# Patient Record
Sex: MALE | Race: BLACK OR AFRICAN AMERICAN | Employment: UNEMPLOYED | URBAN - METROPOLITAN AREA
[De-identification: names, ages, dates, MRNs, and addresses within clinical notes are randomized per-mention and may not be internally consistent; named-entity substitution may affect disease eponyms.]

---

## 2018-09-09 ENCOUNTER — HOSPITAL ENCOUNTER (EMERGENCY)
Age: 33
Discharge: HOME | End: 2018-09-09
Attending: EMERGENCY MEDICINE
Payer: SELF-PAY

## 2018-09-09 ENCOUNTER — APPOINTMENT (OUTPATIENT)
Dept: GENERAL RADIOLOGY | Age: 33
End: 2018-09-09
Attending: EMERGENCY MEDICINE
Payer: SELF-PAY

## 2018-09-09 VITALS — HEART RATE: 106 BPM | DIASTOLIC BLOOD PRESSURE: 67 MMHG | SYSTOLIC BLOOD PRESSURE: 149 MMHG | OXYGEN SATURATION: 96 %

## 2018-09-09 DIAGNOSIS — J20.9 ACUTE BRONCHITIS, UNSPECIFIED ORGANISM: ICD-10-CM

## 2018-09-09 DIAGNOSIS — J45.41 MODERATE PERSISTENT ASTHMA WITH ACUTE EXACERBATION: Primary | ICD-10-CM

## 2018-09-09 LAB
FLUAV AG NPH QL IA: NEGATIVE
FLUBV AG NPH QL IA: NEGATIVE
SPECIMEN SOURCE: NORMAL

## 2018-09-09 PROCEDURE — 94640 AIRWAY INHALATION TREATMENT: CPT

## 2018-09-09 PROCEDURE — 74011250637 HC RX REV CODE- 250/637: Performed by: EMERGENCY MEDICINE

## 2018-09-09 PROCEDURE — 74011000250 HC RX REV CODE- 250: Performed by: EMERGENCY MEDICINE

## 2018-09-09 PROCEDURE — 99284 EMERGENCY DEPT VISIT MOD MDM: CPT | Performed by: EMERGENCY MEDICINE

## 2018-09-09 PROCEDURE — 87804 INFLUENZA ASSAY W/OPTIC: CPT

## 2018-09-09 PROCEDURE — 71046 X-RAY EXAM CHEST 2 VIEWS: CPT

## 2018-09-09 PROCEDURE — 96372 THER/PROPH/DIAG INJ SC/IM: CPT | Performed by: EMERGENCY MEDICINE

## 2018-09-09 PROCEDURE — 74011250636 HC RX REV CODE- 250/636: Performed by: EMERGENCY MEDICINE

## 2018-09-09 RX ORDER — AMOXICILLIN 500 MG/1
500 CAPSULE ORAL 3 TIMES DAILY
Qty: 21 CAP | Refills: 0 | Status: ON HOLD | OUTPATIENT
Start: 2018-09-09 | End: 2022-04-04

## 2018-09-09 RX ORDER — BENZONATATE 200 MG/1
200 CAPSULE ORAL
Qty: 21 CAP | Refills: 0 | Status: SHIPPED | OUTPATIENT
Start: 2018-09-09 | End: 2018-09-16

## 2018-09-09 RX ORDER — IPRATROPIUM BROMIDE AND ALBUTEROL SULFATE 2.5; .5 MG/3ML; MG/3ML
3 SOLUTION RESPIRATORY (INHALATION)
Status: COMPLETED | OUTPATIENT
Start: 2018-09-09 | End: 2018-09-09

## 2018-09-09 RX ORDER — ALBUTEROL SULFATE 0.83 MG/ML
10 SOLUTION RESPIRATORY (INHALATION)
Status: COMPLETED | OUTPATIENT
Start: 2018-09-09 | End: 2018-09-09

## 2018-09-09 RX ORDER — PREDNISONE 20 MG/1
60 TABLET ORAL DAILY
Qty: 15 TAB | Refills: 0 | Status: SHIPPED | OUTPATIENT
Start: 2018-09-09 | End: 2018-09-14

## 2018-09-09 RX ORDER — BENZONATATE 100 MG/1
200 CAPSULE ORAL
Status: COMPLETED | OUTPATIENT
Start: 2018-09-09 | End: 2018-09-09

## 2018-09-09 RX ORDER — ALBUTEROL SULFATE 90 UG/1
2 AEROSOL, METERED RESPIRATORY (INHALATION)
Qty: 1 INHALER | Refills: 0 | OUTPATIENT
Start: 2018-09-09 | End: 2021-10-19

## 2018-09-09 RX ADMIN — ALBUTEROL SULFATE 10 MG: 2.5 SOLUTION RESPIRATORY (INHALATION) at 13:53

## 2018-09-09 RX ADMIN — IPRATROPIUM BROMIDE AND ALBUTEROL SULFATE 3 ML: .5; 3 SOLUTION RESPIRATORY (INHALATION) at 13:18

## 2018-09-09 RX ADMIN — METHYLPREDNISOLONE SODIUM SUCCINATE 125 MG: 125 INJECTION, POWDER, FOR SOLUTION INTRAMUSCULAR; INTRAVENOUS at 14:01

## 2018-09-09 RX ADMIN — BENZONATATE 200 MG: 100 CAPSULE ORAL at 14:01

## 2018-09-09 NOTE — DISCHARGE INSTRUCTIONS
Use inhlaer 2 puffs every 6 hours  1,200mg mucinex DM every 12 hours for a week. Try a sustained release sudafed every morning,  Drink plenty of fluids       Asthma: Your Action Plan  Sample Action Plan    Controller medicine action plan  Fill in the blank spaces and boxes that apply for all sections. · Name of your controller medicine:  ¨ ____________________________________________  · How much of this medicine do you take? ¨ ____________________________________________  · How often do you take this medicine? ¨ ____________________________________________  · Other instructions? ¨ ____________________________________________  Quick-relief medicine action plan  · Name of your quick-relief medicine:  ¨ ____________________________________________  · How much of this medicine do you take? ¨ ____________________________________________  · How often do you take this medicine? ¨ ____________________________________________  Asthma Zones  GREEN ZONE: This is where you want to be! Green zone symptoms  · You have no shortness of breath or chest tightness. You are not coughing or wheezing. · You can do all of your usual activities. · You sleep well at night. Green zone peak flow (if you use a peak flow meter)  · ______ or more (80% or more of your personal best)  Green zone actions (Check the boxes and fill in the blank spaces that apply.)  [ ] You take your controller medicine(s) every day. [ ] Malina Rubio are staying away from your asthma triggers. [ ] You take quick-relief medicine (called _____________________) ______ minutes before exercise. YELLOW ZONE: Your asthma is getting worse. Yellow zone symptoms  · You are short of breath or have chest tightness. You are coughing or wheezing. · You have symptoms that keep you up at night. · You can do some, but not all, of your usual activities.   Yellow zone peak flow (if you use a peak flow meter)  · ______ to ______ (50% to 79% of your personal best)  Yellow zone actions (Check the boxes and fill in the blank spaces that apply.)  [ ] Take _____ puff(s) of quick-relief medicine called ______________________. Repeat _____ times. [ ] If your symptoms don't get better or your peak flow has not returned to the green zone in 1 hour, then:  · [ ] Take _____ puff(s) of medicine called ______________________. Take it ____ times a day. · [ ] Begin or increase treatment with corticosteroid pills. Take ______ mg of medicine called ____________________________ every __________. · [ ] Call your doctor at this number: ____________________. RED ZONE: Danger! Red zone symptoms  · You are very short of breath. · You can't do your usual activities. · Quick-relief medicine doesn't help. Or your symptoms don't get better after 24 hours in the yellow zone. Red zone peak flow (if you use a peak flow meter)  · Less than _______ (less than 50% of your personal best)  Red zone actions (Check the boxes and fill in the blank spaces that apply.)  [ ] Take _____ puff(s) of quick-relief medicine called ____________________________. Repeat ______ times. [ ] Begin or increase treatment with corticosteroid pills. Take ________ mg now. [ ] Call your doctor at this number: _________________. If you can't contact your doctor, go to the emergency department. Call 911 or ___________________. [ ] Other numbers you might call are: ___________________________________. When should you call for help? Call 911 anytime you think you may need emergency care. For example, call if:  · You have severe trouble breathing. Call your doctor now or seek immediate medical care if:  · You are in the red zone of your asthma action plan. · You've used your quick-relief medicine but are still having trouble breathing. · You cough up blood. · You have new or worse trouble breathing. · You cough up dark brown or bloody mucus (sputum).   Watch closely for changes in your health, and be sure to contact your doctor if:  · You need to use quick-relief medicine more than 2 days each week (unless it's just for exercise). · Your coughing and wheezing get worse. Follow-up care is a key part of your treatment and safety. Be sure to make and go to all appointments, and call your doctor if you are having problems. It's also a good idea to know your test results and keep a list of the medicines you take. Where can you learn more? Go to http://rama-cara.info/. Enter 08 04 [id number] in the search box to learn more about \"Asthma: Your Action Plan. \"  Current as of: December 6, 2017  Content Version: 11.7  © 9846-4004 [employer], [employer]. Care instructions adapted under license by [employer] (which disclaims liability or warranty for this information). If you have questions about a medical condition or this instruction, always ask your healthcare professional. [address] any warranty or liability for your use of this information.

## 2018-09-09 NOTE — ED NOTES
I have reviewed discharge instructions with the patient. The patient verbalized understanding. Patient left ED via Discharge Method: ambulatory to Home with family Opportunity for questions and clarification provided. Patient given 4 scripts. To continue your aftercare when you leave the hospital, you may receive an automated call from our care team to check in on how you are doing. This is a free service and part of our promise to provide the best care and service to meet your aftercare needs.  If you have questions, or wish to unsubscribe from this service please call [phone]. Thank you for Choosing our [facility] Emergency Department.

## 2018-09-09 NOTE — LETTER
[address] [facility] EMERGENCY DEPT [facility] [address] [id number] [phone] Work/School Note Date: 9/9/2018 To Whom It May concern: 
 
Josefa Kee was seen and treated today in the emergency room by the following provider(s): 
Attending Provider: Estephania Mann MD. Josefa Kee may return to work on 9/11/18, please excuse Monday as needed.  
 
Sincerely, 
 
 
 
 
Estephania Mann MD

## 2018-09-09 NOTE — ED PROVIDER NOTES
Patient is a 34 y.o. male presenting with wheezing. The history is provided by the patient. Wheezing This is a new problem. The current episode started more than 2 days ago. The problem occurs constantly. The problem has been gradually worsening. Associated symptoms include rhinorrhea, sore throat and cough. Pertinent negatives include no chest pain, no fever, no abdominal pain, no vomiting, no diarrhea, no headaches, no sputum production and no rash. Associated symptoms comments: Chills and body aches. He has tried nothing for the symptoms. The treatment provided no relief. His past medical history is significant for asthma. Past Medical History:  
Diagnosis Date  Asthma History reviewed. No pertinent surgical history. History reviewed. No pertinent family history. Social History Social History  Marital status:  Spouse name: N/A  
 Number of children: N/A  
 Years of education: N/A Occupational History  Not on file. Social History Main Topics  Smoking status: Not on file  Smokeless tobacco: Not on file  Alcohol use Not on file  Drug use: Not on file  Sexual activity: Not on file Other Topics Concern  Not on file Social History Narrative  No narrative on file ALLERGIES: Singulair [montelukast] Review of Systems Constitutional: Positive for chills and fatigue. Negative for fever. HENT: Positive for rhinorrhea and sore throat. Eyes: Negative for discharge and redness. Respiratory: Positive for cough, shortness of breath and wheezing. Negative for sputum production. Cardiovascular: Negative for chest pain and palpitations. Gastrointestinal: Negative for abdominal pain, diarrhea, nausea and vomiting. Musculoskeletal: Positive for arthralgias and myalgias. Skin: Negative for rash. Neurological: Negative for dizziness and headaches. All other systems reviewed and are negative. Vitals: 09/09/18 1319 09/09/18 1353 09/09/18 1456 BP:   149/67 Pulse:   (!) 106 SpO2: 94% 95% 96% Physical Exam  
Constitutional: He is oriented to person, place, and time. He appears well-developed and well-nourished. No distress. HENT:  
Head: Normocephalic and atraumatic. Mouth/Throat: Oropharynx is clear and moist. No oropharyngeal exudate. Eyes: Conjunctivae and EOM are normal. Pupils are equal, round, and reactive to light. Right eye exhibits no discharge. Left eye exhibits no discharge. No scleral icterus. Neck: Normal range of motion. Neck supple. Cardiovascular: Normal rate, regular rhythm and normal heart sounds. Exam reveals no gallop. No murmur heard. Pulmonary/Chest: Effort normal. No respiratory distress. He has wheezes. He has no rales. Moderate coarse wheezing despite a breathing treatment in triage Abdominal: Soft. Bowel sounds are normal. There is no tenderness. There is no guarding. Musculoskeletal: Normal range of motion. He exhibits no edema. Neurological: He is alert and oriented to person, place, and time. He exhibits normal muscle tone. cni 2-12 grossly Skin: Skin is warm and dry. He is not diaphoretic. Psychiatric: He has a normal mood and affect. His behavior is normal.  
Nursing note and vitals reviewed. MDM Number of Diagnoses or Management Options Acute bronchitis, unspecified organism: Moderate persistent asthma with acute exacerbation:  
Diagnosis management comments: Medical decision making note: 
Bronchitis with asthma exacerbation. Patient has been out of his beta agonist inhaler 4 days. Chest x-ray negative for pneumonia, symptoms markedly improved after IM Solu-Medrol and 10 mg continuous albuterol. Home with cough pearls, antibiotics, Ventolin inhaler refill This concludes the \"medical decision making note\" part of this emergency department visit note. ED Course Procedures

## 2020-10-26 ENCOUNTER — HOSPITAL ENCOUNTER (EMERGENCY)
Age: 35
Discharge: HOME | End: 2020-10-26
Attending: EMERGENCY MEDICINE

## 2020-10-26 VITALS
RESPIRATION RATE: 30 BRPM | DIASTOLIC BLOOD PRESSURE: 94 MMHG | OXYGEN SATURATION: 97 % | SYSTOLIC BLOOD PRESSURE: 175 MMHG | HEART RATE: 111 BPM | TEMPERATURE: 98.3 F | WEIGHT: 302 LBS

## 2020-10-26 DIAGNOSIS — J45.41 MODERATE PERSISTENT ASTHMA WITH ACUTE EXACERBATION: Primary | ICD-10-CM

## 2020-10-26 DIAGNOSIS — I10 HYPERTENSION, UNSPECIFIED TYPE: ICD-10-CM

## 2020-10-26 PROCEDURE — 99283 EMERGENCY DEPT VISIT LOW MDM: CPT

## 2020-10-26 PROCEDURE — 94640 AIRWAY INHALATION TREATMENT: CPT

## 2020-10-26 PROCEDURE — 74011000250 HC RX REV CODE- 250

## 2020-10-26 PROCEDURE — 77030013140 HC MSK NEB VYRM -A

## 2020-10-26 PROCEDURE — 74011000250 HC RX REV CODE- 250: Performed by: EMERGENCY MEDICINE

## 2020-10-26 PROCEDURE — 74011636637 HC RX REV CODE- 636/637: Performed by: EMERGENCY MEDICINE

## 2020-10-26 RX ORDER — BENZONATATE 200 MG/1
200 CAPSULE ORAL
Qty: 21 CAP | Refills: 0 | Status: SHIPPED | OUTPATIENT
Start: 2020-10-26 | End: 2020-11-02

## 2020-10-26 RX ORDER — IPRATROPIUM BROMIDE AND ALBUTEROL SULFATE 2.5; .5 MG/3ML; MG/3ML
SOLUTION RESPIRATORY (INHALATION)
Status: DISCONTINUED
Start: 2020-10-26 | End: 2020-10-26 | Stop reason: HOSPADM

## 2020-10-26 RX ORDER — MAGNESIUM SULFATE HEPTAHYDRATE 40 MG/ML
2 INJECTION, SOLUTION INTRAVENOUS
Status: DISCONTINUED | OUTPATIENT
Start: 2020-10-26 | End: 2020-10-26 | Stop reason: HOSPADM

## 2020-10-26 RX ORDER — IPRATROPIUM BROMIDE AND ALBUTEROL SULFATE 2.5; .5 MG/3ML; MG/3ML
3 SOLUTION RESPIRATORY (INHALATION)
Status: COMPLETED | OUTPATIENT
Start: 2020-10-26 | End: 2020-10-26

## 2020-10-26 RX ORDER — ALBUTEROL SULFATE 90 UG/1
2 AEROSOL, METERED RESPIRATORY (INHALATION)
Qty: 1 INHALER | Refills: 0 | OUTPATIENT
Start: 2020-10-26 | End: 2021-10-19

## 2020-10-26 RX ORDER — IPRATROPIUM BROMIDE AND ALBUTEROL SULFATE 2.5; .5 MG/3ML; MG/3ML
SOLUTION RESPIRATORY (INHALATION)
Status: COMPLETED
Start: 2020-10-26 | End: 2020-10-26

## 2020-10-26 RX ORDER — LISINOPRIL 20 MG/1
20 TABLET ORAL ONCE
Status: DISCONTINUED | OUTPATIENT
Start: 2020-10-26 | End: 2020-10-26 | Stop reason: HOSPADM

## 2020-10-26 RX ORDER — PREDNISONE 20 MG/1
60 TABLET ORAL DAILY
Qty: 15 TAB | Refills: 0 | Status: SHIPPED | OUTPATIENT
Start: 2020-10-26 | End: 2020-10-31

## 2020-10-26 RX ADMIN — IPRATROPIUM BROMIDE AND ALBUTEROL SULFATE 3 ML: .5; 3 SOLUTION RESPIRATORY (INHALATION) at 18:51

## 2020-10-26 RX ADMIN — IPRATROPIUM BROMIDE AND ALBUTEROL SULFATE 3 ML: .5; 3 SOLUTION RESPIRATORY (INHALATION) at 19:55

## 2020-10-26 RX ADMIN — IPRATROPIUM BROMIDE AND ALBUTEROL SULFATE 3 ML: 2.5; .5 SOLUTION RESPIRATORY (INHALATION) at 18:51

## 2020-10-26 RX ADMIN — PREDNISONE 60 MG: 10 TABLET ORAL at 19:55

## 2020-10-26 NOTE — ED NOTES
Pt still waiting on room and states that he is not able to breath well. MD Consulted for another breathing treatment.  Noted wheezing

## 2020-10-26 NOTE — ED TRIAGE NOTES
Patient ambulatory to triage without difficulty; mask in place. Patient reports hx of asthma and reports having issues with asthma for the past two weeks. Patient reports using albuterol treatments at home without relief. Expiratory wheezing noted upon auscultation.

## 2020-10-27 NOTE — DISCHARGE INSTRUCTIONS
Follow-up with the [facility], or [facility] [place], or Dr. Nicol Moralez    Use inhlaer 2 puffs every 6 hours, no more than 5 sessions in a day    1,200mg mucinex DM every 12 hours for a week. Try a sustained release sudafed every morning,  Drink plenty of fluids    Return to the ER if worse in any way      Patient Education        Asthma Attack: Care Instructions  Your Care Instructions     During an asthma attack, the airways swell and narrow. This makes it hard to breathe. Severe asthma attacks can be life-threatening, but you can help prevent them by keeping your asthma under control and treating symptoms before they get bad. Symptoms include being short of breath, having chest tightness, coughing, and wheezing. Noting and treating these symptoms can also help you avoid future trips to the emergency room. The doctor has checked you carefully, but problems can develop later. If you notice any problems or new symptoms, get medical treatment right away. Follow-up care is a key part of your treatment and safety. Be sure to make and go to all appointments, and call your doctor if you are having problems. It's also a good idea to know your test results and keep a list of the medicines you take. How can you care for yourself at home? · Follow your asthma action plan to prevent and treat attacks. If you don't have an asthma action plan, work with your doctor to create one. · Take your asthma medicines exactly as prescribed. Talk to your doctor right away if you have any questions about how to take them. ? Use your quick-relief medicine when you have symptoms of an attack. Quick-relief medicine is usually an albuterol inhaler. Some people need to use quick-relief medicine before they exercise. ? Take your controller medicine every day, not just when you have symptoms. Controller medicine is usually an inhaled corticosteroid. The goal is to prevent problems before they occur.  Don't use your controller medicine to treat an attack that has already started. It doesn't work fast enough to help. ? If your doctor prescribed corticosteroid pills to use during an attack, take them exactly as prescribed. It may take hours for the pills to work, but they may make the episode shorter and help you breathe better. ? Keep your quick-relief medicine with you at all times. · Talk to your doctor before using other medicines. Some medicines, such as aspirin, can cause asthma attacks in some people. · If you have a peak flow meter, use it to check how well you are breathing. This can help you predict when an asthma attack is going to occur. Then you can take medicine to prevent the asthma attack or make it less severe. · Do not smoke or allow others to smoke around you. Avoid smoky places. Smoking makes asthma worse. If you need help quitting, talk to your doctor about stop-smoking programs and medicines. These can increase your chances of quitting for good. · Learn what triggers an asthma attack for you, and avoid the triggers when you can. Common triggers include colds, smoke, air pollution, dust, pollen, mold, pets, cockroaches, stress, and cold air. · Avoid colds and the flu. Get a pneumococcal vaccine shot. If you have had one before, ask your doctor if you need a second dose. Get a flu vaccine every fall. If you must be around people with colds or the flu, wash your hands often. When should you call for help? Call 911 anytime you think you may need emergency care. For example, call if:    · You have severe trouble breathing. Call your doctor now or seek immediate medical care if:    · Your symptoms do not get better after you have followed your asthma action plan.     · You have new or worse trouble breathing.     · Your coughing and wheezing get worse.     · You cough up dark brown or bloody mucus (sputum).     · You have a new or higher fever.    Watch closely for changes in your health, and be sure to contact your doctor if:    · You need to use quick-relief medicine on more than 2 days a week (unless it is just for exercise).     · You cough more deeply or more often, especially if you notice more mucus or a change in the color of your mucus.     · You are not getting better as expected. Where can you learn more? Go to http://rama-cara.info/  Enter L002 in the search box to learn more about \"Asthma Attack: Care Instructions. \"  Current as of: February 24, 2020               Content Version: 12.6  © 2006-2020 [employer]. Care instructions adapted under license by [employer] (which disclaims liability or warranty for this information). If you have questions about a medical condition or this instruction, always ask your healthcare professional. [address] any warranty or liability for your use of this information.

## 2020-10-27 NOTE — ED NOTES
Upon RN speaking to patient, patient states \"I have to go, my son is driving my car. \" Patient was requesting prescription from MD. Dr. Susanna Nichols notified, but states that patient needs labs done prior to Rx being written. Patient verbalized understanding but states that he had to get back home. AMA paperwork filled out by MD and discussed with patient. Patient offered no questions. Verbalized understanding of all risks.

## 2021-10-19 ENCOUNTER — HOSPITAL ENCOUNTER (EMERGENCY)
Age: 36
Discharge: HOME | End: 2021-10-19
Attending: EMERGENCY MEDICINE

## 2021-10-19 ENCOUNTER — APPOINTMENT (OUTPATIENT)
Dept: GENERAL RADIOLOGY | Age: 36
End: 2021-10-19
Attending: EMERGENCY MEDICINE

## 2021-10-19 VITALS
BODY MASS INDEX: 46.65 KG/M2 | RESPIRATION RATE: 17 BRPM | OXYGEN SATURATION: 100 % | WEIGHT: 315 LBS | DIASTOLIC BLOOD PRESSURE: 82 MMHG | HEIGHT: 69 IN | TEMPERATURE: 97.9 F | HEART RATE: 89 BPM | SYSTOLIC BLOOD PRESSURE: 124 MMHG

## 2021-10-19 DIAGNOSIS — J20.9 ACUTE BRONCHITIS, UNSPECIFIED ORGANISM: ICD-10-CM

## 2021-10-19 DIAGNOSIS — J45.41 MODERATE PERSISTENT ASTHMA WITH ACUTE EXACERBATION: Primary | ICD-10-CM

## 2021-10-19 LAB
COVID-19 RAPID TEST, COVR: NOT DETECTED
SOURCE, COVRS: NORMAL

## 2021-10-19 PROCEDURE — 74011636637 HC RX REV CODE- 636/637: Performed by: EMERGENCY MEDICINE

## 2021-10-19 PROCEDURE — 87635 SARS-COV-2 COVID-19 AMP PRB: CPT

## 2021-10-19 PROCEDURE — 99282 EMERGENCY DEPT VISIT SF MDM: CPT

## 2021-10-19 PROCEDURE — 71045 X-RAY EXAM CHEST 1 VIEW: CPT

## 2021-10-19 PROCEDURE — 74011000250 HC RX REV CODE- 250: Performed by: EMERGENCY MEDICINE

## 2021-10-19 RX ORDER — ALBUTEROL SULFATE 0.83 MG/ML
2.5 SOLUTION RESPIRATORY (INHALATION)
Qty: 30 NEBULE | Refills: 0 | Status: ON HOLD | OUTPATIENT
Start: 2021-10-19 | End: 2022-04-04

## 2021-10-19 RX ORDER — AZITHROMYCIN 250 MG/1
TABLET, FILM COATED ORAL
Qty: 6 TABLET | Refills: 0 | Status: ON HOLD | OUTPATIENT
Start: 2021-10-19 | End: 2022-04-04

## 2021-10-19 RX ORDER — IPRATROPIUM BROMIDE AND ALBUTEROL SULFATE 2.5; .5 MG/3ML; MG/3ML
3 SOLUTION RESPIRATORY (INHALATION)
Status: COMPLETED | OUTPATIENT
Start: 2021-10-19 | End: 2021-10-19

## 2021-10-19 RX ORDER — PREDNISONE 20 MG/1
60 TABLET ORAL DAILY
Qty: 12 TABLET | Refills: 0 | Status: SHIPPED | OUTPATIENT
Start: 2021-10-19 | End: 2021-10-23

## 2021-10-19 RX ORDER — ALBUTEROL SULFATE 90 UG/1
2 AEROSOL, METERED RESPIRATORY (INHALATION)
Qty: 1 EACH | Refills: 0 | Status: SHIPPED | OUTPATIENT
Start: 2021-10-19 | End: 2022-04-13

## 2021-10-19 RX ADMIN — IPRATROPIUM BROMIDE AND ALBUTEROL SULFATE 3 ML: .5; 3 SOLUTION RESPIRATORY (INHALATION) at 22:30

## 2021-10-19 RX ADMIN — PREDNISONE 60 MG: 10 TABLET ORAL at 21:23

## 2021-10-19 RX ADMIN — IPRATROPIUM BROMIDE AND ALBUTEROL SULFATE 3 ML: .5; 3 SOLUTION RESPIRATORY (INHALATION) at 20:32

## 2021-10-19 NOTE — ED TRIAGE NOTES
Arrives with face mask in place. Reports shortness of breath r/t asthma. Onset of symptoms including shortness of breath and productive cough with white sputum Friday. Denies fever/chills. Denies smoker. Out of inhalers x2 weeks.

## 2021-10-20 NOTE — ED PROVIDER NOTES
Patient with history of asthma. Denies any other medical problems. For the past 5 or 6 days has been having cough congestion and sputum production. Has increased wheezing and shortness of breath. Has been trying albuterol nebulizers without relief. Denies any chest pain. With symptoms getting worse came in for evaluation. Patient had one [employer] vaccination shot. Has not had any others. The history is provided by the patient. No  was used. Wheezing   This is a new problem. The current episode started more than 2 days ago. The problem occurs daily. The problem has been gradually worsening. Associated symptoms include cough and sputum production. Pertinent negatives include no chest pain, no fever, no abdominal pain, no vomiting, no diarrhea, no dysuria, no headaches, no rhinorrhea, no sore throat, no neck pain and no rash. He has tried beta-agonist inhalers for the symptoms. The treatment provided mild relief. His past medical history is significant for asthma. Past Medical History:   Diagnosis Date    Asthma        No past surgical history on file. No family history on file. Social History     Socioeconomic History    Marital status:      Spouse name: Not on file    Number of children: Not on file    Years of education: Not on file    Highest education level: Not on file   Occupational History    Not on file   Tobacco Use    Smoking status: Not on file   Substance and Sexual Activity    Alcohol use: Not on file    Drug use: Not on file    Sexual activity: Not on file   Other Topics Concern    Not on file   Social History Narrative    Not on file     Social Determinants of Health     Financial Resource Strain:     Difficulty of Paying Living Expenses:    Food Insecurity:     Worried About Running Out of Food in the Last Year:     [address] in the Last Year:    Transportation Needs:     Lack of Transportation (Medical):      Lack of Transportation (Non-Medical):    Physical Activity:     Days of Exercise per Week:     Minutes of Exercise per Session:    Stress:     Feeling of Stress :    Social Connections:     Frequency of Communication with Friends and Family:     Frequency of Social Gatherings with Friends and Family:     Attends Anabaptism Services:     Active Member of Clubs or Organizations:     Attends Club or Organization Meetings:     Marital Status:    Intimate Partner Violence:     Fear of Current or Ex-Partner:     Emotionally Abused:     Physically Abused:     Sexually Abused: ALLERGIES: Singulair [montelukast]    Review of Systems   Constitutional: Negative for chills and fever. HENT: Positive for congestion. Negative for rhinorrhea and sore throat. Eyes: Negative for pain and redness. Respiratory: Positive for cough, sputum production, shortness of breath and wheezing. Negative for chest tightness. Cardiovascular: Negative for chest pain and leg swelling. Gastrointestinal: Negative for abdominal pain, diarrhea, nausea and vomiting. Genitourinary: Negative for dysuria and hematuria. Musculoskeletal: Negative for back pain, gait problem, neck pain and neck stiffness. Skin: Negative for color change and rash. Neurological: Negative for weakness, numbness and headaches. Vitals:    10/19/21 1955   BP: (!) 173/99   Pulse: (!) 110   Resp: 24   Temp: 98.3 °F (36.8 °C)   SpO2: 94%   Weight: 147.4 kg (325 lb)   Height: 5' 9\" (1.753 m)            Physical Exam  Constitutional:       Appearance: Normal appearance. He is well-developed. HENT:      Head: Normocephalic and atraumatic. Cardiovascular:      Rate and Rhythm: Regular rhythm. Tachycardia present. Pulmonary:      Effort: Respiratory distress present. Breath sounds: Wheezing present. Abdominal:      General: Bowel sounds are normal.      Palpations: Abdomen is soft. Tenderness: There is no abdominal tenderness. Musculoskeletal:         General: No swelling. Normal range of motion. Cervical back: Normal range of motion and neck supple. Skin:     General: Skin is warm and dry. Neurological:      Mental Status: He is alert and oriented to person, place, and time. MDM  Number of Diagnoses or Management Options  Diagnosis management comments: Patient much improved here after DuoNeb and steroids. Will discharge with meds at home and follow-up. Amount and/or Complexity of Data Reviewed  Clinical lab tests: ordered and reviewed  Tests in the radiology section of CPT®: ordered and reviewed  Tests in the medicine section of CPT®: ordered and reviewed    Patient Progress  Patient progress: stable         Procedures        Results Include:    Recent Results (from the past 24 hour(s))   COVID-19 RAPID TEST    Collection Time: 10/19/21  9:24 PM   Result Value Ref Range    Specimen source NASAL      COVID-19 rapid test Not detected NOTD       XR CHEST PORT (Final result)  Result time 10/19/21 21:25:07  Final result by Viki Dang MD (10/19/21 21:25:07)                Impression:    Normal chest x-ray. Narrative:    EXAM: Chest x-ray. INDICATION: Cough. COMPARISON: Prior chest x-ray on September 9, 2018. TECHNIQUE: Single frontal view. FINDINGS: The lungs are clear. The cardiac size, mediastinal contour and   pulmonary vasculature are normal. No pneumothorax or pleural effusion is seen.    The bony structures are intact.

## 2021-10-20 NOTE — ED NOTES
I have reviewed discharge instructions with the patient. The patient verbalized understanding. Patient left ED via Discharge Method: ambulatory to Home with self . Opportunity for questions and clarification provided. Patient given 4 scripts. No e-sign. To continue your aftercare when you leave the hospital, you may receive an automated call from our care team to check in on how you are doing. This is a free service and part of our promise to provide the best care and service to meet your aftercare needs.  If you have questions, or wish to unsubscribe from this service please call [phone]. Thank you for Choosing our [facility] Emergency Department.

## 2022-04-02 ENCOUNTER — HOSPITAL ENCOUNTER (EMERGENCY)
Age: 37
Discharge: HOME | End: 2022-04-02
Attending: STUDENT IN AN ORGANIZED HEALTH CARE EDUCATION/TRAINING PROGRAM

## 2022-04-02 ENCOUNTER — APPOINTMENT (OUTPATIENT)
Dept: GENERAL RADIOLOGY | Age: 37
End: 2022-04-02
Attending: STUDENT IN AN ORGANIZED HEALTH CARE EDUCATION/TRAINING PROGRAM

## 2022-04-02 VITALS
RESPIRATION RATE: 29 BRPM | HEART RATE: 112 BPM | OXYGEN SATURATION: 90 % | TEMPERATURE: 97.1 F | SYSTOLIC BLOOD PRESSURE: 170 MMHG | DIASTOLIC BLOOD PRESSURE: 108 MMHG

## 2022-04-02 DIAGNOSIS — J45.901 ASTHMA WITH ACUTE EXACERBATION, UNSPECIFIED ASTHMA SEVERITY, UNSPECIFIED WHETHER PERSISTENT: Primary | ICD-10-CM

## 2022-04-02 LAB
ALBUMIN SERPL-MCNC: 3.7 G/DL (ref 3.5–5)
ALBUMIN/GLOB SERPL: 1 {RATIO} (ref 1.2–3.5)
ALP SERPL-CCNC: 68 U/L (ref 50–136)
ALT SERPL-CCNC: 34 U/L (ref 12–65)
ANION GAP SERPL CALC-SCNC: 10 MMOL/L (ref 7–16)
AST SERPL-CCNC: 24 U/L (ref 15–37)
BASOPHILS # BLD: 0.1 K/UL (ref 0–0.2)
BASOPHILS NFR BLD: 1 % (ref 0–2)
BILIRUB SERPL-MCNC: 0.6 MG/DL (ref 0.2–1.1)
BUN SERPL-MCNC: 9 MG/DL (ref 6–23)
CALCIUM SERPL-MCNC: 9.1 MG/DL (ref 8.3–10.4)
CHLORIDE SERPL-SCNC: 107 MMOL/L (ref 98–107)
CO2 SERPL-SCNC: 27 MMOL/L (ref 21–32)
COVID-19 RAPID TEST, COVR: NOT DETECTED
CREAT SERPL-MCNC: 0.9 MG/DL (ref 0.8–1.5)
DIFFERENTIAL METHOD BLD: ABNORMAL
EOSINOPHIL # BLD: 0.3 K/UL (ref 0–0.8)
EOSINOPHIL NFR BLD: 4 % (ref 0.5–7.8)
ERYTHROCYTE [DISTWIDTH] IN BLOOD BY AUTOMATED COUNT: 12.4 % (ref 11.9–14.6)
GLOBULIN SER CALC-MCNC: 3.8 G/DL (ref 2.3–3.5)
GLUCOSE SERPL-MCNC: 96 MG/DL (ref 65–100)
HCT VFR BLD AUTO: 44.2 % (ref 41.1–50.3)
HGB BLD-MCNC: 15.2 G/DL (ref 13.6–17.2)
IMM GRANULOCYTES # BLD AUTO: 0 K/UL (ref 0–0.5)
IMM GRANULOCYTES NFR BLD AUTO: 0 % (ref 0–5)
LYMPHOCYTES # BLD: 2.3 K/UL (ref 0.5–4.6)
LYMPHOCYTES NFR BLD: 28 % (ref 13–44)
MAGNESIUM SERPL-MCNC: 2.1 MG/DL (ref 1.8–2.4)
MCH RBC QN AUTO: 33.9 PG (ref 26.1–32.9)
MCHC RBC AUTO-ENTMCNC: 34.4 G/DL (ref 31.4–35)
MCV RBC AUTO: 98.4 FL (ref 79.6–97.8)
MONOCYTES # BLD: 1 K/UL (ref 0.1–1.3)
MONOCYTES NFR BLD: 12 % (ref 4–12)
NEUTS SEG # BLD: 4.4 K/UL (ref 1.7–8.2)
NEUTS SEG NFR BLD: 54 % (ref 43–78)
NRBC # BLD: 0 K/UL (ref 0–0.2)
PLATELET # BLD AUTO: 397 K/UL (ref 150–450)
PMV BLD AUTO: 9.2 FL (ref 9.4–12.3)
POTASSIUM SERPL-SCNC: 3.6 MMOL/L (ref 3.5–5.1)
PROT SERPL-MCNC: 7.5 G/DL (ref 6.3–8.2)
RBC # BLD AUTO: 4.49 M/UL (ref 4.23–5.6)
SODIUM SERPL-SCNC: 144 MMOL/L (ref 138–145)
SOURCE, COVRS: NORMAL
WBC # BLD AUTO: 8.1 K/UL (ref 4.3–11.1)

## 2022-04-02 PROCEDURE — 85025 COMPLETE CBC W/AUTO DIFF WBC: CPT

## 2022-04-02 PROCEDURE — 74011000250 HC RX REV CODE- 250: Performed by: STUDENT IN AN ORGANIZED HEALTH CARE EDUCATION/TRAINING PROGRAM

## 2022-04-02 PROCEDURE — 71045 X-RAY EXAM CHEST 1 VIEW: CPT

## 2022-04-02 PROCEDURE — 94640 AIRWAY INHALATION TREATMENT: CPT

## 2022-04-02 PROCEDURE — 74011000250 HC RX REV CODE- 250: Performed by: PHYSICIAN ASSISTANT

## 2022-04-02 PROCEDURE — 80053 COMPREHEN METABOLIC PANEL: CPT

## 2022-04-02 PROCEDURE — 87635 SARS-COV-2 COVID-19 AMP PRB: CPT

## 2022-04-02 PROCEDURE — 96365 THER/PROPH/DIAG IV INF INIT: CPT

## 2022-04-02 PROCEDURE — 74011250636 HC RX REV CODE- 250/636: Performed by: STUDENT IN AN ORGANIZED HEALTH CARE EDUCATION/TRAINING PROGRAM

## 2022-04-02 PROCEDURE — 99285 EMERGENCY DEPT VISIT HI MDM: CPT

## 2022-04-02 PROCEDURE — 74011250637 HC RX REV CODE- 250/637: Performed by: STUDENT IN AN ORGANIZED HEALTH CARE EDUCATION/TRAINING PROGRAM

## 2022-04-02 PROCEDURE — 96375 TX/PRO/DX INJ NEW DRUG ADDON: CPT

## 2022-04-02 PROCEDURE — 83735 ASSAY OF MAGNESIUM: CPT

## 2022-04-02 PROCEDURE — 84484 ASSAY OF TROPONIN QUANT: CPT

## 2022-04-02 PROCEDURE — 94664 DEMO&/EVAL PT USE INHALER: CPT

## 2022-04-02 PROCEDURE — 74011250636 HC RX REV CODE- 250/636: Performed by: PHYSICIAN ASSISTANT

## 2022-04-02 RX ORDER — IPRATROPIUM BROMIDE AND ALBUTEROL SULFATE 2.5; .5 MG/3ML; MG/3ML
3 SOLUTION RESPIRATORY (INHALATION)
Status: COMPLETED | OUTPATIENT
Start: 2022-04-02 | End: 2022-04-02

## 2022-04-02 RX ORDER — IBUPROFEN 800 MG/1
800 TABLET ORAL
Qty: 20 TABLET | Refills: 0 | Status: ON HOLD | OUTPATIENT
Start: 2022-04-02 | End: 2022-04-04

## 2022-04-02 RX ORDER — ALBUTEROL SULFATE 0.83 MG/ML
10 SOLUTION RESPIRATORY (INHALATION) CONTINUOUS
Status: DISCONTINUED | OUTPATIENT
Start: 2022-04-02 | End: 2022-04-03 | Stop reason: SDUPTHER

## 2022-04-02 RX ORDER — SODIUM CHLORIDE 0.9 % (FLUSH) 0.9 %
5-10 SYRINGE (ML) INJECTION EVERY 8 HOURS
Status: DISCONTINUED | OUTPATIENT
Start: 2022-04-02 | End: 2022-04-02 | Stop reason: HOSPADM

## 2022-04-02 RX ORDER — ONDANSETRON 2 MG/ML
4 INJECTION INTRAMUSCULAR; INTRAVENOUS
Status: COMPLETED | OUTPATIENT
Start: 2022-04-02 | End: 2022-04-02

## 2022-04-02 RX ORDER — PREDNISONE 20 MG/1
40 TABLET ORAL DAILY
Qty: 8 TABLET | Refills: 0 | Status: ON HOLD | OUTPATIENT
Start: 2022-04-02 | End: 2022-04-04

## 2022-04-02 RX ORDER — ACETAMINOPHEN 500 MG
1000 TABLET ORAL
Status: COMPLETED | OUTPATIENT
Start: 2022-04-02 | End: 2022-04-02

## 2022-04-02 RX ORDER — SODIUM CHLORIDE 0.9 % (FLUSH) 0.9 %
5-10 SYRINGE (ML) INJECTION AS NEEDED
Status: DISCONTINUED | OUTPATIENT
Start: 2022-04-02 | End: 2022-04-02 | Stop reason: HOSPADM

## 2022-04-02 RX ORDER — MAGNESIUM SULFATE HEPTAHYDRATE 40 MG/ML
2 INJECTION, SOLUTION INTRAVENOUS
Status: COMPLETED | OUTPATIENT
Start: 2022-04-02 | End: 2022-04-02

## 2022-04-02 RX ADMIN — ALBUTEROL SULFATE 10 MG: 2.5 SOLUTION RESPIRATORY (INHALATION) at 23:52

## 2022-04-02 RX ADMIN — IPRATROPIUM BROMIDE AND ALBUTEROL SULFATE 3 ML: .5; 3 SOLUTION RESPIRATORY (INHALATION) at 23:23

## 2022-04-02 RX ADMIN — ACETAMINOPHEN 1000 MG: 500 TABLET, FILM COATED ORAL at 17:29

## 2022-04-02 RX ADMIN — METHYLPREDNISOLONE SODIUM SUCCINATE 125 MG: 125 INJECTION, POWDER, FOR SOLUTION INTRAMUSCULAR; INTRAVENOUS at 16:40

## 2022-04-02 RX ADMIN — IPRATROPIUM BROMIDE AND ALBUTEROL SULFATE 3 ML: .5; 3 SOLUTION RESPIRATORY (INHALATION) at 17:04

## 2022-04-02 RX ADMIN — ONDANSETRON 4 MG: 2 INJECTION INTRAMUSCULAR; INTRAVENOUS at 16:25

## 2022-04-02 RX ADMIN — MAGNESIUM SULFATE HEPTAHYDRATE 2 G: 40 INJECTION, SOLUTION INTRAVENOUS at 16:41

## 2022-04-02 NOTE — ED NOTES
I have reviewed discharge instructions with the patient. The patient verbalized understanding. Patient left ED via Discharge Method: ambulatory to Home with . Opportunity for questions and clarification provided. Patient given 2 scripts. To continue your aftercare when you leave the hospital, you may receive an automated call from our care team to check in on how you are doing. This is a free service and part of our promise to provide the best care and service to meet your aftercare needs.  If you have questions, or wish to unsubscribe from this service please call [phone]. Thank you for Choosing our [facility] Emergency Department.

## 2022-04-02 NOTE — ED TRIAGE NOTES
Patient arrives ambulatory to triage with mask in place. Reports wheezing that began this morning. Reports taking inhaler and breathing treatments without relief.

## 2022-04-02 NOTE — DISCHARGE INSTRUCTIONS
Take the prednisone prescribed as directed. Use the ibuprofen as needed for body aches, continue breathing treatments as discussed. If you would require any treatments more than every 4 hours return to this department. Arrange follow-up with your primary care provider this week.

## 2022-04-02 NOTE — ED PROVIDER NOTES
19-year-old male patient with history of asthma presents to the ER with reports of worsening wheezing that started last evening. Is been able to improve this at home with breathing treatments. Shortly after arriving to this department, patient became nauseous and vomited once. He denies history of similar symptoms with previous asthma attacks. He states his wheezing is similar to previous asthma exacerbations however. He reports no fever, chills, chest pain pressure or significant tightness. He denies known sick contacts or recent travel. He did receive 1 Covid shock           Past Medical History:   Diagnosis Date    Asthma        No past surgical history on file. No family history on file. Social History     Socioeconomic History    Marital status:      Spouse name: Not on file    Number of children: Not on file    Years of education: Not on file    Highest education level: Not on file   Occupational History    Not on file   Tobacco Use    Smoking status: Not on file    Smokeless tobacco: Not on file   Substance and Sexual Activity    Alcohol use: Not on file    Drug use: Not on file    Sexual activity: Not on file   Other Topics Concern    Not on file   Social History Narrative    Not on file     Social Determinants of Health     Financial Resource Strain:     Difficulty of Paying Living Expenses: Not on file   Food Insecurity:     Worried About Running Out of Food in the Last Year: Not on file    Sue of Food in the Last Year: Not on file   Transportation Needs:     Lack of Transportation (Medical): Not on file    Lack of Transportation (Non-Medical):  Not on file   Physical Activity:     Days of Exercise per Week: Not on file    Minutes of Exercise per Session: Not on file   Stress:     Feeling of Stress : Not on file   Social Connections:     Frequency of Communication with Friends and Family: Not on file    Frequency of Social Gatherings with Friends and Family: Not on file    Attends Holiness Services: Not on file    Active Member of Clubs or Organizations: Not on file    Attends Club or Organization Meetings: Not on file    Marital Status: Not on file   Intimate Partner Violence:     Fear of Current or Ex-Partner: Not on file    Emotionally Abused: Not on file    Physically Abused: Not on file    Sexually Abused: Not on file   Housing Stability:     Unable to Pay for Housing in the Last Year: Not on file    Number of [place] in the Last Year: Not on file    Unstable Housing in the Last Year: Not on file         ALLERGIES: Singulair [montelukast]    Review of Systems   Constitutional: Negative for chills, diaphoresis and fever. HENT: Negative for congestion, sneezing and sore throat. Eyes: Negative for visual disturbance. Respiratory: Positive for cough, shortness of breath and wheezing. Negative for chest tightness. Cardiovascular: Negative for chest pain and leg swelling. Gastrointestinal: Positive for nausea and vomiting. Negative for abdominal pain, blood in stool and diarrhea. Endocrine: Negative for polyuria. Genitourinary: Negative for difficulty urinating, dysuria, flank pain, hematuria and urgency. Musculoskeletal: Negative for back pain, myalgias, neck pain and neck stiffness. Skin: Negative for color change and rash. Neurological: Negative for dizziness, syncope, speech difficulty, weakness, light-headedness, numbness and headaches. Psychiatric/Behavioral: Negative for behavioral problems. All other systems reviewed and are negative. Vitals:    04/02/22 1624   BP: (!) 138/103   Pulse: (!) 115   Resp: 30   Temp: 97.1 °F (36.2 °C)   SpO2: 95%            Physical Exam  Vitals and nursing note reviewed. Constitutional:       General: He is not in acute distress. Appearance: He is well-developed. He is not diaphoretic. Comments: Alert and oriented to person place and time.   No acute distress, speaks in clear, fluid sentences. HENT:      Head: Normocephalic and atraumatic. Right Ear: External ear normal.      Left Ear: External ear normal.      Nose: Nose normal.   Eyes:      Pupils: Pupils are equal, round, and reactive to light. Cardiovascular:      Rate and Rhythm: Normal rate and regular rhythm. Heart sounds: Normal heart sounds. No murmur heard. No friction rub. No gallop. Pulmonary:      Effort: Pulmonary effort is normal. Tachypnea present. No respiratory distress. Breath sounds: No stridor. Examination of the right-upper field reveals wheezing. Examination of the left-upper field reveals wheezing. Examination of the right-middle field reveals wheezing. Examination of the left-middle field reveals wheezing. Examination of the right-lower field reveals wheezing. Examination of the left-lower field reveals wheezing. Decreased breath sounds and wheezing present. No rhonchi or rales. Comments: Audible wheezing at bedside without the aid of stethoscope. Both inspiratory expiratory wheezing throughout with auscultation. Mild tachypnea  Chest:      Chest wall: No tenderness. Abdominal:      General: There is no distension. Palpations: Abdomen is soft. There is no mass. Tenderness: There is no abdominal tenderness. There is no guarding or rebound. Hernia: No hernia is present. Musculoskeletal:         General: No tenderness or deformity. Normal range of motion. Cervical back: Normal range of motion. Skin:     General: Skin is warm and dry. Neurological:      Mental Status: He is alert and oriented to person, place, and time. Cranial Nerves: No cranial nerve deficit.           MDM       Procedures

## 2022-04-03 ENCOUNTER — HOSPITAL ENCOUNTER (INPATIENT)
Age: 37
LOS: 10 days | Discharge: NURSING, REHAB OR LONG-TERM CARE | DRG: 207 | End: 2022-04-13
Attending: EMERGENCY MEDICINE | Admitting: HOSPITALIST
Payer: COMMERCIAL

## 2022-04-03 ENCOUNTER — HOSPITAL ENCOUNTER (EMERGENCY)
Dept: ULTRASOUND IMAGING | Age: 37
Discharge: HOME | End: 2022-04-03
Attending: EMERGENCY MEDICINE

## 2022-04-03 ENCOUNTER — HOSPITAL ENCOUNTER (EMERGENCY)
Dept: GENERAL RADIOLOGY | Age: 37
Discharge: HOME | End: 2022-04-03
Attending: INTERNAL MEDICINE

## 2022-04-03 ENCOUNTER — HOSPITAL ENCOUNTER (EMERGENCY)
Dept: GENERAL RADIOLOGY | Age: 37
Discharge: HOME | End: 2022-04-03
Attending: PHYSICIAN ASSISTANT

## 2022-04-03 DIAGNOSIS — J45.52 SEVERE PERSISTENT ASTHMA WITH STATUS ASTHMATICUS: ICD-10-CM

## 2022-04-03 DIAGNOSIS — J96.01 ACUTE RESPIRATORY FAILURE WITH HYPOXIA (HCC): ICD-10-CM

## 2022-04-03 DIAGNOSIS — I10 PRIMARY HYPERTENSION: ICD-10-CM

## 2022-04-03 DIAGNOSIS — J10.1 INFLUENZA A: ICD-10-CM

## 2022-04-03 DIAGNOSIS — E66.01 CLASS 3 SEVERE OBESITY WITH SERIOUS COMORBIDITY AND BODY MASS INDEX (BMI) OF 45.0 TO 49.9 IN ADULT, UNSPECIFIED OBESITY TYPE (HCC): Chronic | ICD-10-CM

## 2022-04-03 DIAGNOSIS — J45.51 SEVERE PERSISTENT ASTHMA WITH ACUTE EXACERBATION: Primary | ICD-10-CM

## 2022-04-03 DIAGNOSIS — J15.211 STAPHYLOCOCCUS AUREUS PNEUMONIA (HCC): ICD-10-CM

## 2022-04-03 DIAGNOSIS — J45.51 SEVERE PERSISTENT ASTHMA WITH EXACERBATION: ICD-10-CM

## 2022-04-03 DIAGNOSIS — J96.02 ACUTE RESPIRATORY FAILURE WITH HYPOXIA AND HYPERCAPNIA (HCC): ICD-10-CM

## 2022-04-03 DIAGNOSIS — J96.01 ACUTE RESPIRATORY FAILURE WITH HYPOXIA AND HYPERCAPNIA (HCC): ICD-10-CM

## 2022-04-03 PROBLEM — J45.901 ASTHMA EXACERBATION: Status: ACTIVE | Noted: 2022-04-03

## 2022-04-03 PROBLEM — E66.9 OBESITY: Chronic | Status: ACTIVE | Noted: 2022-04-03

## 2022-04-03 PROBLEM — E66.9 OBESITY: Status: ACTIVE | Noted: 2022-04-03

## 2022-04-03 LAB
ALBUMIN SERPL-MCNC: 3.7 G/DL (ref 3.5–5)
ALBUMIN/GLOB SERPL: 0.9 {RATIO} (ref 1.2–3.5)
ALP SERPL-CCNC: 70 U/L (ref 50–136)
ALT SERPL-CCNC: 39 U/L (ref 12–65)
AMPHET UR QL SCN: NEGATIVE
ANION GAP SERPL CALC-SCNC: 11 MMOL/L (ref 7–16)
ANION GAP SERPL CALC-SCNC: 6 MMOL/L (ref 7–16)
ARTERIAL PATENCY WRIST A: ABNORMAL
ARTERIAL PATENCY WRIST A: ABNORMAL
ARTERIAL PATENCY WRIST A: POSITIVE
AST SERPL-CCNC: 40 U/L (ref 15–37)
ATRIAL RATE: 137 BPM
BARBITURATES UR QL SCN: NEGATIVE
BASE DEFICIT BLD-SCNC: 1.2 MMOL/L
BASE DEFICIT BLD-SCNC: 2.5 MMOL/L
BASE DEFICIT BLD-SCNC: 2.7 MMOL/L
BASE DEFICIT BLD-SCNC: 3 MMOL/L
BASE DEFICIT BLD-SCNC: 3.4 MMOL/L
BASE DEFICIT BLDV-SCNC: 2.4 MMOL/L
BASE EXCESS BLD CALC-SCNC: 0.5 MMOL/L
BASE EXCESS BLD CALC-SCNC: 2.4 MMOL/L
BASOPHILS # BLD: 0 K/UL (ref 0–0.2)
BASOPHILS NFR BLD: 1 % (ref 0–2)
BDY SITE: ABNORMAL
BENZODIAZ UR QL: NEGATIVE
BILIRUB SERPL-MCNC: 0.9 MG/DL (ref 0.2–1.1)
BUN SERPL-MCNC: 8 MG/DL (ref 6–23)
BUN SERPL-MCNC: 9 MG/DL (ref 6–23)
CALCIUM SERPL-MCNC: 8.9 MG/DL (ref 8.3–10.4)
CALCIUM SERPL-MCNC: 9 MG/DL (ref 8.3–10.4)
CALCULATED P AXIS, ECG09: 75 DEGREES
CALCULATED R AXIS, ECG10: 76 DEGREES
CALCULATED T AXIS, ECG11: 20 DEGREES
CANNABINOIDS UR QL SCN: POSITIVE
CHLORIDE SERPL-SCNC: 103 MMOL/L (ref 98–107)
CHLORIDE SERPL-SCNC: 106 MMOL/L (ref 98–107)
CO2 BLD-SCNC: 32 MMOL/L (ref 13–23)
CO2 SERPL-SCNC: 26 MMOL/L (ref 21–32)
CO2 SERPL-SCNC: 28 MMOL/L (ref 21–32)
COCAINE UR QL SCN: NEGATIVE
CREAT SERPL-MCNC: 0.9 MG/DL (ref 0.8–1.5)
CREAT SERPL-MCNC: 1.1 MG/DL (ref 0.8–1.5)
D DIMER PPP FEU-MCNC: 0.55 UG/ML(FEU)
DIAGNOSIS, 93000: NORMAL
DIFFERENTIAL METHOD BLD: ABNORMAL
EOSINOPHIL # BLD: 0 K/UL (ref 0–0.8)
EOSINOPHIL NFR BLD: 0 % (ref 0.5–7.8)
ERYTHROCYTE [DISTWIDTH] IN BLOOD BY AUTOMATED COUNT: 12.8 % (ref 11.9–14.6)
ERYTHROCYTE [DISTWIDTH] IN BLOOD BY AUTOMATED COUNT: 13 % (ref 11.9–14.6)
FIO2, L/MIN - FIO2P: 4
GAS FLOW.O2 O2 DELIVERY SYS: ABNORMAL L/MIN
GLOBULIN SER CALC-MCNC: 3.9 G/DL (ref 2.3–3.5)
GLUCOSE SERPL-MCNC: 171 MG/DL (ref 65–100)
GLUCOSE SERPL-MCNC: 196 MG/DL (ref 65–100)
HCO3 BLD-SCNC: 23.4 MMOL/L (ref 22–26)
HCO3 BLD-SCNC: 26.3 MMOL/L (ref 22–26)
HCO3 BLD-SCNC: 29.4 MMOL/L (ref 22–26)
HCO3 BLD-SCNC: 30.7 MMOL/L (ref 22–26)
HCO3 BLD-SCNC: 31 MMOL/L (ref 22–26)
HCO3 BLD-SCNC: 31.8 MMOL/L (ref 22–26)
HCO3 BLD-SCNC: 32.1 MMOL/L (ref 22–26)
HCO3 BLDV-SCNC: 27.8 MMOL/L (ref 23–28)
HCT VFR BLD AUTO: 44 % (ref 41.1–50.3)
HCT VFR BLD AUTO: 45.1 % (ref 41.1–50.3)
HGB BLD-MCNC: 14.6 G/DL (ref 13.6–17.2)
HGB BLD-MCNC: 15 G/DL (ref 13.6–17.2)
IMM GRANULOCYTES # BLD AUTO: 0 K/UL (ref 0–0.5)
IMM GRANULOCYTES NFR BLD AUTO: 0 % (ref 0–5)
INSPIRATION.DURATION SETTING TIME VENT: 0.75 SEC
INSPIRATION.DURATION SETTING TIME VENT: 0.75 SEC
LYMPHOCYTES # BLD: 0.4 K/UL (ref 0.5–4.6)
LYMPHOCYTES NFR BLD: 5 % (ref 13–44)
MAGNESIUM SERPL-MCNC: 2.1 MG/DL (ref 1.8–2.4)
MCH RBC QN AUTO: 33.5 PG (ref 26.1–32.9)
MCH RBC QN AUTO: 34.3 PG (ref 26.1–32.9)
MCHC RBC AUTO-ENTMCNC: 33.2 G/DL (ref 31.4–35)
MCHC RBC AUTO-ENTMCNC: 33.3 G/DL (ref 31.4–35)
MCV RBC AUTO: 100.9 FL (ref 79.6–97.8)
MCV RBC AUTO: 103.2 FL (ref 79.6–97.8)
METHADONE UR QL: NEGATIVE
MONOCYTES # BLD: 0.2 K/UL (ref 0.1–1.3)
MONOCYTES NFR BLD: 2 % (ref 4–12)
NEUTS SEG # BLD: 7.6 K/UL (ref 1.7–8.2)
NEUTS SEG NFR BLD: 92 % (ref 43–78)
NRBC # BLD: 0 K/UL (ref 0–0.2)
NRBC # BLD: 0 K/UL (ref 0–0.2)
O2/TOTAL GAS SETTING VFR VENT: 100 %
O2/TOTAL GAS SETTING VFR VENT: 100 %
O2/TOTAL GAS SETTING VFR VENT: 60 %
OPIATES UR QL: POSITIVE
P-R INTERVAL, ECG05: 122 MS
PAW @ MEAN EXP FLOW ON VENT: 19 CMH2O
PAW @ MEAN EXP FLOW ON VENT: 20 CMH2O
PCO2 BLD: 106.1 MMHG (ref 35–45)
PCO2 BLD: 112.8 MMHG (ref 35–45)
PCO2 BLD: 47.5 MMHG (ref 35–45)
PCO2 BLD: 62 MMHG (ref 35–45)
PCO2 BLD: 74.2 MMHG (ref 35–45)
PCO2 BLD: 76.4 MMHG (ref 35–45)
PCO2 BLD: 78.8 MMHG (ref 35–45)
PCO2 BLDV: 73.3 MMHG (ref 41–51)
PCP UR QL: NEGATIVE
PEEP RESPIRATORY: 10 CMH2O
PEEP RESPIRATORY: 8 CMH2O
PH BLD: 7.06 [PH] (ref 7.35–7.45)
PH BLD: 7.07 [PH] (ref 7.35–7.45)
PH BLD: 7.19 [PH] (ref 7.35–7.45)
PH BLD: 7.2 [PH] (ref 7.35–7.45)
PH BLD: 7.24 [PH] (ref 7.35–7.45)
PH BLD: 7.25 [PH] (ref 7.35–7.45)
PH BLD: 7.3 [PH] (ref 7.35–7.45)
PH BLDV: 7.19 [PH] (ref 7.32–7.42)
PIP ISTAT,IPIP: 42
PIP ISTAT,IPIP: 47
PLATELET # BLD AUTO: 387 K/UL (ref 150–450)
PLATELET # BLD AUTO: 392 K/UL (ref 150–450)
PMV BLD AUTO: 9.3 FL (ref 9.4–12.3)
PMV BLD AUTO: 9.8 FL (ref 9.4–12.3)
PO2 BLD: 115 MMHG (ref 75–100)
PO2 BLD: 452 MMHG (ref 75–100)
PO2 BLD: 57 MMHG (ref 75–100)
PO2 BLD: 61 MMHG (ref 75–100)
PO2 BLD: 73 MMHG (ref 75–100)
PO2 BLD: 75 MMHG (ref 75–100)
PO2 BLD: 78 MMHG (ref 75–100)
PO2 BLDV: 84 MMHG
POTASSIUM SERPL-SCNC: 3.5 MMOL/L (ref 3.5–5.1)
POTASSIUM SERPL-SCNC: 4.4 MMOL/L (ref 3.5–5.1)
PROT SERPL-MCNC: 7.6 G/DL (ref 6.3–8.2)
Q-T INTERVAL, ECG07: 304 MS
QRS DURATION, ECG06: 86 MS
QTC CALCULATION (BEZET), ECG08: 459 MS
RBC # BLD AUTO: 4.36 M/UL (ref 4.23–5.6)
RBC # BLD AUTO: 4.37 M/UL (ref 4.23–5.6)
SAO2 % BLD: 73.7 % (ref 95–98)
SAO2 % BLD: 88.1 % (ref 95–98)
SAO2 % BLD: 89.6 % (ref 95–98)
SAO2 % BLD: 91.1 % (ref 95–98)
SAO2 % BLD: 91.3 % (ref 95–98)
SAO2 % BLD: 97.4 % (ref 95–98)
SAO2 % BLD: 99.9 % (ref 95–98)
SAO2 % BLDV: 92.6 % (ref 65–88)
SERVICE CMNT-IMP: ABNORMAL
SODIUM SERPL-SCNC: 140 MMOL/L (ref 138–145)
SODIUM SERPL-SCNC: 140 MMOL/L (ref 138–145)
SPECIMEN TYPE: ABNORMAL
TOTAL RESP. RATE, ITRR: 24
TROPONIN-HIGH SENSITIVITY: 5.9 PG/ML (ref 0–14)
VENTILATION MODE VENT: ABNORMAL
VENTRICULAR RATE, ECG03: 137 BPM
VT SETTING VENT: 450 ML
VT SETTING VENT: 450 ML
VT SETTING VENT: 500 ML
WBC # BLD AUTO: 10.5 K/UL (ref 4.3–11.1)
WBC # BLD AUTO: 8.3 K/UL (ref 4.3–11.1)

## 2022-04-03 PROCEDURE — 5A1955Z RESPIRATORY VENTILATION, GREATER THAN 96 CONSECUTIVE HOURS: ICD-10-PCS | Performed by: INTERNAL MEDICINE

## 2022-04-03 PROCEDURE — C1751 CATH, INF, PER/CENT/MIDLINE: HCPCS

## 2022-04-03 PROCEDURE — 94002 VENT MGMT INPAT INIT DAY: CPT

## 2022-04-03 PROCEDURE — 74011250636 HC RX REV CODE- 250/636: Performed by: INTERNAL MEDICINE

## 2022-04-03 PROCEDURE — 74011000250 HC RX REV CODE- 250

## 2022-04-03 PROCEDURE — 74011000258 HC RX REV CODE- 258: Performed by: INTERNAL MEDICINE

## 2022-04-03 PROCEDURE — 82803 BLOOD GASES ANY COMBINATION: CPT

## 2022-04-03 PROCEDURE — 99223 1ST HOSP IP/OBS HIGH 75: CPT | Performed by: INTERNAL MEDICINE

## 2022-04-03 PROCEDURE — B548ZZA ULTRASONOGRAPHY OF SUPERIOR VENA CAVA, GUIDANCE: ICD-10-PCS | Performed by: INTERNAL MEDICINE

## 2022-04-03 PROCEDURE — 71045 X-RAY EXAM CHEST 1 VIEW: CPT

## 2022-04-03 PROCEDURE — 74011000250 HC RX REV CODE- 250: Performed by: INTERNAL MEDICINE

## 2022-04-03 PROCEDURE — 77010033710 HC HELIOX THERAPY DAILY

## 2022-04-03 PROCEDURE — 96374 THER/PROPH/DIAG INJ IV PUSH: CPT

## 2022-04-03 PROCEDURE — 36600 WITHDRAWAL OF ARTERIAL BLOOD: CPT

## 2022-04-03 PROCEDURE — 65620000000 HC RM CCU GENERAL

## 2022-04-03 PROCEDURE — 94640 AIRWAY INHALATION TREATMENT: CPT

## 2022-04-03 PROCEDURE — 85379 FIBRIN DEGRADATION QUANT: CPT

## 2022-04-03 PROCEDURE — 36573 INSJ PICC RS&I 5 YR+: CPT | Performed by: INTERNAL MEDICINE

## 2022-04-03 PROCEDURE — 74011250636 HC RX REV CODE- 250/636

## 2022-04-03 PROCEDURE — 93971 EXTREMITY STUDY: CPT

## 2022-04-03 PROCEDURE — 74011000250 HC RX REV CODE- 250: Performed by: EMERGENCY MEDICINE

## 2022-04-03 PROCEDURE — 36415 COLL VENOUS BLD VENIPUNCTURE: CPT

## 2022-04-03 PROCEDURE — 74011000250 HC RX REV CODE- 250: Performed by: HOSPITALIST

## 2022-04-03 PROCEDURE — 0T9B70Z DRAINAGE OF BLADDER WITH DRAINAGE DEVICE, VIA NATURAL OR ARTIFICIAL OPENING: ICD-10-PCS | Performed by: INTERNAL MEDICINE

## 2022-04-03 PROCEDURE — 96375 TX/PRO/DX INJ NEW DRUG ADDON: CPT

## 2022-04-03 PROCEDURE — 80307 DRUG TEST PRSMV CHEM ANLYZR: CPT

## 2022-04-03 PROCEDURE — 31500 INSERT EMERGENCY AIRWAY: CPT | Performed by: INTERNAL MEDICINE

## 2022-04-03 PROCEDURE — 74018 RADEX ABDOMEN 1 VIEW: CPT

## 2022-04-03 PROCEDURE — 2709999900 HC NON-CHARGEABLE SUPPLY

## 2022-04-03 PROCEDURE — C9113 INJ PANTOPRAZOLE SODIUM, VIA: HCPCS | Performed by: INTERNAL MEDICINE

## 2022-04-03 PROCEDURE — 74011250636 HC RX REV CODE- 250/636: Performed by: HOSPITALIST

## 2022-04-03 PROCEDURE — 0BH17EZ INSERTION OF ENDOTRACHEAL AIRWAY INTO TRACHEA, VIA NATURAL OR ARTIFICIAL OPENING: ICD-10-PCS | Performed by: INTERNAL MEDICINE

## 2022-04-03 PROCEDURE — 74011250637 HC RX REV CODE- 250/637: Performed by: EMERGENCY MEDICINE

## 2022-04-03 PROCEDURE — 36620 INSERTION CATHETER ARTERY: CPT | Performed by: INTERNAL MEDICINE

## 2022-04-03 PROCEDURE — 99291 CRITICAL CARE FIRST HOUR: CPT | Performed by: INTERNAL MEDICINE

## 2022-04-03 PROCEDURE — 85027 COMPLETE CBC AUTOMATED: CPT

## 2022-04-03 PROCEDURE — 94660 CPAP INITIATION&MGMT: CPT

## 2022-04-03 PROCEDURE — 02HV33Z INSERTION OF INFUSION DEVICE INTO SUPERIOR VENA CAVA, PERCUTANEOUS APPROACH: ICD-10-PCS | Performed by: INTERNAL MEDICINE

## 2022-04-03 PROCEDURE — 77030041247 HC PROTECTOR HEEL HEELMEDIX MDII -B

## 2022-04-03 PROCEDURE — 74011000250 HC RX REV CODE- 250: Performed by: PHYSICIAN ASSISTANT

## 2022-04-03 PROCEDURE — 03HY32Z INSERTION OF MONITORING DEVICE INTO UPPER ARTERY, PERCUTANEOUS APPROACH: ICD-10-PCS | Performed by: INTERNAL MEDICINE

## 2022-04-03 PROCEDURE — 74011250636 HC RX REV CODE- 250/636: Performed by: EMERGENCY MEDICINE

## 2022-04-03 RX ORDER — ONDANSETRON 2 MG/ML
4 INJECTION INTRAMUSCULAR; INTRAVENOUS
Status: DISCONTINUED | OUTPATIENT
Start: 2022-04-03 | End: 2022-04-13 | Stop reason: HOSPADM

## 2022-04-03 RX ORDER — SODIUM CHLORIDE 0.9 % (FLUSH) 0.9 %
30 SYRINGE (ML) INJECTION AS NEEDED
Status: DISCONTINUED | OUTPATIENT
Start: 2022-04-03 | End: 2022-04-13 | Stop reason: HOSPADM

## 2022-04-03 RX ORDER — FENTANYL CITRATE-0.9 % NACL/PF 25 MCG/ML
0-200 PLASTIC BAG, INJECTION (ML) INJECTION
Status: DISCONTINUED | OUTPATIENT
Start: 2022-04-03 | End: 2022-04-08

## 2022-04-03 RX ORDER — PROPOFOL 10 MG/ML
0-50 VIAL (ML) INTRAVENOUS
Status: DISCONTINUED | OUTPATIENT
Start: 2022-04-03 | End: 2022-04-03

## 2022-04-03 RX ORDER — ENOXAPARIN SODIUM 100 MG/ML
40 INJECTION SUBCUTANEOUS EVERY 12 HOURS
Status: DISCONTINUED | OUTPATIENT
Start: 2022-04-03 | End: 2022-04-13 | Stop reason: HOSPADM

## 2022-04-03 RX ORDER — NOREPINEPHRINE BITARTRATE/D5W 4MG/250ML
.5-3 PLASTIC BAG, INJECTION (ML) INTRAVENOUS
Status: DISCONTINUED | OUTPATIENT
Start: 2022-04-03 | End: 2022-04-09

## 2022-04-03 RX ORDER — IPRATROPIUM BROMIDE AND ALBUTEROL SULFATE 2.5; .5 MG/3ML; MG/3ML
3 SOLUTION RESPIRATORY (INHALATION) CONTINUOUS
Status: DISCONTINUED | OUTPATIENT
Start: 2022-04-03 | End: 2022-04-03

## 2022-04-03 RX ORDER — MORPHINE SULFATE 2 MG/ML
2 INJECTION, SOLUTION INTRAMUSCULAR; INTRAVENOUS
Status: DISCONTINUED | OUTPATIENT
Start: 2022-04-03 | End: 2022-04-03

## 2022-04-03 RX ORDER — LEVALBUTEROL INHALATION SOLUTION 1.25 MG/3ML
1.25 SOLUTION RESPIRATORY (INHALATION)
Status: DISCONTINUED | OUTPATIENT
Start: 2022-04-03 | End: 2022-04-03

## 2022-04-03 RX ORDER — FENTANYL CITRATE 50 UG/ML
INJECTION, SOLUTION INTRAMUSCULAR; INTRAVENOUS
Status: COMPLETED
Start: 2022-04-03 | End: 2022-04-03

## 2022-04-03 RX ORDER — DEXMEDETOMIDINE HYDROCHLORIDE 4 UG/ML
.1-1.5 INJECTION, SOLUTION INTRAVENOUS
Status: DISCONTINUED | OUTPATIENT
Start: 2022-04-03 | End: 2022-04-03

## 2022-04-03 RX ORDER — LEVALBUTEROL INHALATION SOLUTION 1.25 MG/3ML
1.25 SOLUTION RESPIRATORY (INHALATION)
Status: COMPLETED | OUTPATIENT
Start: 2022-04-03 | End: 2022-04-03

## 2022-04-03 RX ORDER — LEVALBUTEROL INHALATION SOLUTION 1.25 MG/3ML
5 SOLUTION RESPIRATORY (INHALATION)
Status: DISCONTINUED | OUTPATIENT
Start: 2022-04-03 | End: 2022-04-11

## 2022-04-03 RX ORDER — LEVALBUTEROL INHALATION SOLUTION 1.25 MG/3ML
5 SOLUTION RESPIRATORY (INHALATION)
Status: DISCONTINUED | OUTPATIENT
Start: 2022-04-03 | End: 2022-04-03

## 2022-04-03 RX ORDER — IPRATROPIUM BROMIDE AND ALBUTEROL SULFATE 2.5; .5 MG/3ML; MG/3ML
3 SOLUTION RESPIRATORY (INHALATION)
Status: COMPLETED | OUTPATIENT
Start: 2022-04-03 | End: 2022-04-03

## 2022-04-03 RX ORDER — ETOMIDATE 2 MG/ML
INJECTION INTRAVENOUS
Status: COMPLETED
Start: 2022-04-03 | End: 2022-04-03

## 2022-04-03 RX ORDER — ENOXAPARIN SODIUM 100 MG/ML
40 INJECTION SUBCUTANEOUS DAILY
Status: DISCONTINUED | OUTPATIENT
Start: 2022-04-03 | End: 2022-04-03 | Stop reason: DRUGHIGH

## 2022-04-03 RX ORDER — CISATRACURIUM BESYLATE 2 MG/ML
0.2 INJECTION, SOLUTION INTRAVENOUS ONCE
Status: COMPLETED | OUTPATIENT
Start: 2022-04-03 | End: 2022-04-03

## 2022-04-03 RX ORDER — IPRATROPIUM BROMIDE 0.5 MG/2.5ML
0.5 SOLUTION RESPIRATORY (INHALATION)
Status: COMPLETED | OUTPATIENT
Start: 2022-04-03 | End: 2022-04-03

## 2022-04-03 RX ORDER — PROPOFOL 10 MG/ML
INJECTION, EMULSION INTRAVENOUS
Status: ACTIVE
Start: 2022-04-03 | End: 2022-04-03

## 2022-04-03 RX ORDER — IPRATROPIUM BROMIDE AND ALBUTEROL SULFATE 2.5; .5 MG/3ML; MG/3ML
12 SOLUTION RESPIRATORY (INHALATION) CONTINUOUS
Status: DISCONTINUED | OUTPATIENT
Start: 2022-04-03 | End: 2022-04-03 | Stop reason: SDUPTHER

## 2022-04-03 RX ORDER — MIDAZOLAM HYDROCHLORIDE 1 MG/ML
INJECTION, SOLUTION INTRAMUSCULAR; INTRAVENOUS
Status: COMPLETED
Start: 2022-04-03 | End: 2022-04-03

## 2022-04-03 RX ORDER — SODIUM CHLORIDE 0.9 % (FLUSH) 0.9 %
30 SYRINGE (ML) INJECTION DAILY
Status: DISCONTINUED | OUTPATIENT
Start: 2022-04-04 | End: 2022-04-13 | Stop reason: HOSPADM

## 2022-04-03 RX ORDER — MORPHINE SULFATE 2 MG/ML
2 INJECTION, SOLUTION INTRAMUSCULAR; INTRAVENOUS
Status: DISCONTINUED | OUTPATIENT
Start: 2022-04-03 | End: 2022-04-11

## 2022-04-03 RX ORDER — ACETAMINOPHEN 325 MG/1
650 TABLET ORAL
Status: DISCONTINUED | OUTPATIENT
Start: 2022-04-03 | End: 2022-04-04

## 2022-04-03 RX ORDER — POLYETHYLENE GLYCOL 3350 17 G/17G
17 POWDER, FOR SOLUTION ORAL DAILY PRN
Status: DISCONTINUED | OUTPATIENT
Start: 2022-04-03 | End: 2022-04-07

## 2022-04-03 RX ORDER — MIDAZOLAM HYDROCHLORIDE 1 MG/ML
5 INJECTION, SOLUTION INTRAMUSCULAR; INTRAVENOUS ONCE
Status: COMPLETED | OUTPATIENT
Start: 2022-04-03 | End: 2022-04-03

## 2022-04-03 RX ORDER — LEVALBUTEROL INHALATION SOLUTION 0.63 MG/3ML
0.63 SOLUTION RESPIRATORY (INHALATION)
Status: DISCONTINUED | OUTPATIENT
Start: 2022-04-03 | End: 2022-04-03

## 2022-04-03 RX ORDER — ALPRAZOLAM 0.5 MG/1
1 TABLET ORAL ONCE
Status: DISCONTINUED | OUTPATIENT
Start: 2022-04-03 | End: 2022-04-03

## 2022-04-03 RX ORDER — LABETALOL HYDROCHLORIDE 5 MG/ML
10 INJECTION, SOLUTION INTRAVENOUS ONCE
Status: COMPLETED | OUTPATIENT
Start: 2022-04-03 | End: 2022-04-03

## 2022-04-03 RX ORDER — IPRATROPIUM BROMIDE 0.5 MG/2.5ML
0.5 SOLUTION RESPIRATORY (INHALATION)
Status: DISCONTINUED | OUTPATIENT
Start: 2022-04-03 | End: 2022-04-11

## 2022-04-03 RX ORDER — FENTANYL CITRATE 50 UG/ML
100 INJECTION, SOLUTION INTRAMUSCULAR; INTRAVENOUS ONCE
Status: COMPLETED | OUTPATIENT
Start: 2022-04-03 | End: 2022-04-03

## 2022-04-03 RX ORDER — LEVALBUTEROL INHALATION SOLUTION 1.25 MG/3ML
1.25 SOLUTION RESPIRATORY (INHALATION)
Status: DISCONTINUED | OUTPATIENT
Start: 2022-04-03 | End: 2022-04-03 | Stop reason: SDUPTHER

## 2022-04-03 RX ORDER — ETOMIDATE 2 MG/ML
40 INJECTION INTRAVENOUS ONCE
Status: COMPLETED | OUTPATIENT
Start: 2022-04-03 | End: 2022-04-03

## 2022-04-03 RX ORDER — FENTANYL CITRATE-0.9 % NACL/PF 25 MCG/ML
0-200 PLASTIC BAG, INJECTION (ML) INJECTION
Status: DISCONTINUED | OUTPATIENT
Start: 2022-04-03 | End: 2022-04-03

## 2022-04-03 RX ORDER — MIDAZOLAM IN NACL,ISO-OSMOT/PF 100MG/0.1L
0-10 VIAL (ML) INTRAVENOUS
Status: DISCONTINUED | OUTPATIENT
Start: 2022-04-03 | End: 2022-04-09

## 2022-04-03 RX ORDER — MAGNESIUM SULFATE HEPTAHYDRATE 40 MG/ML
2 INJECTION, SOLUTION INTRAVENOUS ONCE
Status: COMPLETED | OUTPATIENT
Start: 2022-04-03 | End: 2022-04-04

## 2022-04-03 RX ORDER — LORAZEPAM 2 MG/ML
1 INJECTION INTRAMUSCULAR
Status: DISCONTINUED | OUTPATIENT
Start: 2022-04-03 | End: 2022-04-11

## 2022-04-03 RX ORDER — ONDANSETRON 4 MG/1
4 TABLET, ORALLY DISINTEGRATING ORAL
Status: DISCONTINUED | OUTPATIENT
Start: 2022-04-03 | End: 2022-04-13 | Stop reason: HOSPADM

## 2022-04-03 RX ORDER — SODIUM CHLORIDE 0.9 % (FLUSH) 0.9 %
5-40 SYRINGE (ML) INJECTION AS NEEDED
Status: DISCONTINUED | OUTPATIENT
Start: 2022-04-03 | End: 2022-04-13 | Stop reason: HOSPADM

## 2022-04-03 RX ORDER — ALBUTEROL SULFATE 0.83 MG/ML
2.5 SOLUTION RESPIRATORY (INHALATION)
Status: DISCONTINUED | OUTPATIENT
Start: 2022-04-03 | End: 2022-04-13 | Stop reason: HOSPADM

## 2022-04-03 RX ORDER — LEVALBUTEROL INHALATION SOLUTION 1.25 MG/3ML
8 SOLUTION RESPIRATORY (INHALATION) CONTINUOUS
Status: DISCONTINUED | OUTPATIENT
Start: 2022-04-03 | End: 2022-04-03 | Stop reason: SDUPTHER

## 2022-04-03 RX ORDER — LEVALBUTEROL INHALATION SOLUTION 1.25 MG/3ML
1.25 SOLUTION RESPIRATORY (INHALATION)
Status: DISCONTINUED | OUTPATIENT
Start: 2022-04-03 | End: 2022-04-13 | Stop reason: HOSPADM

## 2022-04-03 RX ORDER — LEVALBUTEROL INHALATION SOLUTION 0.63 MG/3ML
5 SOLUTION RESPIRATORY (INHALATION)
Status: DISCONTINUED | OUTPATIENT
Start: 2022-04-03 | End: 2022-04-03

## 2022-04-03 RX ORDER — IPRATROPIUM BROMIDE AND ALBUTEROL SULFATE 2.5; .5 MG/3ML; MG/3ML
3 SOLUTION RESPIRATORY (INHALATION)
Status: DISCONTINUED | OUTPATIENT
Start: 2022-04-03 | End: 2022-04-03

## 2022-04-03 RX ORDER — GUAIFENESIN 100 MG/5ML
324 LIQUID (ML) ORAL
Status: COMPLETED | OUTPATIENT
Start: 2022-04-03 | End: 2022-04-03

## 2022-04-03 RX ORDER — SODIUM BICARBONATE 84 MG/ML
100 INJECTION, SOLUTION INTRAVENOUS ONCE
Status: COMPLETED | OUTPATIENT
Start: 2022-04-03 | End: 2022-04-03

## 2022-04-03 RX ORDER — LEVALBUTEROL INHALATION SOLUTION 0.63 MG/3ML
0.63 SOLUTION RESPIRATORY (INHALATION)
Status: DISCONTINUED | OUTPATIENT
Start: 2022-04-03 | End: 2022-04-03 | Stop reason: SDUPTHER

## 2022-04-03 RX ORDER — SODIUM CHLORIDE 0.9 % (FLUSH) 0.9 %
5-40 SYRINGE (ML) INJECTION EVERY 8 HOURS
Status: DISCONTINUED | OUTPATIENT
Start: 2022-04-03 | End: 2022-04-13 | Stop reason: HOSPADM

## 2022-04-03 RX ORDER — ACETAMINOPHEN 650 MG/1
650 SUPPOSITORY RECTAL
Status: DISCONTINUED | OUTPATIENT
Start: 2022-04-03 | End: 2022-04-04

## 2022-04-03 RX ORDER — ENALAPRILAT 1.25 MG/ML
1.25 INJECTION INTRAVENOUS
Status: DISCONTINUED | OUTPATIENT
Start: 2022-04-03 | End: 2022-04-09

## 2022-04-03 RX ORDER — LORAZEPAM 2 MG/ML
1 INJECTION INTRAMUSCULAR ONCE
Status: COMPLETED | OUTPATIENT
Start: 2022-04-03 | End: 2022-04-03

## 2022-04-03 RX ORDER — LORAZEPAM 2 MG/ML
1 INJECTION INTRAMUSCULAR
Status: COMPLETED | OUTPATIENT
Start: 2022-04-03 | End: 2022-04-03

## 2022-04-03 RX ORDER — MORPHINE SULFATE 2 MG/ML
2 INJECTION, SOLUTION INTRAMUSCULAR; INTRAVENOUS ONCE
Status: COMPLETED | OUTPATIENT
Start: 2022-04-03 | End: 2022-04-03

## 2022-04-03 RX ORDER — BUDESONIDE 0.5 MG/2ML
500 INHALANT ORAL
Status: DISCONTINUED | OUTPATIENT
Start: 2022-04-03 | End: 2022-04-13 | Stop reason: HOSPADM

## 2022-04-03 RX ADMIN — SODIUM CHLORIDE 40 MG: 9 INJECTION INTRAMUSCULAR; INTRAVENOUS; SUBCUTANEOUS at 13:51

## 2022-04-03 RX ADMIN — CISATRACURIUM BESYLATE 5 MCG/KG/MIN: 2 INJECTION, SOLUTION INTRAVENOUS at 21:34

## 2022-04-03 RX ADMIN — PROPOFOL 45 MCG/KG/MIN: 10 INJECTION, EMULSION INTRAVENOUS at 11:22

## 2022-04-03 RX ADMIN — ETOMIDATE 40 MG: 2 INJECTION INTRAVENOUS at 11:00

## 2022-04-03 RX ADMIN — METHYLPREDNISOLONE SODIUM SUCCINATE 60 MG: 40 INJECTION, POWDER, FOR SOLUTION INTRAMUSCULAR; INTRAVENOUS at 21:56

## 2022-04-03 RX ADMIN — AZITHROMYCIN MONOHYDRATE 500 MG: 500 INJECTION, POWDER, LYOPHILIZED, FOR SOLUTION INTRAVENOUS at 06:27

## 2022-04-03 RX ADMIN — LEVALBUTEROL HYDROCHLORIDE 8 MG: 1.25 SOLUTION RESPIRATORY (INHALATION) at 00:20

## 2022-04-03 RX ADMIN — LORAZEPAM 1 MG: 2 INJECTION INTRAMUSCULAR at 06:05

## 2022-04-03 RX ADMIN — LABETALOL HYDROCHLORIDE 10 MG: 5 INJECTION INTRAVENOUS at 04:53

## 2022-04-03 RX ADMIN — LEVALBUTEROL HYDROCHLORIDE 1.25 MG: 1.25 SOLUTION RESPIRATORY (INHALATION) at 10:30

## 2022-04-03 RX ADMIN — ASPIRIN 324 MG: 81 TABLET, CHEWABLE ORAL at 01:38

## 2022-04-03 RX ADMIN — METHYLPREDNISOLONE SODIUM SUCCINATE 40 MG: 40 INJECTION, POWDER, FOR SOLUTION INTRAMUSCULAR; INTRAVENOUS at 08:17

## 2022-04-03 RX ADMIN — DEXMEDETOMIDINE HYDROCHLORIDE 0.4 MCG/KG/HR: 100 INJECTION, SOLUTION INTRAVENOUS at 08:19

## 2022-04-03 RX ADMIN — SODIUM CHLORIDE, PRESERVATIVE FREE 10 ML: 5 INJECTION INTRAVENOUS at 07:34

## 2022-04-03 RX ADMIN — IPRATROPIUM BROMIDE 0.5 MG: 0.5 SOLUTION RESPIRATORY (INHALATION) at 16:02

## 2022-04-03 RX ADMIN — LEVALBUTEROL HYDROCHLORIDE 0.63 MG: 0.63 SOLUTION RESPIRATORY (INHALATION) at 07:40

## 2022-04-03 RX ADMIN — MORPHINE SULFATE 2 MG: 2 INJECTION, SOLUTION INTRAMUSCULAR; INTRAVENOUS at 05:20

## 2022-04-03 RX ADMIN — SODIUM CHLORIDE 40 MG: 9 INJECTION INTRAMUSCULAR; INTRAVENOUS; SUBCUTANEOUS at 21:56

## 2022-04-03 RX ADMIN — SODIUM CHLORIDE 1000 ML: 900 INJECTION, SOLUTION INTRAVENOUS at 12:06

## 2022-04-03 RX ADMIN — Medication 2 MG/HR: at 12:35

## 2022-04-03 RX ADMIN — ENOXAPARIN SODIUM 40 MG: 100 INJECTION SUBCUTANEOUS at 08:18

## 2022-04-03 RX ADMIN — SODIUM CHLORIDE, PRESERVATIVE FREE 10 ML: 5 INJECTION INTRAVENOUS at 22:08

## 2022-04-03 RX ADMIN — IPRATROPIUM BROMIDE AND ALBUTEROL SULFATE 3 ML: .5; 3 SOLUTION RESPIRATORY (INHALATION) at 02:34

## 2022-04-03 RX ADMIN — LEVALBUTEROL HYDROCHLORIDE 5 MG: 1.25 SOLUTION RESPIRATORY (INHALATION) at 12:28

## 2022-04-03 RX ADMIN — IPRATROPIUM BROMIDE AND ALBUTEROL SULFATE 3 ML: .5; 3 SOLUTION RESPIRATORY (INHALATION) at 02:39

## 2022-04-03 RX ADMIN — CISATRACURIUM BESYLATE 3 MCG/KG/MIN: 2 INJECTION, SOLUTION INTRAVENOUS at 11:52

## 2022-04-03 RX ADMIN — METHYLPREDNISOLONE SODIUM SUCCINATE 40 MG: 40 INJECTION, POWDER, FOR SOLUTION INTRAMUSCULAR; INTRAVENOUS at 15:23

## 2022-04-03 RX ADMIN — FENTANYL CITRATE 100 MCG: 50 INJECTION, SOLUTION INTRAMUSCULAR; INTRAVENOUS at 11:00

## 2022-04-03 RX ADMIN — LEVALBUTEROL HYDROCHLORIDE 5 MG: 1.25 SOLUTION RESPIRATORY (INHALATION) at 19:25

## 2022-04-03 RX ADMIN — NOREPINEPHRINE BITARTRATE 4 MCG/MIN: 1 SOLUTION INTRAVENOUS at 12:51

## 2022-04-03 RX ADMIN — SODIUM CHLORIDE, PRESERVATIVE FREE 10 ML: 5 INJECTION INTRAVENOUS at 13:14

## 2022-04-03 RX ADMIN — LEVALBUTEROL HYDROCHLORIDE 5 MG: 1.25 SOLUTION RESPIRATORY (INHALATION) at 16:02

## 2022-04-03 RX ADMIN — BUDESONIDE 500 MCG: 0.5 INHALANT RESPIRATORY (INHALATION) at 07:40

## 2022-04-03 RX ADMIN — METHYLPREDNISOLONE SODIUM SUCCINATE 60 MG: 40 INJECTION, POWDER, FOR SOLUTION INTRAMUSCULAR; INTRAVENOUS at 06:27

## 2022-04-03 RX ADMIN — FENTANYL CITRATE 50 MCG/HR: 50 INJECTION, SOLUTION INTRAMUSCULAR; INTRAVENOUS at 11:56

## 2022-04-03 RX ADMIN — PROPOFOL 50 MCG/KG/MIN: 10 INJECTION, EMULSION INTRAVENOUS at 11:01

## 2022-04-03 RX ADMIN — IPRATROPIUM BROMIDE 0.5 MG: 0.5 SOLUTION RESPIRATORY (INHALATION) at 01:34

## 2022-04-03 RX ADMIN — LEVALBUTEROL HYDROCHLORIDE 5 MG: 1.25 SOLUTION RESPIRATORY (INHALATION) at 23:20

## 2022-04-03 RX ADMIN — MIDAZOLAM HYDROCHLORIDE 5 MG: 1 INJECTION, SOLUTION INTRAMUSCULAR; INTRAVENOUS at 11:00

## 2022-04-03 RX ADMIN — CISATRACURIUM BESYLATE 5 MCG/KG/MIN: 2 INJECTION, SOLUTION INTRAVENOUS at 16:43

## 2022-04-03 RX ADMIN — ENOXAPARIN SODIUM 40 MG: 100 INJECTION SUBCUTANEOUS at 21:55

## 2022-04-03 RX ADMIN — ETOMIDATE 40 MG: 2 INJECTION, SOLUTION INTRAVENOUS at 11:00

## 2022-04-03 RX ADMIN — LORAZEPAM 1 MG: 2 INJECTION INTRAMUSCULAR at 07:34

## 2022-04-03 RX ADMIN — DEXMEDETOMIDINE HYDROCHLORIDE 1.5 MCG/KG/HR: 100 INJECTION, SOLUTION INTRAVENOUS at 11:47

## 2022-04-03 RX ADMIN — IPRATROPIUM BROMIDE AND ALBUTEROL SULFATE 12 ML: .5; 3 SOLUTION RESPIRATORY (INHALATION) at 03:46

## 2022-04-03 RX ADMIN — SODIUM BICARBONATE 100 MEQ: 84 INJECTION, SOLUTION INTRAVENOUS at 16:57

## 2022-04-03 RX ADMIN — ENALAPRILAT 1.25 MG: 1.25 INJECTION INTRAVENOUS at 09:50

## 2022-04-03 RX ADMIN — CISATRACURIUM BESYLATE 28.04 MG: 2 INJECTION, SOLUTION INTRAVENOUS at 11:51

## 2022-04-03 RX ADMIN — MIDAZOLAM 5 MG: 1 INJECTION INTRAMUSCULAR; INTRAVENOUS at 11:00

## 2022-04-03 RX ADMIN — MAGNESIUM SULFATE HEPTAHYDRATE 2 G: 40 INJECTION, SOLUTION INTRAVENOUS at 13:52

## 2022-04-03 RX ADMIN — METHYLPREDNISOLONE SODIUM SUCCINATE 125 MG: 125 INJECTION, POWDER, FOR SOLUTION INTRAMUSCULAR; INTRAVENOUS at 02:56

## 2022-04-03 RX ADMIN — MORPHINE SULFATE 2 MG: 2 INJECTION, SOLUTION INTRAMUSCULAR; INTRAVENOUS at 08:17

## 2022-04-03 RX ADMIN — IPRATROPIUM BROMIDE 0.5 MG: 0.5 SOLUTION RESPIRATORY (INHALATION) at 23:20

## 2022-04-03 RX ADMIN — SODIUM CHLORIDE 500 ML: 9 INJECTION, SOLUTION INTRAVENOUS at 01:39

## 2022-04-03 RX ADMIN — PHENYLEPHRINE HYDROCHLORIDE 30 MCG/MIN: 10 INJECTION INTRAVENOUS at 22:08

## 2022-04-03 RX ADMIN — BUDESONIDE 500 MCG: 0.5 INHALANT RESPIRATORY (INHALATION) at 19:25

## 2022-04-03 RX ADMIN — LEVALBUTEROL HYDROCHLORIDE 1.25 MG: 1.25 SOLUTION RESPIRATORY (INHALATION) at 05:57

## 2022-04-03 NOTE — PROGRESS NOTES
Patient was intubated with a number 8.0 ET Tube. Tube placement verified by auscultation, by CXR and ETCO2 monitor. ET Tube is secured at the 23 cm willis at the teeth and on the right side. Patient was intubated by Dr. Greer Quiñonez on the 1 attempt. Breath sounds are diminished, expiratory wheezes and inspiratory wheezes. Patient is Negative for subcutaneous air and chest excursion is symmetric. Trachea is midline. Patient is also Negative for cyanosis and is Negative for pitting edema. Patient placed on ventilator on documented settings. All alarms are set and audible. Resuscitation bag is at the head of the bed. Ventilator Settings  Mode FIO2 Rate Tidal Volume Pressure PEEP I:E Ratio   PRVC  100 %   25 450ml     10  1:2      Peak airway pressure: 44.9 cm H2O   Minute ventilation: 21.4 l/min     ABG: No results for input(s): PH, PCO2, PO2, HCO3 in the last 72 hours.

## 2022-04-03 NOTE — ED NOTES
Pt with significant respiratory distress. Remains tachycardic. ICU charge nurse at bedside speaking with admitting MD regarding pt condition.

## 2022-04-03 NOTE — PROGRESS NOTES
PICC Placement Note    PRE-PROCEDURE VERIFICATION  Correct Procedure: yes. Time out completed with assistant Brianda Lambert RN and all persons present in agreement with time out. Correct Site:  yes  Temperature: Temp: 98.5 °F (36.9 °C), Temperature Source: Temp Source: Oral  Recent Labs     04/03/22  0951   BUN 8   CREA 0.90      WBC 10.5     Allergies: Singulair [montelukast]  Education materials for [facility] Care given to patient or family. PROCEDURE DETAIL  A triple lumen PICC line was started for vascular access. The following documentation is in addition to the PICC properties in the lines/airways flowsheet :  Lot #:[id number]  xylocaine used: yes  Mid-Arm Circumference: 46 (cm)  Internal Catheter Length: 43 (cm)  Internal Catheter Total Length: 43 (cm)  Vein Selection for PICC:right basilic  Central Line Bundle followed yes  Complication Related to Insertion: none  Both the insertion guidewire and ECG guidewire were removed intact all ports have positive blood return and were flush well with normal saline. The location of the tip of the PICC is verified using ECG technology. The tip is in the SVC per ECG reading. See image below.      Line is okay to use: yes

## 2022-04-03 NOTE — PROGRESS NOTES
Reviewed notes for new spiritual concerns      Per notes:        Unknown alma delia    Father -  Ezio Alvarado for decline

## 2022-04-03 NOTE — H&P
[facility] Hospitalist History and Physical       Name:  Dora Bull  Age:36 y.o. Sex:male   :  1985    MRN:  [id number]   PCP:  Nieves Salgado, Not On File, [place]    ER Arrival date and time:  4/3/2022 12:32 AM   Chief Complaint: Wheezing     Reason for Admission:   Asthma exacerbation [J45.901] -- 30 yo AAM, h/o asthma and obesity, 2nd ER visit today, continues with tachycardia and tachypnea, hypoxia on presentation, clear CXR, still wheezing despite treatment in ER    Assessment & Plan:     Principal Problem:    Severe persistent asthma with exacerbation (4/3/2022)        Active Problems:    Obesity (4/3/2022)        Asthma exacerbation (4/3/2022)            PLAN:  1) Admit remote telemetry as he remains tachycardic with pulse 140s  2) Will continue nebs, steroids, oxygen support  3) resume home meds as appropriate  4) Full code      Disposition/Expected LOS: 2d  Diet: DIET ADULT  VTE ppx: lovenox  Code status: full code  Surrogate decision-maker:       History of Presenting Illness:     Dora Bull is a 31 y.o. male with medical history of asthma and obesity presents with complaint of worsening shortness of breath, wheezing, diffuse chest tightness after he was discharged from the ER several hours ago. Patient reports that he has been compliant with his nebulizer inhalers. Denies tobacco use. Denies dizziness, fever, chills, hemoptysis. Patient reports productive cough with yellow sputum over the past several days. Patient with complaint of left calf pain. Denies history of DVT, PE.        This is a recurrent problem. The current episode started more than 2 days ago. The problem occurs constantly. The problem has been gradually worsening. Associated symptoms include cough. Pertinent negatives include no chest pain, no fever, no abdominal pain, no vomiting, no dysuria, no headaches, no rhinorrhea, no sore throat and no rash. He has tried beta-agonist inhalers for the symptoms.  He has had prior hospitalizations. He has had prior ED visits. He was Seen several hours ago and given Solumedrol 125 mg IV, Duoneb, Mag. Patient returns with worsening wheezing, shortness of breath. Patient is  tachycardic, hypoxic. Sats noted to range from 88 to 93% on room air. Patient remains tachypneic with diffuse audible expiratory wheezes. Patient given DuoNeb and transition to Xopenex due to his tachycardia. Chest x-ray clear. Labs pending at this time including D-dimer. Left lower extremity duplex ultrasound in process. Ultrasound negative for DVT. D-dimer 0.55. Will consult hospitalist for admission. Patient requiring 4 L O2 via nasal cannula at this time. Review of Systems:  A 14 point review of systems was taken and pertinent positive as per HPI. Past Medical History:   Diagnosis Date    Asthma        Past Surgical History:   Procedure Laterality Date    HX TONSILLECTOMY         Family History : reviewed  Family History   Problem Relation Age of Onset    Sleep Apnea Mother     Leukemia Father     Asthma Brother         Social History     Tobacco Use    Smoking status: Never Smoker    Smokeless tobacco: Never Used   Substance Use Topics    Alcohol use: Not Currently       Allergies   Allergen Reactions    Singulair [Montelukast] Hives         There is no immunization history on file for this patient.       PTA Medications:  Current Outpatient Medications   Medication Instructions    albuterol (PROVENTIL HFA, VENTOLIN HFA, PROAIR HFA) 90 mcg/actuation inhaler 2 Puffs, Inhalation, EVERY 4 HOURS AS NEEDED    albuterol (PROVENTIL VENTOLIN) 2.5 mg, Nebulization, EVERY 4 HOURS AS NEEDED    amoxicillin (AMOXIL) 500 mg, Oral, 3 TIMES DAILY    azithromycin (Zithromax Z-Kristopher) 250 mg tablet Take 2 tabs day one, and one tab daily thereafter for total of 5 days    ibuprofen (MOTRIN) 800 mg, Oral, EVERY 6 HOURS AS NEEDED    predniSONE (DELTASONE) 40 mg, Oral, DAILY       Objective: Patient Vitals for the past 24 hrs:   Temp Pulse Resp BP SpO2   04/03/22 0130  (!) 132 15 123/60 (!) 89 %   04/03/22 0123  (!) 136 27  (!) 89 %   04/03/22 0115  (!) 132 20 131/66 93 %   04/03/22 0100  (!) 136 14 (!) 144/49 97 %   04/03/22 0045  (!) 134 21 113/83 96 %   04/03/22 0036  (!) 131 25  94 %   04/03/22 0035  (!) 132 23 110/84 96 %   04/02/22 2338     92 %   04/02/22 2226 98.3 °F (36.8 °C) (!) 122 26 (!) 180/88 90 %       Oxygen Therapy  O2 Sat (%): (!) 89 % (04/03/22 0130)  Pulse via Oximetry: 126 beats per minute (04/02/22 2338)  O2 Device: Nasal cannula (04/02/22 2338)  O2 Flow Rate (L/min): 4 l/min (04/02/22 2338)    Body mass index is 44.3 kg/m². Physical Exam:    General:  Alert and oriented x 3, moderate resp distress, Not able to speak in complete sentences, morbidly obese  HEENT:  Pupils equal and reactive to light and accommodation, oropharynx is clear   Neck:   Supple, no lymphadenopathy, no JVD   Lungs:  Harsh scattered wheezing and rhonchi  bilaterally   CV:   tachycardic, regular rhythm, with normal S1 and S2   Abdomen:  Soft, nontender, nondistended, normoactive bowel sounds , obese   Extremities:  No cyanosis clubbing or edema   Neuro:  Nonfocal, A&O x3   Psych:  Normal mood and affect       Data Reviewed: I have reviewed all labs, meds, and studies.       Recent Results (from the past 24 hour(s))   CBC WITH AUTOMATED DIFF    Collection Time: 04/02/22  4:27 PM   Result Value Ref Range    WBC 8.1 4.3 - 11.1 K/uL    RBC 4.49 4.23 - 5.6 M/uL    HGB 15.2 13.6 - 17.2 g/dL    HCT 44.2 41.1 - 50.3 %    MCV 98.4 (H) 79.6 - 97.8 FL    MCH 33.9 (H) 26.1 - 32.9 PG    MCHC 34.4 31.4 - 35.0 g/dL    RDW 12.4 11.9 - 14.6 %    PLATELET 733 342 - 653 K/uL    MPV 9.2 (L) 9.4 - 12.3 FL    ABSOLUTE NRBC 0.00 0.0 - 0.2 K/uL    DF AUTOMATED      NEUTROPHILS 54 43 - 78 %    LYMPHOCYTES 28 13 - 44 %    MONOCYTES 12 4.0 - 12.0 %    EOSINOPHILS 4 0.5 - 7.8 %    BASOPHILS 1 0.0 - 2.0 %    IMMATURE GRANULOCYTES 0 0.0 - 5.0 %    ABS. NEUTROPHILS 4.4 1.7 - 8.2 K/UL    ABS. LYMPHOCYTES 2.3 0.5 - 4.6 K/UL    ABS. MONOCYTES 1.0 0.1 - 1.3 K/UL    ABS. EOSINOPHILS 0.3 0.0 - 0.8 K/UL    ABS. BASOPHILS 0.1 0.0 - 0.2 K/UL    ABS. IMM. GRANS. 0.0 0.0 - 0.5 K/UL   METABOLIC PANEL, COMPREHENSIVE    Collection Time: 04/02/22  4:27 PM   Result Value Ref Range    Sodium 144 138 - 145 mmol/L    Potassium 3.6 3.5 - 5.1 mmol/L    Chloride 107 98 - 107 mmol/L    CO2 27 21 - 32 mmol/L    Anion gap 10 7 - 16 mmol/L    Glucose 96 65 - 100 mg/dL    BUN 9 6 - 23 MG/DL    Creatinine 0.90 0.8 - 1.5 MG/DL    GFR est AA >60 >60 ml/min/1.73m2    GFR est non-AA >60 >60 ml/min/1.73m2    Calcium 9.1 8.3 - 10.4 MG/DL    Bilirubin, total 0.6 0.2 - 1.1 MG/DL    ALT (SGPT) 34 12 - 65 U/L    AST (SGOT) 24 15 - 37 U/L    Alk. phosphatase 68 50 - 136 U/L    Protein, total 7.5 6.3 - 8.2 g/dL    Albumin 3.7 3.5 - 5.0 g/dL    Globulin 3.8 (H) 2.3 - 3.5 g/dL    A-G Ratio 1.0 (L) 1.2 - 3.5     MAGNESIUM    Collection Time: 04/02/22  4:27 PM   Result Value Ref Range    Magnesium 2.1 1.8 - 2.4 mg/dL   COVID-19 RAPID TEST    Collection Time: 04/02/22  4:42 PM   Result Value Ref Range    Specimen source Nasopharyngeal      COVID-19 rapid test Not detected NOTD     CBC WITH AUTOMATED DIFF    Collection Time: 04/02/22 10:42 PM   Result Value Ref Range    WBC 8.3 4.3 - 11.1 K/uL    RBC 4.37 4.23 - 5.6 M/uL    HGB 15.0 13.6 - 17.2 g/dL    HCT 45.1 41.1 - 50.3 %    .2 (H) 79.6 - 97.8 FL    MCH 34.3 (H) 26.1 - 32.9 PG    MCHC 33.3 31.4 - 35.0 g/dL    RDW 12.8 11.9 - 14.6 %    PLATELET 177 264 - 489 K/uL    MPV 9.8 9.4 - 12.3 FL    ABSOLUTE NRBC 0.00 0.0 - 0.2 K/uL    DF AUTOMATED      NEUTROPHILS 92 (H) 43 - 78 %    LYMPHOCYTES 5 (L) 13 - 44 %    MONOCYTES 2 (L) 4.0 - 12.0 %    EOSINOPHILS 0 (L) 0.5 - 7.8 %    BASOPHILS 1 0.0 - 2.0 %    IMMATURE GRANULOCYTES 0 0.0 - 5.0 %    ABS. NEUTROPHILS 7.6 1.7 - 8.2 K/UL    ABS.  LYMPHOCYTES 0.4 (L) 0.5 - 4.6 K/UL ABS. MONOCYTES 0.2 0.1 - 1.3 K/UL    ABS. EOSINOPHILS 0.0 0.0 - 0.8 K/UL    ABS. BASOPHILS 0.0 0.0 - 0.2 K/UL    ABS. IMM. GRANS. 0.0 0.0 - 0.5 K/UL   METABOLIC PANEL, COMPREHENSIVE    Collection Time: 04/02/22 10:42 PM   Result Value Ref Range    Sodium 140 138 - 145 mmol/L    Potassium 3.5 3.5 - 5.1 mmol/L    Chloride 103 98 - 107 mmol/L    CO2 26 21 - 32 mmol/L    Anion gap 11 7 - 16 mmol/L    Glucose 196 (H) 65 - 100 mg/dL    BUN 9 6 - 23 MG/DL    Creatinine 1.10 0.8 - 1.5 MG/DL    GFR est AA >60 >60 ml/min/1.73m2    GFR est non-AA >60 >60 ml/min/1.73m2    Calcium 9.0 8.3 - 10.4 MG/DL    Bilirubin, total 0.9 0.2 - 1.1 MG/DL    ALT (SGPT) 39 12 - 65 U/L    AST (SGOT) 40 (H) 15 - 37 U/L    Alk.  phosphatase 70 50 - 136 U/L    Protein, total 7.6 6.3 - 8.2 g/dL    Albumin 3.7 3.5 - 5.0 g/dL    Globulin 3.9 (H) 2.3 - 3.5 g/dL    A-G Ratio 0.9 (L) 1.2 - 3.5     TROPONIN-HIGH SENSITIVITY    Collection Time: 04/02/22 10:42 PM   Result Value Ref Range    Troponin-High Sensitivity 5.9 0 - 14 pg/mL   MAGNESIUM    Collection Time: 04/02/22 10:42 PM   Result Value Ref Range    Magnesium 2.1 1.8 - 2.4 mg/dL   EKG, 12 LEAD, INITIAL    Collection Time: 04/03/22 12:16 AM   Result Value Ref Range    Ventricular Rate 137 BPM    Atrial Rate 137 BPM    P-R Interval 122 ms    QRS Duration 86 ms    Q-T Interval 304 ms    QTC Calculation (Bezet) 459 ms    Calculated P Axis 75 degrees    Calculated R Axis 76 degrees    Calculated T Axis 20 degrees    Diagnosis       Sinus tachycardia  ST & T wave abnormality, consider inferior ischemia  Abnormal ECG  No previous ECGs available     D DIMER    Collection Time: 04/03/22  1:10 AM   Result Value Ref Range    D DIMER 0.55 <0.56 ug/ml(FEU)   BLOOD GAS, ARTERIAL POC    Collection Time: 04/03/22  3:28 AM   Result Value Ref Range    Device: NASAL CANNULA      pH (POC) 7.30 (L) 7.35 - 7.45      pCO2 (POC) 47.5 (H) 35 - 45 MMHG    pO2 (POC) 61 (L) 75 - 100 MMHG    HCO3 (POC) 23.4 22 - 26 MMOL/L sO2 (POC) 88.1 (L) 95 - 98 %    Base deficit (POC) 3.4 mmol/L    Allens test (POC) Positive      Site RIGHT RADIAL      Specimen type (POC) ARTERIAL      Performed by Shannon     FIO2, L/min 4         EKG Results     Procedure 720 Value Units Date/Time    EKG 12 LEAD INITIAL [[id number]]     Order Status: Sent           All Micro Results     None          Other Studies:  XR CHEST PORT    Result Date: 4/3/2022  EXAM: Chest x-ray. INDICATION: Chest pain and dyspnea. COMPARISON: Yesterday's chest x-ray. TECHNIQUE: Single frontal view. FINDINGS: The lungs are clear. The cardiac size, mediastinal contour and pulmonary vasculature are normal. No pneumothorax or pleural effusion is seen. The bony structures are within normal limits. Normal chest x-ray. XR CHEST PORT    Result Date: 4/2/2022  Portable chest x-ray CLINICAL INDICATION: Shortness of breath and wheezing FINDINGS: Single AP view of the chest compared to a similar exam dated 10/19/2021 show the lungs to be expanded and clear. No pleural effusion or pneumothorax. The cardiac silhouette and mediastinum are unremarkable. The bones are normal.     Normal portable chest x-ray. DUPLEX LOWER EXT VENOUS LEFT    Result Date: 4/3/2022  EXAM: Left leg venous ultrasound. INDICATION: Left leg pain. COMPARISON: None. TECHNIQUE: Evaluation of the left leg veins was performed utilizing grey-scale, color Doppler and duplex ultrasound. FINDINGS: The left common femoral, superficial femoral, deep femoral, popliteal, posterior tibialis, peroneal and greater saphenous veins are patent and compressible, with normal response to augmentation. No evidence of left leg DVT.         Medications:  Medications Administered      Medications Administered     albuterol (PROVENTIL VENTOLIN) nebulizer solution 10 mg     Admin Date  04/02/2022 Action  New Bag Dose  10 mg Route  Nebulization Administered By  [facility] C, RT          albuterol-ipratropium (DUO-NEB) 2.5 MG-0.5 MG/3 ML     Admin Date  04/02/2022 Action  Given Dose  3 mL Route  Nebulization Administered By  Dmitriell Band, RT           Admin Date  04/03/2022 Action  Given Dose  3 mL Route  Nebulization Administered By  Dmitriell Band, RT           Admin Date  04/03/2022 Action  Given Dose  3 mL Route  Nebulization Administered By  [address], RT          aspirin chewable tablet 324 mg     Admin Date  04/03/2022 Action  Given Dose  324 mg Route  Oral Administered By  Kim Arceo RN          ipratropium (ATROVENT) 0.02 % nebulizer solution 0.5 mg     Admin Date  04/03/2022 Action  Given Dose  0.5 mg Route  Nebulization Administered By  Dmitriell Band, RT          levalbuterol [place]il) nebulizer soln 1.25 mg/3 mL     Admin Date  04/03/2022 Action  New Bag Dose  8 mg Route  Nebulization Administered By  Lupe Zhou, RT          methylPREDNISolone (PF) (Solu-MEDROL) injection 125 mg     Admin Date  04/03/2022 Action  Given Dose  125 mg Route  IntraVENous Administered By  Kim Arceo RN          sodium chloride 0.9 % bolus infusion 500 mL     Admin Date  04/03/2022 Action  New Bag Dose  500 mL Route  IntraVENous Administered By  Kim Arceo RN                    Signed By: José Manuel Trimble MD   [facility] Hospitalist Service    April 3, 2022   3:45 AM

## 2022-04-03 NOTE — PROGRESS NOTES
TRANSFER - IN REPORT:    Verbal report received from Rebeka Aly (name) on Dickson Herr  being received from ED (unit) for routine progression of care      Report consisted of patients Situation, Background, Assessment and   Recommendations(SBAR). Information from the following report(s) SBAR, Kardex, ED Summary, Intake/Output, MAR, Recent Results, Cardiac Rhythm ST and Alarm Parameters  was reviewed with the receiving nurse. Opportunity for questions and clarification was provided. Assessment completed upon patients arrival to unit and care assumed.

## 2022-04-03 NOTE — PROGRESS NOTES
Heliox adapters/ventilator  not availble to entrain Heliox into servo for patient at this time. MD Vanegas . Aware. MD Ramona Garcia consulted [facility] for Heliox adapters/vent. RT consulted RT Ar Longoria at [address] [place] [address] for Heliox adapters/vent as well. RT Ar Longoria spoke with MD Ramona Garcia in reference to Heliox administration with ventilator. RT Ar Longoria advised RT and MD Ramona Garcia that patient should be transferred to [address] [place], [place] [id number]  for Heliox treatment.

## 2022-04-03 NOTE — PROCEDURES
Endotracheal Intubation Procedure Note  Indication for endotracheal intubation: respiratory failure  Sedation: midazolam, fentanyl and etomidate  Equipment: Shante 3 laryngoscope blade. Cricoid Pressure: yes  Number of attempts: 1  ETT location confirmed by by auscultation, by CXR and ETCO2 monitor.         Flavio Locke MD

## 2022-04-03 NOTE — PROGRESS NOTES
[facility] Critical Care Note[de-identified] 4/3/2022  Delia Booker  Admission Date: 4/3/2022     Length of Stay: 0 days    Background: 31 y.o. y/o male  seen and evaluated at the request of Dr. Earline William. He has a history of obesity and asthma. He was initially seen in the ER yesterday with wheezing from asthma exacerbation. He has had similar exacerbations before, requiring hospitalization in the past. He is only on prn albuterol at home. No fever, chills, or obvious triggers, though some productive cough. He was initially treated and discharged home. However he returned around midnight with worsening symptoms. L calf pain but d dimer was normal as was LE doppler. CXR was normal. He was given continuous nebs and IV steroids but continued to have increased WOB so has now been started on bipap 18/8 with 60% FiO2. He is satting well and reportedly less WOB than before and less tachycardia (was up to 150 sinus tach) now at 120 but still with ongoing wheeze. ABG already with some mild hypercarbia at 47. Notable PMH:  has a past medical history of Asthma. 24 Hour events: he decompensated this morning and had to be intubated     ROS: unable to obtain/negative except as listed elsewhere. Lines: (insertion date)   ETT: (4/3/22)  Hunter: (4/3/22)  OGT: (4/3/22)  PICC line- 4/3/22  Arterial Line (4/3/22)    Drips: current dose (range)  Versed Dose (mg/hr): 5 mg/hr (bis 64)  Fentanyl gtt  Nimbex gtt    Pertinent Exam:         Blood pressure 118/63, pulse (!) 107, temperature 98.2 °F (36.8 °C), resp. rate 24, height 5' 9\" (1.753 m), weight 309 lb 1.4 oz (140.2 kg), SpO2 98 %. Intake/Output Summary (Last 24 hours) at 4/3/2022 1818  Last data filed at 4/3/2022 1534  Gross per 24 hour   Intake 1445.18 ml   Output 670 ml   Net 775.18 ml     Constitutional:  intubated and mechanically ventilated.   EENMT:  Sclera clear, pupils equal, oral mucosa moist  Respiratory: wheezing  Cardiovascular:  RRR  Gastrointestinal: soft with no tenderness; positive bowel sounds present  Musculoskeletal:  warm with no cyanosis, no lower extremity edema  Skin:  no jaundice or ecchymosis  Neurologic:sedated paralyzed   Psychiatric: sedated and paralyzed    CXR: reviewed     Recent Labs     04/03/22 0951 04/02/22 2242 04/02/22  1627   WBC 10.5 8.3 8.1   HGB 14.6 15.0 15.2   HCT 44.0 45.1 44.2    387 397     Recent Labs     04/03/22 0951 04/02/22 2242 04/02/22  1627    140 144   K 4.4 3.5 3.6    103 107   CO2 28 26 27   * 196* 96   BUN 8 9 9   CREA 0.90 1.10 0.90   MG  --  2.1 2.1   CA 8.9 9.0 9.1   ALB  --  3.7 3.7   AST  --  40* 24   ALT  --  39 34   AP  --  70 68     Recent Labs     04/02/22 2242   TROPHS 5.9     No results for input(s): GLUCPOC in the last 72 hours. No lab exists for component: A1C  ECHO: No results found for this or any previous visit. Results     Procedure Component Value Units Date/Time    COVID-19 RAPID TEST [[id number]] Collected: 04/02/22 1642    Order Status: Completed Specimen: Nasopharyngeal Updated: 04/02/22 1733     Specimen source Nasopharyngeal        COVID-19 rapid test Not detected        Comment:      The specimen is NEGATIVE for SARS-CoV-2, the novel coronavirus associated with COVID-19. A negative result does not rule out COVID-19. This test has been authorized by the FDA under an Emergency Use Authorization (EUA) for use by authorized laboratories.         Fact sheet for Healthcare Providers: ConventionUpdate.co.nz  Fact sheet for Patients: ConventionUpdate.co.nz       Methodology: Isothermal Nucleic Acid Amplification             Inpat Anti-Infectives (From admission, onward)     Start     Ordered Stop    04/03/22 0700  azithromycin (ZITHROMAX) 500 mg in 0.9% sodium chloride 250 mL ([id number])  500 mg,   IntraVENous,   EVERY 24 HOURS         04/03/22 0607 04/08/22 0659              Ventilator Settings:  Ideal body weight: 70.7 kg (155 lb 13.8 oz)   Mode FIO2 Rate Tidal Volume Pressure PEEP   PRVC  60 %    500 ml     10 cm H20    Peak airway pressure: 47 cm H2O       Minute ventilation: 14 l/min  ABG:  Recent Labs     04/03/22  1540 04/03/22  1532 04/03/22  1147   PHI 7.06* 7.07* 7.24*   PCO2I 112.8* 106.1* 62.0*   PO2I 452* 57* 75   HCO3I 31.8* 30.7* 26.3*     Assessment and Plan:  (Medical Decision Making)   Impression: 31 y.o. male with acute respiratory failure due to asthma exacerbation. NEURO:   Sedation: Versed gtt  Analgesia: Fetanyl gtt  Paralytics: nimbex gtt  CV:   - no issues   PULM:   Acute hypoxemic/hypercapneic respiratory failure:  Due to asthma exacerbation ,hard to ventilate ,he had to be paralyzed, severe respiratory acidosis , unable to give helliox, discussed with [facility] for possible transfer     RENAL:  -no issues   GI:   Nutrition: NPO  HEME:   -no issues   ID:    Skin: no decub, turns, preventive care  Prophy: Lovenox, protonix     Family updated at the bedsite     Full Code    The patient is critically ill with respiratory failure, circulatory failure and requires high complexity decision making for assessment and support including frequent ventilator adjustment , frequent evaluation and titration of therapies , application of advanced monitoring technologies and extensive interpretation of multiple databases    Cumulative time devoted to patient care services by me for day of service is  60  mins.     Leonidas Mahmood MD

## 2022-04-03 NOTE — ED PROVIDER NOTES
30-year-old male with history of asthma presents with complaint of worsening shortness of breath, wheezing, diffuse chest tightness after he was discharged in the ER several hours ago. Patient reports that he has been compliant with his nebulizer inhalers. Denies tobacco use. Denies dizziness, fever, chills, hemoptysis. Patient reports productive cough with yellow sputum over the past several days. Patient with complaint of left calf pain. Denies history of DVT, PE. The history is provided by the patient. No  was used. Wheezing   This is a recurrent problem. The current episode started more than 2 days ago. The problem occurs constantly. The problem has been gradually worsening. Associated symptoms include cough. Pertinent negatives include no chest pain, no fever, no abdominal pain, no vomiting, no dysuria, no headaches, no rhinorrhea, no sore throat and no rash. He has tried beta-agonist inhalers for the symptoms. He has had prior hospitalizations. He has had prior ED visits. His past medical history is significant for asthma. Past Medical History:   Diagnosis Date    Asthma        History reviewed. No pertinent surgical history. History reviewed. No pertinent family history.     Social History     Socioeconomic History    Marital status:      Spouse name: Not on file    Number of children: Not on file    Years of education: Not on file    Highest education level: Not on file   Occupational History    Not on file   Tobacco Use    Smoking status: Not on file    Smokeless tobacco: Not on file   Substance and Sexual Activity    Alcohol use: Not on file    Drug use: Not on file    Sexual activity: Not on file   Other Topics Concern    Not on file   Social History Narrative    Not on file     Social Determinants of Health     Financial Resource Strain:     Difficulty of Paying Living Expenses: Not on file   Food Insecurity:     Worried About Running Out of Food in the Last Year: Not on file    Ran Out of Food in the Last Year: Not on file   Transportation Needs:     Lack of Transportation (Medical): Not on file    Lack of Transportation (Non-Medical): Not on file   Physical Activity:     Days of Exercise per Week: Not on file    Minutes of Exercise per Session: Not on file   Stress:     Feeling of Stress : Not on file   Social Connections:     Frequency of Communication with Friends and Family: Not on file    Frequency of Social Gatherings with Friends and Family: Not on file    Attends [employer] Services: Not on file    Active Member of [address] [place] [address] or Organizations: Not on file    Attends Club or Organization Meetings: Not on file    Marital Status: Not on file   Intimate Partner Violence:     Fear of Current or Ex-Partner: Not on file    Emotionally Abused: Not on file    Physically Abused: Not on file    Sexually Abused: Not on file   Housing Stability:     Unable to Pay for Housing in the Last Year: Not on file    Number of [place] in the Last Year: Not on file    Unstable Housing in the Last Year: Not on file         ALLERGIES: Singulair [montelukast]    Review of Systems   Constitutional: Positive for fatigue. Negative for chills, diaphoresis and fever. HENT: Negative for congestion, rhinorrhea, sore throat, trouble swallowing and voice change. Eyes: Negative for redness and visual disturbance. Respiratory: Positive for cough, shortness of breath and wheezing. Cardiovascular: Negative for chest pain and palpitations. Gastrointestinal: Negative for abdominal distention, abdominal pain, nausea and vomiting. Genitourinary: Negative for dysuria and flank pain. Musculoskeletal: Negative for arthralgias, back pain and myalgias. Skin: Negative for rash and wound. Neurological: Negative for dizziness, syncope, weakness, light-headedness and headaches. Hematological: Does not bruise/bleed easily.    Psychiatric/Behavioral: Negative for confusion. Vitals:    04/02/22 2226 04/02/22 2338 04/03/22 0035 04/03/22 0036   BP: (!) 180/88  110/84    Pulse: (!) 122  (!) 132 (!) 131   Resp: 26  23 25   Temp: 98.3 °F (36.8 °C)      SpO2: 90% 92% 96% 94%   Weight: 136.1 kg (300 lb)      Height: 5' 9\" (1.753 m)               Physical Exam  Vitals and nursing note reviewed. Constitutional:       Appearance: He is ill-appearing. HENT:      Head: Normocephalic. Nose: Nose normal.      Mouth/Throat:      Mouth: Mucous membranes are moist.   Eyes:      Extraocular Movements: Extraocular movements intact. Pupils: Pupils are equal, round, and reactive to light. Cardiovascular:      Rate and Rhythm: Tachycardia present. Pulses: Normal pulses. Heart sounds: Normal heart sounds. Pulmonary:      Breath sounds: Wheezing present. Comments: Tachypneic. Diffuse expiratory wheezes noted pain. Abdominal:      General: Bowel sounds are normal.      Tenderness: There is no abdominal tenderness. There is no guarding or rebound. Musculoskeletal:         General: No deformity or signs of injury. Normal range of motion. Comments: Mild left calf tenderness to palpation. No palpable cords. No lower extreme edema noted. DP pulses 2+ and equal bilaterally. Skin:     Findings: No erythema or rash. Neurological:      General: No focal deficit present. Mental Status: He is oriented to person, place, and time. Cranial Nerves: No cranial nerve deficit. Sensory: No sensory deficit. Motor: No weakness. MDM  Number of Diagnoses or Management Options  Severe persistent asthma with acute exacerbation: new and requires workup  Diagnosis management comments: Seen several hours hours ago and given Solumedrol 125 mg IV, Duoneb, Mag. Patient returns with worsening wheezing, shortness of breath. Patient tachycardic, hypoxic. Sats noted to range from 88 to 93% on room air.   Patient remains tachypneic with diffuse audible expiratory wheezes. Patient given DuoNeb and transition to Xopenex due to his tachycardia. Chest x-ray clear. Labs pending at this time including D-dimer. Left lower extremity duplex ultrasound in process.  =====================================================================  Ultrasound negative for DVT. D-dimer 0.55. Will consult hospitalist for admission. Patient requiring 4 L O2 via nasal cannula at this time.        Amount and/or Complexity of Data Reviewed  Clinical lab tests: ordered and reviewed  Tests in the radiology section of CPT®: ordered and reviewed  Tests in the medicine section of CPT®: ordered and reviewed  Review and summarize past medical records: yes  Discuss the patient with other providers: yes  Independent visualization of images, tracings, or specimens: yes    Risk of Complications, Morbidity, and/or Mortality  Presenting problems: high  Diagnostic procedures: high  Management options: high  General comments: Results Include:    Recent Results (from the past 24 hour(s))  -CBC WITH AUTOMATED DIFF:   Collection Time: 04/02/22  4:27 PM       Result                      Value             Ref Range           WBC                         8.1               4.3 - 11.1 K*       RBC                         4.49              4.23 - 5.6 M*       HGB                         15.2              13.6 - 17.2 *       HCT                         44.2              41.1 - 50.3 %       MCV                         98.4 (H)          79.6 - 97.8 *       MCH                         33.9 (H)          26.1 - 32.9 *       MCHC                        34.4              31.4 - 35.0 *       RDW                         12.4              11.9 - 14.6 %       PLATELET                    397               150 - 450 K/*       MPV                         9.2 (L)           9.4 - 12.3 FL       ABSOLUTE NRBC               0.00              0.0 - 0.2 K/*       DF                          AUTOMATED NEUTROPHILS                 54                43 - 78 %           LYMPHOCYTES                 28                13 - 44 %           MONOCYTES                   12                4.0 - 12.0 %        EOSINOPHILS                 4                 0.5 - 7.8 %         BASOPHILS                   1                 0.0 - 2.0 %         IMMATURE GRANULOCYTES       0                 0.0 - 5.0 %         ABS. NEUTROPHILS            4.4               1.7 - 8.2 K/*       ABS. LYMPHOCYTES            2.3               0.5 - 4.6 K/*       ABS. MONOCYTES              1.0               0.1 - 1.3 K/*       ABS. EOSINOPHILS            0.3               0.0 - 0.8 K/*       ABS. BASOPHILS              0.1               0.0 - 0.2 K/*       ABS. IMM. GRANS.            0.0               0.0 - 0.5 K/*  -METABOLIC PANEL, COMPREHENSIVE:   Collection Time: 04/02/22  4:27 PM       Result                      Value             Ref Range           Sodium                      144               138 - 145 mm*       Potassium                   3.6               3.5 - 5.1 mm*       Chloride                    107               98 - 107 mmo*       CO2                         27                21 - 32 mmol*       Anion gap                   10                7 - 16 mmol/L       Glucose                     96                65 - 100 mg/*       BUN                         9                 6 - 23 MG/DL        Creatinine                  0.90              0.8 - 1.5 MG*       GFR est AA                  >60               >60 ml/min/1*       GFR est non-AA              >60               >60 ml/min/1*       Calcium                     9.1               8.3 - 10.4 M*       Bilirubin, total            0.6               0.2 - 1.1 MG*       ALT (SGPT)                  34                12 - 65 U/L         AST (SGOT)                  24                15 - 37 U/L         Alk.  phosphatase            68                50 - 136 U/L        Protein, total 7.5               6.3 - 8.2 g/*       Albumin                     3.7               3.5 - 5.0 g/*       Globulin                    3.8 (H)           2.3 - 3.5 g/*       A-G Ratio                   1.0 (L)           1.2 - 3.5      -MAGNESIUM:   Collection Time: 04/02/22  4:27 PM       Result                      Value             Ref Range           Magnesium                   2.1               1.8 - 2.4 mg*  -COVID-19 RAPID TEST:   Collection Time: 04/02/22  4:42 PM       Result                      Value             Ref Range           Specimen source                                               Nasopharyngeal       COVID-19 rapid test         Not detected      NOTD           -CBC WITH AUTOMATED DIFF:   Collection Time: 04/02/22 10:42 PM       Result                      Value             Ref Range           WBC                         8.3               4.3 - 11.1 K*       RBC                         4.37              4.23 - 5.6 M*       HGB                         15.0              13.6 - 17.2 *       HCT                         45.1              41.1 - 50.3 %       MCV                         103.2 (H)         79.6 - 97.8 *       MCH                         34.3 (H)          26.1 - 32.9 *       MCHC                        33.3              31.4 - 35.0 *       RDW                         12.8              11.9 - 14.6 %       PLATELET                    387               150 - 450 K/*       MPV                         9.8               9.4 - 12.3 FL       ABSOLUTE NRBC               0.00              0.0 - 0.2 K/*       DF                          AUTOMATED                             NEUTROPHILS                 92 (H)            43 - 78 %           LYMPHOCYTES                 5 (L)             13 - 44 %           MONOCYTES                   2 (L)             4.0 - 12.0 %        EOSINOPHILS                 0 (L)             0.5 - 7.8 %         BASOPHILS                   1                 0.0 - 2.0 % IMMATURE GRANULOCYTES       0                 0.0 - 5.0 %         ABS. NEUTROPHILS            7.6               1.7 - 8.2 K/*       ABS. LYMPHOCYTES            0.4 (L)           0.5 - 4.6 K/*       ABS. MONOCYTES              0.2               0.1 - 1.3 K/*       ABS. EOSINOPHILS            0.0               0.0 - 0.8 K/*       ABS. BASOPHILS              0.0               0.0 - 0.2 K/*       ABS. IMM. GRANS.            0.0               0.0 - 0.5 K/*  -METABOLIC PANEL, COMPREHENSIVE:   Collection Time: 04/02/22 10:42 PM       Result                      Value             Ref Range           Sodium                      140               138 - 145 mm*       Potassium                   3.5               3.5 - 5.1 mm*       Chloride                    103               98 - 107 mmo*       CO2                         26                21 - 32 mmol*       Anion gap                   11                7 - 16 mmol/L       Glucose                     196 (H)           65 - 100 mg/*       BUN                         9                 6 - 23 MG/DL        Creatinine                  1. 10              0.8 - 1.5 MG*       GFR est AA                  >60               >60 ml/min/1*       GFR est non-AA              >60               >60 ml/min/1*       Calcium                     9.0               8.3 - 10.4 M*       Bilirubin, total            0.9               0.2 - 1.1 MG*       ALT (SGPT)                  39                12 - 65 U/L         AST (SGOT)                  40 (H)            15 - 37 U/L         Alk.  phosphatase            70                50 - 136 U/L        Protein, total              7.6               6.3 - 8.2 g/*       Albumin                     3.7               3.5 - 5.0 g/*       Globulin                    3.9 (H)           2.3 - 3.5 g/*       A-G Ratio                   0.9 (L)           1.2 - 3.5      -TROPONIN-HIGH SENSITIVITY:   Collection Time: 04/02/22 10:42 PM       Result Value             Ref Range           Troponin-High Sensitiv*     5.9               0 - 14 pg/mL   -MAGNESIUM:   Collection Time: 04/02/22 10:42 PM       Result                      Value             Ref Range           Magnesium                   2.1               1.8 - 2.4 mg*  -EKG, 12 LEAD, INITIAL:   Collection Time: 04/03/22 12:16 AM       Result                      Value             Ref Range           Ventricular Rate            137               BPM                 Atrial Rate                 137               BPM                 P-R Interval                122               ms                  QRS Duration                86                ms                  Q-T Interval                304               ms                  QTC Calculation (Bezet)     459               ms                  Calculated P Axis           75                degrees             Calculated R Axis           76                degrees             Calculated T Axis           20                degrees             Diagnosis                                                     Sinus tachycardia ST & T wave abnormality, consider inferior ischemia Abnormal ECG No previous ECGs available   -D DIMER:   Collection Time: 04/03/22  1:10 AM       Result                      Value             Ref Range           D DIMER                     0.55              <0.56 ug/ml(*      Patient Progress  Patient progress: other (comment) (Guarded )    ED Course as of 04/03/22 0228   Sun Apr 03, 2022   0048 CXR   FINDINGS: The lungs are clear. The cardiac size, mediastinal contour and pulmonary vasculature are normal. No pneumothorax or pleural effusion is seen. The bony structures are within normal limits.     IMPRESSION  Normal chest x-ray. [DF]   0103 Rapid Covid negative earlier today.  [DF]   5989 D DIMER: 0.55 [DF]   0224 Duplex LLE  FINDINGS: The left common femoral, superficial femoral, deep femoral, popliteal, posterior tibialis, peroneal and greater saphenous veins are patent and  compressible, with normal response to augmentation.     IMPRESSION  No evidence of left leg DVT. [DF]   0228 D DIMER: 0.55 [DF]      ED Course User Index  [DF] Isabella Irvin MD       EKG    Date/Time: 4/3/2022 12:49 AM  Performed by: Isabella Irvin MD  Authorized by: Isabella Irvin MD     ECG reviewed by ED Physician in the absence of a cardiologist: yes    Rate:     ECG rate:  137    ECG rate assessment: tachycardic    Rhythm:     Rhythm: sinus tachycardia    Ectopy:     Ectopy: none    QRS:     QRS axis:  Normal    QRS intervals:  Normal  Conduction:     Conduction: normal    T waves:     T waves: inverted      Inverted:  II, III and aVF  Comments:      No ST elevation. ST depression noted in leads II, III, aVF. Critical Care  Performed by: Isabella Irvin MD  Authorized by: Isabella Irvin MD     Critical care provider statement:     Critical care time (minutes):  35    Critical care was necessary to treat or prevent imminent or life-threatening deterioration of the following conditions:  Respiratory failure    Critical care was time spent personally by me on the following activities:  Blood draw for specimens, development of treatment plan with patient or surrogate, discussions with consultants, evaluation of patient's response to treatment, examination of patient, obtaining history from patient or surrogate, ordering and performing treatments and interventions, ordering and review of laboratory studies, ordering and review of radiographic studies, pulse oximetry, re-evaluation of patient's condition and review of old charts    I assumed direction of critical care for this patient from another provider in my specialty: yes    Comments:      Pt w/ acute asthma exacerbation requiring numerous nebs            Damon Steger Martie Sicard., MD; 4/3/2022 @12:49 AM Voice dictation software was used during the making of this note. This software is not perfect and grammatical and other typographical errors may be present.   This note has not been proofread for errors.  ===================================================================

## 2022-04-03 NOTE — PROGRESS NOTES
Dr. Marah Valentin notified of the following ABG results. Will repeat ABG in 1 hour. Results for [facility] (MRN [id number]) as of 4/3/2022 10:19   Ref.  Range 4/3/2022 09:04   pH (POC) Latest Ref Range: 7.35 - 7.45   7.19 (LL)   pCO2 (POC) Latest Ref Range: 35 - 45 MMHG 76.4 (HH)   pO2 (POC) Latest Ref Range: 75 - 100 MMHG 78   HCO3 (POC) Latest Ref Range: 22 - 26 MMOL/L 29.4 (H)   sO2 (POC) Latest Ref Range: 95 - 98 % 91.1 (L)   Base deficit (POC) Latest Units: mmol/L 1.2   FIO2 (POC) Latest Units: % 60

## 2022-04-03 NOTE — ED NOTES
Patient has already been here and been in triage. The respiratory therapist just advised me that patient is complaining of chest pain that is so severe that he has had pain medication. At this point I will initiate a cardiac work-up on patient.   He is not my patient however I am the provider in triage at this time

## 2022-04-03 NOTE — PROGRESS NOTES
Bedside and verbal shift change report received from  Dede Jacobo RN (offgoing nurse). Report included the following information SBAR, Kardex, Intake/Output, MAR, Recent Results, Cardiac Rhythm ST and Alarm Parameters .      Dual skin assessment completed at bedside: n/a (list pertinent skin assessment findings)    Dual verification of gtts completed (name of gtts verified): Sascha, versed, nimbex

## 2022-04-03 NOTE — CONSULTS
History and Physical Initial Visit NOTE           4/3/2022    Helio Ahn                        Date of Admission:  4/3/2022    The patient's chart is reviewed and the patient is discussed with the staff. Subjective:     Patient is a 31 y.o.  male seen and evaluated at the request of Dr. Victor Hugo Martinez. He has a history of obesity and asthma. He was initially seen in the ER yesterday with wheezing from asthma exacerbation. He has had similar exacerbations before, requiring hospitalization in the past. He is only on prn albuterol at home. No fever, chills, or obvious triggers, though some productive cough. He was initially treated and discharged home. However he returned around midnight with worsening symptoms. L calf pain but d dimer was normal as was LE doppler. CXR was normal. He was given continuous nebs and IV steroids but continued to have increased WOB so has now been started on bipap 18/8 with 60% FiO2. He is satting well and reportedly less WOB than before and less tachycardia (was up to 150 sinus tach) now at 120 but still with ongoing wheeze. ABG already with some mild hypercarbia at 47. Review of Systems  A comprehensive review of systems was negative except for that written in the HPI. Prior to Admission Medications   Prescriptions Last Dose Informant Patient Reported? Taking? albuterol (PROVENTIL HFA, VENTOLIN HFA, PROAIR HFA) 90 mcg/actuation inhaler   No No   Sig: Take 2 Puffs by inhalation every four (4) hours as needed for Wheezing. albuterol (PROVENTIL VENTOLIN) 2.5 mg /3 mL (0.083 %) nebu   No No   Sig: 3 mL by Nebulization route every four (4) hours as needed for Wheezing. amoxicillin (AMOXIL) 500 mg capsule   No No   Sig: Take 1 Cap by mouth three (3) times daily.    azithromycin (Zithromax Z-Kristopher) 250 mg tablet   No No   Sig: Take 2 tabs day one, and one tab daily thereafter for total of 5 days   ibuprofen (MOTRIN) 800 mg tablet   No No   Sig: Take 1 Tablet by mouth every six (6) hours as needed for Pain for up to 7 days. predniSONE (DELTASONE) 20 mg tablet   No No   Sig: Take 40 mg by mouth daily for 4 days. Facility-Administered Medications: None     Past Medical History:   Diagnosis Date    Asthma      Past Surgical History:   Procedure Laterality Date    HX TONSILLECTOMY       Social History     Socioeconomic History    Marital status:      Spouse name: Not on file    Number of children: Not on file    Years of education: Not on file    Highest education level: Not on file   Occupational History    Not on file   Tobacco Use    Smoking status: Never Smoker    Smokeless tobacco: Never Used   Substance and Sexual Activity    Alcohol use: Not Currently    Drug use: Not on file    Sexual activity: Not on file   Other Topics Concern    Not on file   Social History Narrative    Not on file     Social Determinants of Health     Financial Resource Strain:     Difficulty of Paying Living Expenses: Not on file   Food Insecurity:     Worried About Running Out of Food in the Last Year: Not on file    Sue of Food in the Last Year: Not on file   Transportation Needs:     Lack of Transportation (Medical): Not on file    Lack of Transportation (Non-Medical):  Not on file   Physical Activity:     Days of Exercise per Week: Not on file    Minutes of Exercise per Session: Not on file   Stress:     Feeling of Stress : Not on file   Social Connections:     Frequency of Communication with Friends and Family: Not on file    Frequency of Social Gatherings with Friends and Family: Not on file    Attends [employer] Services: Not on file    Active Member of Clubs or Organizations: Not on file    Attends Club or Organization Meetings: Not on file    Marital Status: Not on file   Intimate Partner Violence:     Fear of Current or Ex-Partner: Not on file    Emotionally Abused: Not on file    Physically Abused: Not on file    Sexually Abused: Not on file   Housing Stability:     Unable to Pay for Housing in the Last Year: Not on file    Number of Places Lived in the Last Year: Not on file    Unstable Housing in the Last Year: Not on file     Family History   Problem Relation Age of Onset    Sleep Apnea Mother     Leukemia Father     Asthma Brother      Allergies   Allergen Reactions    Singulair [Montelukast] Hives     Current Facility-Administered Medications   Medication Dose Route Frequency    levalbuterol (XOPENEX) nebulizer soln 1.25 mg/3 mL  1.25 mg Nebulization Q4H PRN    albuterol-ipratropium (DUO-NEB) 2.5 MG-0.5 MG/3 ML  12 mL Nebulization CONTINUOUS    levalbuterol (XOPENEX) nebulizer soln 1.25 mg/3 mL  1.25 mg Nebulization NOW     Current Outpatient Medications   Medication Sig    ibuprofen (MOTRIN) 800 mg tablet Take 1 Tablet by mouth every six (6) hours as needed for Pain for up to 7 days.  predniSONE (DELTASONE) 20 mg tablet Take 40 mg by mouth daily for 4 days.  azithromycin (Zithromax Z-Kristopher) 250 mg tablet Take 2 tabs day one, and one tab daily thereafter for total of 5 days    albuterol (PROVENTIL VENTOLIN) 2.5 mg /3 mL (0.083 %) nebu 3 mL by Nebulization route every four (4) hours as needed for Wheezing.  albuterol (PROVENTIL HFA, VENTOLIN HFA, PROAIR HFA) 90 mcg/actuation inhaler Take 2 Puffs by inhalation every four (4) hours as needed for Wheezing.  amoxicillin (AMOXIL) 500 mg capsule Take 1 Cap by mouth three (3) times daily. Objective:   Blood pressure (!) 176/77, pulse (!) 146, temperature 98.3 °F (36.8 °C), resp. rate 15, height 5' 9\" (1.753 m), weight 300 lb (136.1 kg), SpO2 100 %.  No intake or output data in the 24 hours ending 04/03/22 0553  PHYSICAL EXAM   Constitutional:  the patient is well developed and in no acute distress  EENMT:  Sclera clear, pupils equal, oral mucosa moist  Respiratory: Mild B exp wheeze  Cardiovascular:  RRR without M,G,R  Gastrointestinal: soft and non-tender; with positive bowel sounds. Musculoskeletal: warm without cyanosis. There is no lower extremity edema. Skin:  no jaundice or rashes, no wounds   Neurologic: no gross neuro deficits     Psychiatric:  alert and oriented x ppt    CXR:    4/3/22      CT Chest:     Recent Labs     04/02/22  2242 04/02/22  1627   WBC 8.3 8.1   HGB 15.0 15.2   HCT 45.1 44.2    397    144   K 3.5 3.6    107   CO2 26 27   * 96   BUN 9 9   CREA 1.10 0.90   MG 2.1 2.1   CA 9.0 9.1   ALB 3.7 3.7   AST 40* 24   ALT 39 34   AP 70 68   TROPHS 5.9  --      ECHO: No results found for this or any previous visit. Results     Procedure Component Value Units Date/Time    COVID-19 RAPID TEST [[id number]] Collected: 04/02/22 1642    Order Status: Completed Specimen: Nasopharyngeal Updated: 04/02/22 5254     Specimen source Nasopharyngeal        COVID-19 rapid test Not detected        Comment:      The specimen is NEGATIVE for SARS-CoV-2, the novel coronavirus associated with COVID-19. A negative result does not rule out COVID-19. This test has been authorized by the FDA under an Emergency Use Authorization (EUA) for use by authorized laboratories. Fact sheet for Healthcare Providers: ConventionUpdate.co.nz  Fact sheet for Patients: ConventionUpdate.co.nz       Methodology: Isothermal Nucleic Acid Amplification             Inpat Anti-Infectives (From admission, onward)    None        Assessment and Plan:  (Medical Decision Making)   Principal Problem:    Severe persistent asthma with exacerbation (4/3/2022)        Active Problems:    Obesity (4/3/2022)    Contributes to the complexity of his care. Asthma exacerbation (4/3/2022)    Severe and persistent wheeze despite continuous neb treatments and steroids. On bipap but still with some wheezing. Will start heliox, rely on xopenex for his neb treatments given his tachycardia.  Will start azithromycin for possible URI trigger for his symptoms. Will check UDS to look for other possible triggers. Scheduled steroids ordered. Acute respiratory failure with hypoxia ([address] (4/3/2022)    On 60% satting 100%. Will repeat abg in about 1 hour to ensure hypercarbia is not worsening to the point that he would need intubation. No Order    More than 50% of the time documented was spent in face-to-face contact with the patient and in the care of the patient on the floor/unit where the patient is located. Thank you very much for this referral.  We appreciate the opportunity to participate in this patient's care. Will follow along with above stated plan.     Dylan Tang MD

## 2022-04-03 NOTE — PROGRESS NOTES
Leonard Escalante arrived to unit via stretcher, escorted by RNx2 and RTx2. Cardiac and respiratory monitors placed. Patient currently on NRB and placed on bipap 60% fiO2. Dual skin assessment completed with Carolynn Martin RN. No skin breakdown or pressure ulcers noted.

## 2022-04-03 NOTE — PROGRESS NOTES
Dr. Arjun Hunt updated on patient condition. Orders received to transition patient off propofol and to start versed, start levo if bolus is ineffective and for PICC insertion per Dr. Arjun Hunt.

## 2022-04-03 NOTE — H&P
Date of Surgery Update:  Sunday Kelsie was seen and examined. History and physical has been reviewed. The patient has been examined.  There have been no significant clinical changes since the completion of the originally dated History and Physical.    Signed By: Carolin Corral MD     April 3, 2022 6:26 PM

## 2022-04-03 NOTE — Clinical Note
Status[de-identified] INPATIENT [101]   Type of Bed: Remote Telemetry [29]   Cardiac Monitoring Required?: No   Inpatient Hospitalization Certified Necessary for the Following Reasons: 3.  Patient receiving treatment that can only be provided in an inpatient setting (further clarification in H&P documentation)   Admitting Diagnosis: Asthma exacerbation [[id number]]   Admitting Physician: Alison Flores [[id number]]   Attending Physician: Alison Flores [[id number]]   Estimated Length of Stay: 2 Midnights   Discharge Plan[de-identified] Home with Office Follow-up

## 2022-04-03 NOTE — PROGRESS NOTES
Patient's  MV 2.1 at  This time. RT bagged 1.25mg xopenex down ET tube, RT placed patient back  on a Servo ventilator with out complications, patient's MV 11.2. MD and RN aware.

## 2022-04-03 NOTE — RT PROTOCOL NOTE
Patient's PIP 47-50,RT called Dr. Yulissa Hunter to bedside, Heliox ordered, RT researching parts to adapt heliox to vent.

## 2022-04-03 NOTE — PROGRESS NOTES
Patient admitted by Dr. Yousif Polanco in the morning. Seen and examined at bedside. On BiPAP, IV Solu-Medrol and Xopenex. Worsening respiratory distress. Hypoxic, hypercarbic respiratory failure secondary to asthma exacerbation. Respiratory acidosis. Patient was intubated. Will sign off.

## 2022-04-03 NOTE — PROCEDURES
Procedure Note - Arterial Access:   Performed by Herb King MD .          Procedure Location:  CCU. Condition: Elective. Consent:  YES. Method: Seldinger technique. Site Prep: Betadine. Procedure: Arterial Catheter Insertion in Right, Radial Artery   Catheter inserted into a new site. Number of Attempts:  1   Indication: Monitoring and Blood Drawing. Complication None. The procedure was tolerated well.     Herb King MD

## 2022-04-03 NOTE — ED TRIAGE NOTES
Pt presents to the ED from home c/o diffuse chest tightness and SOB with a h/o asthma. States he was seen here for same complaints two hours ago. Denies dizziness, syncope, leg pain/swelling, and any other complaints.

## 2022-04-04 ENCOUNTER — APPOINTMENT (OUTPATIENT)
Dept: GENERAL RADIOLOGY | Age: 37
DRG: 207 | End: 2022-04-04
Attending: INTERNAL MEDICINE

## 2022-04-04 PROBLEM — J45.902 STATUS ASTHMATICUS: Status: ACTIVE | Noted: 2022-04-03

## 2022-04-04 LAB
ANION GAP SERPL CALC-SCNC: 6 MMOL/L (ref 7–16)
ARTERIAL PATENCY WRIST A: ABNORMAL
ARTERIAL PATENCY WRIST A: POSITIVE
ATRIAL RATE: 128 BPM
BASE DEFICIT BLD-SCNC: 0.1 MMOL/L
BASE DEFICIT BLD-SCNC: 0.5 MMOL/L
BASE EXCESS BLD CALC-SCNC: 1.1 MMOL/L
BASE EXCESS BLD CALC-SCNC: 4 MMOL/L
BDY SITE: ABNORMAL
BUN SERPL-MCNC: 17 MG/DL (ref 6–23)
CALCIUM SERPL-MCNC: 8.5 MG/DL (ref 8.3–10.4)
CALCULATED P AXIS, ECG09: 81 DEGREES
CALCULATED R AXIS, ECG10: 74 DEGREES
CALCULATED T AXIS, ECG11: -43 DEGREES
CHLORIDE SERPL-SCNC: 107 MMOL/L (ref 98–107)
CO2 SERPL-SCNC: 29 MMOL/L (ref 21–32)
CREAT SERPL-MCNC: 1.6 MG/DL (ref 0.8–1.5)
DIAGNOSIS, 93000: NORMAL
ERYTHROCYTE [DISTWIDTH] IN BLOOD BY AUTOMATED COUNT: 13.5 % (ref 11.9–14.6)
GAS FLOW.O2 O2 DELIVERY SYS: ABNORMAL L/MIN
GLUCOSE SERPL-MCNC: 160 MG/DL (ref 65–100)
HCO3 BLD-SCNC: 27.6 MMOL/L (ref 22–26)
HCO3 BLD-SCNC: 29.1 MMOL/L (ref 22–26)
HCO3 BLD-SCNC: 30.4 MMOL/L (ref 22–26)
HCO3 BLD-SCNC: 33.7 MMOL/L (ref 22–26)
HCT VFR BLD AUTO: 38.5 % (ref 41.1–50.3)
HGB BLD-MCNC: 12.2 G/DL (ref 13.6–17.2)
INSPIRATION.DURATION SETTING TIME VENT: 0.75 SEC
MCH RBC QN AUTO: 34.1 PG (ref 26.1–32.9)
MCHC RBC AUTO-ENTMCNC: 31.7 G/DL (ref 31.4–35)
MCV RBC AUTO: 107.5 FL (ref 79.6–97.8)
NRBC # BLD: 0 K/UL (ref 0–0.2)
O2/TOTAL GAS SETTING VFR VENT: 60 %
P-R INTERVAL, ECG05: 118 MS
PAW @ MEAN EXP FLOW ON VENT: 21 CMH2O
PAW @ MEAN EXP FLOW ON VENT: 23 CMH2O
PCO2 BLD: 59.5 MMHG (ref 35–45)
PCO2 BLD: 69.1 MMHG (ref 35–45)
PCO2 BLD: 70.7 MMHG (ref 35–45)
PCO2 BLD: 77.6 MMHG (ref 35–45)
PEEP RESPIRATORY: 10 CMH2O
PEEP RESPIRATORY: 10 CMH2O
PEEP RESPIRATORY: 8 CMH2O
PEEP RESPIRATORY: 8 CMH2O
PH BLD: 7.23 [PH] (ref 7.35–7.45)
PH BLD: 7.24 [PH] (ref 7.35–7.45)
PH BLD: 7.25 [PH] (ref 7.35–7.45)
PH BLD: 7.27 [PH] (ref 7.35–7.45)
PIP ISTAT,IPIP: 37
PIP ISTAT,IPIP: 42
PLATELET # BLD AUTO: 316 K/UL (ref 150–450)
PMV BLD AUTO: 9.7 FL (ref 9.4–12.3)
PO2 BLD: 83 MMHG (ref 75–100)
PO2 BLD: 84 MMHG (ref 75–100)
PO2 BLD: 93 MMHG (ref 75–100)
PO2 BLD: 97 MMHG (ref 75–100)
POTASSIUM SERPL-SCNC: 3.8 MMOL/L (ref 3.5–5.1)
Q-T INTERVAL, ECG07: 336 MS
QRS DURATION, ECG06: 98 MS
QTC CALCULATION (BEZET), ECG08: 490 MS
RBC # BLD AUTO: 3.58 M/UL (ref 4.23–5.6)
SAO2 % BLD: 93.2 % (ref 95–98)
SAO2 % BLD: 93.5 % (ref 95–98)
SAO2 % BLD: 95.2 % (ref 95–98)
SAO2 % BLD: 96.2 % (ref 95–98)
SERVICE CMNT-IMP: ABNORMAL
SODIUM SERPL-SCNC: 142 MMOL/L (ref 138–145)
SPECIMEN TYPE: ABNORMAL
VENTILATION MODE VENT: ABNORMAL
VENTRICULAR RATE, ECG03: 128 BPM
VT SETTING VENT: 400 ML
VT SETTING VENT: 450 ML
VT SETTING VENT: 500 ML
VT SETTING VENT: 500 ML
WBC # BLD AUTO: 12.2 K/UL (ref 4.3–11.1)

## 2022-04-04 PROCEDURE — 94640 AIRWAY INHALATION TREATMENT: CPT

## 2022-04-04 PROCEDURE — 74011250636 HC RX REV CODE- 250/636: Performed by: INTERNAL MEDICINE

## 2022-04-04 PROCEDURE — C9113 INJ PANTOPRAZOLE SODIUM, VIA: HCPCS | Performed by: INTERNAL MEDICINE

## 2022-04-04 PROCEDURE — 87070 CULTURE OTHR SPECIMN AEROBIC: CPT

## 2022-04-04 PROCEDURE — 74011000258 HC RX REV CODE- 258: Performed by: INTERNAL MEDICINE

## 2022-04-04 PROCEDURE — 2709999900 HC NON-CHARGEABLE SUPPLY

## 2022-04-04 PROCEDURE — 87077 CULTURE AEROBIC IDENTIFY: CPT

## 2022-04-04 PROCEDURE — 74011000250 HC RX REV CODE- 250: Performed by: INTERNAL MEDICINE

## 2022-04-04 PROCEDURE — 71045 X-RAY EXAM CHEST 1 VIEW: CPT

## 2022-04-04 PROCEDURE — 85027 COMPLETE CBC AUTOMATED: CPT

## 2022-04-04 PROCEDURE — 82803 BLOOD GASES ANY COMBINATION: CPT

## 2022-04-04 PROCEDURE — 87186 SC STD MICRODIL/AGAR DIL: CPT

## 2022-04-04 PROCEDURE — 65620000000 HC RM CCU GENERAL

## 2022-04-04 PROCEDURE — 36600 WITHDRAWAL OF ARTERIAL BLOOD: CPT

## 2022-04-04 PROCEDURE — 99291 CRITICAL CARE FIRST HOUR: CPT | Performed by: INTERNAL MEDICINE

## 2022-04-04 PROCEDURE — 80048 BASIC METABOLIC PNL TOTAL CA: CPT

## 2022-04-04 PROCEDURE — 74011250637 HC RX REV CODE- 250/637: Performed by: INTERNAL MEDICINE

## 2022-04-04 PROCEDURE — 74011000250 HC RX REV CODE- 250: Performed by: HOSPITALIST

## 2022-04-04 PROCEDURE — 74011250636 HC RX REV CODE- 250/636: Performed by: HOSPITALIST

## 2022-04-04 PROCEDURE — 94003 VENT MGMT INPAT SUBQ DAY: CPT

## 2022-04-04 RX ORDER — FLUTICASONE FUROATE AND VILANTEROL TRIFENATATE 100; 25 UG/1; UG/1
1 POWDER RESPIRATORY (INHALATION) DAILY
COMMUNITY
End: 2022-04-13

## 2022-04-04 RX ORDER — MAGNESIUM SULFATE HEPTAHYDRATE 40 MG/ML
2 INJECTION, SOLUTION INTRAVENOUS ONCE
Status: COMPLETED | OUTPATIENT
Start: 2022-04-04 | End: 2022-04-04

## 2022-04-04 RX ORDER — KETAMINE HCL IN 0.9 % NACL 2 MG/ML
.05-3 PLASTIC BAG, INJECTION (ML) INTRAVENOUS
Status: DISCONTINUED | OUTPATIENT
Start: 2022-04-04 | End: 2022-04-06

## 2022-04-04 RX ORDER — KETAMINE HCL IN 0.9 % NACL 2 MG/ML
.05-3 PLASTIC BAG, INJECTION (ML) INTRAVENOUS
Status: DISCONTINUED | OUTPATIENT
Start: 2022-04-04 | End: 2022-04-04

## 2022-04-04 RX ORDER — ACETAMINOPHEN 650 MG/1
650 SUPPOSITORY RECTAL
Status: DISCONTINUED | OUTPATIENT
Start: 2022-04-04 | End: 2022-04-13

## 2022-04-04 RX ORDER — ACETAMINOPHEN 325 MG/1
650 TABLET ORAL
Status: DISCONTINUED | OUTPATIENT
Start: 2022-04-04 | End: 2022-04-13

## 2022-04-04 RX ADMIN — MINERAL OIL AND PETROLATUM: 150; 830 OINTMENT OPHTHALMIC at 20:59

## 2022-04-04 RX ADMIN — SODIUM CHLORIDE 40 MG: 9 INJECTION INTRAMUSCULAR; INTRAVENOUS; SUBCUTANEOUS at 09:00

## 2022-04-04 RX ADMIN — SODIUM CHLORIDE, PRESERVATIVE FREE 30 ML: 5 INJECTION INTRAVENOUS at 09:05

## 2022-04-04 RX ADMIN — ENOXAPARIN SODIUM 40 MG: 100 INJECTION SUBCUTANEOUS at 09:02

## 2022-04-04 RX ADMIN — CISATRACURIUM BESYLATE 5 MCG/KG/MIN: 2 INJECTION, SOLUTION INTRAVENOUS at 16:58

## 2022-04-04 RX ADMIN — IPRATROPIUM BROMIDE 0.5 MG: 0.5 SOLUTION RESPIRATORY (INHALATION) at 07:32

## 2022-04-04 RX ADMIN — Medication 10 MG/HR: at 20:56

## 2022-04-04 RX ADMIN — SODIUM CHLORIDE 40 MG: 9 INJECTION INTRAMUSCULAR; INTRAVENOUS; SUBCUTANEOUS at 20:19

## 2022-04-04 RX ADMIN — LEVALBUTEROL HYDROCHLORIDE 5 MG: 1.25 SOLUTION RESPIRATORY (INHALATION) at 03:41

## 2022-04-04 RX ADMIN — CISATRACURIUM BESYLATE 5 MCG/KG/MIN: 2 INJECTION, SOLUTION INTRAVENOUS at 02:32

## 2022-04-04 RX ADMIN — Medication 10 MG/HR: at 12:02

## 2022-04-04 RX ADMIN — SODIUM CHLORIDE, PRESERVATIVE FREE 10 ML: 5 INJECTION INTRAVENOUS at 14:19

## 2022-04-04 RX ADMIN — LEVALBUTEROL HYDROCHLORIDE 5 MG: 1.25 SOLUTION RESPIRATORY (INHALATION) at 19:37

## 2022-04-04 RX ADMIN — SODIUM CHLORIDE, PRESERVATIVE FREE 30 ML: 5 INJECTION INTRAVENOUS at 20:21

## 2022-04-04 RX ADMIN — ENOXAPARIN SODIUM 40 MG: 100 INJECTION SUBCUTANEOUS at 20:20

## 2022-04-04 RX ADMIN — PHENYLEPHRINE HYDROCHLORIDE 90 MCG/MIN: 10 INJECTION INTRAVENOUS at 19:25

## 2022-04-04 RX ADMIN — IPRATROPIUM BROMIDE 0.5 MG: 0.5 SOLUTION RESPIRATORY (INHALATION) at 23:46

## 2022-04-04 RX ADMIN — FENTANYL CITRATE 200 MCG/HR: 50 INJECTION, SOLUTION INTRAMUSCULAR; INTRAVENOUS at 15:55

## 2022-04-04 RX ADMIN — METHYLPREDNISOLONE SODIUM SUCCINATE 60 MG: 40 INJECTION, POWDER, FOR SOLUTION INTRAMUSCULAR; INTRAVENOUS at 14:18

## 2022-04-04 RX ADMIN — BUDESONIDE 500 MCG: 0.5 INHALANT RESPIRATORY (INHALATION) at 19:37

## 2022-04-04 RX ADMIN — LEVALBUTEROL HYDROCHLORIDE 5 MG: 1.25 SOLUTION RESPIRATORY (INHALATION) at 07:32

## 2022-04-04 RX ADMIN — KETAMINE HYDROCHLORIDE 0.1 MG/KG/HR: 50 INJECTION, SOLUTION INTRAMUSCULAR; INTRAVENOUS at 09:58

## 2022-04-04 RX ADMIN — MAGNESIUM SULFATE HEPTAHYDRATE 2 G: 40 INJECTION, SOLUTION INTRAVENOUS at 09:03

## 2022-04-04 RX ADMIN — Medication 10 MG/HR: at 01:44

## 2022-04-04 RX ADMIN — CEFTRIAXONE 2 G: 2 INJECTION, POWDER, FOR SOLUTION INTRAMUSCULAR; INTRAVENOUS at 09:05

## 2022-04-04 RX ADMIN — LEVALBUTEROL HYDROCHLORIDE 5 MG: 1.25 SOLUTION RESPIRATORY (INHALATION) at 17:12

## 2022-04-04 RX ADMIN — SODIUM CHLORIDE, PRESERVATIVE FREE 10 ML: 5 INJECTION INTRAVENOUS at 05:35

## 2022-04-04 RX ADMIN — FENTANYL CITRATE 200 MCG/HR: 50 INJECTION, SOLUTION INTRAMUSCULAR; INTRAVENOUS at 02:15

## 2022-04-04 RX ADMIN — ACETAMINOPHEN 650 MG: 325 TABLET ORAL at 19:28

## 2022-04-04 RX ADMIN — METHYLPREDNISOLONE SODIUM SUCCINATE 60 MG: 40 INJECTION, POWDER, FOR SOLUTION INTRAMUSCULAR; INTRAVENOUS at 09:00

## 2022-04-04 RX ADMIN — LEVALBUTEROL HYDROCHLORIDE 5 MG: 1.25 SOLUTION RESPIRATORY (INHALATION) at 23:45

## 2022-04-04 RX ADMIN — CISATRACURIUM BESYLATE 4.5 MCG/KG/MIN: 2 INJECTION, SOLUTION INTRAVENOUS at 20:56

## 2022-04-04 RX ADMIN — LEVALBUTEROL HYDROCHLORIDE 5 MG: 1.25 SOLUTION RESPIRATORY (INHALATION) at 12:03

## 2022-04-04 RX ADMIN — CISATRACURIUM BESYLATE 5 MCG/KG/MIN: 2 INJECTION, SOLUTION INTRAVENOUS at 07:09

## 2022-04-04 RX ADMIN — PHENYLEPHRINE HYDROCHLORIDE 90 MCG/MIN: 10 INJECTION INTRAVENOUS at 14:18

## 2022-04-04 RX ADMIN — AZITHROMYCIN MONOHYDRATE 500 MG: 500 INJECTION, POWDER, LYOPHILIZED, FOR SOLUTION INTRAVENOUS at 07:05

## 2022-04-04 RX ADMIN — BUDESONIDE 500 MCG: 0.5 INHALANT RESPIRATORY (INHALATION) at 07:32

## 2022-04-04 RX ADMIN — PHENYLEPHRINE HYDROCHLORIDE 55 MCG/MIN: 10 INJECTION INTRAVENOUS at 05:35

## 2022-04-04 RX ADMIN — METHYLPREDNISOLONE SODIUM SUCCINATE 60 MG: 40 INJECTION, POWDER, FOR SOLUTION INTRAMUSCULAR; INTRAVENOUS at 03:39

## 2022-04-04 RX ADMIN — IPRATROPIUM BROMIDE 0.5 MG: 0.5 SOLUTION RESPIRATORY (INHALATION) at 17:13

## 2022-04-04 RX ADMIN — CISATRACURIUM BESYLATE 5 MCG/KG/MIN: 2 INJECTION, SOLUTION INTRAVENOUS at 12:01

## 2022-04-04 RX ADMIN — METHYLPREDNISOLONE SODIUM SUCCINATE 60 MG: 40 INJECTION, POWDER, FOR SOLUTION INTRAMUSCULAR; INTRAVENOUS at 20:19

## 2022-04-04 RX ADMIN — KETAMINE HYDROCHLORIDE 0.9 MG/KG/HR: 50 INJECTION, SOLUTION INTRAMUSCULAR; INTRAVENOUS at 20:55

## 2022-04-04 NOTE — PROGRESS NOTES
[facility] Critical Care Note[de-identified] 4/4/2022  Hailey Salinas  Admission Date: 4/3/2022     Length of Stay: 1 days    Background: 31 y.o. y/o male  seen and evaluated at the request of Dr. Chucky Marquez. He has a history of obesity and asthma. He was initially seen in the ER yesterday with wheezing from asthma exacerbation. He has had similar exacerbations before, requiring hospitalization in the past. He is only on prn albuterol at home. No fever, chills, or obvious triggers, though some productive cough. He was initially treated and discharged home. However he returned around midnight with worsening symptoms. L calf pain but d dimer was normal as was LE doppler. CXR was normal. He was given continuous nebs and IV steroids but continued to have increased WOB so has now been started on bipap 18/8 with 60% FiO2. He is satting well and reportedly less WOB than before and less tachycardia (was up to 150 sinus tach) now at 120 but still with ongoing wheeze. ABG already with some mild hypercarbia at 47. Notable PMH:  has a past medical history of Asthma. 24 Hour events: decompensated yesterday and required intubation. Unable to continue heliox through ventilator. Having some air trapping and autopeep 15 from 10 set. He has not had any fevers. He has been on 50 rocky for hypotension and some sinus tachycardia. ROS: unable to obtain/negative except as listed elsewhere. Lines: (insertion date)   ETT: (4/3/22)  Hunter: (4/3/22)  OGT: (4/3/22)  PICC line- 4/3/22  Arterial Line (4/3/22)    Drips: current dose (range)  INFUSIONS:  Nimbex (mcg/kg/min): 5  Levophed (mcg/min): off  Pheynylephrine (mcg/kg/min): 50  Midazolam (mg/hr): 10  Fentanyl (mcg/hr): 200    Pertinent Exam:         Blood pressure (!) 105/50, pulse (!) 122, temperature 99.6 °F (37.6 °C), resp. rate 30, height 5' 9\" (1.753 m), weight 309 lb 1.4 oz (140.2 kg), SpO2 98 %.      Intake/Output Summary (Last 24 hours) at 4/4/2022 5416  Last data filed at 4/4/2022 0732  Gross per 24 hour   Intake 2397.91 ml   Output 980 ml   Net 1417.91 ml     Constitutional:  intubated and mechanically ventilated. EENMT:  Sclera clear, pupils equal, oral mucosa moist  Respiratory: very diminished, no overt wheezing, clearly obstructed on vent with severe obstruction on loops  Cardiovascular:  RRR  Gastrointestinal:  soft with no tenderness; positive bowel sounds present  Musculoskeletal:  warm with no cyanosis, no lower extremity edema  Skin:  no jaundice or ecchymosis  Neurologic:sedated paralyzed   Psychiatric: sedated and paralyzed    CXR: 4/4: increasesd R midlulng infiltrate/atelectasis. Recent Labs     04/04/22  0348 04/03/22  0951 04/02/22 2242   WBC 12.2* 10.5 8.3   HGB 12.2* 14.6 15.0   HCT 38.5* 44.0 45.1    392 387     Recent Labs     04/04/22  0348 04/03/22  0951 04/02/22  2242 04/02/22  1627 04/02/22  1627    140 140   < > 144   K 3.8 4.4 3.5   < > 3.6    106 103   < > 107   CO2 29 28 26   < > 27   * 171* 196*   < > 96   BUN 17 8 9   < > 9   CREA 1.60* 0.90 1.10   < > 0.90   MG  --   --  2.1  --  2.1   CA 8.5 8.9 9.0   < > 9.1   ALB  --   --  3.7  --  3.7   AST  --   --  40*  --  24   ALT  --   --  39  --  34   AP  --   --  70  --  68    < > = values in this interval not displayed. Recent Labs     04/02/22 2242   TROPHS 5.9     No results for input(s): GLUCPOC in the last 72 hours. No lab exists for component: A1C    ECHO: No results found for this or any previous visit. Results     Procedure Component Value Units Date/Time    COVID-19 RAPID TEST [[id number]] Collected: 04/02/22 1642    Order Status: Completed Specimen: Nasopharyngeal Updated: 04/02/22 1733     Specimen source Nasopharyngeal        COVID-19 rapid test Not detected        Comment:      The specimen is NEGATIVE for SARS-CoV-2, the novel coronavirus associated with COVID-19. A negative result does not rule out COVID-19.        This test has been authorized by the FDA under an Emergency Use Authorization (EUA) for use by authorized laboratories. Fact sheet for Healthcare Providers: ConventionUpdate.co.nz  Fact sheet for Patients: ConventionUpdate.co.nz       Methodology: Isothermal Nucleic Acid Amplification             Inpat Anti-Infectives (From admission, onward)     Start     Ordered Stop    04/03/22 0700  azithromycin (ZITHROMAX) 500 mg in 0.9% sodium chloride 250 mL ([id number])  500 mg,   IntraVENous,   EVERY 24 HOURS         04/03/22 0607 04/08/22 0659              Ventilator Settings:  Ideal body weight: 70.7 kg (155 lb 13.8 oz)   Mode FIO2 Rate Tidal Volume Pressure PEEP   PRVC  60 %    500 ml     10 cm H20    Peak airway pressure: 38 cm H2O       Minute ventilation: 14.9 l/min  ABG:  Recent Labs     04/04/22  0605 04/04/22  0350 04/03/22 2027   PHI 7.27* 7.24* 7.25*   PCO2I 59.5* 70.7* 74.2*   PO2I 97 93 115*   HCO3I 27.6* 30.4* 32.1*     Assessment and Plan:  (Medical Decision Making)   Impression: 31 y.o. male with acute respiratory failure due to asthma exacerbation. NEURO:   Sedation: Versed gtt, add ketamine for sedation/airway dilation effects  Analgesia: Fetanyl gtt  Paralytics: nimbex gtt  CV:   Shock: on rocky 50. PULM:   Acute hypoxemic/hypercapneic respiratory failure: On ventilator, could not continue heliox with ventilator, but fairly unclear benefits to heliox once on ventilator and discussed with family that this was likely not worth risking transportation. He has never been intubated before this admission. Had dynamic air trapping and reduced Vt 500->400, RR 30->28, Ti 0.75 to 0.65. Will repeat blood gas. Allow permissive hypercapnea  Severe persistent asthma with exacerbation: ongoing air trapping on ventilator. On steroids, mag redose. Continue nebs. Will add ketamine for sedation and airway dilation effects.  Sounds like he was not on any controller therapy at home recently though has been in the past. Had 2 prior ICU stays though over a decade ago and no intubations. RENAL:  TREVOR: mild, new, appears euvolemic, will monitor  GI:   Nutrition: NPO, start TF tomorrow pending stability  HEME:   no issues   ID:    R midlung infiltrate: on azithro, afebrile. Could be atelectasis. Leukocytosis, but this could be steroid response as well. Will add Rocephin and try to get a sputum culture for follow up  Skin: no decub, turns, preventive care  Prophy: Lovenox, protonix     Wife and mother updated at bedside. Full Code    The patient is critically ill with respiratory failure, circulatory failure and requires high complexity decision making for assessment and support including frequent ventilator adjustment , frequent evaluation and titration of therapies , application of advanced monitoring technologies and extensive interpretation of multiple databases    Cumulative time devoted to patient care services by me for day of service is 37 mins.     Kayli Moody MD

## 2022-04-04 NOTE — PROGRESS NOTES
[facility]. Fentanyl, Versed, Nimbex, and Ketamine gtts dual verified.

## 2022-04-04 NOTE — PROGRESS NOTES
Chart reviewed and pt discussed in am IDR s/p admission to CCU for asthma exacerbation. Currently intubated/vent -60% fio2. Óscar gtt. Spoke with wife, César Estrella. Confirms demographics, but states pt will d/c to [address], [place]. Pt was independent of ADL's prior to admission. Drives self. No current DME's for ambulation, HH or rehab. Does have nebulizer per spouse. No payor source, DECO to follow. PCP with Talia and wife states was to see pulmonary prior to admission. Aware CM to follow for d/c needs/POC when stable per MD.     Care Management Interventions  PCP Verified by CM: Yes (Albertina at [facility])  Mode of Transport at Discharge:  Other (see comment)  Transition of Care Consult (CM Consult): Discharge Planning  Discharge Durable Medical Equipment:  (Nebulizer)  Support Systems: Spouse/Significant Other,Parent(s),Child(manjinder) (Pt lives with his spouse and 1 y/o)  Confirm Follow Up Transport: Family  Freedom of Choice List was Provided with Basic Dialogue that Supports the Patient's Individualized Plan of Care/Goals, Treatment Preferences and Shares the Quality Data Associated with the Providers?: Yes  [employer] Information Provided?:  (no payor source, [employer] to follow and spouse aware)  Discharge Location  Patient Expects to be Discharged to[de-identified] Unable to determine at this time

## 2022-04-04 NOTE — PROGRESS NOTES
Ventilator check complete; patient has a #8. 0 ET tube secured at the 23 at the teeth. Patient is sedated. Patient is not able to follow commands. Breath sounds are diminished. Trachea is midline, Negative for subcutaneous air, and chest excursion is symmetric. Patient is also Negative for cyanosis and is Negative for pitting edema. All alarms are set and audible. Resuscitation bag is at the head of the bed. Ventilator Settings  Mode FIO2 Rate Tidal Volume Pressure PEEP I:E Ratio   PRVC  60 %    500 ml     10 cm H20  1:2.3      Peak airway pressure: 47 cm H2O   Minute ventilation: 11.9 l/min     ABG: No results for input(s): PH, PCO2, PO2, HCO3 in the last 72 hours.       Danielle Dixon, RT

## 2022-04-04 NOTE — PROGRESS NOTES
Problem: Patient Education: Go to Patient Education Activity  Goal: Patient/Family Education  Outcome: Progressing Towards Goal     Problem: Pressure Injury - Risk of  Goal: *Prevention of pressure injury  Description: Document Amrtir Scale and appropriate interventions in the flowsheet. Outcome: Progressing Towards Goal  Note: Pressure Injury Interventions:  Sensory Interventions: Avoid rigorous massage over bony prominences,Maintain/enhance activity level,Keep linens dry and wrinkle-free,Float heels,Pad between skin to skin,Minimize linen layers,Monitor skin under medical devices,Turn and reposition approx. every two hours (pillows and wedges if needed)         Activity Interventions: Pressure redistribution bed/mattress(bed type)    Mobility Interventions: Pressure redistribution bed/mattress (bed type),Turn and reposition approx. every two hours(pillow and wedges)    Nutrition Interventions: Document food/fluid/supplement intake    Friction and Shear Interventions: Foam dressings/transparent film/skin sealants,Transferring/repositioning devices                Problem: Patient Education: Go to Patient Education Activity  Goal: Patient/Family Education  Outcome: Progressing Towards Goal     Problem: Falls - Risk of  Goal: *Absence of Falls  Description: Document Rolando Fall Risk and appropriate interventions in the flowsheet. Outcome: Progressing Towards Goal  Note: Fall Risk Interventions:  Mobility Interventions: Strengthening exercises (ROM-active/passive)         Medication Interventions: Evaluate medications/consider consulting pharmacy    Elimination Interventions:  Toileting schedule/hourly rounds              Problem: Patient Education: Go to Patient Education Activity  Goal: Patient/Family Education  Outcome: Progressing Towards Goal     Problem: Ventilator Management  Goal: *Adequate oxygenation and ventilation  Outcome: Progressing Towards Goal  Goal: *Patient maintains clear airway/free of aspiration  Outcome: Progressing Towards Goal  Goal: *Absence of infection signs and symptoms  Outcome: Progressing Towards Goal     Problem: Patient Education: Go to Patient Education Activity  Goal: Patient/Family Education  Outcome: Progressing Towards Goal     Problem: Patient Education:  Go to Education Activity  Goal: Patient/Family Education  Outcome: Progressing Towards Goal     Problem: Asthma: Day 1  Goal: Activity/Safety  Outcome: Progressing Towards Goal  Goal: Consults, if ordered  Outcome: Progressing Towards Goal  Goal: Diagnostic Test/Procedures  Outcome: Progressing Towards Goal  Goal: Discharge Planning  Outcome: Progressing Towards Goal  Goal: Medications  Outcome: Progressing Towards Goal  Goal: Respiratory  Outcome: Progressing Towards Goal  Goal: Treatments/Interventions/Procedures  Outcome: Progressing Towards Goal  Goal: Psychosocial  Outcome: Progressing Towards Goal

## 2022-04-05 ENCOUNTER — APPOINTMENT (OUTPATIENT)
Dept: GENERAL RADIOLOGY | Age: 37
DRG: 207 | End: 2022-04-05
Attending: INTERNAL MEDICINE

## 2022-04-05 LAB
ANION GAP SERPL CALC-SCNC: 2 MMOL/L (ref 7–16)
APPEARANCE UR: CLEAR
ARTERIAL PATENCY WRIST A: ABNORMAL
B PERT DNA SPEC QL NAA+PROBE: NOT DETECTED
BASE EXCESS BLD CALC-SCNC: 5 MMOL/L
BDY SITE: ABNORMAL
BILIRUB UR QL: ABNORMAL
BORDETELLA PARAPERTUSSIS PCR, BORPAR: NOT DETECTED
BUN SERPL-MCNC: 15 MG/DL (ref 6–23)
C PNEUM DNA SPEC QL NAA+PROBE: NOT DETECTED
CALCIUM SERPL-MCNC: 8.8 MG/DL (ref 8.3–10.4)
CHLORIDE SERPL-SCNC: 110 MMOL/L (ref 98–107)
CO2 SERPL-SCNC: 32 MMOL/L (ref 21–32)
COLOR UR: YELLOW
CREAT SERPL-MCNC: 1 MG/DL (ref 0.8–1.5)
ERYTHROCYTE [DISTWIDTH] IN BLOOD BY AUTOMATED COUNT: 13.9 % (ref 11.9–14.6)
FLUAV H3 RNA SPEC QL NAA+PROBE: DETECTED
FLUBV RNA SPEC QL NAA+PROBE: NOT DETECTED
GAS FLOW.O2 O2 DELIVERY SYS: ABNORMAL L/MIN
GLUCOSE SERPL-MCNC: 144 MG/DL (ref 65–100)
GLUCOSE UR STRIP.AUTO-MCNC: NEGATIVE MG/DL
HADV DNA SPEC QL NAA+PROBE: NOT DETECTED
HCO3 BLD-SCNC: 32.3 MMOL/L (ref 22–26)
HCOV 229E RNA SPEC QL NAA+PROBE: NOT DETECTED
HCOV HKU1 RNA SPEC QL NAA+PROBE: NOT DETECTED
HCOV NL63 RNA SPEC QL NAA+PROBE: NOT DETECTED
HCOV OC43 RNA SPEC QL NAA+PROBE: NOT DETECTED
HCT VFR BLD AUTO: 37.1 % (ref 41.1–50.3)
HGB BLD-MCNC: 11.9 G/DL (ref 13.6–17.2)
HGB UR QL STRIP: NEGATIVE
HMPV RNA SPEC QL NAA+PROBE: NOT DETECTED
HPIV1 RNA SPEC QL NAA+PROBE: NOT DETECTED
HPIV2 RNA SPEC QL NAA+PROBE: NOT DETECTED
HPIV3 RNA SPEC QL NAA+PROBE: NOT DETECTED
HPIV4 RNA SPEC QL NAA+PROBE: NOT DETECTED
KETONES UR QL STRIP.AUTO: ABNORMAL MG/DL
LEUKOCYTE ESTERASE UR QL STRIP.AUTO: NEGATIVE
M PNEUMO DNA SPEC QL NAA+PROBE: NOT DETECTED
MAGNESIUM SERPL-MCNC: 2.9 MG/DL (ref 1.8–2.4)
MCH RBC QN AUTO: 33.9 PG (ref 26.1–32.9)
MCHC RBC AUTO-ENTMCNC: 32.1 G/DL (ref 31.4–35)
MCV RBC AUTO: 105.7 FL (ref 79.6–97.8)
NITRITE UR QL STRIP.AUTO: NEGATIVE
NRBC # BLD: 0 K/UL (ref 0–0.2)
O2/TOTAL GAS SETTING VFR VENT: 60 %
PCO2 BLD: 58.6 MMHG (ref 35–45)
PEEP RESPIRATORY: 8 CMH2O
PH BLD: 7.35 [PH] (ref 7.35–7.45)
PH UR STRIP: 6 [PH] (ref 5–9)
PLATELET # BLD AUTO: 296 K/UL (ref 150–450)
PMV BLD AUTO: 9.3 FL (ref 9.4–12.3)
PO2 BLD: 98 MMHG (ref 75–100)
POTASSIUM SERPL-SCNC: 3.6 MMOL/L (ref 3.5–5.1)
PROT UR STRIP-MCNC: NEGATIVE MG/DL
RBC # BLD AUTO: 3.51 M/UL (ref 4.23–5.6)
RSV RNA SPEC QL NAA+PROBE: NOT DETECTED
RV+EV RNA SPEC QL NAA+PROBE: NOT DETECTED
SAO2 % BLD: 97 % (ref 95–98)
SARS-COV-2 PCR, COVPCR: NOT DETECTED
SERVICE CMNT-IMP: ABNORMAL
SODIUM SERPL-SCNC: 144 MMOL/L (ref 138–145)
SP GR UR REFRACTOMETRY: >1.03 (ref 1–1.02)
SPECIMEN TYPE: ABNORMAL
UROBILINOGEN UR QL STRIP.AUTO: 0.2 EU/DL (ref 0.2–1)
VENTILATION MODE VENT: ABNORMAL
VT SETTING VENT: 450 ML
WBC # BLD AUTO: 14.7 K/UL (ref 4.3–11.1)

## 2022-04-05 PROCEDURE — 87040 BLOOD CULTURE FOR BACTERIA: CPT

## 2022-04-05 PROCEDURE — 36600 WITHDRAWAL OF ARTERIAL BLOOD: CPT

## 2022-04-05 PROCEDURE — 71045 X-RAY EXAM CHEST 1 VIEW: CPT

## 2022-04-05 PROCEDURE — 74011250636 HC RX REV CODE- 250/636: Performed by: INTERNAL MEDICINE

## 2022-04-05 PROCEDURE — 81003 URINALYSIS AUTO W/O SCOPE: CPT

## 2022-04-05 PROCEDURE — 74011000258 HC RX REV CODE- 258: Performed by: INTERNAL MEDICINE

## 2022-04-05 PROCEDURE — 82803 BLOOD GASES ANY COMBINATION: CPT

## 2022-04-05 PROCEDURE — 36415 COLL VENOUS BLD VENIPUNCTURE: CPT

## 2022-04-05 PROCEDURE — 83735 ASSAY OF MAGNESIUM: CPT

## 2022-04-05 PROCEDURE — 74011000250 HC RX REV CODE- 250: Performed by: INTERNAL MEDICINE

## 2022-04-05 PROCEDURE — 0DH67UZ INSERTION OF FEEDING DEVICE INTO STOMACH, VIA NATURAL OR ARTIFICIAL OPENING: ICD-10-PCS | Performed by: INTERNAL MEDICINE

## 2022-04-05 PROCEDURE — 80048 BASIC METABOLIC PNL TOTAL CA: CPT

## 2022-04-05 PROCEDURE — 94640 AIRWAY INHALATION TREATMENT: CPT

## 2022-04-05 PROCEDURE — C9113 INJ PANTOPRAZOLE SODIUM, VIA: HCPCS | Performed by: INTERNAL MEDICINE

## 2022-04-05 PROCEDURE — 99291 CRITICAL CARE FIRST HOUR: CPT | Performed by: INTERNAL MEDICINE

## 2022-04-05 PROCEDURE — 94003 VENT MGMT INPAT SUBQ DAY: CPT

## 2022-04-05 PROCEDURE — 85027 COMPLETE CBC AUTOMATED: CPT

## 2022-04-05 PROCEDURE — 65620000000 HC RM CCU GENERAL

## 2022-04-05 PROCEDURE — 3E0G76Z INTRODUCTION OF NUTRITIONAL SUBSTANCE INTO UPPER GI, VIA NATURAL OR ARTIFICIAL OPENING: ICD-10-PCS | Performed by: INTERNAL MEDICINE

## 2022-04-05 PROCEDURE — 77030018798 HC PMP KT ENTRL FED COVD -A

## 2022-04-05 PROCEDURE — 2709999900 HC NON-CHARGEABLE SUPPLY

## 2022-04-05 PROCEDURE — 74011250636 HC RX REV CODE- 250/636: Performed by: HOSPITALIST

## 2022-04-05 PROCEDURE — 74011250637 HC RX REV CODE- 250/637: Performed by: INTERNAL MEDICINE

## 2022-04-05 PROCEDURE — 74011000250 HC RX REV CODE- 250: Performed by: HOSPITALIST

## 2022-04-05 PROCEDURE — 0202U NFCT DS 22 TRGT SARS-COV-2: CPT

## 2022-04-05 RX ORDER — OSELTAMIVIR PHOSPHATE 75 MG/1
75 CAPSULE ORAL 2 TIMES DAILY
Status: COMPLETED | OUTPATIENT
Start: 2022-04-05 | End: 2022-04-09

## 2022-04-05 RX ORDER — VANCOMYCIN HYDROCHLORIDE
1250 EVERY 12 HOURS
Status: DISCONTINUED | OUTPATIENT
Start: 2022-04-05 | End: 2022-04-06

## 2022-04-05 RX ORDER — AMLODIPINE BESYLATE 10 MG/1
10 TABLET ORAL DAILY
Status: DISCONTINUED | OUTPATIENT
Start: 2022-04-05 | End: 2022-04-10

## 2022-04-05 RX ORDER — PROPOFOL 10 MG/ML
0-50 VIAL (ML) INTRAVENOUS
Status: DISCONTINUED | OUTPATIENT
Start: 2022-04-05 | End: 2022-04-08

## 2022-04-05 RX ORDER — OSELTAMIVIR PHOSPHATE 75 MG/1
75 CAPSULE ORAL 2 TIMES DAILY
Status: DISCONTINUED | OUTPATIENT
Start: 2022-04-05 | End: 2022-04-05

## 2022-04-05 RX ADMIN — SODIUM CHLORIDE 40 MG: 9 INJECTION INTRAMUSCULAR; INTRAVENOUS; SUBCUTANEOUS at 21:09

## 2022-04-05 RX ADMIN — FENTANYL CITRATE 200 MCG/HR: 50 INJECTION, SOLUTION INTRAMUSCULAR; INTRAVENOUS at 03:57

## 2022-04-05 RX ADMIN — CISATRACURIUM BESYLATE 3 MCG/KG/MIN: 2 INJECTION, SOLUTION INTRAVENOUS at 12:13

## 2022-04-05 RX ADMIN — OSELTAMIVIR PHOSPHATE 75 MG: 75 CAPSULE ORAL at 17:53

## 2022-04-05 RX ADMIN — LEVALBUTEROL HYDROCHLORIDE 5 MG: 1.25 SOLUTION RESPIRATORY (INHALATION) at 15:53

## 2022-04-05 RX ADMIN — IPRATROPIUM BROMIDE 0.5 MG: 0.5 SOLUTION RESPIRATORY (INHALATION) at 15:55

## 2022-04-05 RX ADMIN — SODIUM CHLORIDE, PRESERVATIVE FREE 10 ML: 5 INJECTION INTRAVENOUS at 21:20

## 2022-04-05 RX ADMIN — Medication 10 MG/HR: at 18:20

## 2022-04-05 RX ADMIN — PROPOFOL 45 MCG/KG/MIN: 10 INJECTION, EMULSION INTRAVENOUS at 20:07

## 2022-04-05 RX ADMIN — VANCOMYCIN HYDROCHLORIDE 1250 MG: 10 INJECTION, POWDER, LYOPHILIZED, FOR SOLUTION INTRAVENOUS at 21:06

## 2022-04-05 RX ADMIN — LEVALBUTEROL HYDROCHLORIDE 5 MG: 1.25 SOLUTION RESPIRATORY (INHALATION) at 19:39

## 2022-04-05 RX ADMIN — IPRATROPIUM BROMIDE 0.5 MG: 0.5 SOLUTION RESPIRATORY (INHALATION) at 07:26

## 2022-04-05 RX ADMIN — SODIUM CHLORIDE, PRESERVATIVE FREE 10 ML: 5 INJECTION INTRAVENOUS at 13:19

## 2022-04-05 RX ADMIN — KETAMINE HYDROCHLORIDE 1 MG/KG/HR: 50 INJECTION, SOLUTION INTRAMUSCULAR; INTRAVENOUS at 03:57

## 2022-04-05 RX ADMIN — BUDESONIDE 500 MCG: 0.5 INHALANT RESPIRATORY (INHALATION) at 07:25

## 2022-04-05 RX ADMIN — LEVALBUTEROL HYDROCHLORIDE 5 MG: 1.25 SOLUTION RESPIRATORY (INHALATION) at 11:14

## 2022-04-05 RX ADMIN — KETAMINE HYDROCHLORIDE 1 MG/KG/HR: 50 INJECTION, SOLUTION INTRAMUSCULAR; INTRAVENOUS at 00:39

## 2022-04-05 RX ADMIN — PROPOFOL 25 MCG/KG/MIN: 10 INJECTION, EMULSION INTRAVENOUS at 12:15

## 2022-04-05 RX ADMIN — KETAMINE HYDROCHLORIDE 0.8 MG/KG/HR: 50 INJECTION, SOLUTION INTRAMUSCULAR; INTRAVENOUS at 09:12

## 2022-04-05 RX ADMIN — OSELTAMIVIR PHOSPHATE 75 MG: 75 CAPSULE ORAL at 11:16

## 2022-04-05 RX ADMIN — METHYLPREDNISOLONE SODIUM SUCCINATE 60 MG: 40 INJECTION, POWDER, FOR SOLUTION INTRAMUSCULAR; INTRAVENOUS at 21:13

## 2022-04-05 RX ADMIN — METHYLPREDNISOLONE SODIUM SUCCINATE 60 MG: 40 INJECTION, POWDER, FOR SOLUTION INTRAMUSCULAR; INTRAVENOUS at 08:17

## 2022-04-05 RX ADMIN — SODIUM CHLORIDE, PRESERVATIVE FREE 30 ML: 5 INJECTION INTRAVENOUS at 08:55

## 2022-04-05 RX ADMIN — AMLODIPINE BESYLATE 10 MG: 10 TABLET ORAL at 08:25

## 2022-04-05 RX ADMIN — VANCOMYCIN HYDROCHLORIDE 2500 MG: 500 INJECTION, POWDER, LYOPHILIZED, FOR SOLUTION INTRAVENOUS at 10:08

## 2022-04-05 RX ADMIN — METHYLPREDNISOLONE SODIUM SUCCINATE 60 MG: 40 INJECTION, POWDER, FOR SOLUTION INTRAMUSCULAR; INTRAVENOUS at 02:00

## 2022-04-05 RX ADMIN — SODIUM CHLORIDE 40 MG: 9 INJECTION INTRAMUSCULAR; INTRAVENOUS; SUBCUTANEOUS at 08:17

## 2022-04-05 RX ADMIN — CISATRACURIUM BESYLATE 3 MCG/KG/MIN: 2 INJECTION, SOLUTION INTRAVENOUS at 20:01

## 2022-04-05 RX ADMIN — FENTANYL CITRATE 200 MCG/HR: 50 INJECTION, SOLUTION INTRAMUSCULAR; INTRAVENOUS at 17:52

## 2022-04-05 RX ADMIN — MINERAL OIL AND PETROLATUM: 150; 830 OINTMENT OPHTHALMIC at 08:34

## 2022-04-05 RX ADMIN — PROPOFOL 45 MCG/KG/MIN: 10 INJECTION, EMULSION INTRAVENOUS at 22:11

## 2022-04-05 RX ADMIN — ENOXAPARIN SODIUM 40 MG: 100 INJECTION SUBCUTANEOUS at 08:22

## 2022-04-05 RX ADMIN — AZITHROMYCIN MONOHYDRATE 500 MG: 500 INJECTION, POWDER, LYOPHILIZED, FOR SOLUTION INTRAVENOUS at 06:07

## 2022-04-05 RX ADMIN — PROPOFOL 35 MCG/KG/MIN: 10 INJECTION, EMULSION INTRAVENOUS at 16:44

## 2022-04-05 RX ADMIN — CEFTRIAXONE 2 G: 2 INJECTION, POWDER, FOR SOLUTION INTRAMUSCULAR; INTRAVENOUS at 08:20

## 2022-04-05 RX ADMIN — BUDESONIDE 500 MCG: 0.5 INHALANT RESPIRATORY (INHALATION) at 19:39

## 2022-04-05 RX ADMIN — IPRATROPIUM BROMIDE 0.5 MG: 0.5 SOLUTION RESPIRATORY (INHALATION) at 23:45

## 2022-04-05 RX ADMIN — LEVALBUTEROL HYDROCHLORIDE 5 MG: 1.25 SOLUTION RESPIRATORY (INHALATION) at 03:46

## 2022-04-05 RX ADMIN — MINERAL OIL AND PETROLATUM: 150; 830 OINTMENT OPHTHALMIC at 21:19

## 2022-04-05 RX ADMIN — LEVALBUTEROL HYDROCHLORIDE 5 MG: 1.25 SOLUTION RESPIRATORY (INHALATION) at 23:45

## 2022-04-05 RX ADMIN — ENOXAPARIN SODIUM 40 MG: 100 INJECTION SUBCUTANEOUS at 21:17

## 2022-04-05 RX ADMIN — CISATRACURIUM BESYLATE 3 MCG/KG/MIN: 2 INJECTION, SOLUTION INTRAVENOUS at 03:56

## 2022-04-05 RX ADMIN — Medication 10 MG/HR: at 08:23

## 2022-04-05 RX ADMIN — METHYLPREDNISOLONE SODIUM SUCCINATE 60 MG: 40 INJECTION, POWDER, FOR SOLUTION INTRAMUSCULAR; INTRAVENOUS at 15:25

## 2022-04-05 RX ADMIN — PROPOFOL 25 MCG/KG/MIN: 10 INJECTION, EMULSION INTRAVENOUS at 08:45

## 2022-04-05 RX ADMIN — LEVALBUTEROL HYDROCHLORIDE 5 MG: 1.25 SOLUTION RESPIRATORY (INHALATION) at 07:26

## 2022-04-05 NOTE — PROGRESS NOTES
VANCO DAILY FOLLOW UP NOTE  [facility] Pharmacokinetic Monitoring Service - Vancomycin    Consulting Provider: Jose Alberto   Indication: HAP  Target Concentration: Goal AUC/-600 mg*hr/L  Day of Therapy: 1    Pertinent Laboratory Values: Wt Readings from Last 1 Encounters:   04/05/22 137.1 kg (302 lb 4 oz)     Temp Readings from Last 1 Encounters:   04/05/22 99.4 °F (37.4 °C)     No components found for: PROCAL  Recent Labs     04/05/22  0335 04/04/22  0348 04/03/22  0951   BUN 15 17 8   CREA 1.00 1.60* 0.90   WBC 14.7* 12.2* 10.5     Estimated Creatinine Clearance: 143.2 mL/min (based on SCr of 1 mg/dL). No results found for: Jean Carlos Rankin    MRSA Nasal Swab: not ordered. Order placed by pharmacy. .      Assessment:  Date/Time Dose Concentration AUC         Note: Serum concentrations collected for AUC dosing may appear elevated if collected in close proximity to the dose administered, this is not necessarily an indication of toxicity    Plan:  Dosing recommendations based on Bayesian software  Start vancomycin 1250 mg q 12 hours  Anticipated AUC of 472 and trough concentration of 12.3 at steady state  Renal labs as indicated   Vancomycin concentrations will be ordered as clinically appropriate   Pharmacy will continue to monitor patient and adjust therapy as indicated    Thank you for the consult,  AFUA SaavedraD

## 2022-04-05 NOTE — PROGRESS NOTES
RT received patient on documented settings, PRVC 500/rr24/+10/60%. Patient has a 8.0 Et tube secure at the [id number] at the Teeth. Patient alarms are on and audible. Pt negative for pitting edema and negative for cyanosis. Patient is also negative for subcutaneous air. Pt ambu bag is located at the [facility]. No complication noted.     Peak airway pressure:30   Minute ventilation: 12.6

## 2022-04-05 NOTE — CONSULTS
Comprehensive Nutrition Assessment    Type and Reason for Visit: Initial,Consult  Tube Feeding Management (pulmonary)    Nutrition Recommendations/Plan:   Enteral Nutrition:   Enteral Access: Nasogastric  Formula: Peptide Based High Protein (Vital High Protein)  Delivery: Continuous feeding: Initiate  20ml/hour, progress by 10ml/8 hours to goal rate of 70ml/hour. Water flush 40 ml every 4 hours  Modulars: None deferred until tolerance of base tube feeding regimen achieved   Enteral regimen at goal to provide:  1540 calories (100% estimated calorie needs), 134 grams protein (100% estimated protein needs) and 1526ml free fluid (1ml/kcal) calculations based on 22 hour infusion. Above regimen and current propofol rate will provide ~2070 calories per day (108% upper end calorie goal)  Nutritional Supplement Therapy:   Implement electrolyte replacement per nutrition support protocols  Replacement indicated:  None  Labs:   EN labs: BMP daily, Mg daily and Phos daily. Daily labs ordered through 4/8. POC Glucoses/SSI Not indicated  Meals and Snacks:  Continue current diet. NPO. Malnutrition Assessment:  Malnutrition Status: At risk for malnutrition (specify) (intubated, NPO.  )    Nutrition Assessment:   Nutrition History: Intubated, unable to provide hx. Nutrition Background:  PMH remarkable for asthma. Admitted with acute respiratory failure, TREVOR, R midlung infiltrate, influenza a. Nutrition Interval:  Discussed with Justus Noriega RN. Goal for today wean ketamine, maintaining paralytic. Abdominal Status (last documented): Intact,Obese,Semi-soft abdomen with Active  bowel sounds. Last BM  (unknown).   Pertinent Medications: zithromax, rocephin, solu-medrol, tamiflu, protonix, vancomycin  Continuous: nimbex, fentanyl, ketamine, versed, rocky, propofol (current rate 20.6 ml/hr potential for 544 calories per day)   PRN: miralax (no administration thus far)  Pertinent Labs:   Lab Results   Component Value Date/Time    Sodium 144 04/05/2022 03:35 AM    Potassium 3.6 04/05/2022 03:35 AM    Chloride 110 (H) 04/05/2022 03:35 AM    CO2 32 04/05/2022 03:35 AM    Anion gap 2 (L) 04/05/2022 03:35 AM    Glucose 144 (H) 04/05/2022 03:35 AM    BUN 15 04/05/2022 03:35 AM    Creatinine 1.00 04/05/2022 03:35 AM    Calcium 8.8 04/05/2022 03:35 AM    Albumin 3.7 04/02/2022 10:42 PM    Magnesium 2.9 (H) 04/05/2022 03:35 AM     Nutrition Related Findings:   No muscle or fat wasting appreciated from window. Intubated 4/3, +NGT. Current Nutrition Therapies:  DIET NPO    Current Intake:   Average Meal Intake: NPO        Anthropometric Measures:  Height: 5' 9\" (175.3 cm)  Current Body Wt: 137.1 kg (302 lb 4 oz) (4/5), Weight source: Bed scale  BMI: 44.6, Obese class 3 (BMI 40.0 or greater)  Admission Body Weight: 300 lb 0.7 oz (pt stated)  Ideal Body Weight (lbs) (Calculated): 160 lbs (73 kg), 188.9 %  Usual Body Wt: 140 kg (308 lb 10.3 oz) (per  office records 2/9/22), Percent weight change: -2.1        Edema: No data recorded  Estimated Daily Nutrient Needs:  Energy (kcal/day): 8722-8448 (Kcal/kg (11-14), Weight Used: Current)  Protein (g/day): 110-146 Weight Used: (Ideal)  Fluid (ml/day):   (1 ml/kcal)    Nutrition Diagnosis:   · Inadequate oral intake related to impaired respiratory function as evidenced by intubation,NPO or clear liquid status due to medical condition    Nutrition Interventions:   Food and/or Nutrient Delivery: Continue NPO,Start tube feeding     Coordination of Nutrition Care: Continue to monitor while inpatient    Goals: Active Goal: Tolerate goal rate TF within 3 days    Nutrition Monitoring and Evaluation:      Food/Nutrient Intake Outcomes: Enteral nutrition intake/tolerance  Physical Signs/Symptoms Outcomes: Biochemical data,GI status,Fluid status or edema,Weight    Discharge Planning:     Too soon to determine    Liang Petty, RD, LDN   Contact: [phone]

## 2022-04-05 NOTE — PROGRESS NOTES
[facility] Critical Care Note[de-identified] 4/5/2022  Aman Cooley  Admission Date: 4/3/2022     Length of Stay: 2 days    Background: 31 y.o. y/o male  seen and evaluated at the request of Dr. Alexis Castillo. He has a history of obesity and asthma. He was initially seen in the ER yesterday with wheezing from asthma exacerbation. He has had similar exacerbations before, requiring hospitalization in the past. He is only on prn albuterol at home. No fever, chills, or obvious triggers, though some productive cough. He was initially treated and discharged home. However he returned around midnight with worsening symptoms. L calf pain but d dimer was normal as was LE doppler. CXR was normal. He was given continuous nebs and IV steroids but continued to have increased WOB so has now been started on bipap 18/8 with 60% FiO2. He is satting well and reportedly less WOB than before and less tachycardia (was up to 150 sinus tach) now at 120 but still with ongoing wheeze. ABG already with some mild hypercarbia at 47. Notable PMH:  has a past medical history of Asthma. 24 Hour events: improved gas this morning. Óscar off. Ketamine 1mcg. Fever 101.4 overnight. Hypertensive. Just now staph returned in sputum. ROS: unable to obtain/negative except as listed elsewhere. Lines: (insertion date)   ETT: (4/3/22)  Hunter: (4/3/22)  OGT: (4/3/22)  PICC line- 4/3/22  Arterial Line (4/3/22)    Drips: current dose (range)  Nimbex (mcg/kg/min): 5  Pheynylephrine (mcg/kg/min): off  Midazolam (mg/hr): 10  Fentanyl (mcg/hr): 200    Pertinent Exam:         Blood pressure (!) 172/69, pulse (!) 117, temperature 99.4 °F (37.4 °C), resp. rate 30, height 5' 9\" (1.753 m), weight 302 lb 4 oz (137.1 kg), SpO2 94 %. Intake/Output Summary (Last 24 hours) at 4/5/2022 0752  Last data filed at 4/5/2022 0717  Gross per 24 hour   Intake 2806.09 ml   Output 1810 ml   Net 996.09 ml     Constitutional:  intubated and mechanically ventilated.   EENMT: Sclera clear, pupils equal, oral mucosa moist  Respiratory: very diminished, no overt wheezing, clearly obstructed on vent with severe obstruction on loops  Cardiovascular:  RRR  Gastrointestinal:  soft with no tenderness; positive bowel sounds present  Musculoskeletal:  warm with no cyanosis, no lower extremity edema  Skin:  no jaundice or ecchymosis  Neurologic:sedated paralyzed   Psychiatric: sedated and paralyzed    CXR: 4/5: increasesd basilar atx/infiltrates      Recent Labs     04/05/22  0335 04/04/22  0348 04/03/22  0951   WBC 14.7* 12.2* 10.5   HGB 11.9* 12.2* 14.6   HCT 37.1* 38.5* 44.0    316 392     Recent Labs     04/05/22 0335 04/04/22 0348 04/03/22  0951 04/02/22  2242 04/02/22 2242 04/02/22  1627 04/02/22  1627    142 140   < > 140   < > 144   K 3.6 3.8 4.4   < > 3.5   < > 3.6   * 107 106   < > 103   < > 107   CO2 32 29 28   < > 26   < > 27   * 160* 171*   < > 196*   < > 96   BUN 15 17 8   < > 9   < > 9   CREA 1.00 1.60* 0.90   < > 1.10   < > 0.90   MG 2.9*  --   --   --  2.1  --  2.1   CA 8.8 8.5 8.9   < > 9.0   < > 9.1   ALB  --   --   --   --  3.7  --  3.7   AST  --   --   --   --  40*  --  24   ALT  --   --   --   --  39  --  34   AP  --   --   --   --  70  --  68    < > = values in this interval not displayed. Recent Labs     04/02/22 2242   TROPHS 5.9     No results for input(s): GLUCPOC in the last 72 hours. No lab exists for component: A1C    ECHO: No results found for this or any previous visit.      Results     Procedure Component Value Units Date/Time    CULTURE, BLOOD [[id number]]     Order Status: Sent Specimen: Blood     RESPIRATORY VIRUS PANEL W/COVID-19, PCR [[id number]]     Order Status: Sent Specimen: NASOPHARYNGEAL SWAB     CULTURE, BLOOD [[id number]]     Order Status: Sent Specimen: Blood     CULTURE, RESPIRATORY/SPUTUM/BRONCH [facility] [[id number]] Collected: 04/04/22 1207    Order Status: Completed Specimen: Sputum,ET Suction Updated: 04/04/22 1 [facility]    COVID-19 RAPID TEST [[id number]] Collected: 04/02/22 1642    Order Status: Completed Specimen: Nasopharyngeal Updated: 04/02/22 1733     Specimen source Nasopharyngeal        COVID-19 rapid test Not detected        Comment:      The specimen is NEGATIVE for SARS-CoV-2, the novel coronavirus associated with COVID-19. A negative result does not rule out COVID-19. This test has been authorized by the FDA under an Emergency Use Authorization (EUA) for use by authorized laboratories. Fact sheet for Healthcare Providers: ConventionUpdate.co.nz  Fact sheet for Patients: ConventionUpdate.co.nz       Methodology: Isothermal Nucleic Acid Amplification             Inpat Anti-Infectives (From admission, onward)     Start     Ordered Stop    04/03/22 0700  azithromycin (ZITHROMAX) 500 mg in 0.9% sodium chloride 250 mL ([id number])  500 mg,   IntraVENous,   EVERY 24 HOURS         04/03/22 0607 04/08/22 0659              Ventilator Settings:  Ideal body weight: 70.7 kg (155 lb 13.8 oz)   Mode FIO2 Rate Tidal Volume Pressure PEEP   PRVC  50 %    450 ml     8 cm H20    Peak airway pressure: 37 cm H2O       Minute ventilation: 13.4 l/min  ABG:  Recent Labs     04/05/22  0348 04/04/22  1520 04/04/22  0941   PHI 7.35 7.25* 7.23*   PCO2I 58.6* 77.6* 69.1*   PO2I 98 84 83   HCO3I 32.3* 33.7* 29.1*     Assessment and Plan:  (Medical Decision Making)   Impression: 31 y.o. male with acute respiratory failure due to asthma exacerbation. NEURO:   Sedation: versed, add propofol, wean ketamine (not sure it helped for airway dilation) and then wean versed  Analgesia: Fetanyl gtt  Paralytics: nimbex gtt, still today  CV:   Shock: on rocky 50. PULM:   Acute hypoxemic/hypercapneic respiratory failure:  On ventilator, could not continue heliox with ventilator, but fairly unclear benefits to heliox once on ventilator and discussed with family that this was likely not worth risking transportation. He has never been intubated before this admission. Improved PCO2 with normalized pH now on 450cc VT, RR 28. Peak pressure 34. Still fairly obstructed on FVL. Will continue paralytics for today at least.   Severe persistent asthma with exacerbation: ongoing air trapping on ventilator. On steroids, mag redose. Continue nebs. Sounds like he was not on any controller therapy at home recently though has been in the past. Had 2 prior ICU stays though over a decade ago and no intubations. RENAL:  TREVOR: improved, UOP is good. Monitor  GI:   Nutrition: NPO, start TF today  HEME:   no issues   ID:    R midlung infiltrate: on azithro/ceft. Sputum now with staph, will add Vanc, await further cultures. Influenza A: add Tamiflu, droplet precautions. Skin: no decub, turns, preventive care  Prophy: Lovenox, protonix     Updated wife this morning in room. Full Code    The patient is critically ill with respiratory failure, circulatory failure and requires high complexity decision making for assessment and support including frequent ventilator adjustment , frequent evaluation and titration of therapies , application of advanced monitoring technologies and extensive interpretation of multiple databases    Cumulative time devoted to patient care services by me for day of service is 32 mins.     Judith Caldwell MD

## 2022-04-05 NOTE — PROGRESS NOTES
Problem: Delirium Treatment  Goal: *Level of consciousness restored to baseline  Outcome: Progressing Towards Goal  Goal: *Level of environmental perceptions restored to baseline  Outcome: Progressing Towards Goal  Goal: *Sensory perception restored to baseline  Outcome: Progressing Towards Goal  Goal: *Emotional stability restored to baseline  Outcome: Progressing Towards Goal  Goal: *Functional assessment restored to baseline  Outcome: Progressing Towards Goal  Goal: *Will remain free of delirium, CAM Score negative  Outcome: Progressing Towards Goal  Goal: *Cognitive status will be restored to baseline  Outcome: Progressing Towards Goal  Goal: Interventions  Outcome: Progressing Towards Goal     Problem: Pressure Injury - Risk of  Goal: *Prevention of pressure injury  Description: Document Martir Scale and appropriate interventions in the flowsheet. Outcome: Progressing Towards Goal  Note: Pressure Injury Interventions:  Sensory Interventions: Assess changes in LOC,Assess need for specialty bed,Avoid rigorous massage over bony prominences,Check visual cues for pain,Discuss PT/OT consult with provider,Keep linens dry and wrinkle-free,Maintain/enhance activity level,Minimize linen layers,Monitor skin under medical devices,Pressure redistribution bed/mattress (bed type),Turn and reposition approx. every two hours (pillows and wedges if needed)         Activity Interventions: Pressure redistribution bed/mattress(bed type),Assess need for specialty bed    Mobility Interventions: Pressure redistribution bed/mattress (bed type),Turn and reposition approx.  every two hours(pillow and wedges)    Nutrition Interventions: Document food/fluid/supplement intake,Discuss nutritional consult with provider    Friction and Shear Interventions: Apply protective barrier, creams and emollients,Feet elevated on foot rest,Foam dressings/transparent film/skin sealants,Lift team/patient mobility team,Transfer aides:transfer board/Leo lift/ceiling lift,Transferring/repositioning devices                Problem: Falls - Risk of  Goal: *Absence of Falls  Description: Document Natalia Batista Fall Risk and appropriate interventions in the flowsheet. Outcome: Progressing Towards Goal  Note: Fall Risk Interventions:  Mobility Interventions: Communicate number of staff needed for ambulation/transfer         Medication Interventions: Evaluate medications/consider consulting pharmacy    Elimination Interventions:  Toileting schedule/hourly rounds,Bed/chair exit alarm              Problem: Ventilator Management  Goal: *Adequate oxygenation and ventilation  Outcome: Progressing Towards Goal  Goal: *Patient maintains clear airway/free of aspiration  Outcome: Progressing Towards Goal  Goal: *Absence of infection signs and symptoms  Outcome: Progressing Towards Goal  Goal: *Normal spontaneous ventilation  Outcome: Progressing Towards Goal

## 2022-04-05 NOTE — PROGRESS NOTES
Bedside and verbal shift change report received from  Rc Preciado (offgoing nurse). Report included the following information SBAR, ED Summary, Procedure Summary, Intake/Output, MAR, Recent Results, Med Rec Status and Cardiac Rhythm NSR.      Dual skin assessment completed at bedside: NA (list pertinent skin assessment findings)    Dual verification of gtts completed (name of gtts verified): Fentanyl, versed and nimbex

## 2022-04-05 NOTE — PROGRESS NOTES
Bedside and verbal shift change report received from  [address] (offgoing nurse). Report included the following information SBAR, Kardex, Intake/Output, MAR, Recent Results, Cardiac Rhythm NSR/ST and Alarm Parameters .      Dual skin assessment completed at bedside: allevyn and heel boots in place for breakdown prevention (list pertinent skin assessment findings)    Dual verification of gtts completed (name of gtts verified): versed, fentanyl, ketamine, nimbex dual verified in [facility]

## 2022-04-06 ENCOUNTER — APPOINTMENT (OUTPATIENT)
Dept: GENERAL RADIOLOGY | Age: 37
DRG: 207 | End: 2022-04-06
Attending: INTERNAL MEDICINE

## 2022-04-06 PROBLEM — I10 PRIMARY HYPERTENSION: Status: ACTIVE | Noted: 2022-04-06

## 2022-04-06 PROBLEM — J15.211 STAPHYLOCOCCUS AUREUS PNEUMONIA (HCC): Status: ACTIVE | Noted: 2022-04-06

## 2022-04-06 PROBLEM — J10.1 INFLUENZA A: Status: ACTIVE | Noted: 2022-04-06

## 2022-04-06 LAB
ANION GAP SERPL CALC-SCNC: 5 MMOL/L (ref 7–16)
ARTERIAL PATENCY WRIST A: ABNORMAL
BASE EXCESS BLD CALC-SCNC: 4.9 MMOL/L
BDY SITE: ABNORMAL
BUN SERPL-MCNC: 18 MG/DL (ref 6–23)
CALCIUM SERPL-MCNC: 9 MG/DL (ref 8.3–10.4)
CHLORIDE SERPL-SCNC: 110 MMOL/L (ref 98–107)
CO2 SERPL-SCNC: 32 MMOL/L (ref 21–32)
CREAT SERPL-MCNC: 0.8 MG/DL (ref 0.8–1.5)
ERYTHROCYTE [DISTWIDTH] IN BLOOD BY AUTOMATED COUNT: 13.6 % (ref 11.9–14.6)
GAS FLOW.O2 O2 DELIVERY SYS: ABNORMAL L/MIN
GLUCOSE SERPL-MCNC: 155 MG/DL (ref 65–100)
HCO3 BLD-SCNC: 31.5 MMOL/L (ref 22–26)
HCT VFR BLD AUTO: 37.2 % (ref 41.1–50.3)
HGB BLD-MCNC: 12.1 G/DL (ref 13.6–17.2)
MAGNESIUM SERPL-MCNC: 2.8 MG/DL (ref 1.8–2.4)
MCH RBC QN AUTO: 34.1 PG (ref 26.1–32.9)
MCHC RBC AUTO-ENTMCNC: 32.5 G/DL (ref 31.4–35)
MCV RBC AUTO: 104.8 FL (ref 79.6–97.8)
NRBC # BLD: 0.02 K/UL (ref 0–0.2)
O2/TOTAL GAS SETTING VFR VENT: 50 %
PCO2 BLD: 54.1 MMHG (ref 35–45)
PEEP RESPIRATORY: 8 CMH2O
PH BLD: 7.37 [PH] (ref 7.35–7.45)
PHOSPHATE SERPL-MCNC: 2.5 MG/DL (ref 2.5–4.5)
PLATELET # BLD AUTO: 309 K/UL (ref 150–450)
PMV BLD AUTO: 9.1 FL (ref 9.4–12.3)
PO2 BLD: 67 MMHG (ref 75–100)
POTASSIUM SERPL-SCNC: 3 MMOL/L (ref 3.5–5.1)
POTASSIUM SERPL-SCNC: 3.3 MMOL/L (ref 3.5–5.1)
RBC # BLD AUTO: 3.55 M/UL (ref 4.23–5.6)
SAO2 % BLD: 91.8 % (ref 95–98)
SERVICE CMNT-IMP: ABNORMAL
SODIUM SERPL-SCNC: 147 MMOL/L (ref 136–145)
SPECIMEN TYPE: ABNORMAL
VANCOMYCIN SERPL-MCNC: 13.3 UG/ML
VENTILATION MODE VENT: ABNORMAL
VT SETTING VENT: 450 ML
WBC # BLD AUTO: 13 K/UL (ref 4.3–11.1)

## 2022-04-06 PROCEDURE — 74011250637 HC RX REV CODE- 250/637: Performed by: INTERNAL MEDICINE

## 2022-04-06 PROCEDURE — 84100 ASSAY OF PHOSPHORUS: CPT

## 2022-04-06 PROCEDURE — 71045 X-RAY EXAM CHEST 1 VIEW: CPT

## 2022-04-06 PROCEDURE — 2709999900 HC NON-CHARGEABLE SUPPLY

## 2022-04-06 PROCEDURE — 94640 AIRWAY INHALATION TREATMENT: CPT

## 2022-04-06 PROCEDURE — 74011250636 HC RX REV CODE- 250/636: Performed by: INTERNAL MEDICINE

## 2022-04-06 PROCEDURE — 83735 ASSAY OF MAGNESIUM: CPT

## 2022-04-06 PROCEDURE — 94003 VENT MGMT INPAT SUBQ DAY: CPT

## 2022-04-06 PROCEDURE — 82803 BLOOD GASES ANY COMBINATION: CPT

## 2022-04-06 PROCEDURE — 74011000250 HC RX REV CODE- 250: Performed by: INTERNAL MEDICINE

## 2022-04-06 PROCEDURE — 99291 CRITICAL CARE FIRST HOUR: CPT | Performed by: INTERNAL MEDICINE

## 2022-04-06 PROCEDURE — C9113 INJ PANTOPRAZOLE SODIUM, VIA: HCPCS | Performed by: INTERNAL MEDICINE

## 2022-04-06 PROCEDURE — 74011250636 HC RX REV CODE- 250/636: Performed by: HOSPITALIST

## 2022-04-06 PROCEDURE — 74011000250 HC RX REV CODE- 250: Performed by: HOSPITALIST

## 2022-04-06 PROCEDURE — 36600 WITHDRAWAL OF ARTERIAL BLOOD: CPT

## 2022-04-06 PROCEDURE — 80048 BASIC METABOLIC PNL TOTAL CA: CPT

## 2022-04-06 PROCEDURE — 65620000000 HC RM CCU GENERAL

## 2022-04-06 PROCEDURE — 84132 ASSAY OF SERUM POTASSIUM: CPT

## 2022-04-06 PROCEDURE — 85027 COMPLETE CBC AUTOMATED: CPT

## 2022-04-06 PROCEDURE — 80202 ASSAY OF VANCOMYCIN: CPT

## 2022-04-06 PROCEDURE — 74011000258 HC RX REV CODE- 258: Performed by: INTERNAL MEDICINE

## 2022-04-06 RX ORDER — FUROSEMIDE 10 MG/ML
20 INJECTION INTRAMUSCULAR; INTRAVENOUS 2 TIMES DAILY
Status: DISCONTINUED | OUTPATIENT
Start: 2022-04-06 | End: 2022-04-07

## 2022-04-06 RX ORDER — CISATRACURIUM BESYLATE 2 MG/ML
0.2 INJECTION, SOLUTION INTRAVENOUS ONCE
Status: COMPLETED | OUTPATIENT
Start: 2022-04-06 | End: 2022-04-06

## 2022-04-06 RX ORDER — VANCOMYCIN HYDROCHLORIDE
1250 EVERY 8 HOURS
Status: DISCONTINUED | OUTPATIENT
Start: 2022-04-06 | End: 2022-04-07

## 2022-04-06 RX ORDER — MIDAZOLAM HYDROCHLORIDE 1 MG/ML
4 INJECTION, SOLUTION INTRAMUSCULAR; INTRAVENOUS ONCE
Status: COMPLETED | OUTPATIENT
Start: 2022-04-06 | End: 2022-04-06

## 2022-04-06 RX ADMIN — SODIUM CHLORIDE 40 MG: 9 INJECTION INTRAMUSCULAR; INTRAVENOUS; SUBCUTANEOUS at 20:42

## 2022-04-06 RX ADMIN — VANCOMYCIN HYDROCHLORIDE 1250 MG: 10 INJECTION, POWDER, LYOPHILIZED, FOR SOLUTION INTRAVENOUS at 09:27

## 2022-04-06 RX ADMIN — SODIUM CHLORIDE, PRESERVATIVE FREE 30 ML: 5 INJECTION INTRAVENOUS at 09:28

## 2022-04-06 RX ADMIN — PROPOFOL 45 MCG/KG/MIN: 10 INJECTION, EMULSION INTRAVENOUS at 00:50

## 2022-04-06 RX ADMIN — METHYLPREDNISOLONE SODIUM SUCCINATE 60 MG: 40 INJECTION, POWDER, FOR SOLUTION INTRAMUSCULAR; INTRAVENOUS at 08:28

## 2022-04-06 RX ADMIN — SODIUM CHLORIDE 40 MG: 9 INJECTION INTRAMUSCULAR; INTRAVENOUS; SUBCUTANEOUS at 08:28

## 2022-04-06 RX ADMIN — CISATRACURIUM BESYLATE 3 MCG/KG/MIN: 2 INJECTION, SOLUTION INTRAVENOUS at 03:30

## 2022-04-06 RX ADMIN — ENOXAPARIN SODIUM 40 MG: 100 INJECTION SUBCUTANEOUS at 08:29

## 2022-04-06 RX ADMIN — BUDESONIDE 500 MCG: 0.5 INHALANT RESPIRATORY (INHALATION) at 07:13

## 2022-04-06 RX ADMIN — CISATRACURIUM BESYLATE 28.64 MG: 2 INJECTION, SOLUTION INTRAVENOUS at 13:34

## 2022-04-06 RX ADMIN — METHYLPREDNISOLONE SODIUM SUCCINATE 60 MG: 40 INJECTION, POWDER, FOR SOLUTION INTRAMUSCULAR; INTRAVENOUS at 14:00

## 2022-04-06 RX ADMIN — FENTANYL CITRATE 200 MCG/HR: 50 INJECTION, SOLUTION INTRAMUSCULAR; INTRAVENOUS at 05:54

## 2022-04-06 RX ADMIN — IPRATROPIUM BROMIDE 0.5 MG: 0.5 SOLUTION RESPIRATORY (INHALATION) at 15:10

## 2022-04-06 RX ADMIN — METHYLPREDNISOLONE SODIUM SUCCINATE 60 MG: 40 INJECTION, POWDER, FOR SOLUTION INTRAMUSCULAR; INTRAVENOUS at 02:52

## 2022-04-06 RX ADMIN — OSELTAMIVIR PHOSPHATE 75 MG: 75 CAPSULE ORAL at 08:29

## 2022-04-06 RX ADMIN — SODIUM CHLORIDE, PRESERVATIVE FREE 10 ML: 5 INJECTION INTRAVENOUS at 13:04

## 2022-04-06 RX ADMIN — PROPOFOL 45 MCG/KG/MIN: 10 INJECTION, EMULSION INTRAVENOUS at 13:55

## 2022-04-06 RX ADMIN — MIDAZOLAM 4 MG: 1 INJECTION INTRAMUSCULAR; INTRAVENOUS at 12:47

## 2022-04-06 RX ADMIN — OSELTAMIVIR PHOSPHATE 75 MG: 75 CAPSULE ORAL at 17:05

## 2022-04-06 RX ADMIN — LEVALBUTEROL HYDROCHLORIDE 5 MG: 1.25 SOLUTION RESPIRATORY (INHALATION) at 07:13

## 2022-04-06 RX ADMIN — PROPOFOL 45 MCG/KG/MIN: 10 INJECTION, EMULSION INTRAVENOUS at 16:46

## 2022-04-06 RX ADMIN — PROPOFOL 45 MCG/KG/MIN: 10 INJECTION, EMULSION INTRAVENOUS at 20:17

## 2022-04-06 RX ADMIN — BUDESONIDE 500 MCG: 0.5 INHALANT RESPIRATORY (INHALATION) at 20:36

## 2022-04-06 RX ADMIN — IPRATROPIUM BROMIDE 0.5 MG: 0.5 SOLUTION RESPIRATORY (INHALATION) at 07:13

## 2022-04-06 RX ADMIN — LEVALBUTEROL HYDROCHLORIDE 5 MG: 1.25 SOLUTION RESPIRATORY (INHALATION) at 11:40

## 2022-04-06 RX ADMIN — AZITHROMYCIN MONOHYDRATE 500 MG: 500 INJECTION, POWDER, LYOPHILIZED, FOR SOLUTION INTRAVENOUS at 06:35

## 2022-04-06 RX ADMIN — AMLODIPINE BESYLATE 10 MG: 10 TABLET ORAL at 08:29

## 2022-04-06 RX ADMIN — MINERAL OIL AND PETROLATUM: 150; 830 OINTMENT OPHTHALMIC at 09:28

## 2022-04-06 RX ADMIN — CISATRACURIUM BESYLATE 3 MCG/KG/MIN: 2 INJECTION, SOLUTION INTRAVENOUS at 16:08

## 2022-04-06 RX ADMIN — LEVALBUTEROL HYDROCHLORIDE 5 MG: 1.25 SOLUTION RESPIRATORY (INHALATION) at 04:45

## 2022-04-06 RX ADMIN — VANCOMYCIN HYDROCHLORIDE 1250 MG: 10 INJECTION, POWDER, LYOPHILIZED, FOR SOLUTION INTRAVENOUS at 17:06

## 2022-04-06 RX ADMIN — FUROSEMIDE 20 MG: 10 INJECTION, SOLUTION INTRAMUSCULAR; INTRAVENOUS at 17:06

## 2022-04-06 RX ADMIN — PROPOFOL 40 MCG/KG/MIN: 10 INJECTION, EMULSION INTRAVENOUS at 11:36

## 2022-04-06 RX ADMIN — Medication 10 MG/HR: at 17:32

## 2022-04-06 RX ADMIN — PROPOFOL 45 MCG/KG/MIN: 10 INJECTION, EMULSION INTRAVENOUS at 09:06

## 2022-04-06 RX ADMIN — ENOXAPARIN SODIUM 40 MG: 100 INJECTION SUBCUTANEOUS at 20:42

## 2022-04-06 RX ADMIN — LEVALBUTEROL HYDROCHLORIDE 5 MG: 1.25 SOLUTION RESPIRATORY (INHALATION) at 20:35

## 2022-04-06 RX ADMIN — PROPOFOL 45 MCG/KG/MIN: 10 INJECTION, EMULSION INTRAVENOUS at 03:50

## 2022-04-06 RX ADMIN — Medication 9 MG/HR: at 05:54

## 2022-04-06 RX ADMIN — FENTANYL CITRATE 200 MCG/HR: 50 INJECTION, SOLUTION INTRAMUSCULAR; INTRAVENOUS at 20:26

## 2022-04-06 RX ADMIN — POTASSIUM BICARBONATE 40 MEQ: 782 TABLET, EFFERVESCENT ORAL at 17:05

## 2022-04-06 RX ADMIN — METHYLPREDNISOLONE SODIUM SUCCINATE 60 MG: 40 INJECTION, POWDER, FOR SOLUTION INTRAMUSCULAR; INTRAVENOUS at 20:42

## 2022-04-06 RX ADMIN — PROPOFOL 45 MCG/KG/MIN: 10 INJECTION, EMULSION INTRAVENOUS at 06:34

## 2022-04-06 RX ADMIN — POTASSIUM BICARBONATE 40 MEQ: 782 TABLET, EFFERVESCENT ORAL at 04:05

## 2022-04-06 RX ADMIN — FUROSEMIDE 20 MG: 10 INJECTION, SOLUTION INTRAMUSCULAR; INTRAVENOUS at 09:06

## 2022-04-06 RX ADMIN — POTASSIUM BICARBONATE 40 MEQ: 782 TABLET, EFFERVESCENT ORAL at 08:29

## 2022-04-06 RX ADMIN — PROPOFOL 45 MCG/KG/MIN: 10 INJECTION, EMULSION INTRAVENOUS at 22:18

## 2022-04-06 RX ADMIN — MINERAL OIL AND PETROLATUM: 150; 830 OINTMENT OPHTHALMIC at 20:43

## 2022-04-06 RX ADMIN — LEVALBUTEROL HYDROCHLORIDE 5 MG: 1.25 SOLUTION RESPIRATORY (INHALATION) at 15:10

## 2022-04-06 NOTE — PROGRESS NOTES
Bedside report received from Virginia Pinto, [place]. Gtt rates verified. Pt remains heavily sedated and paralyzed on nimbex, propofol, fentanyl, and versed. Wife at bedside.

## 2022-04-06 NOTE — PROGRESS NOTES
Received patient on documented settings /RR30/+8/50%. Patient alarms are on and audible. Patient negative for cyanosis and negative for pitting edema. Patient also negative for subcutaneous air. PAtient has a size 8.0 ET tube secured at the [id number] at the teeth.     Peak airway pressure: 35  Minute ventilation: 13.3

## 2022-04-06 NOTE — PROGRESS NOTES
Chart reviewed as pt remains ICU. Intubated/vent -50% fio2. Fentanyl, versed, and propofol gtt. Type A flu positive isolation. CM will continue to follow for d/c needs/POC when stable per MD. LOS 3 days.

## 2022-04-06 NOTE — PROGRESS NOTES
Pt remains paralyzed and sedated on propofol at 45 mcg/kg/min, nimbex at 3 mcg/kg/min, versed at 10 mg/hr, and fentanyl at 200 mcg/hr.

## 2022-04-06 NOTE — PROGRESS NOTES
Problem: Delirium Treatment  Goal: *Absence of falls  Outcome: Progressing Towards Goal  Goal: Interventions  Outcome: Progressing Towards Goal     Problem: Patient Education: Go to Patient Education Activity  Goal: Patient/Family Education  Outcome: Progressing Towards Goal     Problem: Pressure Injury - Risk of  Goal: *Prevention of pressure injury  Description: Document Martir Scale and appropriate interventions in the flowsheet. Outcome: Progressing Towards Goal  Note: Pressure Injury Interventions:  Sensory Interventions: Assess need for specialty bed,Avoid rigorous massage over bony prominences,Discuss PT/OT consult with provider,Float heels,Keep linens dry and wrinkle-free,Minimize linen layers,Monitor skin under medical devices,Pad between skin to skin,Turn and reposition approx. every two hours (pillows and wedges if needed)         Activity Interventions: Assess need for specialty bed,Pressure redistribution bed/mattress(bed type)    Mobility Interventions: Assess need for specialty bed,HOB 30 degrees or less,Suspension boots,Turn and reposition approx. every two hours(pillow and wedges)    Nutrition Interventions: Document food/fluid/supplement intake,Discuss nutritional consult with provider    Friction and Shear Interventions: Apply protective barrier, creams and emollients,Foam dressings/transparent film/skin sealants,HOB 30 degrees or less,Lift sheet,Lift team/patient mobility team,Minimize layers,Transfer aides:transfer board/Leo lift/ceiling lift,Transferring/repositioning devices                Problem: Patient Education: Go to Patient Education Activity  Goal: Patient/Family Education  Outcome: Progressing Towards Goal     Problem: Falls - Risk of  Goal: *Absence of Falls  Description: Document Rolando Fall Risk and appropriate interventions in the flowsheet.   Outcome: Progressing Towards Goal  Note: Fall Risk Interventions:  Mobility Interventions: Communicate number of staff needed for ambulation/transfer,Bed/chair exit alarm         Medication Interventions: Bed/chair exit alarm,Evaluate medications/consider consulting pharmacy    Elimination Interventions:  Toileting schedule/hourly rounds              Problem: Patient Education: Go to Patient Education Activity  Goal: Patient/Family Education  Outcome: Progressing Towards Goal     Problem: Ventilator Management  Goal: *Adequate oxygenation and ventilation  Outcome: Progressing Towards Goal  Goal: *Patient maintains clear airway/free of aspiration  Outcome: Progressing Towards Goal  Goal: *Absence of infection signs and symptoms  Outcome: Progressing Towards Goal     Problem: Patient Education: Go to Patient Education Activity  Goal: Patient/Family Education  Outcome: Progressing Towards Goal     Problem: Patient Education:  Go to Education Activity  Goal: Patient/Family Education  Outcome: Progressing Towards Goal

## 2022-04-06 NOTE — PROGRESS NOTES
Formerly [facility] Critical Care Note[de-identified] 4/6/2022  Daron Lee  Admission Date: 4/3/2022     Length of Stay: 3 days    Background: 31 y.o. y/o male  seen and evaluated at the request of Dr. Jairo Herr. He has a history of obesity and asthma. He was initially seen in the ER yesterday with wheezing from asthma exacerbation. He has had similar exacerbations before, requiring hospitalization in the past. He is only on prn albuterol at home. No fever, chills, or obvious triggers, though some productive cough. He was initially treated and discharged home. However he returned around midnight with worsening symptoms. L calf pain but d dimer was normal as was LE doppler. CXR was normal. He was given continuous nebs and IV steroids but continued to have increased WOB so has now been started on bipap 18/8 with 60% FiO2. He is satting well and reportedly less WOB than before and less tachycardia (was up to 150 sinus tach) now at 120 but still with ongoing wheeze. ABG already with some mild hypercarbia at 47. Notable PMH:  has a past medical history of Asthma. 24 Hour events: improved gas this morning. Off pressors, hypertensive. Afebrile overnight, WBC slightly down. Peak pressures down to 30, FVL on vent improved. ROS: unable to obtain/negative except as listed elsewhere. Lines: (insertion date)   ETT: (4/3/22)  Hunter: (4/3/22)  OGT: (4/3/22)  PICC line- 4/3/22  Arterial Line (4/3/22)    Drips: current dose (range)  Nimbex (mcg/kg/min): 2  Propofol (mcg/kg/min): 45  Midazolam (mg/hr): 8  Fentanyl (mcg/hr): 200    Pertinent Exam:         Blood pressure (!) 154/62, pulse (!) 104, temperature 97.7 °F (36.5 °C), resp. rate 30, height 5' 9\" (1.753 m), weight 315 lb 11.2 oz (143.2 kg), SpO2 93 %.      Intake/Output Summary (Last 24 hours) at 4/6/2022 0820  Last data filed at 4/6/2022 0304  Gross per 24 hour   Intake 2740.26 ml   Output 1225 ml   Net 1515.26 ml     Constitutional:  intubated and mechanically ventilated. EENMT:  Sclera clear, pupils equal, oral mucosa moist  Respiratory: very diminished, no overt wheezing, clearly obstructed on vent with severe obstruction on loops  Cardiovascular:  RRR  Gastrointestinal:  soft with no tenderness; positive bowel sounds present  Musculoskeletal:  warm with no cyanosis, no lower extremity edema  Skin:  no jaundice or ecchymosis  Neurologic:sedated paralyzed   Psychiatric: sedated and paralyzed    CXR: 4/6: improved basilar atx/infiltrates, ETT is at clavicles      Recent Labs     04/06/22 0256 04/05/22 0335 04/04/22 0348   WBC 13.0* 14.7* 12.2*   HGB 12.1* 11.9* 12.2*   HCT 37.2* 37.1* 38.5*    296 316     Recent Labs     04/06/22 0256 04/05/22 0335 04/04/22 0348   * 144 142   K 3.0* 3.6 3.8   * 110* 107   CO2 32 32 29   * 144* 160*   BUN 18 15 17   CREA 0.80 1.00 1.60*   MG 2.8* 2.9*  --    CA 9.0 8.8 8.5   PHOS 2.5  --   --      No results for input(s): LAC, TROPHS, BNPNT, CRP in the last 72 hours. No lab exists for component: ESR  No results for input(s): GLUCPOC in the last 72 hours. No lab exists for component: A1C    ECHO: No results found for this or any previous visit.      Results     Procedure Component Value Units Date/Time    CULTURE, BLOOD [[id number]] Collected: 04/05/22 1103    Order Status: Completed Specimen: Blood Updated: 04/05/22 1254    CULTURE, BLOOD [[id number]] Collected: 04/05/22 0947    Order Status: Completed Specimen: Blood Updated: 04/05/22 1022    MSSA/MRSA SC BY PCR, NASAL SWAB [[id number]]     Order Status: Canceled Specimen: Nasal swab     RESPIRATORY VIRUS PANEL W/COVID-19, PCR [[id number]]  (Abnormal) Collected: 04/05/22 0837    Order Status: Completed Specimen: Nasopharyngeal Updated: 04/05/22 1021     Adenovirus NOT DETECTED        Coronavirus 229E NOT DETECTED        Coronavirus HKU1 NOT DETECTED        Coronavirus CVNL63 NOT DETECTED        Coronavirus OC43 NOT DETECTED        SARS-CoV-2, PCR NOT DETECTED        Metapneumovirus NOT DETECTED        Rhinovirus and Enterovirus NOT DETECTED        Influenza A, subtype H3 Detected        Influenza B NOT DETECTED        Parainfluenza 1 NOT DETECTED        Parainfluenza 2 NOT DETECTED        Parainfluenza 3 NOT DETECTED        Parainfluenza virus 4 NOT DETECTED        RSV by PCR NOT DETECTED        B. parapertussis, PCR NOT DETECTED        Bordetella pertussis - PCR NOT DETECTED        Chlamydophila pneumoniae DNA, QL, PCR NOT DETECTED        Mycoplasma pneumoniae DNA, QL, PCR NOT DETECTED       CULTURE, RESPIRATORY/SPUTUM/BRONCH Clarnce Fluke STAIN [[id number]]  (Abnormal) Collected: 04/04/22 1207    Order Status: Completed Specimen: Sputum,ET Suction Updated: 04/06/22 0716     Special Requests: NO SPECIAL REQUESTS        GRAM STAIN 2 TO 7 WBCS SEEN PER OIF      NO EPITHELIAL CELLS SEEN         FEW GRAM POSITIVE COCCI         NO MUCUS PRESENT        Culture result:       HEAVY STAPHYLOCOCCUS AUREUS SENSITIVITY TO FOLLOW                  MODERATE NORMAL RESPIRATORY ALYCIA                  CULTURE IN PROGRESS,FURTHER UPDATES TO FOLLOW          COVID-19 RAPID TEST [[id number]] Collected: 04/02/22 1642    Order Status: Completed Specimen: Nasopharyngeal Updated: 04/02/22 1733     Specimen source Nasopharyngeal        COVID-19 rapid test Not detected        Comment:      The specimen is NEGATIVE for SARS-CoV-2, the novel coronavirus associated with COVID-19. A negative result does not rule out COVID-19. This test has been authorized by the FDA under an Emergency Use Authorization (EUA) for use by authorized laboratories.         Fact sheet for Healthcare Providers: ConventionUpdate.co.nz  Fact sheet for Patients: ConventionUpdate.co.nz       Methodology: Isothermal Nucleic Acid Amplification             Inpat Anti-Infectives (From admission, onward)     Start     Ordered Stop    04/03/22 0700  azithromycin (ZITHROMAX) 500 mg in 0.9% sodium chloride 250 mL ([id number])  500 mg,   IntraVENous,   EVERY 24 HOURS         04/03/22 0607 04/08/22 0659              Ventilator Settings:  Ideal body weight: 70.7 kg (155 lb 13.8 oz)   Mode FIO2 Rate Tidal Volume Pressure PEEP   PRVC  50 %    450 ml     8 cm H20    Peak airway pressure: 31 cm H2O       Minute ventilation: 13.5 l/min  ABG:  Recent Labs     04/06/22  0450 04/05/22  0348 04/04/22  1520   PHI 7.37 7.35 7.25*   PCO2I 54.1* 58.6* 77.6*   PO2I 67* 98 84   HCO3I 31.5* 32.3* 33.7*     Assessment and Plan:  (Medical Decision Making)   Impression: 31 y.o. male with acute respiratory failure due to asthma exacerbation, influenza A and staph pneumonia    NEURO:   Sedation: propofol, try to wean versed slowly after stable off nimbex  Analgesia: Fentanyl gtt  Paralytics: hold nimbex and see if can tolerate off without vent desynchrony, breath stacking  CV:   Shock: resolved, add lasix 20mg BID for + balance past few days. PULM:   Acute hypoxemic/hypercapneic respiratory failure: On ventilator, could not continue heliox with ventilator, but fairly unclear benefits to heliox once on ventilator and discussed with family that this was likely not worth risking transportation. He has never been intubated before this admission. Improved PCO2 with normalized pH now on 450cc VT, RR 28. Peak pressure 30. Obstruction better. Wean off nimbex today. Will slowly wean versed after this if tolerating. Advance ETT 2cm. Remove A-line, check VBGs daily. Severe persistent asthma with exacerbation: improved air trapping on ventilator. On steroids, mag redose. Continue nebs. Sounds like he was not on any controller therapy at home recently though has been in the past. Had 2 prior ICU stays though over a decade ago and no intubations. RENAL:  TREVOR: improved, UOP is good.  Monitor, add lasix for + balance  GI:   Nutrition: Tolerating TF  HEME:   no issues   ID:    R midlung infiltrate: finishes azithro, continue Vancomycin while pending staph culture. Stop Rocephin for now. Influenza A: Tamiflu BID x 2/5 days, droplet precautions. Skin: no decub, turns, preventive care  Prophy: Lovenox, protonix     Updated wife this morning by phone. Full Code    The patient is critically ill with respiratory failure, circulatory failure and requires high complexity decision making for assessment and support including frequent ventilator adjustment , frequent evaluation and titration of therapies , application of advanced monitoring technologies and extensive interpretation of multiple databases    Cumulative time devoted to patient care services by me for day of service is 32 mins.     Lila Atkins MD

## 2022-04-06 NOTE — INTERDISCIPLINARY ROUNDS
Interdisciplinary team rounds were held 4/6/2022 with the following team members:Infection Control, Nursing, Nurse Practitioner, Nutrition, Occupational Therapy, Palliative Care, Pastoral Care, Pharmacy, Physical Therapy, Physician and Respiratory Therapy and the patient and spouse. Plan of care discussed. See clinical pathway and/or care plan for interventions and desired outcomes.

## 2022-04-06 NOTE — PROGRESS NOTES
followed up with family when that arrived. Family confirmed that patient would want prayer in his current circumstance, while he is unable to communicate his own needs.  provided pastoral presence, prayer and empathetic listening.   Signed by  Lucia Jones M.Div.

## 2022-04-06 NOTE — PROGRESS NOTES
Vent alarming. Pt restless, coughing. ETT suctioned. HR elevated in 130's, sat dropped to 80's. RT called to bedside. Spoke with Dr Stephanie Mcfarlane about change in pt condition. New order for now versed push of 4 mg IV and to increase sedation back up. Do not re paralyze pt at this time. Family at bedside. Updated.

## 2022-04-06 NOTE — PROGRESS NOTES
Physician Progress Note      Kimberley Kelsey  [facility] #:                  [id number]  :                       1985  ADMIT DATE:       4/3/2022 12:32 AM  DISCH DATE:  RESPONDING  PROVIDER #:        LAZ PORTILLO MD        QUERY TEXT:    Type of Shock: Please provide further specificity, if known. Clinical indicators include: blood, hgb, hct, shock, blood  culture  Options provided:  -- Cardiogenic shock  -- Septic shock  -- Hypovolemic shock  -- Hemorrhagic shock due to trauma  -- Hemorrhagic shock related to surgery  -- Anaphylactic shock  -- Other - I will add my own diagnosis  -- Disagree - Not applicable / Not valid  -- Disagree - Clinically Unable to determine / Unknown        PROVIDER RESPONSE TEXT:    The patient has septic shock.       Electronically signed by:  Candis Fraser MD 2022 10:01 AM

## 2022-04-06 NOTE — PROGRESS NOTES
provided follow up visit. Patient did not respond at time, but appeared to be comfortable.  provided prayer and pastoral presence.   Signed by  Fern Ray M.Div.

## 2022-04-06 NOTE — PROGRESS NOTES
Attempted to cut off paralytics. Still deeply sedated and did okay for a while, but this afternoon had more difficulty and even with a push of versed still having difficulty ventilating. RT was only able to keep volume, MV stable by placing on SIMV PC 30/8 with prolonged I time and rate of 10. This generated a VT of 1200 and rate of 10. This is obviously not acceptable for concerns of VILI. I attempted PS (apnea), PRVC (VT 100s), PC (VT 100s) and ultimately appears he needs to be reparalyzed. Will bolus and restart gtt. Will re-evaluate tomorrow pending his obstruction. May be more possible to wean with PSV with lower sedation as well.      Arsenio Marie MD

## 2022-04-06 NOTE — PROGRESS NOTES
VANCO DAILY FOLLOW UP NOTE  [facility] Pharmacokinetic Monitoring Service - Vancomycin    Consulting Provider: Jose Alberto   Indication: HAP  Target Concentration: Goal AUC/-600 mg*hr/L  Day of Therapy: 2    Pertinent Laboratory Values: Wt Readings from Last 1 Encounters:   04/06/22 143.2 kg (315 lb 11.2 oz)     Temp Readings from Last 1 Encounters:   04/06/22 97.7 °F (36.5 °C)     No components found for: PROCAL  Recent Labs     04/06/22  0256 04/05/22  0335 04/04/22  0348   BUN 18 15 17   CREA 0.80 1.00 1.60*   WBC 13.0* 14.7* 12.2*     Estimated Creatinine Clearance: 183.5 mL/min (based on SCr of 0.8 mg/dL). Lab Results   Component Value Date/Time    Vancomycin, random 13.3 04/06/2022 02:56 AM       MRSA Nasal Swab: not ordered. Order placed by pharmacy. .      Assessment:  Date/Time Dose Concentration AUC   4/6 0256 1250 mg q 12 hours 13.3 289   Note: Serum concentrations collected for AUC dosing may appear elevated if collected in close proximity to the dose administered, this is not necessarily an indication of toxicity    Plan:  Current dosing regimen is sub-therapeutic  Increase dose to 1250 mg q 8 hours for predicted AUC/Tr of 434/9.8  Repeat vancomycin concentrations will be ordered as clinically appropriate   Pharmacy will continue to monitor patient and adjust therapy as indicated    Thank you for the consult,  Satinder Whitmore, AFUAD

## 2022-04-06 NOTE — PROGRESS NOTES
Ventilator check complete; patient has a #8. 0 ET tube secured at the 23 at the teeth. Patient is sedated. Patient is not able to follow commands. Breath sounds are diminished. Trachea is midline, Negative for subcutaneous air, and chest excursion is symmetric. Patient is also Negative for cyanosis and is Negative for pitting edema. All alarms are set and audible. Resuscitation bag is at the head of the bed.       Ventilator Settings  Mode FIO2 Rate Tidal Volume Pressure PEEP I:E Ratio   PRVC  50 %    450 ml     8 cm H20  1:2      Peak airway pressure: 31 cm H2O   Minute ventilation: 13.5 l/min       Cassandra Michel, RT

## 2022-04-06 NOTE — PROGRESS NOTES
Pt paralyzed with TOF 0/4. Pt just received bolus of nimbex. With titrate gtt to TOF of 1-2 twitches. Family at bedside, Dr Osmar Moore spoke with wife.  Will try to decrease sedation and paralytic in am.

## 2022-04-07 ENCOUNTER — APPOINTMENT (OUTPATIENT)
Dept: GENERAL RADIOLOGY | Age: 37
DRG: 207 | End: 2022-04-07
Attending: INTERNAL MEDICINE

## 2022-04-07 LAB
ANION GAP SERPL CALC-SCNC: 2 MMOL/L (ref 7–16)
ARTERIAL PATENCY WRIST A: POSITIVE
BACTERIA SPEC CULT: ABNORMAL
BACTERIA SPEC CULT: ABNORMAL
BASE EXCESS BLD CALC-SCNC: 10 MMOL/L
BDY SITE: ABNORMAL
BUN SERPL-MCNC: 24 MG/DL (ref 6–23)
CALCIUM SERPL-MCNC: 9.2 MG/DL (ref 8.3–10.4)
CHLORIDE SERPL-SCNC: 107 MMOL/L (ref 98–107)
CO2 SERPL-SCNC: 36 MMOL/L (ref 21–32)
CREAT SERPL-MCNC: 0.9 MG/DL (ref 0.8–1.5)
ERYTHROCYTE [DISTWIDTH] IN BLOOD BY AUTOMATED COUNT: 13.8 % (ref 11.9–14.6)
GAS FLOW.O2 O2 DELIVERY SYS: ABNORMAL L/MIN
GLUCOSE BLD STRIP.AUTO-MCNC: 177 MG/DL (ref 65–100)
GLUCOSE BLD STRIP.AUTO-MCNC: 220 MG/DL (ref 65–100)
GLUCOSE BLD STRIP.AUTO-MCNC: 228 MG/DL (ref 65–100)
GLUCOSE SERPL-MCNC: 207 MG/DL (ref 65–100)
GRAM STN SPEC: ABNORMAL
HCO3 BLD-SCNC: 36.1 MMOL/L (ref 22–26)
HCT VFR BLD AUTO: 37.3 % (ref 41.1–50.3)
HGB BLD-MCNC: 12 G/DL (ref 13.6–17.2)
MAGNESIUM SERPL-MCNC: 2.7 MG/DL (ref 1.8–2.4)
MCH RBC QN AUTO: 33.7 PG (ref 26.1–32.9)
MCHC RBC AUTO-ENTMCNC: 32.2 G/DL (ref 31.4–35)
MCV RBC AUTO: 104.8 FL (ref 79.6–97.8)
NRBC # BLD: 0.04 K/UL (ref 0–0.2)
O2/TOTAL GAS SETTING VFR VENT: 50 %
PCO2 BLD: 53.8 MMHG (ref 35–45)
PEEP RESPIRATORY: 8 CMH2O
PH BLD: 7.44 [PH] (ref 7.35–7.45)
PHOSPHATE SERPL-MCNC: 3.7 MG/DL (ref 2.5–4.5)
PLATELET # BLD AUTO: 302 K/UL (ref 150–450)
PMV BLD AUTO: 9.1 FL (ref 9.4–12.3)
PO2 BLD: 65 MMHG (ref 75–100)
POTASSIUM SERPL-SCNC: 4 MMOL/L (ref 3.5–5.1)
RBC # BLD AUTO: 3.56 M/UL (ref 4.23–5.6)
SAO2 % BLD: 92.5 % (ref 95–98)
SERVICE CMNT-IMP: ABNORMAL
SODIUM SERPL-SCNC: 145 MMOL/L (ref 138–145)
SPECIMEN TYPE: ABNORMAL
VENTILATION MODE VENT: ABNORMAL
VT SETTING VENT: 450 ML
WBC # BLD AUTO: 12.1 K/UL (ref 4.3–11.1)

## 2022-04-07 PROCEDURE — 71045 X-RAY EXAM CHEST 1 VIEW: CPT

## 2022-04-07 PROCEDURE — 74011636637 HC RX REV CODE- 636/637: Performed by: INTERNAL MEDICINE

## 2022-04-07 PROCEDURE — 74011250637 HC RX REV CODE- 250/637: Performed by: HOSPITALIST

## 2022-04-07 PROCEDURE — 82803 BLOOD GASES ANY COMBINATION: CPT

## 2022-04-07 PROCEDURE — 94003 VENT MGMT INPAT SUBQ DAY: CPT

## 2022-04-07 PROCEDURE — 65620000000 HC RM CCU GENERAL

## 2022-04-07 PROCEDURE — 77030018798 HC PMP KT ENTRL FED COVD -A

## 2022-04-07 PROCEDURE — 74011000258 HC RX REV CODE- 258: Performed by: INTERNAL MEDICINE

## 2022-04-07 PROCEDURE — 74011250636 HC RX REV CODE- 250/636: Performed by: INTERNAL MEDICINE

## 2022-04-07 PROCEDURE — 36600 WITHDRAWAL OF ARTERIAL BLOOD: CPT

## 2022-04-07 PROCEDURE — 94640 AIRWAY INHALATION TREATMENT: CPT

## 2022-04-07 PROCEDURE — 80048 BASIC METABOLIC PNL TOTAL CA: CPT

## 2022-04-07 PROCEDURE — 74011000250 HC RX REV CODE- 250: Performed by: INTERNAL MEDICINE

## 2022-04-07 PROCEDURE — 2709999900 HC NON-CHARGEABLE SUPPLY

## 2022-04-07 PROCEDURE — 82962 GLUCOSE BLOOD TEST: CPT

## 2022-04-07 PROCEDURE — 85027 COMPLETE CBC AUTOMATED: CPT

## 2022-04-07 PROCEDURE — 83735 ASSAY OF MAGNESIUM: CPT

## 2022-04-07 PROCEDURE — 99291 CRITICAL CARE FIRST HOUR: CPT | Performed by: INTERNAL MEDICINE

## 2022-04-07 PROCEDURE — 74011250636 HC RX REV CODE- 250/636: Performed by: HOSPITALIST

## 2022-04-07 PROCEDURE — 74011000250 HC RX REV CODE- 250: Performed by: HOSPITALIST

## 2022-04-07 PROCEDURE — 74011250637 HC RX REV CODE- 250/637: Performed by: INTERNAL MEDICINE

## 2022-04-07 PROCEDURE — C9113 INJ PANTOPRAZOLE SODIUM, VIA: HCPCS | Performed by: INTERNAL MEDICINE

## 2022-04-07 PROCEDURE — 84100 ASSAY OF PHOSPHORUS: CPT

## 2022-04-07 RX ORDER — FUROSEMIDE 10 MG/ML
40 INJECTION INTRAMUSCULAR; INTRAVENOUS 2 TIMES DAILY
Status: DISCONTINUED | OUTPATIENT
Start: 2022-04-07 | End: 2022-04-09

## 2022-04-07 RX ORDER — POLYETHYLENE GLYCOL 3350 17 G/17G
17 POWDER, FOR SOLUTION ORAL DAILY
Status: DISCONTINUED | OUTPATIENT
Start: 2022-04-07 | End: 2022-04-10

## 2022-04-07 RX ORDER — INSULIN LISPRO 100 [IU]/ML
INJECTION, SOLUTION INTRAVENOUS; SUBCUTANEOUS EVERY 6 HOURS
Status: DISCONTINUED | OUTPATIENT
Start: 2022-04-07 | End: 2022-04-10

## 2022-04-07 RX ORDER — AMOXICILLIN 250 MG
2 CAPSULE ORAL
Status: DISCONTINUED | OUTPATIENT
Start: 2022-04-07 | End: 2022-04-10

## 2022-04-07 RX ORDER — CEFAZOLIN SODIUM/WATER 2 G/20 ML
2 SYRINGE (ML) INTRAVENOUS EVERY 8 HOURS
Status: COMPLETED | OUTPATIENT
Start: 2022-04-07 | End: 2022-04-12

## 2022-04-07 RX ADMIN — LEVALBUTEROL HYDROCHLORIDE 5 MG: 1.25 SOLUTION RESPIRATORY (INHALATION) at 00:00

## 2022-04-07 RX ADMIN — MINERAL OIL AND PETROLATUM: 150; 830 OINTMENT OPHTHALMIC at 08:35

## 2022-04-07 RX ADMIN — CEFAZOLIN SODIUM 2 G: 100 INJECTION, POWDER, LYOPHILIZED, FOR SOLUTION INTRAVENOUS at 09:10

## 2022-04-07 RX ADMIN — ENOXAPARIN SODIUM 40 MG: 100 INJECTION SUBCUTANEOUS at 08:35

## 2022-04-07 RX ADMIN — FUROSEMIDE 40 MG: 10 INJECTION, SOLUTION INTRAMUSCULAR; INTRAVENOUS at 08:35

## 2022-04-07 RX ADMIN — BUDESONIDE 500 MCG: 0.5 INHALANT RESPIRATORY (INHALATION) at 20:17

## 2022-04-07 RX ADMIN — LEVALBUTEROL HYDROCHLORIDE 5 MG: 1.25 SOLUTION RESPIRATORY (INHALATION) at 11:03

## 2022-04-07 RX ADMIN — PROPOFOL 45 MCG/KG/MIN: 10 INJECTION, EMULSION INTRAVENOUS at 15:09

## 2022-04-07 RX ADMIN — SODIUM CHLORIDE, PRESERVATIVE FREE 10 ML: 5 INJECTION INTRAVENOUS at 13:46

## 2022-04-07 RX ADMIN — POLYETHYLENE GLYCOL 3350 17 G: 17 POWDER, FOR SOLUTION ORAL at 13:46

## 2022-04-07 RX ADMIN — SODIUM CHLORIDE, PRESERVATIVE FREE 10 ML: 5 INJECTION INTRAVENOUS at 06:05

## 2022-04-07 RX ADMIN — METHYLPREDNISOLONE SODIUM SUCCINATE 60 MG: 40 INJECTION, POWDER, FOR SOLUTION INTRAMUSCULAR; INTRAVENOUS at 03:22

## 2022-04-07 RX ADMIN — FENTANYL CITRATE 200 MCG/HR: 50 INJECTION, SOLUTION INTRAMUSCULAR; INTRAVENOUS at 07:03

## 2022-04-07 RX ADMIN — CISATRACURIUM BESYLATE 3 MCG/KG/MIN: 2 INJECTION, SOLUTION INTRAVENOUS at 23:08

## 2022-04-07 RX ADMIN — FENTANYL CITRATE 200 MCG/HR: 50 INJECTION, SOLUTION INTRAMUSCULAR; INTRAVENOUS at 19:49

## 2022-04-07 RX ADMIN — PROPOFOL 45 MCG/KG/MIN: 10 INJECTION, EMULSION INTRAVENOUS at 15:25

## 2022-04-07 RX ADMIN — OSELTAMIVIR PHOSPHATE 75 MG: 75 CAPSULE ORAL at 08:35

## 2022-04-07 RX ADMIN — METHYLPREDNISOLONE SODIUM SUCCINATE 60 MG: 40 INJECTION, POWDER, FOR SOLUTION INTRAMUSCULAR; INTRAVENOUS at 15:09

## 2022-04-07 RX ADMIN — POLYETHYLENE GLYCOL 3350 17 G: 17 POWDER, FOR SOLUTION ORAL at 03:26

## 2022-04-07 RX ADMIN — PROPOFOL 45 MCG/KG/MIN: 10 INJECTION, EMULSION INTRAVENOUS at 12:07

## 2022-04-07 RX ADMIN — BUDESONIDE 500 MCG: 0.5 INHALANT RESPIRATORY (INHALATION) at 07:20

## 2022-04-07 RX ADMIN — SENNOSIDES AND DOCUSATE SODIUM 2 TABLET: 8.6; 5 TABLET ORAL at 21:09

## 2022-04-07 RX ADMIN — POTASSIUM BICARBONATE 40 MEQ: 782 TABLET, EFFERVESCENT ORAL at 08:35

## 2022-04-07 RX ADMIN — PROPOFOL 45 MCG/KG/MIN: 10 INJECTION, EMULSION INTRAVENOUS at 07:03

## 2022-04-07 RX ADMIN — PROPOFOL 45 MCG/KG/MIN: 10 INJECTION, EMULSION INTRAVENOUS at 03:49

## 2022-04-07 RX ADMIN — INSULIN LISPRO 2 UNITS: 100 INJECTION, SOLUTION INTRAVENOUS; SUBCUTANEOUS at 12:02

## 2022-04-07 RX ADMIN — CISATRACURIUM BESYLATE 3 MCG/KG/MIN: 2 INJECTION, SOLUTION INTRAVENOUS at 07:21

## 2022-04-07 RX ADMIN — PROPOFOL 45 MCG/KG/MIN: 10 INJECTION, EMULSION INTRAVENOUS at 09:52

## 2022-04-07 RX ADMIN — FUROSEMIDE 40 MG: 10 INJECTION, SOLUTION INTRAMUSCULAR; INTRAVENOUS at 17:29

## 2022-04-07 RX ADMIN — IPRATROPIUM BROMIDE 0.5 MG: 0.5 SOLUTION RESPIRATORY (INHALATION) at 23:54

## 2022-04-07 RX ADMIN — LEVALBUTEROL HYDROCHLORIDE 5 MG: 1.25 SOLUTION RESPIRATORY (INHALATION) at 04:48

## 2022-04-07 RX ADMIN — LEVALBUTEROL HYDROCHLORIDE 5 MG: 1.25 SOLUTION RESPIRATORY (INHALATION) at 23:54

## 2022-04-07 RX ADMIN — SODIUM CHLORIDE, PRESERVATIVE FREE 30 ML: 5 INJECTION INTRAVENOUS at 09:10

## 2022-04-07 RX ADMIN — OSELTAMIVIR PHOSPHATE 75 MG: 75 CAPSULE ORAL at 17:29

## 2022-04-07 RX ADMIN — INSULIN LISPRO 4 UNITS: 100 INJECTION, SOLUTION INTRAVENOUS; SUBCUTANEOUS at 17:29

## 2022-04-07 RX ADMIN — METHYLPREDNISOLONE SODIUM SUCCINATE 60 MG: 40 INJECTION, POWDER, FOR SOLUTION INTRAMUSCULAR; INTRAVENOUS at 08:35

## 2022-04-07 RX ADMIN — AMLODIPINE BESYLATE 10 MG: 10 TABLET ORAL at 08:35

## 2022-04-07 RX ADMIN — VANCOMYCIN HYDROCHLORIDE 1250 MG: 10 INJECTION, POWDER, LYOPHILIZED, FOR SOLUTION INTRAVENOUS at 01:05

## 2022-04-07 RX ADMIN — PROPOFOL 40 MCG/KG/MIN: 10 INJECTION, EMULSION INTRAVENOUS at 21:09

## 2022-04-07 RX ADMIN — LEVALBUTEROL HYDROCHLORIDE 5 MG: 1.25 SOLUTION RESPIRATORY (INHALATION) at 16:10

## 2022-04-07 RX ADMIN — IPRATROPIUM BROMIDE 0.5 MG: 0.5 SOLUTION RESPIRATORY (INHALATION) at 07:20

## 2022-04-07 RX ADMIN — SODIUM CHLORIDE 40 MG: 9 INJECTION INTRAMUSCULAR; INTRAVENOUS; SUBCUTANEOUS at 08:35

## 2022-04-07 RX ADMIN — Medication 10 MG/HR: at 22:10

## 2022-04-07 RX ADMIN — AZITHROMYCIN MONOHYDRATE 500 MG: 500 INJECTION, POWDER, LYOPHILIZED, FOR SOLUTION INTRAVENOUS at 06:04

## 2022-04-07 RX ADMIN — Medication 10 MG/HR: at 03:49

## 2022-04-07 RX ADMIN — MINERAL OIL AND PETROLATUM: 150; 830 OINTMENT OPHTHALMIC at 21:10

## 2022-04-07 RX ADMIN — IPRATROPIUM BROMIDE 0.5 MG: 0.5 SOLUTION RESPIRATORY (INHALATION) at 16:10

## 2022-04-07 RX ADMIN — PROPOFOL 45 MCG/KG/MIN: 10 INJECTION, EMULSION INTRAVENOUS at 01:05

## 2022-04-07 RX ADMIN — PROPOFOL 40 MCG/KG/MIN: 10 INJECTION, EMULSION INTRAVENOUS at 18:56

## 2022-04-07 RX ADMIN — CEFAZOLIN SODIUM 2 G: 100 INJECTION, POWDER, LYOPHILIZED, FOR SOLUTION INTRAVENOUS at 17:29

## 2022-04-07 RX ADMIN — CISATRACURIUM BESYLATE 3 MCG/KG/MIN: 2 INJECTION, SOLUTION INTRAVENOUS at 15:09

## 2022-04-07 RX ADMIN — LEVALBUTEROL HYDROCHLORIDE 5 MG: 1.25 SOLUTION RESPIRATORY (INHALATION) at 07:19

## 2022-04-07 RX ADMIN — SODIUM CHLORIDE, PRESERVATIVE FREE 10 ML: 5 INJECTION INTRAVENOUS at 21:11

## 2022-04-07 RX ADMIN — CISATRACURIUM BESYLATE 3 MCG/KG/MIN: 2 INJECTION, SOLUTION INTRAVENOUS at 01:09

## 2022-04-07 RX ADMIN — IPRATROPIUM BROMIDE 0.5 MG: 0.5 SOLUTION RESPIRATORY (INHALATION) at 00:00

## 2022-04-07 RX ADMIN — SODIUM CHLORIDE 40 MG: 9 INJECTION INTRAMUSCULAR; INTRAVENOUS; SUBCUTANEOUS at 21:10

## 2022-04-07 RX ADMIN — METHYLPREDNISOLONE SODIUM SUCCINATE 60 MG: 40 INJECTION, POWDER, FOR SOLUTION INTRAMUSCULAR; INTRAVENOUS at 21:10

## 2022-04-07 RX ADMIN — ENOXAPARIN SODIUM 40 MG: 100 INJECTION SUBCUTANEOUS at 21:09

## 2022-04-07 RX ADMIN — INSULIN LISPRO 4 UNITS: 100 INJECTION, SOLUTION INTRAVENOUS; SUBCUTANEOUS at 23:38

## 2022-04-07 RX ADMIN — Medication 10 MG/HR: at 12:06

## 2022-04-07 RX ADMIN — LEVALBUTEROL HYDROCHLORIDE 5 MG: 1.25 SOLUTION RESPIRATORY (INHALATION) at 20:17

## 2022-04-07 NOTE — PROGRESS NOTES
Bedside and verbal shift change report [facility]  [facility] (offgoing nurse). Report included the following information SBAR, Intake/Output, MAR, Recent Results, Med Rec Status and Cardiac Rhythm NSR.      Dual skin assessment completed at bedside: NA (list pertinent skin assessment findings)    Dual verification of gtts completed (name of gtts verified): Fentanyl, Versed, Nimbex

## 2022-04-07 NOTE — PROGRESS NOTES
[facility] Critical Care Note[de-identified] 4/7/2022  Tamiko Erwin  Admission Date: 4/3/2022     Length of Stay: 4 days    Background: 31 y.o. y/o male  seen and evaluated at the request of Dr. Yousif Polanco. He has a history of obesity and asthma. He was initially seen in the ER yesterday with wheezing from asthma exacerbation. He has had similar exacerbations before, requiring hospitalization in the past. He is only on prn albuterol at home. No fever, chills, or obvious triggers, though some productive cough. He was initially treated and discharged home. However he returned around midnight with worsening symptoms. L calf pain but d dimer was normal as was LE doppler. CXR was normal. He was given continuous nebs and IV steroids but continued to have increased WOB so has now been started on bipap 18/8 with 60% FiO2. He is satting well and reportedly less WOB than before and less tachycardia (was up to 150 sinus tach) now at 120 but still with ongoing wheeze. ABG already with some mild hypercarbia at 47. Notable PMH:  has a past medical history of Asthma. 24 Hour events: improved gas this morning. Off pressors. Attempted to stop paralytics yesterday, but he did not do well, breath stacked on multiple controlled modes and apneic on PSV. Today still obstructed on flow loops. FiO2 is down to 45%. Placed on lasix yesterday 20mg BID, but still 700cc+. ROS: unable to obtain/negative except as listed elsewhere. Lines: (insertion date)   ETT: (4/3/22)  Hunter: (4/3/22)  OGT: (4/3/22)  PICC line- 4/3/22  Arterial Line (4/3/22)    Drips: current dose (range)  Nimbex (mcg/kg/min): 3  Propofol (mcg/kg/min): 45  Midazolam (mg/hr): 10  Fentanyl (mcg/hr): 200    Pertinent Exam:         Blood pressure (!) 145/65, pulse 83, temperature 98.9 °F (37.2 °C), resp. rate 30, height 5' 9\" (1.753 m), weight 307 lb 8.7 oz (139.5 kg), SpO2 100 %.      Intake/Output Summary (Last 24 hours) at 4/7/2022 0800  Last data filed at 4/7/2022 0329  Gross per 24 hour   Intake 4405.44 ml   Output 3765 ml   Net 640.44 ml     Constitutional:  intubated and mechanically ventilated. EENMT:  Sclera clear, pupils equal, oral mucosa moist  Respiratory: very diminished, no overt wheezing, clearly obstructed on vent with severe obstruction on loops  Cardiovascular:  RRR  Gastrointestinal:  soft with no tenderness; positive bowel sounds present  Musculoskeletal:  warm with no cyanosis, no lower extremity edema  Skin:  no jaundice or ecchymosis  Neurologic:sedated paralyzed   Psychiatric: sedated and paralyzed    CXR: 4/7: stable basilar atx/infiltrates, ETT is now appropriate after advancing 2cm yesterday      Recent Labs     04/07/22  0340 04/06/22  0256 04/05/22  0335   WBC 12.1* 13.0* 14.7*   HGB 12.0* 12.1* 11.9*   HCT 37.3* 37.2* 37.1*    309 296     Recent Labs     04/07/22  0340 04/06/22  1155 04/06/22  0256 04/05/22  0335 04/05/22  0335     --  147*  --  144   K 4.0 3.3* 3.0*   < > 3.6     --  110*  --  110*   CO2 36*  --  32  --  32   *  --  155*  --  144*   BUN 24*  --  18  --  15   CREA 0.90  --  0.80  --  1.00   MG 2.7*  --  2.8*  --  2.9*   CA 9.2  --  9.0  --  8.8   PHOS 3.7  --  2.5  --   --     < > = values in this interval not displayed. No results for input(s): LAC, TROPHS, BNPNT, CRP in the last 72 hours. No lab exists for component: ESR  No results for input(s): GLUCPOC in the last 72 hours. No lab exists for component: A1C    ECHO: No results found for this or any previous visit.      Results     Procedure Component Value Units Date/Time    CULTURE, BLOOD [[id number]] Collected: 04/05/22 1103    Order Status: Completed Specimen: Blood Updated: 04/07/22 0722     Special Requests: --        LEFT  FOREARM       Culture result: NO GROWTH 2 DAYS       CULTURE, BLOOD [[id number]] Collected: 04/05/22 0947    Order Status: Completed Specimen: Blood Updated: 04/07/22 6311     Special Requests: -- LEFT  FOREARM       Culture result: NO GROWTH 2 DAYS       MSSA/MRSA SC BY PCR, NASAL SWAB [[id number]]     Order Status: Canceled Specimen: Nasal swab     RESPIRATORY VIRUS PANEL W/COVID-19, PCR [[id number]]  (Abnormal) Collected: 04/05/22 0837    Order Status: Completed Specimen: Nasopharyngeal Updated: 04/05/22 1021     Adenovirus NOT DETECTED        Coronavirus 229E NOT DETECTED        Coronavirus HKU1 NOT DETECTED        Coronavirus CVNL63 NOT DETECTED        Coronavirus OC43 NOT DETECTED        SARS-CoV-2, PCR NOT DETECTED        Metapneumovirus NOT DETECTED        Rhinovirus and Enterovirus NOT DETECTED        Influenza A, subtype H3 Detected        Influenza B NOT DETECTED        Parainfluenza 1 NOT DETECTED        Parainfluenza 2 NOT DETECTED        Parainfluenza 3 NOT DETECTED        Parainfluenza virus 4 NOT DETECTED        RSV by PCR NOT DETECTED        B. parapertussis, PCR NOT DETECTED        Bordetella pertussis - PCR NOT DETECTED        Chlamydophila pneumoniae DNA, QL, PCR NOT DETECTED        Mycoplasma pneumoniae DNA, QL, PCR NOT DETECTED       CULTURE, RESPIRATORY/SPUTUM/BRONCH Elonda Locker STAIN [[id number]]  (Abnormal)  (Susceptibility) Collected: 04/04/22 1207    Order Status: Completed Specimen: Sputum,ET Suction Updated: 04/07/22 0649     Special Requests: NO SPECIAL REQUESTS        GRAM STAIN 2 TO 7 WBCS SEEN PER OIF      NO EPITHELIAL CELLS SEEN         FEW GRAM POSITIVE COCCI         NO MUCUS PRESENT        Culture result:       HEAVY STAPHYLOCOCCUS AUREUS                  MODERATE NORMAL RESPIRATORY ALYCIA          Susceptibility      Staphylococcus aureus     TEJ     Cefazolin ($) Susceptible     Clindamycin ($) Resistant     Oxacillin Susceptible     Rifampin ($$$$) Susceptible  [1]      Tetracycline Susceptible     Trimeth-Sulfamethoxa Susceptible     Vancomycin ($) Susceptible                 [1]  Rifampin is not to be used for mono-therapy.           Linear View                   COVID-19 RAPID TEST [[id number]] Collected: 04/02/22 1642    Order Status: Completed Specimen: Nasopharyngeal Updated: 04/02/22 1733     Specimen source Nasopharyngeal        COVID-19 rapid test Not detected        Comment:      The specimen is NEGATIVE for SARS-CoV-2, the novel coronavirus associated with COVID-19. A negative result does not rule out COVID-19. This test has been authorized by the FDA under an Emergency Use Authorization (EUA) for use by authorized laboratories. Fact sheet for Healthcare Providers: ConventionAFreezedate.co.nz  Fact sheet for Patients: BEKIZdate.co.nz       Methodology: Isothermal Nucleic Acid Amplification             Inpat Anti-Infectives (From admission, onward)     Start     Ordered Stop    04/03/22 0700  azithromycin (ZITHROMAX) 500 mg in 0.9% sodium chloride 250 mL ([id number])  500 mg,   IntraVENous,   EVERY 24 HOURS         04/03/22 0607 04/08/22 0659              Ventilator Settings:  Ideal body weight: 70.7 kg (155 lb 13.8 oz)   Mode FIO2 Rate Tidal Volume Pressure PEEP   PRVC  45 %    450 ml     8 cm H20    Peak airway pressure: 32 cm H2O       Minute ventilation: 13.5 l/min  ABG:  Recent Labs     04/07/22  0455 04/06/22  0450 04/05/22  0348   PHI 7.44 7.37 7.35   PCO2I 53.8* 54.1* 58.6*   PO2I 65* 67* 98   HCO3I 36.1* 31.5* 32.3*     Assessment and Plan:  (Medical Decision Making)   Impression: 31 y.o. male with acute respiratory failure due to asthma exacerbation, influenza A and staph pneumonia    NEURO:   Sedation: propofol, versed  Analgesia: Fentanyl gtt  Paralytics: continue nimbex today, will attempt to hold again in AM.   CV:   Shock: resolved  PULM:   Acute hypoxemic/hypercapneic respiratory failure: On ventilator, could not continue heliox with ventilator, but fairly unclear benefits to heliox once on ventilator and discussed with family that this was likely not worth risking transportation.  He has never been intubated before this admission. Improved PCO2 with normalized pH now on 450cc VT, RR 28. Still quite obstructed though ABG improved. Will cut down rate to 26 and monitor. Still on paralytics today, deeply sedated. Severe persistent asthma with exacerbation: improved air trapping on ventilator. On steroids IV, Continue nebs. Sounds like he was not on any controller therapy at home recently though has been in the past. Had 2 prior ICU stays though over a decade ago and no intubations. RENAL:  TREVOR: improved, UOP is good. Monitor, increase lasix for + balance  GI:   Nutrition: Tolerating TF  HEME:   no issues   ID:    MSSA pneumonia: finishes azithro this AM, change to Ancef for 5 more days  Influenza A: Tamiflu BID x 3/5 days, droplet precautions. Skin: no decub, turns, preventive care  Prophy: Lovenox, protonix     Updated wife by phone. Full Code    The patient is critically ill with respiratory failure, circulatory failure and requires high complexity decision making for assessment and support including frequent ventilator adjustment , frequent evaluation and titration of therapies , application of advanced monitoring technologies and extensive interpretation of multiple databases    Cumulative time devoted to patient care services by me for day of service is 31 mins.     Ethel Cabral MD

## 2022-04-07 NOTE — PROGRESS NOTES
Problem: Delirium Treatment  Goal: *Level of consciousness restored to baseline  Outcome: Progressing Towards Goal  Goal: *Level of environmental perceptions restored to baseline  Outcome: Progressing Towards Goal  Goal: *Sensory perception restored to baseline  Outcome: Progressing Towards Goal  Goal: *Emotional stability restored to baseline  Outcome: Progressing Towards Goal  Goal: *Functional assessment restored to baseline  Outcome: Progressing Towards Goal  Goal: *Absence of falls  Outcome: Progressing Towards Goal  Goal: *Will remain free of delirium, CAM Score negative  Outcome: Progressing Towards Goal  Goal: *Cognitive status will be restored to baseline  Outcome: Progressing Towards Goal  Goal: Interventions  Outcome: Progressing Towards Goal     Problem: Pressure Injury - Risk of  Goal: *Prevention of pressure injury  Description: Document Martir Scale and appropriate interventions in the flowsheet. Outcome: Progressing Towards Goal  Note: Pressure Injury Interventions:  Sensory Interventions: Assess changes in LOC,Assess need for specialty bed,Avoid rigorous massage over bony prominences,Check visual cues for pain,Keep linens dry and wrinkle-free,Pad between skin to skin,Minimize linen layers,Monitor skin under medical devices,Pressure redistribution bed/mattress (bed type),Turn and reposition approx. every two hours (pillows and wedges if needed)         Activity Interventions: Pressure redistribution bed/mattress(bed type)    Mobility Interventions: Pressure redistribution bed/mattress (bed type),Turn and reposition approx.  every two hours(pillow and wedges)    Nutrition Interventions: Document food/fluid/supplement intake,Discuss nutritional consult with provider    Friction and Shear Interventions: Apply protective barrier, creams and emollients,Foam dressings/transparent film/skin sealants,Lift team/patient mobility team,Transfer aides:transfer board/Leo lift/ceiling lift,Transferring/repositioning devices                Problem: Falls - Risk of  Goal: *Absence of Falls  Description: Document Alberta Yanez Fall Risk and appropriate interventions in the flowsheet.   Outcome: Progressing Towards Goal  Note: Fall Risk Interventions:  Mobility Interventions: Communicate number of staff needed for ambulation/transfer,Bed/chair exit alarm         Medication Interventions: Bed/chair exit alarm,Evaluate medications/consider consulting pharmacy    Elimination Interventions: Bed/chair exit alarm,Toileting schedule/hourly rounds              Problem: Ventilator Management  Goal: *Adequate oxygenation and ventilation  Outcome: Progressing Towards Goal  Goal: *Patient maintains clear airway/free of aspiration  Outcome: Progressing Towards Goal  Goal: *Absence of infection signs and symptoms  Outcome: Progressing Towards Goal  Goal: *Normal spontaneous ventilation  Outcome: Progressing Towards Goal     Problem: Patient Education:  Go to Education Activity  Goal: Patient/Family Education  Outcome: Progressing Towards Goal

## 2022-04-07 NOTE — PROGRESS NOTES
Ventilator check complete; patient has a #8. 0 ET tube secured at the 23 at the teeth. Patient is sedated. Patient is not able to follow commands. Breath sounds are diminished. Trachea is midline, Negative for subcutaneous air, and chest excursion is symmetric. Patient is also Negative for cyanosis and is Negative for pitting edema. All alarms are set and audible. Resuscitation bag is at the head of the bed.  Current vent settings changed by MD Jose Alberto: PRVC 450 RR 26 +8 45%

## 2022-04-07 NOTE — PROGRESS NOTES
Received patient on documented settings PRVC 450/RR 30/+8?50%. Patient has a size 8.0 ET tube secured [id number] at the lips. Patient alarms are on and audible. Patient is negative for pitting edema and negative for cyanosis. Patient also negative for subcutaneous  Air. Patient has equal chest rise and diminished BBS.      Peak airway pressure:[id number]  Minute ventilation: 14l /min

## 2022-04-07 NOTE — PROGRESS NOTES
Unable to meet with spouse today while here. Call to see if CM could assist with any needs. States she has met with [facility] and would like to meet with CM tomorrow regarding other issues. Aware to notify RN to call CM when arrives. CM following.

## 2022-04-07 NOTE — PROGRESS NOTES
Physician Progress Note      Sharan Chandra  CSN #:                  [id number]  :                       1985  ADMIT DATE:       4/3/2022 12:32 AM  DISCH DATE:  RESPONDING  PROVIDER #:        LAZ PORTILLO MD          QUERY TEXT:    Pt admitted with Influenza A, asthma exacerbation and PNA. notes to also have \"septic shock\" per last query response. . If possible, please document in progress notes and discharge summary the present on admission status of septic shock: The medical record reflects the following:  Risk Factors: Asthma exacerbation, Influenza A, PNA, obesity  Clinical Indicators: tachypnea--26, [id number]--608, bp systolic 09'R. --cxray--new infiltrate,  resp panel pos for influenza. hypercarbic,hypoxic resp failure. Treatment: intubated, steroids, rocky for hypotension.  fluids, labs, cxray,  Options provided:  -- Yes, septic shock  was present at the time of the order to admit to the hospital  -- No, septic shock was not present on admission and developed during the inpatient stay  -- Other - I will add my own diagnosis  -- Disagree - Not applicable / Not valid  -- Disagree - Clinically unable to determine / Unknown  -- Refer to Clinical Documentation Reviewer    PROVIDER RESPONSE TEXT:    Yes, septic shock was present at the time of the order to admit to the hospital.    Query created by: Mark Martins on 2022 12:41 PM      Electronically signed by:  Lorna PORTILLO MD 2022 12:44 PM

## 2022-04-07 NOTE — PROGRESS NOTES
Comprehensive Nutrition Assessment    Type and Reason for Visit: Reassess  Tube Feeding Management (pulmonary)    Nutrition Recommendations/Plan:   Enteral Nutrition:   Enteral Access: Nasogastric  Formula: Peptide Based High Protein (Vital High Protein)  Delivery: Continuous feeding: Change to goal rate of 45ml/hour. Water flush Change to 90 ml every 4 hours  Modulars: Initiate Protein: Prosource TF 2 pouches BID @ 0900 and 2100 30 ml water flush before and after administration   Enteral regimen at goal to provide:  1540 calories (76% estimated calorie needs), 130 grams protein (100% estimated protein needs) and 1488 ml free fluid (~1ml/kcal) calculations based on 22 hour infusion. Above regimen and current propofol rate will provide ~2045 calories per day (107% upper end calorie goal)  Nutritional Supplement Therapy:   Implement electrolyte replacement per nutrition support protocols  Replacement indicated:  None  Labs:   EN labs: BMP daily, Mg MWF and Phos MWF. POC Glucoses/SSI Active  Meals and Snacks:  Continue current diet. NPO. Nutrition Related Medication Management:   Implementing Bowel regimen while on fentanyl drip: 2 tabs Pericolace daily and Miralax once daily. Malnutrition Assessment:  Malnutrition Status: At risk for malnutrition (specify) (intubated, NPO.  )    Nutrition Assessment:   Nutrition History: Intubated, unable to provide hx. Nutrition Background:  PMH remarkable for asthma. Admitted with acute respiratory failure, TREVOR, R midlung infiltrate, influenza a. Nutrition Interval:  Remains intubated, lasix increased today. Propofol has increased since initial RD assessment necessitating change in enteral regimen d/t daily caloric contribution. Discussed with Claudeen Grange, RN. Abdominal Status (last documented): Obese,Semi-soft,Intact abdomen with Hypoactive  bowel sounds. Last BM  (unknown).   Pertinent Medications: ancef, lasix 40 mg/12 hr, SSI (started 4/7), solu-medrol, tamiflu, protonix  Continuous: nimbex, fentanyl, ketamine, versed, rocky, propofol (current rate 37 ml/hr potential for 895 calories per day)   PRN: miralax (no administration thus far)  Electrolyte Replacement: 4/6: Effer K 40 meq x 2 implemented twice  Pertinent Labs:   Lab Results   Component Value Date/Time    Sodium 145 04/07/2022 03:40 AM    Potassium 4.0 04/07/2022 03:40 AM    Chloride 107 04/07/2022 03:40 AM    CO2 36 (H) 04/07/2022 03:40 AM    Anion gap 2 (L) 04/07/2022 03:40 AM    Glucose 207 (H) 04/07/2022 03:40 AM    BUN 24 (H) 04/07/2022 03:40 AM    Creatinine 0.90 04/07/2022 03:40 AM    Calcium 9.2 04/07/2022 03:40 AM    Albumin 3.7 04/02/2022 10:42 PM    Magnesium 2.7 (H) 04/07/2022 03:40 AM    Phosphorus 3.7 04/07/2022 03:40 AM   Labs are remarkable for corrected K s/p replacement, Mg trending down, am glucose elevated (+solumedrol)  Nutrition Related Findings:   No muscle or fat wasting appreciated from window. Intubated 4/3, +NGT. TF started 4/5. Current Nutrition Therapies:  DIET NPO  ADULT TUBE FEEDING Nasogastric; Peptide Based High Protein; Delivery Method: Continuous; Continuous Initial Rate (mL/hr): 20; Continuous Advance Tube Feeding: Yes; Advancement Volume (mL/hr): 10; Advancement Frequency: Q 8 hours; Continuous Goal R. ..     Current Intake:   Average Meal Intake: NPO        Anthropometric Measures:  Height: 5' 9\" (175.3 cm)  Current Body Wt: 139.5 kg (307 lb 8.7 oz) (4/7), Weight source: Bed scale  BMI: 45.4, Obese class 3 (BMI 40.0 or greater)  Admission Body Weight: 300 lb 0.7 oz (pt stated)  Ideal Body Weight (lbs) (Calculated): 160 lbs (73 kg), 188.9 %  Usual Body Wt: 140 kg (308 lb 10.3 oz) (per  office records 2/9/22), Percent weight change: -2.1        Edema: No data recorded  Estimated Daily Nutrient Needs:  Energy (kcal/day): 0780-8687 (Kcal/kg (11-14), Weight Used: Current)  Protein (g/day): 110-146 Weight Used: (Ideal)  Fluid (ml/day):   (1 ml/kcal)    Nutrition Diagnosis:   · Inadequate oral intake related to impaired respiratory function as evidenced by intubation,NPO or clear liquid status due to medical condition    Nutrition Interventions:   Food and/or Nutrient Delivery: Continue NPO,Modify tube feeding     Coordination of Nutrition Care: Continue to monitor while inpatient    Goals:   Previous Goal Met: Progressing toward goal(s)  Active Goal: Tolerate goal rate TF within 3 days    Nutrition Monitoring and Evaluation:      Food/Nutrient Intake Outcomes: Enteral nutrition intake/tolerance  Physical Signs/Symptoms Outcomes: Biochemical data,GI status,Fluid status or edema,Weight    Discharge Planning:     Too soon to determine    Liang Mejia, ALEX, LDN   Contact: [phone]

## 2022-04-08 ENCOUNTER — APPOINTMENT (OUTPATIENT)
Dept: GENERAL RADIOLOGY | Age: 37
DRG: 207 | End: 2022-04-08
Attending: INTERNAL MEDICINE

## 2022-04-08 LAB
ANION GAP SERPL CALC-SCNC: 4 MMOL/L (ref 7–16)
ARTERIAL PATENCY WRIST A: POSITIVE
BASE DEFICIT BLD-SCNC: 0.5 MMOL/L
BASE EXCESS BLD CALC-SCNC: 12.7 MMOL/L
BASE EXCESS BLD CALC-SCNC: 13.4 MMOL/L
BDY SITE: ABNORMAL
BUN SERPL-MCNC: 28 MG/DL (ref 6–23)
CALCIUM SERPL-MCNC: 8.3 MG/DL (ref 8.3–10.4)
CHLORIDE SERPL-SCNC: 98 MMOL/L (ref 98–107)
CO2 SERPL-SCNC: 38 MMOL/L (ref 21–32)
CREAT SERPL-MCNC: 0.9 MG/DL (ref 0.8–1.5)
ERYTHROCYTE [DISTWIDTH] IN BLOOD BY AUTOMATED COUNT: 13.8 % (ref 11.9–14.6)
GAS FLOW.O2 O2 DELIVERY SYS: ABNORMAL L/MIN
GLUCOSE BLD STRIP.AUTO-MCNC: 213 MG/DL (ref 65–100)
GLUCOSE BLD STRIP.AUTO-MCNC: 219 MG/DL (ref 65–100)
GLUCOSE BLD STRIP.AUTO-MCNC: 239 MG/DL (ref 65–100)
GLUCOSE BLD STRIP.AUTO-MCNC: 240 MG/DL (ref 65–100)
GLUCOSE BLD STRIP.AUTO-MCNC: 241 MG/DL (ref 65–100)
GLUCOSE SERPL-MCNC: 265 MG/DL (ref 65–100)
HCO3 BLD-SCNC: 29.9 MMOL/L (ref 22–26)
HCO3 BLD-SCNC: 39.9 MMOL/L (ref 22–26)
HCO3 BLD-SCNC: 40.9 MMOL/L (ref 22–26)
HCT VFR BLD AUTO: 36.2 % (ref 41.1–50.3)
HGB BLD-MCNC: 12.1 G/DL (ref 13.6–17.2)
INSPIRATION.DURATION SETTING TIME VENT: 0.35 SEC
INSPIRATION.DURATION SETTING TIME VENT: 0.65 SEC
INSPIRATION.DURATION SETTING TIME VENT: 0.75 SEC
MAGNESIUM SERPL-MCNC: 2.6 MG/DL (ref 1.8–2.4)
MCH RBC QN AUTO: 35.4 PG (ref 26.1–32.9)
MCHC RBC AUTO-ENTMCNC: 33.4 G/DL (ref 31.4–35)
MCV RBC AUTO: 105.8 FL (ref 79.6–97.8)
NRBC # BLD: 0.04 K/UL (ref 0–0.2)
O2/TOTAL GAS SETTING VFR VENT: 35 %
O2/TOTAL GAS SETTING VFR VENT: 55 %
O2/TOTAL GAS SETTING VFR VENT: 70 %
PAW @ MEAN EXP FLOW ON VENT: 17 CMH2O
PAW @ MEAN EXP FLOW ON VENT: 19 CMH2O
PCO2 BLD: 60.6 MMHG (ref 35–45)
PCO2 BLD: 64.6 MMHG (ref 35–45)
PCO2 BLD: 75.5 MMHG (ref 35–45)
PEEP RESPIRATORY: 8 CMH2O
PEEP RESPIRATORY: 8 CMH2O
PH BLD: 7.21 [PH] (ref 7.35–7.45)
PH BLD: 7.41 [PH] (ref 7.35–7.45)
PH BLD: 7.43 [PH] (ref 7.35–7.45)
PHOSPHATE SERPL-MCNC: 3.4 MG/DL (ref 2.5–4.5)
PIP ISTAT,IPIP: 33
PIP ISTAT,IPIP: 35
PLATELET # BLD AUTO: 296 K/UL (ref 150–450)
PMV BLD AUTO: 9.3 FL (ref 9.4–12.3)
PO2 BLD: 44 MMHG (ref 75–100)
PO2 BLD: 65 MMHG (ref 75–100)
PO2 BLD: 79 MMHG (ref 75–100)
POTASSIUM SERPL-SCNC: 4.2 MMOL/L (ref 3.5–5.1)
RBC # BLD AUTO: 3.42 M/UL (ref 4.23–5.6)
SAO2 % BLD: 79.4 % (ref 95–98)
SAO2 % BLD: 91.5 % (ref 95–98)
SAO2 % BLD: 91.6 % (ref 95–98)
SERVICE CMNT-IMP: ABNORMAL
SODIUM SERPL-SCNC: 140 MMOL/L (ref 136–145)
SPECIMEN TYPE: ABNORMAL
VENTILATION MODE VENT: ABNORMAL
VENTILATION MODE VENT: ABNORMAL
VT SETTING VENT: 450 ML
VT SETTING VENT: 450 ML
VT SETTING VENT: 550 ML
WBC # BLD AUTO: 11.5 K/UL (ref 4.3–11.1)

## 2022-04-08 PROCEDURE — 74011000250 HC RX REV CODE- 250: Performed by: INTERNAL MEDICINE

## 2022-04-08 PROCEDURE — 74011000258 HC RX REV CODE- 258: Performed by: INTERNAL MEDICINE

## 2022-04-08 PROCEDURE — 85027 COMPLETE CBC AUTOMATED: CPT

## 2022-04-08 PROCEDURE — 94003 VENT MGMT INPAT SUBQ DAY: CPT

## 2022-04-08 PROCEDURE — 94640 AIRWAY INHALATION TREATMENT: CPT

## 2022-04-08 PROCEDURE — 74011250637 HC RX REV CODE- 250/637: Performed by: INTERNAL MEDICINE

## 2022-04-08 PROCEDURE — 2709999900 HC NON-CHARGEABLE SUPPLY

## 2022-04-08 PROCEDURE — 82803 BLOOD GASES ANY COMBINATION: CPT

## 2022-04-08 PROCEDURE — 80048 BASIC METABOLIC PNL TOTAL CA: CPT

## 2022-04-08 PROCEDURE — 99291 CRITICAL CARE FIRST HOUR: CPT | Performed by: INTERNAL MEDICINE

## 2022-04-08 PROCEDURE — 74011000250 HC RX REV CODE- 250: Performed by: HOSPITALIST

## 2022-04-08 PROCEDURE — 84100 ASSAY OF PHOSPHORUS: CPT

## 2022-04-08 PROCEDURE — 71045 X-RAY EXAM CHEST 1 VIEW: CPT

## 2022-04-08 PROCEDURE — 74011250636 HC RX REV CODE- 250/636: Performed by: HOSPITALIST

## 2022-04-08 PROCEDURE — 74011636637 HC RX REV CODE- 636/637: Performed by: INTERNAL MEDICINE

## 2022-04-08 PROCEDURE — 74011250636 HC RX REV CODE- 250/636: Performed by: INTERNAL MEDICINE

## 2022-04-08 PROCEDURE — C9113 INJ PANTOPRAZOLE SODIUM, VIA: HCPCS | Performed by: INTERNAL MEDICINE

## 2022-04-08 PROCEDURE — 65620000000 HC RM CCU GENERAL

## 2022-04-08 PROCEDURE — 36600 WITHDRAWAL OF ARTERIAL BLOOD: CPT

## 2022-04-08 PROCEDURE — 82962 GLUCOSE BLOOD TEST: CPT

## 2022-04-08 PROCEDURE — 83735 ASSAY OF MAGNESIUM: CPT

## 2022-04-08 RX ORDER — PROPOFOL 10 MG/ML
0-50 VIAL (ML) INTRAVENOUS
Status: DISCONTINUED | OUTPATIENT
Start: 2022-04-08 | End: 2022-04-09

## 2022-04-08 RX ORDER — INSULIN GLARGINE 100 [IU]/ML
15 INJECTION, SOLUTION SUBCUTANEOUS DAILY
Status: DISCONTINUED | OUTPATIENT
Start: 2022-04-08 | End: 2022-04-09

## 2022-04-08 RX ORDER — FENTANYL CITRATE-0.9 % NACL/PF 25 MCG/ML
0-200 PLASTIC BAG, INJECTION (ML) INJECTION
Status: DISCONTINUED | OUTPATIENT
Start: 2022-04-08 | End: 2022-04-09

## 2022-04-08 RX ADMIN — ACETAMINOPHEN 650 MG: 325 TABLET ORAL at 12:28

## 2022-04-08 RX ADMIN — IPRATROPIUM BROMIDE 0.5 MG: 0.5 SOLUTION RESPIRATORY (INHALATION) at 08:02

## 2022-04-08 RX ADMIN — PROPOFOL 50 MCG/KG/MIN: 10 INJECTION, EMULSION INTRAVENOUS at 11:54

## 2022-04-08 RX ADMIN — INSULIN LISPRO 4 UNITS: 100 INJECTION, SOLUTION INTRAVENOUS; SUBCUTANEOUS at 05:30

## 2022-04-08 RX ADMIN — IPRATROPIUM BROMIDE 0.5 MG: 0.5 SOLUTION RESPIRATORY (INHALATION) at 23:40

## 2022-04-08 RX ADMIN — CEFAZOLIN SODIUM 2 G: 100 INJECTION, POWDER, LYOPHILIZED, FOR SOLUTION INTRAVENOUS at 08:44

## 2022-04-08 RX ADMIN — ENOXAPARIN SODIUM 40 MG: 100 INJECTION SUBCUTANEOUS at 21:34

## 2022-04-08 RX ADMIN — SODIUM CHLORIDE 40 MG: 9 INJECTION INTRAMUSCULAR; INTRAVENOUS; SUBCUTANEOUS at 08:45

## 2022-04-08 RX ADMIN — FENTANYL CITRATE 200 MCG/HR: 50 INJECTION, SOLUTION INTRAMUSCULAR; INTRAVENOUS at 09:10

## 2022-04-08 RX ADMIN — LEVALBUTEROL HYDROCHLORIDE 5 MG: 1.25 SOLUTION RESPIRATORY (INHALATION) at 20:01

## 2022-04-08 RX ADMIN — MINERAL OIL AND PETROLATUM: 150; 830 OINTMENT OPHTHALMIC at 08:46

## 2022-04-08 RX ADMIN — SODIUM CHLORIDE, PRESERVATIVE FREE 10 ML: 5 INJECTION INTRAVENOUS at 21:34

## 2022-04-08 RX ADMIN — PROPOFOL 30 MCG/KG/MIN: 10 INJECTION, EMULSION INTRAVENOUS at 15:03

## 2022-04-08 RX ADMIN — SODIUM CHLORIDE, PRESERVATIVE FREE 30 ML: 5 INJECTION INTRAVENOUS at 08:45

## 2022-04-08 RX ADMIN — CISATRACURIUM BESYLATE 3 MCG/KG/MIN: 2 INJECTION, SOLUTION INTRAVENOUS at 06:46

## 2022-04-08 RX ADMIN — ENOXAPARIN SODIUM 40 MG: 100 INJECTION SUBCUTANEOUS at 08:44

## 2022-04-08 RX ADMIN — CEFAZOLIN SODIUM 2 G: 100 INJECTION, POWDER, LYOPHILIZED, FOR SOLUTION INTRAVENOUS at 00:13

## 2022-04-08 RX ADMIN — PROPOFOL 30 MCG/KG/MIN: 10 INJECTION, EMULSION INTRAVENOUS at 21:33

## 2022-04-08 RX ADMIN — LACTULOSE 30 ML: 20 SOLUTION ORAL at 17:03

## 2022-04-08 RX ADMIN — FENTANYL CITRATE 150 MCG/HR: 50 INJECTION, SOLUTION INTRAMUSCULAR; INTRAVENOUS at 23:47

## 2022-04-08 RX ADMIN — LEVALBUTEROL HYDROCHLORIDE 5 MG: 1.25 SOLUTION RESPIRATORY (INHALATION) at 04:03

## 2022-04-08 RX ADMIN — MORPHINE SULFATE 2 MG: 2 INJECTION, SOLUTION INTRAMUSCULAR; INTRAVENOUS at 23:49

## 2022-04-08 RX ADMIN — MINERAL OIL AND PETROLATUM: 150; 830 OINTMENT OPHTHALMIC at 21:33

## 2022-04-08 RX ADMIN — PROPOFOL 50 MCG/KG/MIN: 10 INJECTION, EMULSION INTRAVENOUS at 09:17

## 2022-04-08 RX ADMIN — SENNOSIDES AND DOCUSATE SODIUM 2 TABLET: 8.6; 5 TABLET ORAL at 21:34

## 2022-04-08 RX ADMIN — CEFAZOLIN SODIUM 2 G: 100 INJECTION, POWDER, LYOPHILIZED, FOR SOLUTION INTRAVENOUS at 17:03

## 2022-04-08 RX ADMIN — IPRATROPIUM BROMIDE 0.5 MG: 0.5 SOLUTION RESPIRATORY (INHALATION) at 16:38

## 2022-04-08 RX ADMIN — Medication 10 MG/HR: at 10:09

## 2022-04-08 RX ADMIN — PROPOFOL 30 MCG/KG/MIN: 10 INJECTION, EMULSION INTRAVENOUS at 18:15

## 2022-04-08 RX ADMIN — LEVALBUTEROL HYDROCHLORIDE 5 MG: 1.25 SOLUTION RESPIRATORY (INHALATION) at 12:38

## 2022-04-08 RX ADMIN — SODIUM CHLORIDE, PRESERVATIVE FREE 10 ML: 5 INJECTION INTRAVENOUS at 14:59

## 2022-04-08 RX ADMIN — INSULIN LISPRO 4 UNITS: 100 INJECTION, SOLUTION INTRAVENOUS; SUBCUTANEOUS at 23:32

## 2022-04-08 RX ADMIN — INSULIN LISPRO 4 UNITS: 100 INJECTION, SOLUTION INTRAVENOUS; SUBCUTANEOUS at 17:26

## 2022-04-08 RX ADMIN — AMLODIPINE BESYLATE 10 MG: 10 TABLET ORAL at 08:43

## 2022-04-08 RX ADMIN — BUDESONIDE 500 MCG: 0.5 INHALANT RESPIRATORY (INHALATION) at 08:02

## 2022-04-08 RX ADMIN — SODIUM CHLORIDE, PRESERVATIVE FREE 10 ML: 5 INJECTION INTRAVENOUS at 05:31

## 2022-04-08 RX ADMIN — METHYLPREDNISOLONE SODIUM SUCCINATE 60 MG: 40 INJECTION, POWDER, FOR SOLUTION INTRAMUSCULAR; INTRAVENOUS at 03:37

## 2022-04-08 RX ADMIN — PROPOFOL 40 MCG/KG/MIN: 10 INJECTION, EMULSION INTRAVENOUS at 03:45

## 2022-04-08 RX ADMIN — OSELTAMIVIR PHOSPHATE 75 MG: 75 CAPSULE ORAL at 17:03

## 2022-04-08 RX ADMIN — POLYETHYLENE GLYCOL 3350 17 G: 17 POWDER, FOR SOLUTION ORAL at 08:44

## 2022-04-08 RX ADMIN — PROPOFOL 40 MCG/KG/MIN: 10 INJECTION, EMULSION INTRAVENOUS at 00:18

## 2022-04-08 RX ADMIN — METHYLPREDNISOLONE SODIUM SUCCINATE 60 MG: 40 INJECTION, POWDER, FOR SOLUTION INTRAMUSCULAR; INTRAVENOUS at 21:33

## 2022-04-08 RX ADMIN — LEVALBUTEROL HYDROCHLORIDE 5 MG: 1.25 SOLUTION RESPIRATORY (INHALATION) at 16:38

## 2022-04-08 RX ADMIN — LEVALBUTEROL HYDROCHLORIDE 5 MG: 1.25 SOLUTION RESPIRATORY (INHALATION) at 08:02

## 2022-04-08 RX ADMIN — PROPOFOL 40 MCG/KG/MIN: 10 INJECTION, EMULSION INTRAVENOUS at 06:24

## 2022-04-08 RX ADMIN — FUROSEMIDE 40 MG: 10 INJECTION, SOLUTION INTRAMUSCULAR; INTRAVENOUS at 17:03

## 2022-04-08 RX ADMIN — METHYLPREDNISOLONE SODIUM SUCCINATE 60 MG: 40 INJECTION, POWDER, FOR SOLUTION INTRAMUSCULAR; INTRAVENOUS at 15:03

## 2022-04-08 RX ADMIN — INSULIN GLARGINE 15 UNITS: 100 INJECTION, SOLUTION SUBCUTANEOUS at 10:30

## 2022-04-08 RX ADMIN — OSELTAMIVIR PHOSPHATE 75 MG: 75 CAPSULE ORAL at 08:44

## 2022-04-08 RX ADMIN — METHYLPREDNISOLONE SODIUM SUCCINATE 60 MG: 40 INJECTION, POWDER, FOR SOLUTION INTRAMUSCULAR; INTRAVENOUS at 08:45

## 2022-04-08 RX ADMIN — LEVALBUTEROL HYDROCHLORIDE 5 MG: 1.25 SOLUTION RESPIRATORY (INHALATION) at 23:40

## 2022-04-08 RX ADMIN — INSULIN LISPRO 4 UNITS: 100 INJECTION, SOLUTION INTRAVENOUS; SUBCUTANEOUS at 12:16

## 2022-04-08 RX ADMIN — FUROSEMIDE 40 MG: 10 INJECTION, SOLUTION INTRAMUSCULAR; INTRAVENOUS at 08:45

## 2022-04-08 RX ADMIN — BUDESONIDE 500 MCG: 0.5 INHALANT RESPIRATORY (INHALATION) at 20:02

## 2022-04-08 NOTE — PROGRESS NOTES
Problem: Delirium Treatment  Goal: *Level of consciousness restored to baseline  Outcome: Not Progressing Towards Goal  Goal: *Level of environmental perceptions restored to baseline  Outcome: Not Progressing Towards Goal  Goal: *Sensory perception restored to baseline  Outcome: Not Progressing Towards Goal  Goal: *Emotional stability restored to baseline  Outcome: Not Progressing Towards Goal  Goal: *Functional assessment restored to baseline  Outcome: Not Progressing Towards Goal  Goal: *Absence of falls  Outcome: Not Progressing Towards Goal  Goal: *Will remain free of delirium, CAM Score negative  Outcome: Not Progressing Towards Goal  Goal: *Cognitive status will be restored to baseline  Outcome: Not Progressing Towards Goal     Problem: Pressure Injury - Risk of  Goal: *Prevention of pressure injury  Description: Document Martir Scale and appropriate interventions in the flowsheet. Outcome: Progressing Towards Goal  Note: Pressure Injury Interventions:  Sensory Interventions: Assess changes in LOC,Assess need for specialty bed,Avoid rigorous massage over bony prominences,Check visual cues for pain,Float heels,Keep linens dry and wrinkle-free,Maintain/enhance activity level,Minimize linen layers,Monitor skin under medical devices,Pad between skin to skin,Pressure redistribution bed/mattress (bed type),Turn and reposition approx. every two hours (pillows and wedges if needed)    Moisture Interventions: Absorbent underpads,Apply protective barrier, creams and emollients,Check for incontinence Q2 hours and as needed,Internal/External urinary devices,Limit adult briefs,Maintain skin hydration (lotion/cream),Minimize layers,Moisture barrier    Activity Interventions: Pressure redistribution bed/mattress(bed type),Assess need for specialty bed    Mobility Interventions: Assess need for specialty bed,Float heels,Pressure redistribution bed/mattress (bed type),Turn and reposition approx.  every two hours(pillow and wedges)    Nutrition Interventions: Document food/fluid/supplement intake,Discuss nutritional consult with provider    Friction and Shear Interventions: Apply protective barrier, creams and emollients,Foam dressings/transparent film/skin sealants,Lift sheet,Lift team/patient mobility team,Minimize layers                Problem: Patient Education: Go to Patient Education Activity  Goal: Patient/Family Education  Outcome: Progressing Towards Goal     Problem: Falls - Risk of  Goal: *Absence of Falls  Description: Document Rolando Fall Risk and appropriate interventions in the flowsheet. Outcome: Progressing Towards Goal  Note: Fall Risk Interventions:  Mobility Interventions: Bed/chair exit alarm,Communicate number of staff needed for ambulation/transfer         Medication Interventions: Bed/chair exit alarm,Evaluate medications/consider consulting pharmacy    Elimination Interventions:  Toileting schedule/hourly rounds,Bed/chair exit alarm              Problem: Patient Education: Go to Patient Education Activity  Goal: Patient/Family Education  Outcome: Progressing Towards Goal     Problem: Ventilator Management  Goal: *Adequate oxygenation and ventilation  Outcome: Progressing Towards Goal  Goal: *Patient maintains clear airway/free of aspiration  Outcome: Progressing Towards Goal  Goal: *Absence of infection signs and symptoms  Outcome: Progressing Towards Goal  Goal: *Normal spontaneous ventilation  Outcome: Not Progressing Towards Goal

## 2022-04-08 NOTE — PROGRESS NOTES
Bedside shift change report given to Ivonne Hernández RN (oncoming nurse) by Trena RN (offgoing nurse). Report included the following information SBAR, Kardex, Intake/Output, MAR, Recent Results, Med Rec Status and Cardiac Rhythm NSR.

## 2022-04-08 NOTE — PROGRESS NOTES
Interdisciplinary team rounds were held 4/8/2022 with the following team members:Nursing, Nutrition, Palliative Care, Pastoral Care, Pharmacy, Physician, Respiratory Therapy and Clinical Coordinator and the patient. Plan of care discussed. See clinical pathway and/or care plan for interventions and desired outcomes.

## 2022-04-08 NOTE — PROGRESS NOTES
Received patient on documented settings PRVC 450/RR 26/+8 35%. Patient has a size 8.0 ET tube secured [id number] at the lips. Patient alarms are on and audible. Patient is negative for pitting edema and negative for cyanosis. Patient also negative for subcutaneous  Air. Patient has equal chest rise and diminished BBS.     Peak Pressure 30  Minute ventilation 11.9

## 2022-04-08 NOTE — PROGRESS NOTES
[facility] Critical Care Note[de-identified] 4/8/2022  Shantel Chavis  Admission Date: 4/3/2022     Length of Stay: 5 days    Background: 31 y.o. y/o male  seen and evaluated at the request of Dr. Delaney Díaz. He has a history of obesity and asthma. He was initially seen in the ER yesterday with wheezing from asthma exacerbation. He has had similar exacerbations before, requiring hospitalization in the past. He is only on prn albuterol at home. No fever, chills, or obvious triggers, though some productive cough. He was initially treated and discharged home. However he returned around midnight with worsening symptoms. L calf pain but d dimer was normal as was LE doppler. CXR was normal. He was given continuous nebs and IV steroids but continued to have increased WOB so has now been started on bipap 18/8 with 60% FiO2. He is satting well and reportedly less WOB than before and less tachycardia (was up to 150 sinus tach) now at 120 but still with ongoing wheeze. ABG already with some mild hypercarbia at 47. Notable PMH:  has a past medical history of Asthma. 24 Hour events: improved flow loops on ventilator again, peak pressures down to 29. No autopeep currently. No acute events overnight. ROS: unable to obtain/negative except as listed elsewhere. Lines: (insertion date)   ETT: (4/3/22)  Hunter: (4/3/22)  OGT: (4/3/22)  PICC line- 4/3/22  Arterial Line (4/3/22)    Drips: current dose (range)  Nimbex (mcg/kg/min): 3  Propofol (mcg/kg/min): 40  Midazolam (mg/hr): 10  Fentanyl (mcg/hr): 200    Pertinent Exam:         Blood pressure 134/64, pulse (!) 104, temperature 99.9 °F (37.7 °C), resp. rate 26, height 5' 9\" (1.753 m), weight 315 lb 11.2 oz (143.2 kg), SpO2 97 %. Intake/Output Summary (Last 24 hours) at 4/8/2022 0874  Last data filed at 4/8/2022 0610  Gross per 24 hour   Intake 3841.8 ml   Output 5440 ml   Net -1598.2 ml     Constitutional:  intubated and mechanically ventilated.   EENMT:  Sclera clear, pupils equal, oral mucosa moist  Respiratory: better air movement today bilaterally  Cardiovascular:  RRR  Gastrointestinal:  soft with no tenderness; positive bowel sounds present  Musculoskeletal:  warm with no cyanosis, no lower extremity edema  Skin:  no jaundice or ecchymosis  Neurologic:sedated paralyzed   Psychiatric: sedated and paralyzed    CXR: 4/8: stable basilar atx/infiltrates      Recent Labs     04/08/22  0320 04/07/22  0340 04/06/22  0256   WBC 11.5* 12.1* 13.0*   HGB 12.1* 12.0* 12.1*   HCT 36.2* 37.3* 37.2*    302 309     Recent Labs     04/08/22  0320 04/07/22  0340 04/06/22  1155 04/06/22  0256 04/06/22  0256    145  --   --  147*   K 4.2 4.0 3.3*   < > 3.0*   CL 98 107  --   --  110*   CO2 38* 36*  --   --  32   * 207*  --   --  155*   BUN 28* 24*  --   --  18   CREA 0.90 0.90  --   --  0.80   MG 2.6* 2.7*  --   --  2.8*   CA 8.3 9.2  --   --  9.0   PHOS 3.4 3.7  --   --  2.5    < > = values in this interval not displayed. No results for input(s): LAC, TROPHS, BNPNT, CRP in the last 72 hours. No lab exists for component: ESR  Recent Labs     04/08/22  0525 04/07/22  2333 04/07/22  1728   GLUCPOC 239* 228* 220*     ECHO: No results found for this or any previous visit.      Results     Procedure Component Value Units Date/Time    CULTURE, BLOOD [[id number]] Collected: 04/05/22 1103    Order Status: Completed Specimen: Blood Updated: 04/08/22 0737     Special Requests: --        LEFT  FOREARM       Culture result: NO GROWTH 3 DAYS       CULTURE, BLOOD [[id number]] Collected: 04/05/22 0947    Order Status: Completed Specimen: Blood Updated: 04/08/22 0737     Special Requests: --        LEFT  FOREARM       Culture result: NO GROWTH 3 DAYS       MSSA/MRSA SC BY PCR, NASAL SWAB [[id number]]     Order Status: Canceled Specimen: Nasal swab     RESPIRATORY VIRUS PANEL W/COVID-19, PCR [[id number]]  (Abnormal) Collected: 04/05/22 0837    Order Status: Completed Specimen: Nasopharyngeal Updated: 04/05/22 1021     Adenovirus NOT DETECTED        Coronavirus 229E NOT DETECTED        Coronavirus HKU1 NOT DETECTED        Coronavirus CVNL63 NOT DETECTED        Coronavirus OC43 NOT DETECTED        SARS-CoV-2, PCR NOT DETECTED        Metapneumovirus NOT DETECTED        Rhinovirus and Enterovirus NOT DETECTED        Influenza A, subtype H3 Detected        Influenza B NOT DETECTED        Parainfluenza 1 NOT DETECTED        Parainfluenza 2 NOT DETECTED        Parainfluenza 3 NOT DETECTED        Parainfluenza virus 4 NOT DETECTED        RSV by PCR NOT DETECTED        B. parapertussis, PCR NOT DETECTED        Bordetella pertussis - PCR NOT DETECTED        Chlamydophila pneumoniae DNA, QL, PCR NOT DETECTED        Mycoplasma pneumoniae DNA, QL, PCR NOT DETECTED       CULTURE, RESPIRATORY/SPUTUM/BRONCH Floretta Donley STAIN [[id number]]  (Abnormal)  (Susceptibility) Collected: 04/04/22 1207    Order Status: Completed Specimen: Sputum,ET Suction Updated: 04/07/22 0649     Special Requests: NO SPECIAL REQUESTS        GRAM STAIN 2 TO 7 WBCS SEEN PER OIF      NO EPITHELIAL CELLS SEEN         FEW GRAM POSITIVE COCCI         NO MUCUS PRESENT        Culture result:       HEAVY STAPHYLOCOCCUS AUREUS                  MODERATE NORMAL RESPIRATORY ALYCIA          Susceptibility      Staphylococcus aureus     TEJ     Cefazolin ($) Susceptible     Clindamycin ($) Resistant     Oxacillin Susceptible     Rifampin ($$$$) Susceptible  [1]      Tetracycline Susceptible     Trimeth-Sulfamethoxa Susceptible     Vancomycin ($) Susceptible                 [1]  Rifampin is not to be used for mono-therapy.           Linear View                   COVID-19 RAPID TEST [[id number]] Collected: 04/02/22 1642    Order Status: Completed Specimen: Nasopharyngeal Updated: 04/02/22 1733     Specimen source Nasopharyngeal        COVID-19 rapid test Not detected        Comment:      The specimen is NEGATIVE for SARS-CoV-2, the novel coronavirus associated with COVID-19. A negative result does not rule out COVID-19. This test has been authorized by the FDA under an Emergency Use Authorization (EUA) for use by authorized laboratories. Fact sheet for Healthcare Providers: ConventionUpdate.co.nz  Fact sheet for Patients: ConventionUpdate.co.nz       Methodology: Isothermal Nucleic Acid Amplification             Inpat Anti-Infectives (From admission, onward)     Start     Ordered Stop    04/03/22 0700  azithromycin (ZITHROMAX) 500 mg in 0.9% sodium chloride 250 mL ([id number])  500 mg,   IntraVENous,   EVERY 24 HOURS         04/03/22 0607 04/08/22 0659              Ventilator Settings:  Ideal body weight: 70.7 kg (155 lb 13.8 oz)   Mode FIO2 Rate Tidal Volume Pressure PEEP   PRVC  55 %    450 ml     8 cm H20    Peak airway pressure: 29 cm H2O       Minute ventilation: 11.7 l/min  ABG:  Recent Labs     04/08/22  0420 04/08/22  0357 04/07/22  0455   PHI 7.41 7.43 7.44   PCO2I 64.6* 60.6* 53.8*   PO2I 65* 44* 65*   HCO3I 40.9* 39.9* 36.1*     Assessment and Plan:  (Medical Decision Making)   Impression: 31 y.o. male with acute respiratory failure due to asthma exacerbation, influenza A and staph pneumonia    NEURO:   Sedation: increase propofol to 50, after stable off paralytics would try to slowly wean versed 1mg every 4 hours as tolerated  Analgesia: Fentanyl gtt  Paralytics: will try to hold again today. CV:   Septic Shock: due to influenza and Staph pneumonia, resolved  Volume: 1.6L net negative on 40mg lasix BID. PULM:   Acute hypoxemic/hypercapneic respiratory failure: On ventilator, could not continue heliox with ventilator, but fairly unclear benefits to heliox once on ventilator and discussed with family that this was likely not worth risking transportation. He has never been intubated before this admission. Improved PCO2 with normalized pH now on 450cc VT, RR 26. Obstruction improved. Wean paralytics. Severe persistent asthma with exacerbation: improved air trapping on ventilator. On steroids IV, Continue nebs. Sounds like he was not on any controller therapy at home recently though has been in the past. Had 2 prior ICU stays though over a decade ago and no intubations. RENAL:  TREVOR: improved, UOP is good. STable. GI:   Nutrition: Tolerating TF  HEME:   no issues   ID:    MSSA pneumonia: finished azithro, change to Ancef for 2/5 more days  Influenza A: Tamiflu BID x 4/5 days, droplet precautions. Skin: no decub, turns, preventive care  Prophy: Lovenox, protonix     Updated wife at bedside with plans. Full Code    The patient is critically ill with respiratory failure, circulatory failure and requires high complexity decision making for assessment and support including frequent ventilator adjustment , frequent evaluation and titration of therapies , application of advanced monitoring technologies and extensive interpretation of multiple databases    Cumulative time devoted to patient care services by me for day of service is 31 mins.     Jose Holbrook MD

## 2022-04-08 NOTE — PROGRESS NOTES
Problem: Patient Education: Go to Patient Education Activity  Goal: Patient/Family Education  Outcome: Progressing Towards Goal     Problem: Pressure Injury - Risk of  Goal: *Prevention of pressure injury  Description: Document Martir Scale and appropriate interventions in the flowsheet. Outcome: Progressing Towards Goal  Note: Pressure Injury Interventions:  Sensory Interventions: Assess changes in LOC,Check visual cues for pain,Discuss PT/OT consult with provider,Float heels,Keep linens dry and wrinkle-free,Minimize linen layers,Monitor skin under medical devices,Pressure redistribution bed/mattress (bed type),Turn and reposition approx. every two hours (pillows and wedges if needed)    Moisture Interventions: Absorbent underpads,Apply protective barrier, creams and emollients,Check for incontinence Q2 hours and as needed,Maintain skin hydration (lotion/cream),Internal/External urinary devices,Moisture barrier,Minimize layers    Activity Interventions: Assess need for specialty bed,Increase time out of bed,Pressure redistribution bed/mattress(bed type),PT/OT evaluation    Mobility Interventions: PT/OT evaluation,Pressure redistribution bed/mattress (bed type),HOB 30 degrees or less,Float heels,Turn and reposition approx.  every two hours(pillow and wedges)    Nutrition Interventions: Document food/fluid/supplement intake,Discuss nutritional consult with provider,Offer support with meals,snacks and hydration    Friction and Shear Interventions: Minimize layers,Lift team/patient mobility team,HOB 30 degrees or less,Foam dressings/transparent film/skin sealants                Problem: Patient Education: Go to Patient Education Activity  Goal: Patient/Family Education  Outcome: Progressing Towards Goal     Problem: Patient Education: Go to Patient Education Activity  Goal: Patient/Family Education  Outcome: Progressing Towards Goal     Problem: Ventilator Management  Goal: *Adequate oxygenation and ventilation  Outcome: Progressing Towards Goal  Goal: *Patient maintains clear airway/free of aspiration  Outcome: Progressing Towards Goal  Goal: *Absence of infection signs and symptoms  Outcome: Progressing Towards Goal     Problem: Patient Education: Go to Patient Education Activity  Goal: Patient/Family Education  Outcome: Progressing Towards Goal

## 2022-04-08 NOTE — PROGRESS NOTES
Nimbex gtt stopped earlier this shift. Did not need to be restarted. Gtt wasted with David Rhodes, RN- 80cc wasted.

## 2022-04-09 LAB
ANION GAP SERPL CALC-SCNC: 4 MMOL/L (ref 7–16)
ARTERIAL PATENCY WRIST A: ABNORMAL
BASE EXCESS BLDV CALC-SCNC: 14 MMOL/L
BDY SITE: ABNORMAL
BUN SERPL-MCNC: 36 MG/DL (ref 6–23)
CALCIUM SERPL-MCNC: 9 MG/DL (ref 8.3–10.4)
CHLORIDE SERPL-SCNC: 94 MMOL/L (ref 98–107)
CO2 SERPL-SCNC: 42 MMOL/L (ref 21–32)
CREAT SERPL-MCNC: 1.1 MG/DL (ref 0.8–1.5)
ERYTHROCYTE [DISTWIDTH] IN BLOOD BY AUTOMATED COUNT: 13.2 % (ref 11.9–14.6)
GAS FLOW.O2 O2 DELIVERY SYS: ABNORMAL L/MIN
GLUCOSE BLD STRIP.AUTO-MCNC: 173 MG/DL (ref 65–100)
GLUCOSE BLD STRIP.AUTO-MCNC: 184 MG/DL (ref 65–100)
GLUCOSE BLD STRIP.AUTO-MCNC: 195 MG/DL (ref 65–100)
GLUCOSE BLD STRIP.AUTO-MCNC: 250 MG/DL (ref 65–100)
GLUCOSE BLD STRIP.AUTO-MCNC: 286 MG/DL (ref 65–100)
GLUCOSE SERPL-MCNC: 291 MG/DL (ref 65–100)
HCO3 BLDV-SCNC: 41.9 MMOL/L (ref 23–28)
HCT VFR BLD AUTO: 40.3 % (ref 41.1–50.3)
HGB BLD-MCNC: 12.8 G/DL (ref 13.6–17.2)
INSPIRATION.DURATION SETTING TIME VENT: 0.62 SEC
MCH RBC QN AUTO: 33.9 PG (ref 26.1–32.9)
MCHC RBC AUTO-ENTMCNC: 31.8 G/DL (ref 31.4–35)
MCV RBC AUTO: 106.6 FL (ref 79.6–97.8)
NRBC # BLD: 0.08 K/UL (ref 0–0.2)
O2/TOTAL GAS SETTING VFR VENT: 40 %
PCO2 BLDV: 64.8 MMHG (ref 41–51)
PEEP RESPIRATORY: 8 CMH2O
PH BLDV: 7.42 [PH] (ref 7.32–7.42)
PIP ISTAT,IPIP: 29
PLATELET # BLD AUTO: 312 K/UL (ref 150–450)
PMV BLD AUTO: 9.6 FL (ref 9.4–12.3)
PO2 BLDV: 35 MMHG
POTASSIUM SERPL-SCNC: 5 MMOL/L (ref 3.5–5.1)
RBC # BLD AUTO: 3.78 M/UL (ref 4.23–5.6)
SAO2 % BLDV: 65.6 % (ref 65–88)
SERVICE CMNT-IMP: ABNORMAL
SODIUM SERPL-SCNC: 140 MMOL/L (ref 138–145)
SPECIMEN TYPE: ABNORMAL
VENTILATION MODE VENT: ABNORMAL
WBC # BLD AUTO: 17.2 K/UL (ref 4.3–11.1)

## 2022-04-09 PROCEDURE — 74011000250 HC RX REV CODE- 250: Performed by: INTERNAL MEDICINE

## 2022-04-09 PROCEDURE — 99291 CRITICAL CARE FIRST HOUR: CPT | Performed by: INTERNAL MEDICINE

## 2022-04-09 PROCEDURE — 87077 CULTURE AEROBIC IDENTIFY: CPT

## 2022-04-09 PROCEDURE — 74011250636 HC RX REV CODE- 250/636: Performed by: INTERNAL MEDICINE

## 2022-04-09 PROCEDURE — 74011000258 HC RX REV CODE- 258: Performed by: INTERNAL MEDICINE

## 2022-04-09 PROCEDURE — 87186 SC STD MICRODIL/AGAR DIL: CPT

## 2022-04-09 PROCEDURE — 82962 GLUCOSE BLOOD TEST: CPT

## 2022-04-09 PROCEDURE — 94003 VENT MGMT INPAT SUBQ DAY: CPT

## 2022-04-09 PROCEDURE — 74011000250 HC RX REV CODE- 250: Performed by: HOSPITALIST

## 2022-04-09 PROCEDURE — 74011250637 HC RX REV CODE- 250/637: Performed by: INTERNAL MEDICINE

## 2022-04-09 PROCEDURE — 74011250636 HC RX REV CODE- 250/636: Performed by: HOSPITALIST

## 2022-04-09 PROCEDURE — 85027 COMPLETE CBC AUTOMATED: CPT

## 2022-04-09 PROCEDURE — 74011636637 HC RX REV CODE- 636/637: Performed by: INTERNAL MEDICINE

## 2022-04-09 PROCEDURE — 82803 BLOOD GASES ANY COMBINATION: CPT

## 2022-04-09 PROCEDURE — 94640 AIRWAY INHALATION TREATMENT: CPT

## 2022-04-09 PROCEDURE — 77030040393 HC DRSG OPTIFOAM GENT MDII -B

## 2022-04-09 PROCEDURE — 87070 CULTURE OTHR SPECIMN AEROBIC: CPT

## 2022-04-09 PROCEDURE — 65620000000 HC RM CCU GENERAL

## 2022-04-09 PROCEDURE — 5A09457 ASSISTANCE WITH RESPIRATORY VENTILATION, 24-96 CONSECUTIVE HOURS, CONTINUOUS POSITIVE AIRWAY PRESSURE: ICD-10-PCS | Performed by: INTERNAL MEDICINE

## 2022-04-09 PROCEDURE — 2709999900 HC NON-CHARGEABLE SUPPLY

## 2022-04-09 PROCEDURE — C9113 INJ PANTOPRAZOLE SODIUM, VIA: HCPCS | Performed by: INTERNAL MEDICINE

## 2022-04-09 PROCEDURE — 80048 BASIC METABOLIC PNL TOTAL CA: CPT

## 2022-04-09 RX ORDER — INSULIN GLARGINE 100 [IU]/ML
30 INJECTION, SOLUTION SUBCUTANEOUS DAILY
Status: DISCONTINUED | OUTPATIENT
Start: 2022-04-09 | End: 2022-04-12

## 2022-04-09 RX ORDER — DEXMEDETOMIDINE HYDROCHLORIDE 4 UG/ML
.1-1.5 INJECTION, SOLUTION INTRAVENOUS
Status: DISCONTINUED | OUTPATIENT
Start: 2022-04-09 | End: 2022-04-10

## 2022-04-09 RX ORDER — HALOPERIDOL 5 MG/ML
2 INJECTION INTRAMUSCULAR
Status: DISCONTINUED | OUTPATIENT
Start: 2022-04-09 | End: 2022-04-11

## 2022-04-09 RX ORDER — LABETALOL HYDROCHLORIDE 5 MG/ML
20 INJECTION, SOLUTION INTRAVENOUS
Status: DISCONTINUED | OUTPATIENT
Start: 2022-04-09 | End: 2022-04-13 | Stop reason: HOSPADM

## 2022-04-09 RX ORDER — FUROSEMIDE 10 MG/ML
40 INJECTION INTRAMUSCULAR; INTRAVENOUS DAILY
Status: DISCONTINUED | OUTPATIENT
Start: 2022-04-09 | End: 2022-04-09

## 2022-04-09 RX ORDER — FACIAL-BODY WIPES
10 EACH TOPICAL DAILY PRN
Status: DISCONTINUED | OUTPATIENT
Start: 2022-04-09 | End: 2022-04-13 | Stop reason: HOSPADM

## 2022-04-09 RX ORDER — HALOPERIDOL 5 MG/ML
5 INJECTION INTRAMUSCULAR ONCE
Status: COMPLETED | OUTPATIENT
Start: 2022-04-09 | End: 2022-04-09

## 2022-04-09 RX ORDER — HYDRALAZINE HYDROCHLORIDE 20 MG/ML
20 INJECTION INTRAMUSCULAR; INTRAVENOUS
Status: DISCONTINUED | OUTPATIENT
Start: 2022-04-09 | End: 2022-04-13 | Stop reason: HOSPADM

## 2022-04-09 RX ADMIN — LEVALBUTEROL HYDROCHLORIDE 5 MG: 1.25 SOLUTION RESPIRATORY (INHALATION) at 23:57

## 2022-04-09 RX ADMIN — DEXMEDETOMIDINE HYDROCHLORIDE 1.5 MCG/KG/HR: 100 INJECTION, SOLUTION INTRAVENOUS at 21:03

## 2022-04-09 RX ADMIN — LEVALBUTEROL HYDROCHLORIDE 5 MG: 1.25 SOLUTION RESPIRATORY (INHALATION) at 08:20

## 2022-04-09 RX ADMIN — LORAZEPAM 1 MG: 2 INJECTION INTRAMUSCULAR at 00:51

## 2022-04-09 RX ADMIN — LEVALBUTEROL HYDROCHLORIDE 5 MG: 1.25 SOLUTION RESPIRATORY (INHALATION) at 16:01

## 2022-04-09 RX ADMIN — ENOXAPARIN SODIUM 40 MG: 100 INJECTION SUBCUTANEOUS at 08:49

## 2022-04-09 RX ADMIN — DEXMEDETOMIDINE HYDROCHLORIDE 1.4 MCG/KG/HR: 100 INJECTION, SOLUTION INTRAVENOUS at 10:02

## 2022-04-09 RX ADMIN — LABETALOL HYDROCHLORIDE 20 MG: 5 INJECTION INTRAVENOUS at 09:32

## 2022-04-09 RX ADMIN — SODIUM CHLORIDE, PRESERVATIVE FREE 10 ML: 5 INJECTION INTRAVENOUS at 21:06

## 2022-04-09 RX ADMIN — METHYLPREDNISOLONE SODIUM SUCCINATE 40 MG: 40 INJECTION, POWDER, FOR SOLUTION INTRAMUSCULAR; INTRAVENOUS at 20:58

## 2022-04-09 RX ADMIN — SODIUM CHLORIDE, PRESERVATIVE FREE 10 ML: 5 INJECTION INTRAVENOUS at 13:14

## 2022-04-09 RX ADMIN — ACETAMINOPHEN 650 MG: 325 TABLET ORAL at 10:09

## 2022-04-09 RX ADMIN — CEFAZOLIN SODIUM 2 G: 100 INJECTION, POWDER, LYOPHILIZED, FOR SOLUTION INTRAVENOUS at 01:19

## 2022-04-09 RX ADMIN — SODIUM CHLORIDE, PRESERVATIVE FREE 30 ML: 5 INJECTION INTRAVENOUS at 08:51

## 2022-04-09 RX ADMIN — DEXMEDETOMIDINE HYDROCHLORIDE 1.4 MCG/KG/HR: 100 INJECTION, SOLUTION INTRAVENOUS at 14:29

## 2022-04-09 RX ADMIN — SODIUM CHLORIDE 40 MG: 9 INJECTION INTRAMUSCULAR; INTRAVENOUS; SUBCUTANEOUS at 08:49

## 2022-04-09 RX ADMIN — INSULIN LISPRO 6 UNITS: 100 INJECTION, SOLUTION INTRAVENOUS; SUBCUTANEOUS at 05:12

## 2022-04-09 RX ADMIN — METHYLPREDNISOLONE SODIUM SUCCINATE 40 MG: 40 INJECTION, POWDER, FOR SOLUTION INTRAMUSCULAR; INTRAVENOUS at 08:48

## 2022-04-09 RX ADMIN — DEXMEDETOMIDINE HYDROCHLORIDE 1.5 MCG/KG/HR: 100 INJECTION, SOLUTION INTRAVENOUS at 16:39

## 2022-04-09 RX ADMIN — HALOPERIDOL LACTATE 2 MG: 5 INJECTION, SOLUTION INTRAMUSCULAR at 20:14

## 2022-04-09 RX ADMIN — BUDESONIDE 500 MCG: 0.5 INHALANT RESPIRATORY (INHALATION) at 20:20

## 2022-04-09 RX ADMIN — PROPOFOL 30 MCG/KG/MIN: 10 INJECTION, EMULSION INTRAVENOUS at 02:19

## 2022-04-09 RX ADMIN — SENNOSIDES AND DOCUSATE SODIUM 2 TABLET: 8.6; 5 TABLET ORAL at 21:02

## 2022-04-09 RX ADMIN — HYDRALAZINE HYDROCHLORIDE 20 MG: 20 INJECTION INTRAMUSCULAR; INTRAVENOUS at 23:43

## 2022-04-09 RX ADMIN — INSULIN GLARGINE 30 UNITS: 100 INJECTION, SOLUTION SUBCUTANEOUS at 09:08

## 2022-04-09 RX ADMIN — CEFAZOLIN SODIUM 2 G: 100 INJECTION, POWDER, LYOPHILIZED, FOR SOLUTION INTRAVENOUS at 09:08

## 2022-04-09 RX ADMIN — LORAZEPAM 1 MG: 2 INJECTION INTRAMUSCULAR at 16:02

## 2022-04-09 RX ADMIN — BUDESONIDE 500 MCG: 0.5 INHALANT RESPIRATORY (INHALATION) at 08:20

## 2022-04-09 RX ADMIN — LORAZEPAM 1 MG: 2 INJECTION INTRAMUSCULAR at 09:08

## 2022-04-09 RX ADMIN — CEFAZOLIN SODIUM 2 G: 100 INJECTION, POWDER, LYOPHILIZED, FOR SOLUTION INTRAVENOUS at 16:19

## 2022-04-09 RX ADMIN — DEXMEDETOMIDINE HYDROCHLORIDE 1.4 MCG/KG/HR: 100 INJECTION, SOLUTION INTRAVENOUS at 23:08

## 2022-04-09 RX ADMIN — LABETALOL HYDROCHLORIDE 20 MG: 5 INJECTION INTRAVENOUS at 14:15

## 2022-04-09 RX ADMIN — INSULIN LISPRO 2 UNITS: 100 INJECTION, SOLUTION INTRAVENOUS; SUBCUTANEOUS at 11:37

## 2022-04-09 RX ADMIN — IPRATROPIUM BROMIDE 0.5 MG: 0.5 SOLUTION RESPIRATORY (INHALATION) at 16:01

## 2022-04-09 RX ADMIN — MINERAL OIL AND PETROLATUM: 150; 830 OINTMENT OPHTHALMIC at 08:50

## 2022-04-09 RX ADMIN — ENOXAPARIN SODIUM 40 MG: 100 INJECTION SUBCUTANEOUS at 20:58

## 2022-04-09 RX ADMIN — HALOPERIDOL LACTATE 2 MG: 5 INJECTION, SOLUTION INTRAMUSCULAR at 16:19

## 2022-04-09 RX ADMIN — LEVALBUTEROL HYDROCHLORIDE 5 MG: 1.25 SOLUTION RESPIRATORY (INHALATION) at 04:12

## 2022-04-09 RX ADMIN — INSULIN LISPRO 2 UNITS: 100 INJECTION, SOLUTION INTRAVENOUS; SUBCUTANEOUS at 23:20

## 2022-04-09 RX ADMIN — SODIUM CHLORIDE, PRESERVATIVE FREE 10 ML: 5 INJECTION INTRAVENOUS at 05:14

## 2022-04-09 RX ADMIN — HALOPERIDOL LACTATE 5 MG: 5 INJECTION, SOLUTION INTRAMUSCULAR at 09:56

## 2022-04-09 RX ADMIN — MINERAL OIL AND PETROLATUM: 150; 830 OINTMENT OPHTHALMIC at 21:06

## 2022-04-09 RX ADMIN — OSELTAMIVIR PHOSPHATE 75 MG: 75 CAPSULE ORAL at 18:31

## 2022-04-09 RX ADMIN — ENALAPRILAT 1.25 MG: 1.25 INJECTION INTRAVENOUS at 00:07

## 2022-04-09 RX ADMIN — METHYLPREDNISOLONE SODIUM SUCCINATE 40 MG: 40 INJECTION, POWDER, FOR SOLUTION INTRAMUSCULAR; INTRAVENOUS at 14:29

## 2022-04-09 RX ADMIN — DEXMEDETOMIDINE HYDROCHLORIDE 1.5 MCG/KG/HR: 100 INJECTION, SOLUTION INTRAVENOUS at 18:30

## 2022-04-09 RX ADMIN — HYDRALAZINE HYDROCHLORIDE 20 MG: 20 INJECTION INTRAMUSCULAR; INTRAVENOUS at 15:44

## 2022-04-09 RX ADMIN — OSELTAMIVIR PHOSPHATE 75 MG: 75 CAPSULE ORAL at 08:49

## 2022-04-09 RX ADMIN — DEXMEDETOMIDINE HYDROCHLORIDE 0.4 MCG/KG/HR: 100 INJECTION, SOLUTION INTRAVENOUS at 02:06

## 2022-04-09 RX ADMIN — DEXMEDETOMIDINE HYDROCHLORIDE 1.4 MCG/KG/HR: 100 INJECTION, SOLUTION INTRAVENOUS at 12:23

## 2022-04-09 RX ADMIN — METHYLPREDNISOLONE SODIUM SUCCINATE 60 MG: 40 INJECTION, POWDER, FOR SOLUTION INTRAMUSCULAR; INTRAVENOUS at 02:06

## 2022-04-09 RX ADMIN — DEXMEDETOMIDINE HYDROCHLORIDE 0.7 MCG/KG/HR: 100 INJECTION, SOLUTION INTRAVENOUS at 07:00

## 2022-04-09 RX ADMIN — IPRATROPIUM BROMIDE 0.5 MG: 0.5 SOLUTION RESPIRATORY (INHALATION) at 23:57

## 2022-04-09 RX ADMIN — IPRATROPIUM BROMIDE 0.5 MG: 0.5 SOLUTION RESPIRATORY (INHALATION) at 08:20

## 2022-04-09 RX ADMIN — PROPOFOL 15 MCG/KG/MIN: 10 INJECTION, EMULSION INTRAVENOUS at 06:57

## 2022-04-09 RX ADMIN — INSULIN LISPRO 2 UNITS: 100 INJECTION, SOLUTION INTRAVENOUS; SUBCUTANEOUS at 18:31

## 2022-04-09 RX ADMIN — LEVALBUTEROL HYDROCHLORIDE 5 MG: 1.25 SOLUTION RESPIRATORY (INHALATION) at 12:11

## 2022-04-09 RX ADMIN — POLYETHYLENE GLYCOL 3350 17 G: 17 POWDER, FOR SOLUTION ORAL at 08:50

## 2022-04-09 RX ADMIN — LORAZEPAM 1 MG: 2 INJECTION INTRAMUSCULAR at 23:45

## 2022-04-09 RX ADMIN — LEVALBUTEROL HYDROCHLORIDE 5 MG: 1.25 SOLUTION RESPIRATORY (INHALATION) at 20:19

## 2022-04-09 RX ADMIN — AMLODIPINE BESYLATE 10 MG: 10 TABLET ORAL at 08:49

## 2022-04-09 RX ADMIN — BISACODYL 10 MG: 10 SUPPOSITORY RECTAL at 14:29

## 2022-04-09 NOTE — PROGRESS NOTES
Bedside and verbal shift change report received from  [facility] (offgoing nurse). Report included the following information SBAR, Kardex, Intake/Output and Recent Results.      Dual skin assessment completed at bedside: N/A (list pertinent skin assessment findings)    Dual verification of gtts completed (name of gtts verified): Fent @ 100 prop @ 15 precedex @ 0.7

## 2022-04-09 NOTE — PROGRESS NOTES
Bedside and verbal shift change report received from  [address] [place] [address] (offgoing nurse). Report included the following information SBAR, Kardex, Intake/Output, MAR, Recent Results, Med Rec Status, Cardiac Rhythm SR/ST, Alarm Parameters  and Dual Neuro Assessment.      Dual skin assessment completed at bedside: skin intact (list pertinent skin assessment findings)    Dual verification of gtts completed (name of gtts verified): precedex @ 1.5

## 2022-04-09 NOTE — PROGRESS NOTES
Problem: Delirium Treatment  Goal: *Level of consciousness restored to baseline  Outcome: Not Progressing Towards Goal  Goal: *Level of environmental perceptions restored to baseline  Outcome: Not Progressing Towards Goal  Goal: *Sensory perception restored to baseline  Outcome: Not Progressing Towards Goal  Goal: *Emotional stability restored to baseline  Outcome: Not Progressing Towards Goal  Goal: *Functional assessment restored to baseline  Outcome: Not Progressing Towards Goal  Goal: *Absence of falls  Outcome: Progressing Towards Goal  Goal: *Will remain free of delirium, CAM Score negative  Outcome: Not Progressing Towards Goal  Goal: *Cognitive status will be restored to baseline  Outcome: Not Progressing Towards Goal  Goal: Interventions  Outcome: Progressing Towards Goal     Problem: Patient Education: Go to Patient Education Activity  Goal: Patient/Family Education  Outcome: Progressing Towards Goal     Problem: Pressure Injury - Risk of  Goal: *Prevention of pressure injury  Description: Document Martir Scale and appropriate interventions in the flowsheet. Outcome: Progressing Towards Goal  Note: Pressure Injury Interventions:  Sensory Interventions: Assess changes in LOC,Assess need for specialty bed,Avoid rigorous massage over bony prominences,Check visual cues for pain,Float heels,Keep linens dry and wrinkle-free,Maintain/enhance activity level,Minimize linen layers,Monitor skin under medical devices,Pad between skin to skin,Pressure redistribution bed/mattress (bed type),Turn and reposition approx.  every two hours (pillows and wedges if needed)    Moisture Interventions: Absorbent underpads,Apply protective barrier, creams and emollients,Assess need for specialty bed,Check for incontinence Q2 hours and as needed,Internal/External urinary devices,Limit adult briefs,Maintain skin hydration (lotion/cream),Minimize layers,Moisture barrier    Activity Interventions: Pressure redistribution bed/mattress(bed type),Assess need for specialty bed    Mobility Interventions: Assess need for specialty bed,Float heels,Pressure redistribution bed/mattress (bed type),Turn and reposition approx. every two hours(pillow and wedges)    Nutrition Interventions: Document food/fluid/supplement intake,Discuss nutritional consult with provider    Friction and Shear Interventions: Apply protective barrier, creams and emollients,Foam dressings/transparent film/skin sealants,Lift sheet,Minimize layers                Problem: Patient Education: Go to Patient Education Activity  Goal: Patient/Family Education  Outcome: Progressing Towards Goal     Problem: Falls - Risk of  Goal: *Absence of Falls  Description: Document Rolando Fall Risk and appropriate interventions in the flowsheet.   Outcome: Progressing Towards Goal  Note: Fall Risk Interventions:  Mobility Interventions: Bed/chair exit alarm,Strengthening exercises (ROM-active/passive)         Medication Interventions: Bed/chair exit alarm,Evaluate medications/consider consulting pharmacy    Elimination Interventions: Bed/chair exit alarm,Toileting schedule/hourly rounds              Problem: Patient Education: Go to Patient Education Activity  Goal: Patient/Family Education  Outcome: Progressing Towards Goal     Problem: Ventilator Management  Goal: *Adequate oxygenation and ventilation  Outcome: Progressing Towards Goal  Goal: *Patient maintains clear airway/free of aspiration  Outcome: Progressing Towards Goal  Goal: *Absence of infection signs and symptoms  Outcome: Progressing Towards Goal  Goal: *Normal spontaneous ventilation  Outcome: Progressing Towards Goal     Problem: Patient Education: Go to Patient Education Activity  Goal: Patient/Family Education  Outcome: Progressing Towards Goal     Problem: Patient Education:  Go to Education Activity  Goal: Patient/Family Education  Outcome: Progressing Towards Goal     Problem: Non-Violent Restraints  Goal: Removal from restraints as soon as assessed to be safe  Outcome: Progressing Towards Goal  Goal: No harm/injury to patient while restraints in use  Outcome: Progressing Towards Goal  Goal: Patient's dignity will be maintained  Outcome: Progressing Towards Goal  Goal: Patient Interventions  Outcome: Progressing Towards Goal

## 2022-04-09 NOTE — PROGRESS NOTES
Formerly [facility], [facility] Critical Care Note[de-identified] 4/9/2022  Blaine Nunez  Admission Date: 4/3/2022     Length of Stay: 6 days    Background: 31 y.o. y/o male  seen and evaluated at the request of Dr. Uvaldo Noel. He has a history of obesity and asthma. He was initially seen in the ER yesterday with wheezing from asthma exacerbation. He has had similar exacerbations before, requiring hospitalization in the past. He is only on prn albuterol at home. No fever, chills, or obvious triggers, though some productive cough. He was initially treated and discharged home. However he returned around midnight with worsening symptoms. L calf pain but d dimer was normal as was LE doppler. CXR was normal. He was given continuous nebs and IV steroids but continued to have increased WOB so has now been started on bipap 18/8 with 60% FiO2. He is satting well and reportedly less WOB than before and less tachycardia (was up to 150 sinus tach) now at 120 but still with ongoing wheeze. ABG already with some mild hypercarbia at 47. Notable PMH:  has a past medical history of Asthma. 24 Hour events: was able to tolerate off paralytics, had to work through sedation wean, but eventually on propofol, precedex and fentanyl. Is responsive this morning. Gets agitated at times and reaches for tube, but is redirectable. Placed on PSV 5/8 40% and volumes are 500s with RR 20-24. He seems uncomfortable and is biting tube at times. Wife at bedside helping to calm him. ROS: unable to obtain/negative except as listed elsewhere. Lines: (insertion date)   ETT: (4/3/22)  Hunter: (4/3/22)  OGT: (4/3/22)  PICC line- 4/3/22  Arterial Line (4/3/22)    Drips: current dose (range)  Propofol (mcg/kg/min): 15  Precedex (mcg/kg/min): 0.9  Fentanyl (mcg/hr): 200    Pertinent Exam:         Blood pressure (!) 177/82, pulse 91, temperature 100.2 °F (37.9 °C), resp. rate (!) 39, height 5' 9\" (1.753 m), weight 310 lb 10.1 oz (140.9 kg), SpO2 93 %. Intake/Output Summary (Last 24 hours) at 4/9/2022 0801  Last data filed at 4/9/2022 0715  Gross per 24 hour   Intake 3031.8 ml   Output 5670 ml   Net -2638.2 ml     Constitutional:  intubated and mechanically ventilated. EENMT:  Sclera clear, pupils equal, oral mucosa moist  Respiratory: better air movement today bilaterally  Cardiovascular:  RRR  Gastrointestinal:  soft with no tenderness; positive bowel sounds present  Musculoskeletal:  warm with no cyanosis, no lower extremity edema  Skin:  no jaundice or ecchymosis  Neurologic:sedated  Psychiatric: some agitation    CXR: 4/8: stable basilar atx/infiltrates      Recent Labs     04/09/22 0326 04/08/22  0320 04/07/22  0340   WBC 17.2* 11.5* 12.1*   HGB 12.8* 12.1* 12.0*   HCT 40.3* 36.2* 37.3*    296 302     Recent Labs     04/09/22  0326 04/08/22  0320 04/07/22  0340    140 145   K 5.0 4.2 4.0   CL 94* 98 107   CO2 42* 38* 36*   * 265* 207*   BUN 36* 28* 24*   CREA 1.10 0.90 0.90   MG  --  2.6* 2.7*   CA 9.0 8.3 9.2   PHOS  --  3.4 3.7     No results for input(s): LAC, TROPHS, BNPNT, CRP in the last 72 hours. No lab exists for component: ESR  Recent Labs     04/09/22  0507 04/08/22  2325 04/08/22  1721   GLUCPOC 286* 241* 219*     ECHO: No results found for this or any previous visit.      Results     Procedure Component Value Units Date/Time    CULTURE, RESPIRATORY/SPUTUM/BRONCH Gaylan Nyhan [[id number]]     Order Status: Sent Specimen: Sputum,ET Suction     CULTURE, BLOOD [[id number]] Collected: 04/05/22 1103    Order Status: Completed Specimen: Blood Updated: 04/09/22 0642     Special Requests: --        LEFT  FOREARM       Culture result: NO GROWTH 4 DAYS       CULTURE, BLOOD [[id number]] Collected: 04/05/22 0947    Order Status: Completed Specimen: Blood Updated: 04/09/22 0642     Special Requests: --        LEFT  FOREARM       Culture result: NO GROWTH 4 DAYS       MSSA/MRSA SC BY PCR, NASAL SWAB [[id number]]     Order Status: Canceled Specimen: Nasal swab     RESPIRATORY VIRUS PANEL W/COVID-19, PCR [[id number]]  (Abnormal) Collected: 04/05/22 0837    Order Status: Completed Specimen: Nasopharyngeal Updated: 04/05/22 1021     Adenovirus NOT DETECTED        Coronavirus 229E NOT DETECTED        Coronavirus HKU1 NOT DETECTED        Coronavirus CVNL63 NOT DETECTED        Coronavirus OC43 NOT DETECTED        SARS-CoV-2, PCR NOT DETECTED        Metapneumovirus NOT DETECTED        Rhinovirus and Enterovirus NOT DETECTED        Influenza A, subtype H3 Detected        Influenza B NOT DETECTED        Parainfluenza 1 NOT DETECTED        Parainfluenza 2 NOT DETECTED        Parainfluenza 3 NOT DETECTED        Parainfluenza virus 4 NOT DETECTED        RSV by PCR NOT DETECTED        B. parapertussis, PCR NOT DETECTED        Bordetella pertussis - PCR NOT DETECTED        Chlamydophila pneumoniae DNA, QL, PCR NOT DETECTED        Mycoplasma pneumoniae DNA, QL, PCR NOT DETECTED       CULTURE, RESPIRATORY/SPUTUM/BRONCH Rexie Broccoli STAIN [[id number]]  (Abnormal)  (Susceptibility) Collected: 04/04/22 1207    Order Status: Completed Specimen: Sputum,ET Suction Updated: 04/07/22 0649     Special Requests: NO SPECIAL REQUESTS        GRAM STAIN 2 TO 7 WBCS SEEN PER OIF      NO EPITHELIAL CELLS SEEN         FEW GRAM POSITIVE COCCI         NO MUCUS PRESENT        Culture result:       HEAVY STAPHYLOCOCCUS AUREUS                  MODERATE NORMAL RESPIRATORY ALYCIA          Susceptibility      Staphylococcus aureus     TEJ     Cefazolin ($) Susceptible     Clindamycin ($) Resistant     Oxacillin Susceptible     Rifampin ($$$$) Susceptible  [1]      Tetracycline Susceptible     Trimeth-Sulfamethoxa Susceptible     Vancomycin ($) Susceptible                 [1]  Rifampin is not to be used for mono-therapy.           Linear View                   COVID-19 RAPID TEST [[id number]] Collected: 04/02/22 1642    Order Status: Completed Specimen: Nasopharyngeal Updated: 04/02/22 3139 Specimen source Nasopharyngeal        COVID-19 rapid test Not detected        Comment:      The specimen is NEGATIVE for SARS-CoV-2, the novel coronavirus associated with COVID-19. A negative result does not rule out COVID-19. This test has been authorized by the FDA under an Emergency Use Authorization (EUA) for use by authorized laboratories. Fact sheet for Healthcare Providers: ConventionLaboratoires Nutrition & Cardiometabolismedate.co.nz  Fact sheet for Patients: MeetBalldate.co.nz       Methodology: Isothermal Nucleic Acid Amplification             Inpat Anti-Infectives (From admission, onward)     Start     Ordered Stop    04/03/22 0700  azithromycin (ZITHROMAX) 500 mg in 0.9% sodium chloride 250 mL ([id number])  500 mg,   IntraVENous,   EVERY 24 HOURS         04/03/22 0607 04/08/22 0659              Ventilator Settings:  Ideal body weight: 70.7 kg (155 lb 13.8 oz)   Mode FIO2 Rate Tidal Volume Pressure PEEP   Pressure control  40 %    450 ml     8 cm H20    Peak airway pressure: 29 cm H2O       Minute ventilation: 9.3 l/min  ABG:  Recent Labs     04/08/22  0420 04/08/22  0357 04/07/22  0455   PHI 7.41 7.43 7.44   PCO2I 64.6* 60.6* 53.8*   PO2I 65* 44* 65*   HCO3I 40.9* 39.9* 36.1*     Assessment and Plan:  (Medical Decision Making)   Impression: 31 y.o. male with acute respiratory failure due to asthma exacerbation, influenza A and staph pneumonia    NEURO:   Sedation: Hold propofol and extubate, but keep on precedex  Analgesia: hold fentanyl for extubation  Paralytics: off  CV:   Septic Shock: due to influenza and Staph pneumonia, resolved  Volume: 2L negative, Cr slightly up, seems a bit hemoconcentated, Bicarb up, will hold lasix today. PULM:   Acute hypoxemic/hypercapneic respiratory failure: On ventilator, improved, weaned to PSV this morning and seems to be reasonable for extubation. He is agitated at times and will keep on precedex for extubation for anxiety.  Wean slowly afterwards  Severe persistent asthma with exacerbation: improved air trapping on ventilator. On steroids IV, wean to 40 Q6H, Continue nebs. Sounds like he was not on any controller therapy at home recently though has been in the past. Had 2 prior ICU stays though over a decade ago and no intubations. RENAL:  TREVOR: UOP is good. Stable. Hold lasix today  GI:   Nutrition: TF off for extubation, place to LIWS for the next 30 minutes  HEME:   no issues   ID:    MSSA pneumonia: finished azithro, change to Ancef for 3/5 more days  Influenza A: Tamiflu BID x 5/5 days, droplet precautions. Skin: no decub, turns, preventive care  Prophy: Lovenox, protonix     Updated wife at bedside with plans. Full Code    The patient is critically ill with respiratory failure, circulatory failure and requires high complexity decision making for assessment and support including frequent ventilator adjustment , frequent evaluation and titration of therapies , application of advanced monitoring technologies and extensive interpretation of multiple databases    Cumulative time devoted to patient care services by me for day of service is 31 mins.     Wyman Hashimoto, MD

## 2022-04-09 NOTE — PROGRESS NOTES
Received patient on documented settings PC 22 RR16 +8 40%. Patient has a size 8.0 ET tube secured [id number] at the lips. Patient alarms are on and audible. Patient is negative for pitting edema and negative for cyanosis. Patient also negative for subcutaneous  Air. Patient has equal chest rise and diminished BBS.        Peak Pressure 19  Minute Volume 8.1

## 2022-04-09 NOTE — PROGRESS NOTES
Pt awake and able to lift head off of bed. Cuff leak notable around ETT. Oral and ETT airway suctioned. Cuff deflated and pt extubated to 8L high flow nasal cannula. Pt suctioned orally and able to cough post extubation. MD at bedside for procedure.

## 2022-04-09 NOTE — PROGRESS NOTES
Attempted to wean sedation. Patient more alert, eyes open spontaneously, not following commands. Frequently pulling at restraints and reaching for ETT. Stacking breaths on the vent. HR elevated to 130s, /104 at this time. PRN morphine 2 mg administered at 2349 and Vasotec 1.25 mg given at [address]. BP remains elevated at this time. RASS +1. Sedation increased per RASS score. See MAR.

## 2022-04-10 ENCOUNTER — APPOINTMENT (OUTPATIENT)
Dept: GENERAL RADIOLOGY | Age: 37
DRG: 207 | End: 2022-04-10
Attending: INTERNAL MEDICINE

## 2022-04-10 LAB
ANION GAP SERPL CALC-SCNC: 5 MMOL/L (ref 7–16)
BACTERIA SPEC CULT: NORMAL
BACTERIA SPEC CULT: NORMAL
BUN SERPL-MCNC: 31 MG/DL (ref 6–23)
CALCIUM SERPL-MCNC: 9.6 MG/DL (ref 8.3–10.4)
CHLORIDE SERPL-SCNC: 95 MMOL/L (ref 98–107)
CO2 SERPL-SCNC: 36 MMOL/L (ref 21–32)
CREAT SERPL-MCNC: 0.9 MG/DL (ref 0.8–1.5)
ERYTHROCYTE [DISTWIDTH] IN BLOOD BY AUTOMATED COUNT: 13 % (ref 11.9–14.6)
GLUCOSE BLD STRIP.AUTO-MCNC: 118 MG/DL (ref 65–100)
GLUCOSE BLD STRIP.AUTO-MCNC: 147 MG/DL (ref 65–100)
GLUCOSE BLD STRIP.AUTO-MCNC: 160 MG/DL (ref 65–100)
GLUCOSE BLD STRIP.AUTO-MCNC: 173 MG/DL (ref 65–100)
GLUCOSE SERPL-MCNC: 187 MG/DL (ref 65–100)
HCT VFR BLD AUTO: 43 % (ref 41.1–50.3)
HGB BLD-MCNC: 14.3 G/DL (ref 13.6–17.2)
MCH RBC QN AUTO: 33.9 PG (ref 26.1–32.9)
MCHC RBC AUTO-ENTMCNC: 33.3 G/DL (ref 31.4–35)
MCV RBC AUTO: 101.9 FL (ref 79.6–97.8)
NRBC # BLD: 0.02 K/UL (ref 0–0.2)
PLATELET # BLD AUTO: 291 K/UL (ref 150–450)
PMV BLD AUTO: 9.2 FL (ref 9.4–12.3)
POTASSIUM SERPL-SCNC: 4.8 MMOL/L (ref 3.5–5.1)
RBC # BLD AUTO: 4.22 M/UL (ref 4.23–5.6)
SERVICE CMNT-IMP: ABNORMAL
SERVICE CMNT-IMP: NORMAL
SERVICE CMNT-IMP: NORMAL
SODIUM SERPL-SCNC: 136 MMOL/L (ref 136–145)
WBC # BLD AUTO: 20.2 K/UL (ref 4.3–11.1)

## 2022-04-10 PROCEDURE — 74011000250 HC RX REV CODE- 250: Performed by: INTERNAL MEDICINE

## 2022-04-10 PROCEDURE — 80048 BASIC METABOLIC PNL TOTAL CA: CPT

## 2022-04-10 PROCEDURE — 71045 X-RAY EXAM CHEST 1 VIEW: CPT

## 2022-04-10 PROCEDURE — 74011250636 HC RX REV CODE- 250/636: Performed by: HOSPITALIST

## 2022-04-10 PROCEDURE — 2709999900 HC NON-CHARGEABLE SUPPLY

## 2022-04-10 PROCEDURE — 65270000029 HC RM PRIVATE

## 2022-04-10 PROCEDURE — 74011000258 HC RX REV CODE- 258: Performed by: INTERNAL MEDICINE

## 2022-04-10 PROCEDURE — 82962 GLUCOSE BLOOD TEST: CPT

## 2022-04-10 PROCEDURE — 74011250636 HC RX REV CODE- 250/636: Performed by: INTERNAL MEDICINE

## 2022-04-10 PROCEDURE — 94760 N-INVAS EAR/PLS OXIMETRY 1: CPT

## 2022-04-10 PROCEDURE — 94640 AIRWAY INHALATION TREATMENT: CPT

## 2022-04-10 PROCEDURE — 92610 EVALUATE SWALLOWING FUNCTION: CPT

## 2022-04-10 PROCEDURE — 99233 SBSQ HOSP IP/OBS HIGH 50: CPT | Performed by: INTERNAL MEDICINE

## 2022-04-10 PROCEDURE — 74011000250 HC RX REV CODE- 250: Performed by: HOSPITALIST

## 2022-04-10 PROCEDURE — 85027 COMPLETE CBC AUTOMATED: CPT

## 2022-04-10 PROCEDURE — 74011250637 HC RX REV CODE- 250/637: Performed by: INTERNAL MEDICINE

## 2022-04-10 PROCEDURE — 77010033711 HC HIGH FLOW OXYGEN

## 2022-04-10 PROCEDURE — C9113 INJ PANTOPRAZOLE SODIUM, VIA: HCPCS | Performed by: INTERNAL MEDICINE

## 2022-04-10 PROCEDURE — 36592 COLLECT BLOOD FROM PICC: CPT

## 2022-04-10 PROCEDURE — 74011636637 HC RX REV CODE- 636/637: Performed by: INTERNAL MEDICINE

## 2022-04-10 RX ORDER — INSULIN LISPRO 100 [IU]/ML
INJECTION, SOLUTION INTRAVENOUS; SUBCUTANEOUS
Status: DISCONTINUED | OUTPATIENT
Start: 2022-04-10 | End: 2022-04-13 | Stop reason: HOSPADM

## 2022-04-10 RX ORDER — AMOXICILLIN 250 MG
2 CAPSULE ORAL
Status: DISCONTINUED | OUTPATIENT
Start: 2022-04-10 | End: 2022-04-13 | Stop reason: HOSPADM

## 2022-04-10 RX ORDER — AMLODIPINE BESYLATE 10 MG/1
10 TABLET ORAL DAILY
Status: DISCONTINUED | OUTPATIENT
Start: 2022-04-11 | End: 2022-04-13 | Stop reason: HOSPADM

## 2022-04-10 RX ORDER — POLYETHYLENE GLYCOL 3350 17 G/17G
17 POWDER, FOR SOLUTION ORAL DAILY
Status: DISCONTINUED | OUTPATIENT
Start: 2022-04-11 | End: 2022-04-13 | Stop reason: HOSPADM

## 2022-04-10 RX ADMIN — LEVALBUTEROL HYDROCHLORIDE 5 MG: 1.25 SOLUTION RESPIRATORY (INHALATION) at 13:05

## 2022-04-10 RX ADMIN — INSULIN GLARGINE 30 UNITS: 100 INJECTION, SOLUTION SUBCUTANEOUS at 08:23

## 2022-04-10 RX ADMIN — HALOPERIDOL LACTATE 2 MG: 5 INJECTION, SOLUTION INTRAMUSCULAR at 04:02

## 2022-04-10 RX ADMIN — AMLODIPINE BESYLATE 10 MG: 10 TABLET ORAL at 09:00

## 2022-04-10 RX ADMIN — ENOXAPARIN SODIUM 40 MG: 100 INJECTION SUBCUTANEOUS at 08:23

## 2022-04-10 RX ADMIN — INSULIN LISPRO 2 UNITS: 100 INJECTION, SOLUTION INTRAVENOUS; SUBCUTANEOUS at 06:26

## 2022-04-10 RX ADMIN — DEXMEDETOMIDINE HYDROCHLORIDE 0.5 MCG/KG/HR: 100 INJECTION, SOLUTION INTRAVENOUS at 04:04

## 2022-04-10 RX ADMIN — LEVALBUTEROL HYDROCHLORIDE 5 MG: 1.25 SOLUTION RESPIRATORY (INHALATION) at 15:33

## 2022-04-10 RX ADMIN — LABETALOL HYDROCHLORIDE 20 MG: 5 INJECTION INTRAVENOUS at 11:43

## 2022-04-10 RX ADMIN — LEVALBUTEROL HYDROCHLORIDE 5 MG: 1.25 SOLUTION RESPIRATORY (INHALATION) at 03:55

## 2022-04-10 RX ADMIN — METHYLPREDNISOLONE SODIUM SUCCINATE 40 MG: 40 INJECTION, POWDER, FOR SOLUTION INTRAMUSCULAR; INTRAVENOUS at 20:14

## 2022-04-10 RX ADMIN — METHYLPREDNISOLONE SODIUM SUCCINATE 40 MG: 40 INJECTION, POWDER, FOR SOLUTION INTRAMUSCULAR; INTRAVENOUS at 02:30

## 2022-04-10 RX ADMIN — BUDESONIDE 500 MCG: 0.5 INHALANT RESPIRATORY (INHALATION) at 20:33

## 2022-04-10 RX ADMIN — SENNOSIDES AND DOCUSATE SODIUM 2 TABLET: 8.6; 5 TABLET ORAL at 21:14

## 2022-04-10 RX ADMIN — SODIUM CHLORIDE, PRESERVATIVE FREE 10 ML: 5 INJECTION INTRAVENOUS at 06:26

## 2022-04-10 RX ADMIN — BUDESONIDE 500 MCG: 0.5 INHALANT RESPIRATORY (INHALATION) at 07:21

## 2022-04-10 RX ADMIN — IPRATROPIUM BROMIDE 0.5 MG: 0.5 SOLUTION RESPIRATORY (INHALATION) at 23:39

## 2022-04-10 RX ADMIN — HYDRALAZINE HYDROCHLORIDE 20 MG: 20 INJECTION INTRAMUSCULAR; INTRAVENOUS at 20:11

## 2022-04-10 RX ADMIN — DEXMEDETOMIDINE HYDROCHLORIDE 1.2 MCG/KG/HR: 100 INJECTION, SOLUTION INTRAVENOUS at 01:21

## 2022-04-10 RX ADMIN — IPRATROPIUM BROMIDE 0.5 MG: 0.5 SOLUTION RESPIRATORY (INHALATION) at 07:21

## 2022-04-10 RX ADMIN — LEVALBUTEROL HYDROCHLORIDE 5 MG: 1.25 SOLUTION RESPIRATORY (INHALATION) at 20:33

## 2022-04-10 RX ADMIN — CEFAZOLIN SODIUM 2 G: 100 INJECTION, POWDER, LYOPHILIZED, FOR SOLUTION INTRAVENOUS at 16:44

## 2022-04-10 RX ADMIN — CEFAZOLIN SODIUM 2 G: 100 INJECTION, POWDER, LYOPHILIZED, FOR SOLUTION INTRAVENOUS at 08:23

## 2022-04-10 RX ADMIN — INSULIN LISPRO 2 UNITS: 100 INJECTION, SOLUTION INTRAVENOUS; SUBCUTANEOUS at 11:59

## 2022-04-10 RX ADMIN — SODIUM CHLORIDE, PRESERVATIVE FREE 5 ML: 5 INJECTION INTRAVENOUS at 21:15

## 2022-04-10 RX ADMIN — IPRATROPIUM BROMIDE 0.5 MG: 0.5 SOLUTION RESPIRATORY (INHALATION) at 15:33

## 2022-04-10 RX ADMIN — CEFAZOLIN SODIUM 2 G: 100 INJECTION, POWDER, LYOPHILIZED, FOR SOLUTION INTRAVENOUS at 01:16

## 2022-04-10 RX ADMIN — LEVALBUTEROL HYDROCHLORIDE 5 MG: 1.25 SOLUTION RESPIRATORY (INHALATION) at 23:39

## 2022-04-10 RX ADMIN — METHYLPREDNISOLONE SODIUM SUCCINATE 40 MG: 40 INJECTION, POWDER, FOR SOLUTION INTRAMUSCULAR; INTRAVENOUS at 08:23

## 2022-04-10 RX ADMIN — ENOXAPARIN SODIUM 40 MG: 100 INJECTION SUBCUTANEOUS at 20:13

## 2022-04-10 RX ADMIN — LEVALBUTEROL HYDROCHLORIDE 5 MG: 1.25 SOLUTION RESPIRATORY (INHALATION) at 07:21

## 2022-04-10 RX ADMIN — SODIUM CHLORIDE, PRESERVATIVE FREE 30 ML: 5 INJECTION INTRAVENOUS at 08:25

## 2022-04-10 RX ADMIN — SODIUM CHLORIDE, PRESERVATIVE FREE 10 ML: 5 INJECTION INTRAVENOUS at 14:01

## 2022-04-10 RX ADMIN — SODIUM CHLORIDE 40 MG: 9 INJECTION INTRAMUSCULAR; INTRAVENOUS; SUBCUTANEOUS at 08:23

## 2022-04-10 NOTE — PROGRESS NOTES
Double checked with pulmonary about patient being on continuous pulse ox and Dr. Aundrea Villafuerte does not think he needs it at this time

## 2022-04-10 NOTE — PROGRESS NOTES
04/10/22 1403   Dual Skin Pressure Injury Assessment   Dual Skin Pressure Injury Assessment WDL   Second Care Provider (Based on [address] [place] [address]) Sin Leon RN   Skin Integumentary   Skin Integumentary (WDL) WDL    Pressure  Injury Documentation No Pressure Injury Noted-Pressure Ulcer Prevention Initiated

## 2022-04-10 NOTE — PROGRESS NOTES
Bedside and verbal shift change report received from  [employer], [facility] (offgoing nurse). Report included the following information SBAR, Kardex, Intake/Output and Recent Results.      Dual skin assessment completed at bedside: N/A (list pertinent skin assessment findings)    Dual verification of gtts completed (name of gtts verified): Precedex gtt @ 0.5

## 2022-04-10 NOTE — PROGRESS NOTES
[facility] CRITICAL CARE OUTREACH NURSE PROGRESS REPORT      SUBJECTIVE: Called to assess patient secondary to transfer from critical care. MEWS Score: 2 (04/10/22 1100)  Vitals:    04/10/22 1219 04/10/22 1305 04/10/22 1403 04/10/22 1404   BP: (!) 165/80  (!) 140/76 (!) 140/76   Pulse: 88  89 88   Resp: 30      Temp:   98.2 °F (36.8 °C) 98.2 °F (36.8 °C)   SpO2: 98% 96% 95%    Weight:       Height:            LAB DATA:    Recent Labs     04/10/22  0228 04/09/22  0326 04/08/22  0320    140 140   K 4.8 5.0 4.2   CL 95* 94* 98   CO2 36* 42* 38*   AGAP 5* 4* 4*   * 291* 265*   BUN 31* 36* 28*   CREA 0.90 1.10 0.90   GFRAA >60 >60 >60   GFRNA >60 >60 >60   CA 9.6 9.0 8.3   MG  --   --  2.6*   PHOS  --   --  3.4        Recent Labs     04/10/22  0228 04/09/22  0326 04/08/22  0320   WBC 20.2* 17.2* 11.5*   HGB 14.3 12.8* 12.1*   HCT 43.0 40.3* 36.2*    312 296          OBJECTIVE: On arrival to room, I found patient to be alert in bed. ASSESSMENT:  Patient is alert and oriented. Denies any pain or SOB. Respirations even and unlabored. Bilateral wheezes noted. Respirations even and unlabored on airvo 40L 50%. PT denies any needs at this time. Assisted in changing PICC dressing. PLAN:  VS, labs, and progress notes reviewed. No concerns per primary RN. Will continue to follow per outreach protocol.

## 2022-04-10 NOTE — PROGRESS NOTES
Date of Outreach Update:  Leonard Escalante was seen and assessed. MEWS Score: 2 (04/10/22 1100)  Vitals:    04/10/22 1534 04/10/22 1542 04/10/22 1543 04/10/22 1551   BP:  (!) 162/98 (!) 162/98    Pulse:  89 85    Resp:  17 17    Temp:  98 °F (36.7 °C) 98 °F (36.7 °C)    SpO2: 98% 98% 100% 98%   Weight:       Height:             Pain Assessment  Pain Intensity 1: 0 (04/10/22 1403)  Pain Location 1: Chest     Patient Stated Pain Goal: 0      Previous Outreach assessment has been reviewed. There have been no significant clinical changes since the completion of the last dated Outreach assessment. Denies pain or SOB. Respirations even and unlabored. PT is alert and oriented. Will continue to follow up per outreach protocol.     Signed ByFam Adan Standard    April 10, 2022 4:16 PM

## 2022-04-10 NOTE — PROGRESS NOTES
Problem: Pressure Injury - Risk of  Goal: *Prevention of pressure injury  Description: Document Martir Scale and appropriate interventions in the flowsheet. Outcome: Progressing Towards Goal  Note: Pressure Injury Interventions:  Sensory Interventions: Assess changes in LOC,Check visual cues for pain    Moisture Interventions: Absorbent underpads,Check for incontinence Q2 hours and as needed    Activity Interventions: Increase time out of bed,PT/OT evaluation    Mobility Interventions: HOB 30 degrees or less,PT/OT evaluation    Nutrition Interventions: Document food/fluid/supplement intake,Discuss nutritional consult with provider,Offer support with meals,snacks and hydration    Friction and Shear Interventions: Apply protective barrier, creams and emollients,Foam dressings/transparent film/skin sealants                Problem: Falls - Risk of  Goal: *Absence of Falls  Description: Document Rolando Fall Risk and appropriate interventions in the flowsheet.   Outcome: Progressing Towards Goal  Note: Fall Risk Interventions:  Mobility Interventions: OT consult for ADLs,Patient to call before getting OOB,PT Consult for mobility concerns    Mentation Interventions: Bed/chair exit alarm,Door open when patient unattended,Family/sitter at bedside    Medication Interventions: Bed/chair exit alarm,Patient to call before getting OOB,Teach patient to arise slowly    Elimination Interventions: Bed/chair exit alarm,Call light in reach,Patient to call for help with toileting needs,Toileting schedule/hourly rounds

## 2022-04-10 NOTE — PROGRESS NOTES
TRANSFER - OUT REPORT:    Verbal report given to Maggie (name) on Helio Ahn  being transferred to 7th floor (unit) for routine progression of care       Report consisted of patients Situation, Background, Assessment and   Recommendations(SBAR). Information from the following report(s) SBAR, Kardex, ED Summary, Intake/Output and Recent Results was reviewed with the receiving nurse. Lines:   PICC Triple Lumen 94/84/95 Right;Basilic (Active)   Central Line Being Utilized Yes 04/10/22 1100   Criteria for Appropriate Use Limited/no vessel suitable for conventional peripheral access 04/10/22 1100   Site Assessment Clean, dry, & intact 04/10/22 1100   Phlebitis Assessment 0 04/10/22 1100   Infiltration Assessment 0 04/10/22 0700   Arm Circumference (cm) 46 cm 04/03/22 1420   Date of Last Dressing Change 04/03/22 04/10/22 0700   Dressing Status Loose 04/10/22 1100   External Catheter Length (cm) 0 centimeters 04/08/22 1903   Dressing Type Disk with Chlorhexadine gluconate (CHG); Stabilization/securement device;Transparent 04/10/22 0700   Action Taken Other (comment) 04/07/22 1023   Hub Color/Line Status Capped 04/10/22 1100   Positive Blood Return (Site #1) Yes 04/10/22 0700   Hub Color/Line Status Capped 04/10/22 1100   Positive Blood Return (Site #2) Yes 04/10/22 0700   Hub Color/Line Status Capped 04/10/22 1100   Positive Blood Return (Site #3) Yes 04/10/22 0700   Alcohol Cap Used Yes 04/09/22 1915        Opportunity for questions and clarification was provided.       Patient transported with:   O2 @ 15 liters

## 2022-04-10 NOTE — PROGRESS NOTES
TRANSFER - IN REPORT:    Verbal report received from [address] [place] [address] on [facility]  being received from ICU for routine progression of care      Report consisted of patients Situation, Background, Assessment and   Recommendations(SBAR). Information from the following report(s) SBAR was reviewed with the receiving nurse. Opportunity for questions and clarification was provided. Assessment completed upon patients arrival to unit and care assumed.

## 2022-04-11 PROBLEM — J45.902 STATUS ASTHMATICUS: Status: RESOLVED | Noted: 2022-04-03 | Resolved: 2022-04-11

## 2022-04-11 PROBLEM — I10 PRIMARY HYPERTENSION: Chronic | Status: ACTIVE | Noted: 2022-04-06

## 2022-04-11 LAB
ANION GAP SERPL CALC-SCNC: 9 MMOL/L (ref 7–16)
APPEARANCE UR: CLEAR
BACTERIA URNS QL MICRO: 0 /HPF
BILIRUB UR QL: ABNORMAL
BUN SERPL-MCNC: 29 MG/DL (ref 6–23)
CALCIUM SERPL-MCNC: 9.8 MG/DL (ref 8.3–10.4)
CASTS URNS QL MICRO: 0 /LPF
CHLORIDE SERPL-SCNC: 96 MMOL/L (ref 98–107)
CO2 SERPL-SCNC: 31 MMOL/L (ref 21–32)
COLOR UR: YELLOW
COVID-19 RAPID TEST, COVR: NOT DETECTED
CREAT SERPL-MCNC: 1 MG/DL (ref 0.8–1.5)
EPI CELLS #/AREA URNS HPF: 0 /HPF
ERYTHROCYTE [DISTWIDTH] IN BLOOD BY AUTOMATED COUNT: 13 % (ref 11.9–14.6)
EST. AVERAGE GLUCOSE BLD GHB EST-MCNC: 126 MG/DL
GLUCOSE BLD STRIP.AUTO-MCNC: 137 MG/DL (ref 65–100)
GLUCOSE BLD STRIP.AUTO-MCNC: 141 MG/DL (ref 65–100)
GLUCOSE BLD STRIP.AUTO-MCNC: 204 MG/DL (ref 65–100)
GLUCOSE BLD STRIP.AUTO-MCNC: 220 MG/DL (ref 65–100)
GLUCOSE SERPL-MCNC: 241 MG/DL (ref 65–100)
GLUCOSE UR STRIP.AUTO-MCNC: NEGATIVE MG/DL
HBA1C MFR BLD: 6 % (ref 4.2–6.3)
HCT VFR BLD AUTO: 51.5 % (ref 41.1–50.3)
HGB BLD-MCNC: 17.4 G/DL (ref 13.6–17.2)
HGB UR QL STRIP: ABNORMAL
KETONES UR QL STRIP.AUTO: ABNORMAL MG/DL
LEUKOCYTE ESTERASE UR QL STRIP.AUTO: NEGATIVE
MCH RBC QN AUTO: 33.8 PG (ref 26.1–32.9)
MCHC RBC AUTO-ENTMCNC: 33.8 G/DL (ref 31.4–35)
MCV RBC AUTO: 100 FL (ref 79.6–97.8)
NITRITE UR QL STRIP.AUTO: NEGATIVE
NRBC # BLD: 0.03 K/UL (ref 0–0.2)
PH UR STRIP: 6 [PH] (ref 5–9)
PLATELET # BLD AUTO: 353 K/UL (ref 150–450)
PMV BLD AUTO: 9.2 FL (ref 9.4–12.3)
POTASSIUM SERPL-SCNC: 4.5 MMOL/L (ref 3.5–5.1)
PROCALCITONIN SERPL-MCNC: 0.11 NG/ML (ref 0–0.49)
PROT UR STRIP-MCNC: ABNORMAL MG/DL
RBC # BLD AUTO: 5.15 M/UL (ref 4.23–5.6)
RBC #/AREA URNS HPF: ABNORMAL /HPF
SERVICE CMNT-IMP: ABNORMAL
SODIUM SERPL-SCNC: 136 MMOL/L (ref 138–145)
SOURCE, COVRS: NORMAL
SP GR UR REFRACTOMETRY: 1.02 (ref 1–1.02)
UROBILINOGEN UR QL STRIP.AUTO: 0.2 EU/DL (ref 0.2–1)
WBC # BLD AUTO: 23.3 K/UL (ref 4.3–11.1)
WBC URNS QL MICRO: ABNORMAL /HPF

## 2022-04-11 PROCEDURE — 97530 THERAPEUTIC ACTIVITIES: CPT

## 2022-04-11 PROCEDURE — 74011250636 HC RX REV CODE- 250/636: Performed by: INTERNAL MEDICINE

## 2022-04-11 PROCEDURE — 74011250637 HC RX REV CODE- 250/637: Performed by: INTERNAL MEDICINE

## 2022-04-11 PROCEDURE — 77010033678 HC OXYGEN DAILY

## 2022-04-11 PROCEDURE — 74011250636 HC RX REV CODE- 250/636: Performed by: HOSPITALIST

## 2022-04-11 PROCEDURE — 74011636637 HC RX REV CODE- 636/637: Performed by: INTERNAL MEDICINE

## 2022-04-11 PROCEDURE — 36415 COLL VENOUS BLD VENIPUNCTURE: CPT

## 2022-04-11 PROCEDURE — 87635 SARS-COV-2 COVID-19 AMP PRB: CPT

## 2022-04-11 PROCEDURE — 87040 BLOOD CULTURE FOR BACTERIA: CPT

## 2022-04-11 PROCEDURE — 99252 IP/OBS CONSLTJ NEW/EST SF 35: CPT | Performed by: PHYSICAL MEDICINE & REHABILITATION

## 2022-04-11 PROCEDURE — 94640 AIRWAY INHALATION TREATMENT: CPT

## 2022-04-11 PROCEDURE — C9113 INJ PANTOPRAZOLE SODIUM, VIA: HCPCS | Performed by: INTERNAL MEDICINE

## 2022-04-11 PROCEDURE — 81001 URINALYSIS AUTO W/SCOPE: CPT

## 2022-04-11 PROCEDURE — 97162 PT EVAL MOD COMPLEX 30 MIN: CPT

## 2022-04-11 PROCEDURE — 74011000250 HC RX REV CODE- 250: Performed by: HOSPITALIST

## 2022-04-11 PROCEDURE — 94760 N-INVAS EAR/PLS OXIMETRY 1: CPT

## 2022-04-11 PROCEDURE — 65270000029 HC RM PRIVATE

## 2022-04-11 PROCEDURE — 74011000250 HC RX REV CODE- 250: Performed by: INTERNAL MEDICINE

## 2022-04-11 PROCEDURE — 83036 HEMOGLOBIN GLYCOSYLATED A1C: CPT

## 2022-04-11 PROCEDURE — 92526 ORAL FUNCTION THERAPY: CPT

## 2022-04-11 PROCEDURE — 84145 PROCALCITONIN (PCT): CPT

## 2022-04-11 PROCEDURE — 94660 CPAP INITIATION&MGMT: CPT

## 2022-04-11 PROCEDURE — 85027 COMPLETE CBC AUTOMATED: CPT

## 2022-04-11 PROCEDURE — 74011000250 HC RX REV CODE- 250: Performed by: FAMILY MEDICINE

## 2022-04-11 PROCEDURE — 82962 GLUCOSE BLOOD TEST: CPT

## 2022-04-11 PROCEDURE — 80048 BASIC METABOLIC PNL TOTAL CA: CPT

## 2022-04-11 PROCEDURE — 99232 SBSQ HOSP IP/OBS MODERATE 35: CPT | Performed by: INTERNAL MEDICINE

## 2022-04-11 RX ORDER — LEVALBUTEROL INHALATION SOLUTION 1.25 MG/3ML
5 SOLUTION RESPIRATORY (INHALATION)
Status: DISCONTINUED | OUTPATIENT
Start: 2022-04-11 | End: 2022-04-11 | Stop reason: CLARIF

## 2022-04-11 RX ORDER — ALBUTEROL SULFATE 0.83 MG/ML
2.5 SOLUTION RESPIRATORY (INHALATION)
Status: DISCONTINUED | OUTPATIENT
Start: 2022-04-11 | End: 2022-04-13 | Stop reason: HOSPADM

## 2022-04-11 RX ORDER — IPRATROPIUM BROMIDE 0.5 MG/2.5ML
0.5 SOLUTION RESPIRATORY (INHALATION)
Status: DISCONTINUED | OUTPATIENT
Start: 2022-04-11 | End: 2022-04-12

## 2022-04-11 RX ORDER — PREDNISONE 20 MG/1
40 TABLET ORAL
Status: DISCONTINUED | OUTPATIENT
Start: 2022-04-12 | End: 2022-04-13 | Stop reason: HOSPADM

## 2022-04-11 RX ADMIN — SODIUM CHLORIDE, PRESERVATIVE FREE 10 ML: 5 INJECTION INTRAVENOUS at 22:09

## 2022-04-11 RX ADMIN — SODIUM CHLORIDE, PRESERVATIVE FREE 30 ML: 5 INJECTION INTRAVENOUS at 08:52

## 2022-04-11 RX ADMIN — INSULIN LISPRO 4 UNITS: 100 INJECTION, SOLUTION INTRAVENOUS; SUBCUTANEOUS at 08:31

## 2022-04-11 RX ADMIN — LEVALBUTEROL HYDROCHLORIDE 5 MG: 1.25 SOLUTION RESPIRATORY (INHALATION) at 03:10

## 2022-04-11 RX ADMIN — CEFAZOLIN SODIUM 2 G: 100 INJECTION, POWDER, LYOPHILIZED, FOR SOLUTION INTRAVENOUS at 08:30

## 2022-04-11 RX ADMIN — IPRATROPIUM BROMIDE 0.5 MG: 0.5 SOLUTION RESPIRATORY (INHALATION) at 09:06

## 2022-04-11 RX ADMIN — ALBUTEROL SULFATE 2.5 MG: 2.5 SOLUTION RESPIRATORY (INHALATION) at 19:46

## 2022-04-11 RX ADMIN — BUDESONIDE 500 MCG: 0.5 INHALANT RESPIRATORY (INHALATION) at 19:46

## 2022-04-11 RX ADMIN — BUDESONIDE 500 MCG: 0.5 INHALANT RESPIRATORY (INHALATION) at 09:06

## 2022-04-11 RX ADMIN — ENOXAPARIN SODIUM 40 MG: 100 INJECTION SUBCUTANEOUS at 20:35

## 2022-04-11 RX ADMIN — ACETAMINOPHEN 650 MG: 325 TABLET ORAL at 20:35

## 2022-04-11 RX ADMIN — CEFAZOLIN SODIUM 2 G: 100 INJECTION, POWDER, LYOPHILIZED, FOR SOLUTION INTRAVENOUS at 17:14

## 2022-04-11 RX ADMIN — LEVALBUTEROL HYDROCHLORIDE 5 MG: 1.25 SOLUTION RESPIRATORY (INHALATION) at 11:41

## 2022-04-11 RX ADMIN — SODIUM CHLORIDE 40 MG: 9 INJECTION INTRAMUSCULAR; INTRAVENOUS; SUBCUTANEOUS at 08:30

## 2022-04-11 RX ADMIN — AMLODIPINE BESYLATE 10 MG: 10 TABLET ORAL at 08:30

## 2022-04-11 RX ADMIN — ALBUTEROL SULFATE 2.5 MG: 2.5 SOLUTION RESPIRATORY (INHALATION) at 14:56

## 2022-04-11 RX ADMIN — METHYLPREDNISOLONE SODIUM SUCCINATE 40 MG: 40 INJECTION, POWDER, FOR SOLUTION INTRAMUSCULAR; INTRAVENOUS at 08:30

## 2022-04-11 RX ADMIN — IPRATROPIUM BROMIDE 0.5 MG: 0.5 SOLUTION RESPIRATORY (INHALATION) at 19:46

## 2022-04-11 RX ADMIN — SODIUM CHLORIDE, PRESERVATIVE FREE 5 ML: 5 INJECTION INTRAVENOUS at 00:18

## 2022-04-11 RX ADMIN — ENOXAPARIN SODIUM 40 MG: 100 INJECTION SUBCUTANEOUS at 08:30

## 2022-04-11 RX ADMIN — SODIUM CHLORIDE, PRESERVATIVE FREE 5 ML: 5 INJECTION INTRAVENOUS at 05:46

## 2022-04-11 RX ADMIN — LEVALBUTEROL HYDROCHLORIDE 5 MG: 1.25 SOLUTION RESPIRATORY (INHALATION) at 09:06

## 2022-04-11 RX ADMIN — SODIUM CHLORIDE, PRESERVATIVE FREE 10 ML: 5 INJECTION INTRAVENOUS at 13:56

## 2022-04-11 RX ADMIN — INSULIN LISPRO 4 UNITS: 100 INJECTION, SOLUTION INTRAVENOUS; SUBCUTANEOUS at 12:28

## 2022-04-11 RX ADMIN — HYDRALAZINE HYDROCHLORIDE 20 MG: 20 INJECTION INTRAMUSCULAR; INTRAVENOUS at 11:03

## 2022-04-11 RX ADMIN — INSULIN GLARGINE 30 UNITS: 100 INJECTION, SOLUTION SUBCUTANEOUS at 09:42

## 2022-04-11 RX ADMIN — CEFAZOLIN SODIUM 2 G: 100 INJECTION, POWDER, LYOPHILIZED, FOR SOLUTION INTRAVENOUS at 00:18

## 2022-04-11 NOTE — CONSULTS
[facility] Hospitalist Consult   Admit Date:  4/3/2022 12:32 AM   Name:  Benson Fields   Age:  31 y.o. Sex:  male  :  1985   MRN:  [id number]   Room:  721/    Presenting Complaint: No chief complaint on file. Reason(s) for Admission: Asthma exacerbation [J45.901]     Hospitalists consulted by Carleen Gross MD for: assume primary care, move out of ICU    History of Presenting Illness:   Benson Fields is a 31 y.o. male with history of obesity and asthma who was admitted with acute respiratory failure due to asthma exacerbation, influenza A, and MSSA pneumonia. Patient presented to ED 4/3 with cc chest tightness and SOB a/w productive cough of yellow sputum of few-day duration. Patient hypoxic, tachycardic, tachypneic on presentation. Also with complaint of left calf pain, LLE ultrasound negative for DVT. D-dimer 0.55. Patient was admitted under pulmonary service to ICU and required intubation and mechanical ventilation 4/3 with subsequent extubation to Airvo . He did require Precedex for agitation. He was transferred to medical floor 4/10. We are consulted today to assume primary care on medical floor with Pulmonology still following as consultant. Currently, patient on RA with O2 90%, worse BiPAP overnight, denies SOB, chest pain, abdominal pain. Anticipating trying breakfast today. Review of Systems:  10 systems reviewed and negative except as noted in HPI.   Assessment & Plan:     Principal Problem:  Acute respiratory failure with hypoxia and hypercapnia (HCC)   -s/p intubation and mechanical ventilation 4/3, extubated to Airvo   -On rounding today, saturations stable at 90% on RA  -Cont BiPAP QHS  -Pulmonary will continue to follow  -Initiate aggressive pulmonary toilet, PT consult, OOB to chair, mobilize as able    Active Problems:  Severe persistent asthma with exacerbation   -Cont Pulmicort BID, Atrovent Q8H, Xopenex Q4H, IV Solumedrol Q12H, plan to wean steroids more tomorrow    Obesity   -Increases all cause mortality, encourage diet and lifestyle modifications    Influenza A  -s/p Tamiflu 5-day course, cont supportive care and droplet precautions    Staphylococcus aureus pneumonia (HCC)  -Sputum culture 4/4 with heavy SA, received Azithromycin 4/3-4/8, now on Cefazolin per susceptibilities with eot 4/12  -Repeat sputum 4/9 with persistent moderate SA, further sensitivity pending    Primary hypertension  -Initiate Amlodipine this am, monitor response    Leukocytosis, not improving  -BCx 4/5 finalized negative, UA 4/5 clear, on Cefazolin for MSSA pneumonia, also on high-dose steroids, however leukocytosis continues to worsen, today to 23.3K, repeat sputum culture still growing SA, remains afebrile, cont Cefazolin and follow repeat sputum cx sensitivities, check procal, repeat blood and urine evalutions, CXR in am    Hyperglycemia  Pre-diabetes  -A1C 6.0, on steroid therapy likely driving hyperglycemia, cont Lantus 30 units daily and Humalog SSI ACHS, carb-controlled diet    Discharge Planning:  Pending clinical course, PT eval pending and diet progression today    Diet:  ADULT TUBE FEEDING Nasogastric; Peptide Based High Protein; Delivery Method: Continuous; Continuous Initial Rate (mL/hr): 45; Continuous Advance Tube Feeding: No; Water Flush Volume (mL): 90; Water Flush Frequency: Q 4 hours; Modulars/Additives: P...   ADULT DIET Dysphagia - Soft & Bite Sized  DVT PPx: Lovenox  Code status: Full Code    Hospital Problems as of 4/11/2022 Never Reviewed          Codes Class Noted - Resolved POA    Influenza A ICD-10-CM: J10.1  ICD-9-CM: 487.1  4/6/2022 - Present Unknown        [facility] ICD-10-CM: V17.173  ICD-9-CM: 482.41  4/6/2022 - Present Unknown        Primary hypertension ICD-10-CM: I10  ICD-9-CM: 401.9  4/6/2022 - Present Unknown        Severe persistent asthma with exacerbation ICD-10-CM: J45.51  ICD-9-CM: 493.92  4/3/2022 - Present Unknown Obesity (Chronic) ICD-10-CM: E66.9  ICD-9-CM: 278.00  4/3/2022 - Present Unknown        * (Principal) Acute respiratory failure with hypoxia and hypercapnia (HCC) ICD-10-CM: J96.01, J96.02  ICD-9-CM: 518.81  4/3/2022 - Present Yes        RESOLVED: Status asthmaticus ICD-10-CM: J45.902  ICD-9-CM: 493.91  4/3/2022 - 4/11/2022 Unknown              Past History:  Past Medical History:   Diagnosis Date    Asthma       Past Surgical History:   Procedure Laterality Date    HX TONSILLECTOMY        Allergies   Allergen Reactions    Singulair [Montelukast] Hives      Social History     Tobacco Use    Smoking status: Never Smoker    Smokeless tobacco: Never Used   Substance Use Topics    Alcohol use: Not Currently      Family History   Problem Relation Age of Onset    Sleep Apnea Mother     Leukemia Father     Asthma Brother       Family history reviewed and negative except as otherwise noted. There is no immunization history on file for this patient.   Current Facility-Administered Medications   Medication Dose Route Frequency    methylPREDNISolone (PF) (SOLU-MEDROL) injection 40 mg  40 mg IntraVENous Q12H    amLODIPine (NORVASC) tablet 10 mg  10 mg Oral DAILY    polyethylene glycol (MIRALAX) packet 17 g  17 g Oral DAILY    senna-docusate (PERICOLACE) 8.6-50 mg per tablet 2 Tablet  2 Tablet Oral QHS    insulin lispro (HUMALOG) injection   SubCUTAneous AC&HS    insulin glargine (LANTUS) injection 30 Units  30 Units SubCUTAneous DAILY    labetaloL (NORMODYNE;TRANDATE) injection 20 mg  20 mg IntraVENous Q4H PRN    haloperidol lactate (HALDOL) injection 2 mg  2 mg IntraVENous Q4H PRN    bisacodyL (DULCOLAX) suppository 10 mg  10 mg Rectal DAILY PRN    hydrALAZINE (APRESOLINE) 20 mg/mL injection 20 mg  20 mg IntraVENous Q8H PRN    pantoprazole (PROTONIX) 40 mg in 0.9% sodium chloride 10 mL injection  40 mg IntraVENous DAILY    ceFAZolin (ANCEF) 2 g/20 mL in sterile water IV syringe  2 g IntraVENous Q8H  NUTRITIONAL SUPPORT ELECTROLYTE PRN ORDERS   Does Not Apply PRN    acetaminophen (TYLENOL) tablet 650 mg  650 mg Per NG tube Q6H PRN    Or    acetaminophen (TYLENOL) suppository 650 mg  650 mg Rectal Q6H PRN    sodium chloride (NS) flush 5-40 mL  5-40 mL IntraVENous Q8H    sodium chloride (NS) flush 5-40 mL  5-40 mL IntraVENous PRN    ondansetron (ZOFRAN ODT) tablet 4 mg  4 mg Oral Q8H PRN    Or    ondansetron (ZOFRAN) injection 4 mg  4 mg IntraVENous Q6H PRN    albuterol (PROVENTIL VENTOLIN) nebulizer solution 2.5 mg  2.5 mg Nebulization Q4H PRN    budesonide (PULMICORT) 500 mcg/2 ml nebulizer suspension  500 mcg Nebulization BID RT    enoxaparin (LOVENOX) injection 40 mg  40 mg SubCUTAneous Q12H    levalbuterol (XOPENEX) nebulizer soln 1.25 mg/3 mL  1.25 mg Nebulization Q6H PRN    LORazepam (ATIVAN) injection 1 mg  1 mg IntraVENous Q6H PRN    morphine injection 2 mg  2 mg IntraVENous Q4H PRN    levalbuterol (XOPENEX) nebulizer soln 1.25 mg/3 mL  5 mg Nebulization Q4H RT    sodium chloride (NS) flush 30 mL  30 mL InterCATHeter DAILY    sodium chloride (NS) flush 30 mL  30 mL InterCATHeter PRN    ipratropium (ATROVENT) 0.02 % nebulizer solution 0.5 mg  0.5 mg Nebulization Q8H RT       Objective:     Patient Vitals for the past 24 hrs:   Temp Pulse Resp BP SpO2   04/11/22 0348 98.1 °F (36.7 °C) (!) 121 20 (!) 161/104 94 %   04/11/22 0310     96 %   04/11/22 0120     94 %   04/10/22 2339    (!) 140/74 96 %   04/10/22 2313 98.5 °F (36.9 °C) (!) 123 20 (!) 152/98    04/10/22 2100    (!) 148/78    04/10/22 2033     95 %   04/10/22 2002 98.6 °F (37 °C) (!) 110 17 (!) 167/108 98 %   04/10/22 1551     98 %   04/10/22 1543 98 °F (36.7 °C) 85 17 (!) 162/98 100 %   04/10/22 1542 98 °F (36.7 °C) 89 17 (!) 162/98 98 %   04/10/22 1534     98 %   04/10/22 1404 98.2 °F (36.8 °C) 88  (!) 140/76    04/10/22 1403 98.2 °F (36.8 °C) 89  (!) 140/76 95 %   04/10/22 1305     96 % 04/10/22 1219  88 30 (!) 165/80 98 %   04/10/22 1150  90 19 (!) 155/76 94 %   04/10/22 1143    (!) 190/87    04/10/22 1100 99.4 °F (37.4 °C) (!) 103 16 (!) 161/101 97 %   04/10/22 1033     98 %   04/10/22 1030  (!) 101 26 (!) 156/84 97 %   04/10/22 1000  96 25 (!) 149/81 94 %   04/10/22 0930  98 28 138/68 90 %   04/10/22 0909     95 %   04/10/22 0900  93 28 (!) 146/73 96 %   04/10/22 0830  93 24 (!) 140/72 99 %   04/10/22 0800  93 23 139/70 99 %   04/10/22 0730  91 29 (!) 146/79 98 %   04/10/22 0722     97 %     Oxygen Therapy  O2 Sat (%): 94 % (04/11/22 0348)  Pulse via Oximetry: 119 beats per minute (04/11/22 0310)  O2 Device: BIPAP (04/11/22 0310)  Skin Assessment: Clean, dry, & intact (04/09/22 1915)  Skin Protection for O2 Device: N/A (04/09/22 1915)  Orientation: Bilateral (04/09/22 0327)  Location: Cheek (04/09/22 0327)  Interventions: Mouth Care (04/09/22 1915)  O2 Flow Rate (L/min): 5 l/min (bleed in) (04/11/22 0310)  O2 Temperature: 87.8 °F (31 °C) (04/10/22 1551)  FIO2 (%): 50 % (04/10/22 2339)    Estimated body mass index is 43.82 kg/m² as calculated from the following:    Height as of this encounter: 5' 9\" (1.753 m). Weight as of this encounter: 134.6 kg (296 lb 11.8 oz). Intake/Output Summary (Last 24 hours) at 4/11/2022 0707  Last data filed at 4/10/2022 1100  Gross per 24 hour   Intake    Output 520 ml   Net -520 ml         Physical Exam:  Blood pressure (!) 161/104, pulse (!) 121, temperature 98.1 °F (36.7 °C), resp. rate 20, height 5' 9\" (1.753 m), weight 134.6 kg (296 lb 11.8 oz), SpO2 94 %. General:    Well-nourished, no acute distress, alert, calm, conversational.  Head:  Normocephalic, atraumatic  Eyes:  Sclerae appear normal.  Pupils equally round. ENT:  Nares appear normal, no drainage. Moist oral mucosa  Neck:  No restricted ROM. Trachea midline   CV:   Tachycardic, regular. No m/r/g. No jugular venous distension.   Lungs:   Rhonchi throughout all lobes, Respirations even, unlabored on RA  Abdomen: Bowel sounds present. Obese, soft, nontender to palpation, nondistended. Extremities: No cyanosis or clubbing. No peripheral edema. Skin:     No rashes and normal coloration. Warm and dry. Neuro:  CN II-XII grossly intact. Sensation intact. A&Ox3. Moves all extremities. No focal deficits. Psych:  Normal mood and affect.       I have reviewed ordered lab tests and independently visualized imaging below:    Recent Labs:  Recent Results (from the past 48 hour(s))   GLUCOSE, POC    Collection Time: 04/09/22  9:07 AM   Result Value Ref Range    Glucose (POC) 250 (H) 65 - 100 mg/dL    Performed by [facility] [employer] [facility]    GLUCOSE, POC    Collection Time: 04/09/22 11:30 AM   Result Value Ref Range    Glucose (POC) 195 (H) 65 - 100 mg/dL    Performed by [employer]    GLUCOSE, POC    Collection Time: 04/09/22  6:15 PM   Result Value Ref Range    Glucose (POC) 184 (H) 65 - 100 mg/dL    Performed by [employer]    GLUCOSE, POC    Collection Time: 04/09/22 11:19 PM   Result Value Ref Range    Glucose (POC) 173 (H) 65 - 100 mg/dL    Performed by [facility]    METABOLIC PANEL, BASIC    Collection Time: 04/10/22  2:28 AM   Result Value Ref Range    Sodium 136 136 - 145 mmol/L    Potassium 4.8 3.5 - 5.1 mmol/L    Chloride 95 (L) 98 - 107 mmol/L    CO2 36 (H) 21 - 32 mmol/L    Anion gap 5 (L) 7 - 16 mmol/L    Glucose 187 (H) 65 - 100 mg/dL    BUN 31 (H) 6 - 23 MG/DL    Creatinine 0.90 0.8 - 1.5 MG/DL    GFR est AA >60 >60 ml/min/1.73m2    GFR est non-AA >60 >60 ml/min/1.73m2    Calcium 9.6 8.3 - 10.4 MG/DL   CBC W/O DIFF    Collection Time: 04/10/22  2:28 AM   Result Value Ref Range    WBC 20.2 (H) 4.3 - 11.1 K/uL    RBC 4.22 (L) 4.23 - 5.6 M/uL    HGB 14.3 13.6 - 17.2 g/dL    HCT 43.0 41.1 - 50.3 %    .9 (H) 79.6 - 97.8 FL    MCH 33.9 (H) 26.1 - 32.9 PG    MCHC 33.3 31.4 - 35.0 g/dL    RDW 13.0 11.9 - 14.6 %    PLATELET 971 747 - 965 K/uL    MPV 9.2 (L) 9.4 - 12.3 FL    ABSOLUTE NRBC 0.02 0.0 - 0.2 K/uL   GLUCOSE, POC    Collection Time: 04/10/22  6:25 AM   Result Value Ref Range    Glucose (POC) 173 (H) 65 - 100 mg/dL    Performed by Jagdish    GLUCOSE, POC    Collection Time: 04/10/22 11:46 AM   Result Value Ref Range    Glucose (POC) 160 (H) 65 - 100 mg/dL    Performed by Verónica    GLUCOSE, POC    Collection Time: 04/10/22  5:05 PM   Result Value Ref Range    Glucose (POC) 118 (H) 65 - 100 mg/dL    Performed by Mindi Dent    GLUCOSE, POC    Collection Time: 04/10/22  8:37 PM   Result Value Ref Range    Glucose (POC) 147 (H) 65 - 100 mg/dL    Performed by AlejandroorPCA    METABOLIC PANEL, BASIC    Collection Time: 04/11/22  4:27 AM   Result Value Ref Range    Sodium 136 (L) 138 - 145 mmol/L    Potassium 4.5 3.5 - 5.1 mmol/L    Chloride 96 (L) 98 - 107 mmol/L    CO2 31 21 - 32 mmol/L    Anion gap 9 7 - 16 mmol/L    Glucose 241 (H) 65 - 100 mg/dL    BUN 29 (H) 6 - 23 MG/DL    Creatinine 1.00 0.8 - 1.5 MG/DL    GFR est AA >60 >60 ml/min/1.73m2    GFR est non-AA >60 >60 ml/min/1.73m2    Calcium 9.8 8.3 - 10.4 MG/DL   CBC W/O DIFF    Collection Time: 04/11/22  4:27 AM   Result Value Ref Range    WBC 23.3 (H) 4.3 - 11.1 K/uL    RBC 5.15 4.23 - 5.6 M/uL    HGB 17.4 (H) 13.6 - 17.2 g/dL    HCT 51.5 (H) 41.1 - 50.3 %    .0 (H) 79.6 - 97.8 FL    MCH 33.8 (H) 26.1 - 32.9 PG    MCHC 33.8 31.4 - 35.0 g/dL    RDW 13.0 11.9 - 14.6 %    PLATELET 964 323 - 860 K/uL    MPV 9.2 (L) 9.4 - 12.3 FL    ABSOLUTE NRBC 0.03 0.0 - 0.2 K/uL   HEMOGLOBIN A1C WITH EAG    Collection Time: 04/11/22  4:27 AM   Result Value Ref Range    Hemoglobin A1c 6.0 4.20 - 6.30 %    Est. average glucose 126 mg/dL   GLUCOSE, POC    Collection Time: 04/11/22  6:08 AM   Result Value Ref Range    Glucose (POC) 204 (H) 65 - 100 mg/dL    Performed by Renata        All Micro Results     Procedure Component Value Units Date/Time    CULTURE, BLOOD [[id number]] Order Status: Sent Specimen: Blood     CULTURE, BLOOD [[id number]]     Order Status: Sent Specimen: Blood     CULTURE, RESPIRATORY/SPUTUM/BRONCH Clarnce Fluke STAIN [[id number]]  (Abnormal) Collected: 04/09/22 0832    Order Status: Completed Specimen: Sputum,ET Suction Updated: 04/11/22 0747     Special Requests: NO SPECIAL REQUESTS        GRAM STAIN 50  WBC'S SEEN PER OIF      NO EPITHELIAL CELLS SEEN         FEW GRAM POSITIVE COCCI         FEW GRAM POSITIVE RODS         4+ MUCUS PRESENT        Culture result:       MODERATE STAPHYLOCOCCUS AUREUS SENSITIVITY TO FOLLOW                  MODERATE NORMAL RESPIRATORY ALYCIA          CULTURE, BLOOD [[id number]] Collected: 04/05/22 0947    Order Status: Completed Specimen: Blood Updated: 04/10/22 0657     Special Requests: --        LEFT  FOREARM       Culture result: NO GROWTH 5 DAYS       CULTURE, BLOOD [[id number]] Collected: 04/05/22 1103    Order Status: Completed Specimen: Blood Updated: 04/10/22 0657     Special Requests: --        LEFT  FOREARM       Culture result: NO GROWTH 5 DAYS       CULTURE, RESPIRATORY/SPUTUM/BRONCH W GRAM STAIN [[id number]]  (Abnormal)  (Susceptibility) Collected: 04/04/22 1207    Order Status: Completed Specimen: Sputum,ET Suction Updated: 04/07/22 0649     Special Requests: NO SPECIAL REQUESTS        GRAM STAIN 2 TO 7 WBCS SEEN PER OIF      NO EPITHELIAL CELLS SEEN         FEW GRAM POSITIVE COCCI         NO MUCUS PRESENT        Culture result:       HEAVY STAPHYLOCOCCUS AUREUS                  MODERATE NORMAL RESPIRATORY ALYCIA          RESPIRATORY VIRUS PANEL W/COVID-19, PCR [[id number]]  (Abnormal) Collected: 04/05/22 0837    Order Status: Completed Specimen: Nasopharyngeal Updated: 04/05/22 1021     Adenovirus NOT DETECTED        Coronavirus 229E NOT DETECTED        Coronavirus HKU1 NOT DETECTED        Coronavirus CVNL63 NOT DETECTED        Coronavirus OC43 NOT DETECTED        SARS-CoV-2, PCR NOT DETECTED        Metapneumovirus NOT DETECTED Rhinovirus and Enterovirus NOT DETECTED        Influenza A, subtype H3 Detected        Influenza B NOT DETECTED        Parainfluenza 1 NOT DETECTED        Parainfluenza 2 NOT DETECTED        Parainfluenza 3 NOT DETECTED        Parainfluenza virus 4 NOT DETECTED        RSV by PCR NOT DETECTED        B. parapertussis, PCR NOT DETECTED        Bordetella pertussis - PCR NOT DETECTED        Chlamydophila pneumoniae DNA, QL, PCR NOT DETECTED        Mycoplasma pneumoniae DNA, QL, PCR NOT DETECTED       MSSA/MRSA SC BY PCR, NASAL SWAB [[id number]]     Order Status: Canceled Specimen: Nasal swab           Other Studies:  No results found. Signed:  Saturnino Summers NP    Part of this note may have been written by using a voice dictation software. The note has been proof read but may still contain some grammatical/other typographical errors.

## 2022-04-11 NOTE — PROGRESS NOTES
Pt placed on BIPAP - pt is in no distress at this time      04/11/22 0120   Oxygen Therapy   O2 Sat (%) 94 %   Pulse via Oximetry 122 beats per minute   O2 Device BIPAP   O2 Flow Rate (L/min) 5 l/min  (bleed in)   Respiratory   Respiratory (WDL) X   Respiratory Pattern Regular   Chest/Tracheal Assessment Chest expansion, symmetrical   Breath Sounds Bilateral Expiratory wheezing   CPAP/BIPAP   CPAP/BIPAP Start/Stop On   Device Mode BIPAP   $$ Bipap Daily Yes   Mask Type and Size Full face   Skin Condition intact   PIP Observed 9.8 cm H20   IPAP (cm H2O) 10 cm H2O   EPAP (cm H2O) 5 cm H2O   Vt Spont (ml) 442 ml   Total RR (Spontaneous) 24 breaths per minute   Insp Rise Time (sec) 4   Leak (Estimated) 5 L/min   Pt's Home Machine No   Biomedical Check Performed Yes   Settings Verified Yes   Alarm Settings   Apnea 20   Pulmonary Toilet   Pulmonary Toilet H. O.B elevated;Cough and deep breath

## 2022-04-11 NOTE — PROGRESS NOTES
Date of Outreach Update:  Helio Ahn was seen and assessed. MEWS Score: 3 (04/11/22 0725)  Vitals:    04/11/22 0310 04/11/22 0348 04/11/22 0725 04/11/22 0906   BP:  (!) 161/104 (!) 158/102    Pulse:  (!) 121 (!) 113    Resp:  20 19    Temp:  98.1 °F (36.7 °C) 98.5 °F (36.9 °C)    SpO2: 96% 94% 90% 91%   Weight:       Height:             Pain Assessment  Pain Intensity 1: 0 (04/11/22 0830)  Pain Location 1: Chest     Patient Stated Pain Goal: 0      Previous Outreach assessment has been reviewed. There have been no significant clinical changes since the completion of the last dated Outreach assessment. Patient alert and oriented. Respirations unlabored on 3L NC. Denies complaints. Encouraged to work on range of motion while lying in bed. VS, labs, and progress notes reviewed. Will continue to follow up per outreach protocol.     Signed By:   Radha Calero RN    April 11, 2022 10:48 AM

## 2022-04-11 NOTE — PROGRESS NOTES
Date of Outreach Update:  Monica Helm was seen and assessed. MEWS Score: 3 (04/10/22 2313)  Vitals:    04/11/22 0120 04/11/22 0152 04/11/22 0310 04/11/22 0348   BP:    (!) 161/104   Pulse:    (!) 121   Resp:    20   Temp:    98.1 °F (36.7 °C)   SpO2: 94%  96% 94%   Weight:  134.6 kg (296 lb 11.8 oz)     Height:             Pain Assessment  Pain Intensity 1: 0 (04/10/22 1945)  Pain Location 1: Chest     Patient Stated Pain Goal: 0      Previous Outreach assessment has been reviewed. AOx4, VSS, lungs diminished/coarse. Chart reviewed. No concerns per primary RN, apart from him not doing more for himself. Will continue to follow up per outreach protocol.     Signed By:   Ximena Montes    April 11, 2022 5:01 AM

## 2022-04-11 NOTE — PROGRESS NOTES
[facility] CRITICAL CARE OUTREACH NURSE PROGRESS REPORT      SUBJECTIVE: Called to assess patient secondary to transfer from ICU. MEWS Score: 2 (04/10/22 1100)  Vitals:    04/10/22 1543 04/10/22 1551 04/10/22 2002 04/10/22 2033   BP: (!) 162/98  (!) 167/108    Pulse: 85  (!) 110    Resp: 17  17    Temp: 98 °F (36.7 °C)  98.6 °F (37 °C)    SpO2: 100% 98% 98% 95%   Weight:       Height:            LAB DATA:    Recent Labs     04/10/22  0228 04/09/22  0326 04/08/22  0320    140 140   K 4.8 5.0 4.2   CL 95* 94* 98   CO2 36* 42* 38*   AGAP 5* 4* 4*   * 291* 265*   BUN 31* 36* 28*   CREA 0.90 1.10 0.90   GFRAA >60 >60 >60   GFRNA >60 >60 >60   CA 9.6 9.0 8.3   MG  --   --  2.6*   PHOS  --   --  3.4        Recent Labs     04/10/22  0228 04/09/22  0326 04/08/22  0320   WBC 20.2* 17.2* 11.5*   HGB 14.3 12.8* 12.1*   HCT 43.0 40.3* 36.2*    312 296          OBJECTIVE: On arrival to room, I found patient to be resting in bed. Pain Assessment  Pain Intensity 1: 0 (04/10/22 1403)  Pain Location 1: Chest     Patient Stated Pain Goal: 0      ASSESSMENT:  Resting in bed AOx4, VSS, currently on airvo 30L/50% sat 95%, chart reviewed. PLAN:  Will continued to follow per outreach protocol.

## 2022-04-11 NOTE — PROGRESS NOTES
CM continuing to follow for ongoing discharge planning. Therapy recommending [facility] at discharge. Call placed to [facility] admissions for review; physiatry consult placed. CM anticipates if accepted, patient will d/c to [facility] with subsequent discharge to his home with [facility] and family support. Rapid Covid ordered for possible [facility] admission.

## 2022-04-11 NOTE — PROGRESS NOTES
ACUTE PHYSICAL THERAPY GOALS:  (Developed with and agreed upon by patient and/or caregiver.)  1. Pt will perform supine to/from sit with mod I in 7 days. 2. Pt will perform sit to/from stand with mod I and LRAD in 7 days. 3. Pt will ambulate 250 ft with mod I and LRAD in 7 days. 4. Pt will negotiate 36 stairs with mod I and LRAD in 7 days. 5. Pt will demonstrate independence with HEP in 7 days. PHYSICAL THERAPY ASSESSMENT: Initial Assessment, Daily Note and AM PT Treatment Day # 1      Burnadette Goodell is a 31 y.o. male   PRIMARY DIAGNOSIS: Acute respiratory failure with hypoxia and hypercapnia (HCC)  Asthma exacerbation [J45.901]       ICD-10: Treatment Diagnosis: Generalized Muscle Weakness (M62.81)  Other lack of cordination (R27.8)  Difficulty in walking, Not elsewhere classified (R26.2)  INPATIENT: Payor: /     ASSESSMENT:     REHAB RECOMMENDATIONS:   Recommendation to date pending progress:  [address] [place] [address]  Equipment:    Rolling Walker     PRIOR LEVEL OF FUNCTION:  (Prior to Hospitalization) INITIAL/CURRENT LEVEL OF FUNCTION:  (Most Recently Demonstrated)   Bed Mobility:   Independent  Sit to Stand:   Independent  Transfers:   Independent  Gait/Mobility:   Independent Bed Mobility:   Supervision  Sit to Stand:   Minimal Assistance  Transfers:   Minimal Assistance  Gait/Mobility:   Minimal Assistance     ASSESSMENT:  Mr. Anna Carlos is a 31 y.o. male presenting s/p extubation due to ARF. Upon PT evaluation, pt exhibits gross BLE/BUE weakness, impaired coordination, impaired balance, and reduced cardiorespiratory reserve resulting in limited independence with functional mobility tasks. Prior to admission, pt was independent with all mobility, now requiring min A, RW, and limited to room-level mobility. He will need to be able to ascend 3 flights of stairs in order to return home. Recommend IRC at discharge.   Pt will benefit from additional skilled PT to address above impairments and maximize functional independence. SUBJECTIVE:   Mr. Antionette Sky states, \"I'm ready to get moving\"    SOCIAL HISTORY/LIVING ENVIRONMENT:   Home Environment: Private residence  # Steps to Enter: 36  One/Two Story Residence: One story  Living Alone: No  Support Systems: Spouse/Significant Other  OBJECTIVE:     PAIN: VITAL SIGNS: LINES/DRAINS:   Pre Treatment: Pain Screen  Pain Scale 1: Numeric (0 - 10)  Pain Intensity 1: 0  Post Treatment: 0   IV  O2 Device: Hi flow nasal cannula 3 lpm     GROSS EVALUATION: Within Functional Limits Abnormal/ Functional Abnormal/ Non-Functional (see comments) Not Tested Comments:   AROM [x] [] [] []    PROM [x] [] [] []    Strength [] [] [x] [] B knee extension 3+/5. Balance [] [] [x] [] Intermittent posterior LOB while ambulating, requiring intermittent min A to avoid fall with RW. Posture [] [x] [] []    Sensation [x] [] [] []    Coordination [] [] [x] [] Difficulty sequencing gross motor tasks. Required increased time.    Tone [x] [] [] []    Edema [] [x] [] []    Activity Tolerance [] [] [x] [] Tachy to 130s with transfer, SpO2 stable on 3 lpm.    [] [] [] []      COGNITION/  PERCEPTION: Intact Impaired   (see comments) Comments:   Orientation [x] []    Vision [x] []    Hearing [x] []    Command Following [x] []    Safety Awareness [] [x] Required cues for safety.    [] []      MOBILITY: I Mod I S SBA CGA Min Mod Max Total  NT x2 Comments:   Bed Mobility    Rolling [] [] [x] [] [] [] [] [] [] [] []    Supine to Sit [] [] [x] [] [] [] [] [] [] [] []    Scooting [] [] [x] [] [] [] [] [] [] [] []    Sit to Supine [] [] [] [] [] [] [] [] [] [] []    Transfers    Sit to Stand [] [] [] [] [] [x] [] [] [] [] []    Bed to Chair [] [] [] [] [] [x] [] [] [] [] []    Stand to Sit [] [] [] [] [] [x] [] [] [] [] []    I=Independent, Mod I=Modified Independent, S=Supervision, SBA=Standby Assistance, CGA=Contact Guard Assistance,   Min=Minimal Assistance, Mod=Moderate Assistance, Max=Maximal Assistance, Total=Total Assistance, NT=Not Tested  GAIT: I Mod I S SBA CGA Min Mod Max Total  NT x2 Comments:   Level of Assistance [] [] [] [] [] [x] [] [] [] [] []    Distance 4 ft to chair    DME Rolling Walker    Gait Quality Reduced step length, intermittent posterior LOB requiring min A to stabilize, forward lean on UEs. Weightbearing Status WBAT     I=Independent, Mod I=Modified Independent, S=Supervision, SBA=Standby Assistance, CGA=Contact Guard Assistance,   Min=Minimal Assistance, Mod=Moderate Assistance, Max=Maximal Assistance, Total=Total Assistance, NT=Not [facility]       How much difficulty does the patient currently have. .. Unable A Lot A Little None   1. Turning over in bed (including adjusting bedclothes, sheets and blankets)? [] 1   [] 2   [x] 3   [] 4   2. Sitting down on and standing up from a chair with arms ( e.g., wheelchair, bedside commode, etc.)   [] 1   [] 2   [x] 3   [] 4   3. Moving from lying on back to sitting on the side of the bed? [] 1   [] 2   [x] 3   [] 4   How much help from another person does the patient currently need. .. Total A Lot A Little None   4. Moving to and from a bed to a chair (including a wheelchair)? [] 1   [] 2   [x] 3   [] 4   5. Need to walk in hospital room? [] 1   [x] 2   [] 3   [] 4   6. Climbing 3-5 steps with a railing? [] 1   [x] 2   [] 3   [] 4   © 2007, Trustees of [address] [place] [place] [address] [address] [id number], under license to [employer]. All rights reserved     Score:  Initial: 16 Most Recent: X (Date: -- )    Interpretation of Tool:  Represents activities that are increasingly more difficult (i.e. Bed mobility, Transfers, Gait).     PLAN:   FREQUENCY/DURATION: PT Plan of Care: 3 times/week for duration of hospital stay or until stated goals are met, whichever comes first.    PROBLEM LIST:   (Skilled intervention is medically necessary to address:)  1. Decreased ADL/Functional Activities  2. Decreased Activity Tolerance  3. Decreased AROM/PROM  4. Decreased Balance  5. Decreased Coordination  6. Decreased Gait Ability  7. Decreased Strength  8. Decreased Transfer Abilities   INTERVENTIONS PLANNED:   (Benefits and precautions of physical therapy have been discussed with the patient.)  1. Therapeutic Activity  2. Therapeutic Exercise/HEP  3. Neuromuscular Re-education  4. Gait Training  5. Manual Therapy  6. Education     TREATMENT:     EVALUATION: Moderate Complexity : (Untimed Charge)    TREATMENT:   ($$ Therapeutic Activity: 23-37 mins    )  Therapeutic Activity (23 Minutes): Therapeutic activity included Rolling, Supine to Sit, Scooting, Transfer Training and Ambulation on level ground to improve functional Mobility, Strength and Activity tolerance. Therex: Ankle pumps, quad sets, and glut sets x 10 reps each. Encouraged pt to perform hourly for DVT prophylaxis.     AFTER TREATMENT POSITION/PRECAUTIONS:  Alarm Activated, Chair, Needs within reach, RN notified and Visitors at bedside    INTERDISCIPLINARY COLLABORATION:  RN/PCT and PT/PTA    TOTAL TREATMENT DURATION:  PT Patient Time In/Time Out  Time In: 1100  Time Out: Cyndie [address], PT

## 2022-04-11 NOTE — PROGRESS NOTES
Date of Outreach Update:  Shanna Singh was seen and assessed. MEWS Score: 3 (04/11/22 1102)  Vitals:    04/11/22 1141 04/11/22 1229 04/11/22 1456 04/11/22 1547   BP:  (!) 142/99  (!) 145/96   Pulse:  (!) 122  (!) 115   Resp:    18   Temp:    98.6 °F (37 °C)   SpO2: 95% 91% 93% 90%   Weight:       Height:             Pain Assessment  Pain Intensity 1: 0 (04/11/22 1300)  Pain Location 1: Chest     Patient Stated Pain Goal: 0      Previous Outreach assessment has been reviewed. There have been no significant clinical changes since the completion of the last dated Outreach assessment. Patient alert and oriented. Sitting up in chair. Reports feeling stronger today. Will continue to follow up per outreach protocol.     Signed By:   Ivana Reyes RN    April 11, 2022 4:08 PM

## 2022-04-11 NOTE — PROGRESS NOTES
Comprehensive Nutrition Assessment    Type and Reason for Visit: Reassess  Tube Feeding Management (pulmonary)    Nutrition Recommendations/Plan:   · Enteral Nutrition:  · Discontinue current order, pt no longer with EN access. Meals and Snacks:  Continue current diet. Progression per SLP. Nutrition Supplement Therapy:  · Medical food supplement therapy:  Initiate Glucerna Shake three times per day (this provides 220 kcal and 10 grams protein per bottle)      Malnutrition Assessment:  Malnutrition Status: At risk for malnutrition (specify) (compromised po s/p extubation)  Context: Acute illness  Findings of clinical characteristics of malnutrition:   Energy Intake:  Mild decrease in energy intake (specify) (TF 4/5-4/9, po <25% s/p extubation)  Weight Loss:  No significant weight loss     Body Fat Loss:  No significant body fat loss,     Muscle Mass Loss:  No significant muscle mass loss,    Fluid Accumulation:  Unable to assess,     Strength:  Not performed     Nutrition Assessment:   Nutrition History: Eats one meal per day at baseline aroung 1-130p. Drinks primarily sugar sweetened beverages. Nutrition Background:   PMH remarkable for asthma. Admitted with acute respiratory failure, TREVOR, R midlung infiltrate, influenza a. Nutrition Interval:  Pt up to chair at RD visit. Noon tray in front of him <25% consumed. Drinking diet nilsa mist.  Pt able to self feed, poor endurance noted. He c/o gas, not getting foods he likes as additional barriers to po intake. PDA arrived during my visit, assisted with preferences for evening meal.      Nutrition Related Findings:   No muscle or fat wasting appreciated from window. Intubated 4/3, +NGT. TF started 4/5. Adjusted for propofol 4/7. Extubated 4/9, FT d/c. Diet advanced per SLP 4/11.        Current Nutrition Therapies:  ADULT TUBE FEEDING Nasogastric; Peptide Based High Protein; Delivery Method: Continuous; Continuous Initial Rate (mL/hr): 45; Continuous Advance Tube Feeding: No; Water Flush Volume (mL): 90; Water Flush Frequency: Q 4 hours; Modulars/Additives: P... ADULT DIET Dysphagia - Soft & Bite Sized; 4 carb choices (60 gm/meal)    Current Intake:   Average Meal Intake: 1-25% Average Supplement Intake: None ordered      Anthropometric Measures:  Height: 5' 9\" (175.3 cm)  Current Body Wt: 134.6 kg (296 lb 11.8 oz) (4/11), Weight source: Bed scale  BMI: 43.8, Obese class 3 (BMI 40.0 or greater)  Admission Body Weight: 300 lb 0.7 oz (pt stated)  Ideal Body Weight (lbs) (Calculated): 160 lbs (73 kg), 188.9 %  Usual Body Wt: 140 kg (308 lb 10.3 oz) (per  office records 2/9/22), Percent weight change: -2.1        Edema: No data recorded  Estimated Daily Nutrient Needs:  Energy (kcal/day): 2923-8797 (Kcal/kg (15-20), Weight Used: Current)  Protein (g/day): 108-135 (0.8-1 g/kg) Weight Used: (Current)  Fluid (ml/day):   (1 ml/kcal)    Nutrition Diagnosis:   · Inadequate oral intake related to  (general debility, fatigue) as evidenced by intake 0-25%    Nutrition Interventions:   Food and/or Nutrient Delivery: Continue current diet,Start oral nutrition supplement     Coordination of Nutrition Care: Continue to monitor while inpatient    Goals:   Previous Goal Met: Progress towards goal(s) declining  Active Goal: Meet > 50% estimated needs by nutrition follow-up    Nutrition Monitoring and Evaluation:      Food/Nutrient Intake Outcomes: Food and nutrient intake,Supplement intake  Physical Signs/Symptoms Outcomes: Biochemical data,GI status,Meal time behavior,Weight    Discharge Planning:     Too soon to determine    Liang Garcia RD, LDN   Contact: [phone]

## 2022-04-11 NOTE — PROGRESS NOTES
Problem: Dysphagia (Adult)  Goal: *Acute Goals and Plan of Care (Insert Text)  Note: ST. Pt. Will tolerate soft/bite size diet with thin liquids with no overt s/sx of aspiration/penetration. 2. Patient will tolerate regular diet textures without increased mastication time/fatigue to allow for upgrade. 3. Pt. Will participate in speech/language/cognitive evaluation with 100% participation if further indicated in plan of care. LTG: Pt. Will tolerate least restrictive diet without respiratory decline. SPEECH LANGUAGE PATHOLOGY: DYSPHAGIA  Daily Note 1    NAME/AGE/GENDER: Ella Chapman is a 31 y.o. male  DATE: 2022  PRIMARY DIAGNOSIS: Asthma exacerbation [J45.901]      ICD-10: Treatment Diagnosis: R13.11 Dysphagia, Oral Phase    RECOMMENDATIONS   DIET:    Soft and Bite-Sized   Thin Liquids    MEDICATIONS: With liquid  Whole in puree  As tolerated     ASPIRATION PRECAUTIONS  · Slow rate of intake  · Small bites/sips  · Upright at 90 degrees during meal     COMPENSATORY STRATEGIES/MODIFICATIONS  · Alternate liquids/solids  · Small sips and bites  · Assistance      EDUCATION: recommendations discussed with patient and RN     CONTINUATION OF SKILLED SERVICES/MEDICAL NECESSITY:   Patient is expected to demonstrate progress in  swallow strength, swallow timeliness, swallow function, diet tolerance, and swallow safety in order to  improve swallow safety, work toward diet advancement, and decrease aspiration risk.  Patient continues to require skilled intervention due to dysphagia and possible need for evaluation of cognitive linguistic deficits. RECOMMENDATIONS for CONTINUED SPEECH THERAPY: YES: Anticipate need for ongoing speech therapy during this hospitalization. ASSESSMENT   Patient presents with mildly prolonged oral prep with chewables and decreased endurance. Limited intake of solids today. No overt s/sx pharyngeal dysphagia.      Recommend continue soft/bite size diet with thin liquids; assistance with meals. REHABILITATION POTENTIAL FOR STATED GOALS: Good    PLAN    FREQUENCY/DURATION: Continue to follow patient 3 times a week for duration of hospital stay to address above goals. Recommendations for next treatment session: Next treatment session will address diet tolerance and po trials to determine if appropriate for advancement     SUBJECTIVE   Patient awake in chair with lunch tray. 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 Dev Palacios  has a past medical history of Asthma and Status asthmaticus (4/3/2022). Nicole Ryan He also  has a past surgical history that includes hx tonsillectomy. Problem List:  (Impairments causing functional limitations):  1. Oral dysphagia     Orientation:   Person  Place  Time  Situation    Pain: Pain Scale 1: Numeric (0 - 10)  Pain Intensity 1: 0    OBJECTIVE   Swallow treatment: diet tolerance  Patient seen with portions of meal tray (soft bite/sized chicken, mashed potatoes, and thin liquids via straw). No overt s/sx aspiration. Voice clear. Tolerated serial gulps of thin liquid without difficulty. mildly prolonged prep with meat but adequate breakdown and clearance with increased time. Reduced endurance. Attempting to self feed but this appears to further tire him and results in even more decreased intake at this time.  Patient only took a few bites of meal.       INTERDISCIPLINARY COLLABORATION: Registered Nurse  PRECAUTIONS/ALLERGIES: Singulair [montelukast]      Tool Used: Dysphagia Outcome and Severity Scale (DAYSI)    Score Comments   Normal Diet  [] 7 With no strategies or extra time needed   Functional Swallow  [] 6 May have mild oral or pharyngeal delay   Mild Dysphagia  [x] 5 Which may require one diet consistency restricted    Mild-Moderate Dysphagia  [] 4 With 1-2 diet consistencies restricted   Moderate Dysphagia  [] 3 With 2 or more diet consistencies restricted   Moderate-Severe Dysphagia  [] 2 With partial PO strategies (trials with ST only)   Severe Dysphagia  [] 1 With inability to tolerate any PO safely      Score:  Initial: 5 Most Recent: 5 (Date 04/11/22 )   Interpretation of Tool: The Dysphagia Outcome and Severity Scale (DAYSI) is a simple, easy-to-use, 7-point scale developed to systematically rate the functional severity of dysphagia based on objective assessment and make recommendations for diet level, independence level, and type of nutrition.          SAFETY:  After treatment position/precautions:  · Up in chair  · RN notified  · Call light within reach    Total Treatment Duration:   Time In: 6658  Time Out: [facility], [facility] MEDICO DEL [facility], [facility], [address] [facility]

## 2022-04-11 NOTE — PROGRESS NOTES
Opal Deleon  Admission Date: 4/3/2022         Daily Progress Note: 4/11/2022    The patient's chart is reviewed and the patient is discussed with the staff. Background:  31 y.o. y/o male  seen and evaluated at the request of Dr. Santiago Flaherty has a history of obesity and asthma. He was initially seen in the ER the day before admission with wheezing from asthma exacerbation. He has had similar exacerbations before, requiring hospitalization in the past. He is only on prn albuterol at home. No fever, chills, or obvious triggers, though some productive cough. He was initially treated and discharged home. However he returned around midnight with worsening symptoms. L calf pain but d dimer was normal as was LE doppler. CXR was normal. He was given continuous nebs and IV steroids but continued to have increased WOB so plarted on bipap 18/8 with 60% FiO2. WOB worsened so he required iutubation on 4/3 but was able to be extubated on 4/9 after being treated for Influenza A and MSSA pneumonia. He was also diuresed. His baseline CO2 is around 60. He is using the hospital bipap for now. He states that he has an appt with Hawa for evaluation of his asthma and suspected ZACK.      Subjective:         Up in the chair in his room. Discussed need for outpatient follow up. He states that he has an appt with hawa for asthma and sleep evaluation.      Current Facility-Administered Medications   Medication Dose Route Frequency    levalbuterol (XOPENEX) nebulizer soln 1.25 mg/3 mL  5 mg Nebulization Q6H RT    methylPREDNISolone (PF) (SOLU-MEDROL) injection 40 mg  40 mg IntraVENous Q12H    amLODIPine (NORVASC) tablet 10 mg  10 mg Oral DAILY    polyethylene glycol (MIRALAX) packet 17 g  17 g Oral DAILY    senna-docusate (PERICOLACE) 8.6-50 mg per tablet 2 Tablet  2 Tablet Oral QHS    insulin lispro (HUMALOG) injection   SubCUTAneous AC&HS    insulin glargine (LANTUS) injection 30 Units  30 Units SubCUTAneous DAILY    labetaloL (NORMODYNE;TRANDATE) injection 20 mg  20 mg IntraVENous Q4H PRN    bisacodyL (DULCOLAX) suppository 10 mg  10 mg Rectal DAILY PRN    hydrALAZINE (APRESOLINE) 20 mg/mL injection 20 mg  20 mg IntraVENous Q8H PRN    ceFAZolin (ANCEF) 2 g/20 mL in sterile water IV syringe  2 g IntraVENous Q8H    NUTRITIONAL SUPPORT ELECTROLYTE PRN ORDERS   Does Not Apply PRN    acetaminophen (TYLENOL) tablet 650 mg  650 mg Per NG tube Q6H PRN    Or    acetaminophen (TYLENOL) suppository 650 mg  650 mg Rectal Q6H PRN    sodium chloride (NS) flush 5-40 mL  5-40 mL IntraVENous Q8H    sodium chloride (NS) flush 5-40 mL  5-40 mL IntraVENous PRN    ondansetron (ZOFRAN ODT) tablet 4 mg  4 mg Oral Q8H PRN    Or    ondansetron (ZOFRAN) injection 4 mg  4 mg IntraVENous Q6H PRN    albuterol (PROVENTIL VENTOLIN) nebulizer solution 2.5 mg  2.5 mg Nebulization Q4H PRN    budesonide (PULMICORT) 500 mcg/2 ml nebulizer suspension  500 mcg Nebulization BID RT    enoxaparin (LOVENOX) injection 40 mg  40 mg SubCUTAneous Q12H    levalbuterol (XOPENEX) nebulizer soln 1.25 mg/3 mL  1.25 mg Nebulization Q6H PRN    sodium chloride (NS) flush 30 mL  30 mL InterCATHeter DAILY    sodium chloride (NS) flush 30 mL  30 mL InterCATHeter PRN    ipratropium (ATROVENT) 0.02 % nebulizer solution 0.5 mg  0.5 mg Nebulization Q8H RT     Review of Systems  Constitutional: negative for fever, chills, sweats  Cardiovascular: negative for chest pain, palpitations, syncope, edema  Gastrointestinal:  negative for dysphagia, reflux, vomiting, diarrhea, abdominal pain, or melena  Neurologic:  negative for focal weakness, numbness, headache  Objective:     Vitals:    04/11/22 0906 04/11/22 1102 04/11/22 1141 04/11/22 1229   BP:  (!) 155/84  (!) 142/99   Pulse:  (!) 111  (!) 122   Resp:  20     Temp:  98.4 °F (36.9 °C)     SpO2: 91% 96% 95% 91%   Weight:       Height:           Intake/Output Summary (Last 24 hours) at 4/11/2022 1250  Last data filed at 4/11/2022 0850  Gross per 24 hour   Intake 60 ml   Output    Net 60 ml     Physical Exam:   Constitution:  the patient is well developed and in no acute distress  HEENT:  Sclera clear, pupils equal, oral mucosa moist  Respiratory: clear bilaterally and without wheezing. Cardiovascular:  RRR without M,G,R  Gastrointestinal: soft and non-tender; with positive bowel sounds. Musculoskeletal: warm without cyanosis. There is no lower extremity edema. Skin:  no jaundice or rashes, no wounds   Neurologic: no gross neuro deficits     Psychiatric:  calm    CXR: none today  4/10      LAB:  Recent Labs     04/11/22  0427 04/10/22  0228 04/09/22  0326   WBC 23.3* 20.2* 17.2*   HGB 17.4* 14.3 12.8*   HCT 51.5* 43.0 40.3*    291 312   PCT 0.11  --   --      Recent Labs     04/11/22  0427 04/10/22  0228 04/09/22  0326   * 136 140   K 4.5 4.8 5.0   CL 96* 95* 94*   CO2 31 36* 42*   * 187* 291*   BUN 29* 31* 36*   CREA 1.00 0.90 1.10   CA 9.8 9.6 9.0     No results for input(s): LAC, TROPHS, BNPNT, CRP in the last 72 hours. No lab exists for component: ESR  No results for input(s): PH, PCO2, PO2, HCO3, PHI, PCO2I, PO2I, HCO3I in the last 72 hours. Recent Labs     04/09/22  0832   CULT MODERATE STAPHYLOCOCCUS AUREUS SENSITIVITY TO FOLLOW*  MODERATE NORMAL RESPIRATORY ALYCIA     Assessment and Plan:  (Medical Decision Making)   Principal Problem:    Acute respiratory failure with hypoxia and hypercapnia ([address] (4/3/2022)  Successful extubation on 4/9. Weaning oxygen. Baseline CO2 around 60. Needs sleep evaluation. He states that he has appt with Talia but will have  confirm. Active Problems:    Severe persistent asthma with exacerbation (4/3/2022)  No wheezing on exam today. Will change steroids to po and taper off over next 7 to 10 days. He was just on prn Albuterol at home but has had frequent exacerbations. Denies reflux.  Would plan to discharge on inhaled steroid with albuterol. He is uninsured but hopefully can afford inhaled Advair with [facility]. Obesity (4/3/2022)  Suspected ZACK      Influenza A (4/6/2022)  Has completed tx      Staphylococcus aureus pneumonia ([facility] [address] (4/6/2022)  Has staph in sputum culture from 4/9 after he ran a fever and the WBC is still up. Will await sensitivities and change abx if needed - on ancef day 5 now. WBC is still up but may be secondary to steroids/volume status. Previous tx of MSSA using rocephin - ? Colonized now       Primary hypertension (4/6/2022)  Poor control. No outpatient tx but now on Norvasc. Reassess with steroid taper    Have RT assess oxygen needs  Using hospital bipap for now - recommending outpatient sleep evaluation      More than 50% of the time documented was spent in face-to-face contact with the patient and in the care of the patient on the floor/unit where the patient is located. Ben Bentley NP     I have spoken with and examined the patient. I agree with the above assessment and plan as documented. Lungs:  Scattered faint wheezing but with good air movement  Heart:  RRR with no Murmur/Rubs/Gallops      Wean O2, taper steroids slowly and use CPAP with sleep empirically.  Need sleep study and OP follow up    Elliot Redmond MD

## 2022-04-11 NOTE — CONSULTS
Crescencio Zhang MD  Medical Director  [address] [place] [address], [address] [facility]  Tel: [phone]       Physical Medicine & Rehabilitation Consult    Subjective:     Date of Consultation:  April 11, 2022  Referring Provider: Dr Priyanka Ibarra is a 31 y.o. male who is being evaluated at the request of Hospitalist service for consideration for inpatient rehabilitation following acute hypoxic respiratory failure due to influenza and asthma exacerbation with subsequent physical debilitation. 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 He was tachycardic, tachypnic , and hypoxic on presentation. CXR clear. Initially treated in the ED and sent home but came back several hours later with worsening symptoms. He reported productive cough with yellow spt over the course of several days. D Dimer 0.55. LE neg for DVT ,as he had complained of left calf pain. He was requiring 4L to maintain sats. He was given continuous nebs and IV steroids but continued to have increased WOB so had been started on bipap 18/8 with 60% FiO2. He was satting well and reportedly less WOB than before and less tachycardia (was up to 150 sinus tach) then at 120 but still with ongoing wheezes. ABG  with some mild hypercarbia at 47.    Later on on day of admission, he decompensated and required intubation. Unable to continue heliox through ventilator. Was having some air trapping and autopeep 15 from 10 set. He had not had any fevers. He had been on 50 rocky for hypotension and some sinus tachycardia. He developed a right midlung infiltrate and was placed on azithromycin. Rocephin was added. By 4/6 , he was off pressors and his leukocytosis was improving. His Peak pressures were down to 30 .  On that day they attempted to cut off paralytics and he did ok at first but by afternoon he had more difficulty, thus paralytics restarted. 4/9 was able to tolerate off paralytics, had to work through sedation wean, but eventually on propofol, precedex and fentanyl. Placed on PSV 5/8 40% and volumes are 500s with RR 20-24. He seemed uncomfortable and was biting tube at times. He tested + for influenza A, subtype H3 and spt grew out MSSA. Covid 19 Negative. He was finally extubated on 4/9. He spiked a fever to 101.7 but afebrile the following day. He was on AirVo 50%. Still required small dose of Precedex. He was transferred out of the ICU 4/10. The Pulmonologists turned the care over to the Hospitalist Service. He is not tolerating the bipap. Nursing documenting pt does very minimal for himself and has been encouraged to do so. Today is HD 8 and he was seen by PT; supervision bed mobility, Min assist STS/transfer and gait 4ft RW. The concern is that pt has 3 flts of steps to go up to access his apt. Has not been evaluated by OT.     Principal Problem:    Acute respiratory failure with hypoxia and hypercapnia (HCC) (4/3/2022)    Active Problems:    Severe persistent asthma with exacerbation (4/3/2022)      Obesity (4/3/2022)      Influenza A (4/6/2022)      Staphylococcus aureus pneumonia ([facility] [address] (4/6/2022)      Primary hypertension (4/6/2022)      Past Medical History:   Diagnosis Date    Asthma     Status asthmaticus 4/3/2022      Past Surgical History:   Procedure Laterality Date    HX TONSILLECTOMY        Family History   Problem Relation Age of Onset    Sleep Apnea Mother     Leukemia Father     Asthma Brother       Social History     Tobacco Use    Smoking status: Never Smoker    Smokeless tobacco: Never Used   Substance Use Topics    Alcohol use: Not Currently   Home Environment: Private residence  # Steps to Enter: 39  One/Two Story Residence: One story  Living Alone: No  Support Systems: Spouse/Significant Other  Prior to Admission medications    Medication Sig Start Date End Date Taking? Authorizing Provider   fluticasone furoate-vilanteroL (Breo Ellipta) 100-25 mcg/dose inhaler Take 1 Puff by inhalation daily. Yes Provider, Historical   albuterol (PROVENTIL HFA, VENTOLIN HFA, PROAIR HFA) 90 mcg/actuation inhaler Take 2 Puffs by inhalation every four (4) hours as needed for Wheezing. Patient taking differently: Take 2 Puffs by inhalation every four (4) hours as needed for Wheezing (every 2-4 hours PRN). 10/19/21  Yes Ronda Tilley MD     Allergies   Allergen Reactions    Singulair [Montelukast] Hives        Review of Systems:  A comprehensive review of systems was negative except for that written in the HPI. Objective:     Vitals:  Blood pressure (!) 142/99, pulse (!) 122, temperature 98.4 °F (36.9 °C), resp. rate 20, height 5' 9\" (1.753 m), weight 296 lb 11.8 oz (134.6 kg), SpO2 91 %. Temp (24hrs), Av.3 °F (36.8 °C), Min:98 °F (36.7 °C), Max:98.6 °F (37 °C)      Intake and Output:   1901 -  0700  In: 481.2 [I.V.:481.2]  Out: 2720 [Urine:2720]    Physical Exam:  General:  Alert, oriented and mood affect appropriate   Lungs:   Clear to auscultation bilaterally. Heart:  Tachy rate and nl rhythm, S1, S2 stable, no murmur, click, rub or gallop. Abdomen:   Soft, non-tender. Bowel sounds present. No masses,  No organomegaly. obese   Genitourinary: defer   Neuro Muscular: Pt with weakness proximally in bilateral UEs and LEs  Shoulder flexion; Full PROM but actively can lift his arms to about 40d. bic are 4/5, shoulder abd 4+; seems very anxious about lifting arms  Sensation intact  Hip flexion 3+ , distally 4+. Skin:  No rashes, lesions, or signs/symptoms or infection.        Labs/Studies:  Recent Results (from the past 72 hour(s))   GLUCOSE, POC    Collection Time: 22  5:21 PM   Result Value Ref Range    Glucose (POC) 219 (H) 65 - 100 mg/dL    Performed by [address] [place] [address], POC    Collection Time: 22 11:25 PM   Result Value Ref Range    Glucose (POC) 241 (H) 65 - 100 mg/dL    Performed by Rafael    CBC W/O DIFF    Collection Time: 04/09/22  3:26 AM   Result Value Ref Range    WBC 17.2 (H) 4.3 - 11.1 K/uL    RBC 3.78 (L) 4.23 - 5.6 M/uL    HGB 12.8 (L) 13.6 - 17.2 g/dL    HCT 40.3 (L) 41.1 - 50.3 %    .6 (H) 79.6 - 97.8 FL    MCH 33.9 (H) 26.1 - 32.9 PG    MCHC 31.8 31.4 - 35.0 g/dL    RDW 13.2 11.9 - 14.6 %    PLATELET 237 657 - 770 K/uL    MPV 9.6 9.4 - 12.3 FL    ABSOLUTE NRBC 0.08 0.0 - 0.2 K/uL   METABOLIC PANEL, BASIC    Collection Time: 04/09/22  3:26 AM   Result Value Ref Range    Sodium 140 138 - 145 mmol/L    Potassium 5.0 3.5 - 5.1 mmol/L    Chloride 94 (L) 98 - 107 mmol/L    CO2 42 (HH) 21 - 32 mmol/L    Anion gap 4 (L) 7 - 16 mmol/L    Glucose 291 (H) 65 - 100 mg/dL    BUN 36 (H) 6 - 23 MG/DL    Creatinine 1.10 0.8 - 1.5 MG/DL    GFR est AA >60 >60 ml/min/1.73m2    GFR est non-AA >60 >60 ml/min/1.73m2    Calcium 9.0 8.3 - 10.4 MG/DL   GLUCOSE, POC    Collection Time: 04/09/22  5:07 AM   Result Value Ref Range    Glucose (POC) 286 (H) 65 - 100 mg/dL    Performed by Rafael    POC VENOUS BLOOD GAS    Collection Time: 04/09/22  5:22 AM   Result Value Ref Range    Device: ADULT VENT      FIO2 (POC) 40 %    pH, venous (POC) 7.42 7.32 - 7.42      pCO2, venous (POC) 64.8 (H) 41 - 51 MMHG    pO2, venous (POC) 35 mmHg    HCO3, venous (POC) 41.9 (H) 23 - 28 MMOL/L    sO2, venous (POC) 65.6 65 - 88 %    Base excess, venous (POC) 14.0 mmol/L    Mode PRESSURE CONTROL      PEEP/CPAP (POC) 8 cmH2O    PIP (POC) 29      Allens test (POC) NOT APPLICABLE      Inspiratory Time 0.62 sec    Site PICC      Specimen type (POC) VENOUS BLOOD      Performed by Raeann     Critical value read back MARIANNA     Respiratory comment: ve9.2    CULTURE, RESPIRATORY/SPUTUM/BRONCH W GRAM STAIN    Collection Time: 04/09/22  8:32 AM    Specimen: Sputum,ET Suction   Result Value Ref Range    Special Requests: NO SPECIAL REQUESTS      GRAM STAIN 50  WBC'S SEEN PER OIF     GRAM STAIN NO EPITHELIAL CELLS SEEN      GRAM STAIN FEW GRAM POSITIVE COCCI      GRAM STAIN FEW GRAM POSITIVE RODS      GRAM STAIN 4+ MUCUS PRESENT      Culture result: (A)       MODERATE STAPHYLOCOCCUS AUREUS SENSITIVITY TO FOLLOW    Culture result: MODERATE NORMAL RESPIRATORY ALYCIA     GLUCOSE, POC    Collection Time: 04/09/22  9:07 AM   Result Value Ref Range    Glucose (POC) 250 (H) 65 - 100 mg/dL    Performed by [facility]    GLUCOSE, POC    Collection Time: 04/09/22 11:30 AM   Result Value Ref Range    Glucose (POC) 195 (H) 65 - 100 mg/dL    Performed by [facility]    GLUCOSE, POC    Collection Time: 04/09/22  6:15 PM   Result Value Ref Range    Glucose (POC) 184 (H) 65 - 100 mg/dL    Performed by SP[facility]    GLUCOSE, POC    Collection Time: 04/09/22 11:19 PM   Result Value Ref Range    Glucose (POC) 173 (H) 65 - 100 mg/dL    Performed by Niranjan    METABOLIC PANEL, BASIC    Collection Time: 04/10/22  2:28 AM   Result Value Ref Range    Sodium 136 136 - 145 mmol/L    Potassium 4.8 3.5 - 5.1 mmol/L    Chloride 95 (L) 98 - 107 mmol/L    CO2 36 (H) 21 - 32 mmol/L    Anion gap 5 (L) 7 - 16 mmol/L    Glucose 187 (H) 65 - 100 mg/dL    BUN 31 (H) 6 - 23 MG/DL    Creatinine 0.90 0.8 - 1.5 MG/DL    GFR est AA >60 >60 ml/min/1.73m2    GFR est non-AA >60 >60 ml/min/1.73m2    Calcium 9.6 8.3 - 10.4 MG/DL   CBC W/O DIFF    Collection Time: 04/10/22  2:28 AM   Result Value Ref Range    WBC 20.2 (H) 4.3 - 11.1 K/uL    RBC 4.22 (L) 4.23 - 5.6 M/uL    HGB 14.3 13.6 - 17.2 g/dL    HCT 43.0 41.1 - 50.3 %    .9 (H) 79.6 - 97.8 FL    MCH 33.9 (H) 26.1 - 32.9 PG    MCHC 33.3 31.4 - 35.0 g/dL    RDW 13.0 11.9 - 14.6 %    PLATELET 153 920 - 375 K/uL    MPV 9.2 (L) 9.4 - 12.3 FL    ABSOLUTE NRBC 0.02 0.0 - 0.2 K/uL   GLUCOSE, POC    Collection Time: 04/10/22  6:25 AM   Result Value Ref Range Glucose (POC) 173 (H) 65 - 100 mg/dL    Performed by Jagdish    GLUCOSE, POC    Collection Time: 04/10/22 11:46 AM   Result Value Ref Range    Glucose (POC) 160 (H) 65 - 100 mg/dL    Performed by Verónica    GLUCOSE, POC    Collection Time: 04/10/22  5:05 PM   Result Value Ref Range    Glucose (POC) 118 (H) 65 - 100 mg/dL    Performed by Tenisha Tristan    GLUCOSE, POC    Collection Time: 04/10/22  8:37 PM   Result Value Ref Range    Glucose (POC) 147 (H) 65 - 100 mg/dL    Performed by [email or web address]    METABOLIC PANEL, BASIC    Collection Time: 04/11/22  4:27 AM   Result Value Ref Range    Sodium 136 (L) 138 - 145 mmol/L    Potassium 4.5 3.5 - 5.1 mmol/L    Chloride 96 (L) 98 - 107 mmol/L    CO2 31 21 - 32 mmol/L    Anion gap 9 7 - 16 mmol/L    Glucose 241 (H) 65 - 100 mg/dL    BUN 29 (H) 6 - 23 MG/DL    Creatinine 1.00 0.8 - 1.5 MG/DL    GFR est AA >60 >60 ml/min/1.73m2    GFR est non-AA >60 >60 ml/min/1.73m2    Calcium 9.8 8.3 - 10.4 MG/DL   CBC W/O DIFF    Collection Time: 04/11/22  4:27 AM   Result Value Ref Range    WBC 23.3 (H) 4.3 - 11.1 K/uL    RBC 5.15 4.23 - 5.6 M/uL    HGB 17.4 (H) 13.6 - 17.2 g/dL    HCT 51.5 (H) 41.1 - 50.3 %    .0 (H) 79.6 - 97.8 FL    MCH 33.8 (H) 26.1 - 32.9 PG    MCHC 33.8 31.4 - 35.0 g/dL    RDW 13.0 11.9 - 14.6 %    PLATELET 373 025 - 712 K/uL    MPV 9.2 (L) 9.4 - 12.3 FL    ABSOLUTE NRBC 0.03 0.0 - 0.2 K/uL   HEMOGLOBIN A1C WITH EAG    Collection Time: 04/11/22  4:27 AM   Result Value Ref Range    Hemoglobin A1c 6.0 4.20 - 6.30 %    Est. average glucose 126 mg/dL   PROCALCITONIN    Collection Time: 04/11/22  4:27 AM   Result Value Ref Range    Procalcitonin 0.11 0.00 - 0.49 ng/mL   GLUCOSE, POC    Collection Time: 04/11/22  6:08 AM   Result Value Ref Range    Glucose (POC) 204 (H) 65 - 100 mg/dL    Performed by Renata    URINALYSIS W/ RFLX MICROSCOPIC    Collection Time: 04/11/22 10:34 AM   Result Value Ref Range    Color YELLOW Appearance CLEAR      Specific gravity 1.025 (H) 1.001 - 1.023      pH (UA) 6.0 5.0 - 9.0      Protein TRACE (A) NEG mg/dL    Glucose Negative mg/dL    Ketone TRACE (A) NEG mg/dL    Bilirubin SMALL (A) NEG      Blood TRACE (A) NEG      Urobilinogen 0.2 0.2 - 1.0 EU/dL    Nitrites Negative NEG      Leukocyte Esterase Negative NEG      WBC 0-3 0 /hpf    RBC 3-5 0 /hpf    Epithelial cells 0 0 /hpf    Bacteria 0 0 /hpf    Casts 0 0 /lpf   GLUCOSE, POC    Collection Time: 04/11/22 11:48 AM   Result Value Ref Range    Glucose (POC) 220 (H) 65 - 100 mg/dL    Performed by Mirna          Functional Assessment:  Functional Assessment  Baseline Functional Status: Ambulated independently  Fall in Past 12 Months: No  Decline in Gait/Transfer/Balance: No  Decline in Capacity to Feed/Dress/Bathe: No  Developmental Delay: No  Chewing/Swallowing Problems: No  Difficulty with Secretions: No  Speech Slurred/Thick/Garbled: No     Rubio Score:        Speech Assessment:                   Ambulation:  Activity and Safety  Activity Level:  Up with Assistance  Ambulate: Ambulate to bathroom  Brunilda's Egress Test: Fail  Activity: In recliner  Activity Assistance: Complete care  Weight Bearing Status: WBAT (Weight Bearing as Tolerated)  NWB (Non Weight Bearing extremities: BLE (Bilateral Lower Extremities)  Mode of Transportation: Stretcher  Repositioned: Semi fowlers,Supine (back)  Patient Turned: Turned on Left Side  Head of Bed Elevated: Self regulated  Activity Response: Dyspnea on exertion,Fairly tolerated  Assistive Device: Fall prevention device  Safety Measures: Bed/Chair alarm on,Bed/Chair-Wheels locked,Bed in low position,Call light within reach,Gripper socks,Side rails X2,Fall prevention (comment)  Venipuncture/BP Precautions: Venipuncture/BP precautions RUE (PICC (triple lumen))     Impression / Assessment:     Principal Problem:    Acute respiratory failure with hypoxia and hypercapnia ([facility] [address] (4/3/2022)    Active Problems:    Severe persistent asthma with exacerbation (4/3/2022)      Obesity (4/3/2022)      Influenza A (4/6/2022)      Staphylococcus aureus pneumonia ([facility] [address] (4/6/2022)      Primary hypertension (4/6/2022)    Physical Debility; HD 8 acute hypoxic and hypercapnic respiratory failure due to influenza A, bacterial pneumonia and asthma exacerbation    Plan / Recommendations / Medical Decision Making:     Recommendations:   Continue Acute Rehab Program  Coordination of rehab / medical care  Counseling of PM&R care issues management  Monitoring and management of medical conditions per plan of care / orders  -just had his first PT session today. Transferred out of ICU yesterday. Currently on 3L after extubation 4/9  -OT pending  - PT note appreciated. Given his young age and previously independent status, he should do well. Prior to his illness , it sounds that ascending 3 flts of steps was not easy given his asthma and morbid obesity. -pt not yet medically ready for post acute rehab and asks that we give him a day or two to participate. He is going to the restroom independently , per his report. Has good family support. Reports that his friend is a  and that he has said the crew could get him into his apt. I told him this would not be an ideal plan. -will f/u with pt tomorrow. He wants to go home if at all possible and wants to talk to his wife  -a short inpt rehab stay would be beneficial. Would need admin approval given funding challenges. Pt not yet sold on the idea    Discussion with pt/ Staff    Reviewed Therapies / Zuleika Sherman / Majel Collet / Records      Thank you very much for this referral. We appreciate the opportunity to participate in this patient's care. We will continue to follow. Inocencia Carmen MD, Medical Director  [address] [place] [address]

## 2022-04-11 NOTE — PROGRESS NOTES
Pt with multiple requests for bedpan several times an hour so far this shift. He has only a few drops of loose liquid stool with each attempt. Will not let himself be placed in a more upright position in bed to facilitate emptying of bowels. And then will not allow staff to lower the head of the bed enough for him to roll over enough to clean him without straining the nurses back. Pt will not hold a drinking cup and attempt to perform even minimal self care. Has taken off his bipap multiple times and then calls out to put it back on. I have encouraged his participation in trying to do more for himself, still he resists.   Wife remains at bedside and does try to assist.

## 2022-04-12 PROBLEM — R73.03 PRE-DIABETES: Status: ACTIVE | Noted: 2022-04-12

## 2022-04-12 LAB
ANION GAP SERPL CALC-SCNC: 8 MMOL/L (ref 7–16)
BACTERIA SPEC CULT: ABNORMAL
BACTERIA SPEC CULT: ABNORMAL
BASOPHILS # BLD: 0.2 K/UL (ref 0–0.2)
BASOPHILS NFR BLD: 1 % (ref 0–2)
BUN SERPL-MCNC: 28 MG/DL (ref 6–23)
CALCIUM SERPL-MCNC: 9.5 MG/DL (ref 8.3–10.4)
CHLORIDE SERPL-SCNC: 97 MMOL/L (ref 98–107)
CO2 SERPL-SCNC: 31 MMOL/L (ref 21–32)
CREAT SERPL-MCNC: 0.8 MG/DL (ref 0.8–1.5)
DIFFERENTIAL METHOD BLD: ABNORMAL
EOSINOPHIL # BLD: 0.2 K/UL (ref 0–0.8)
EOSINOPHIL NFR BLD: 1 % (ref 0.5–7.8)
ERYTHROCYTE [DISTWIDTH] IN BLOOD BY AUTOMATED COUNT: 12.7 % (ref 11.9–14.6)
GLUCOSE BLD STRIP.AUTO-MCNC: 118 MG/DL (ref 65–100)
GLUCOSE BLD STRIP.AUTO-MCNC: 143 MG/DL (ref 65–100)
GLUCOSE BLD STRIP.AUTO-MCNC: 151 MG/DL (ref 65–100)
GLUCOSE BLD STRIP.AUTO-MCNC: 190 MG/DL (ref 65–100)
GLUCOSE SERPL-MCNC: 129 MG/DL (ref 65–100)
GRAM STN SPEC: ABNORMAL
HCT VFR BLD AUTO: 50.4 % (ref 41.1–50.3)
HGB BLD-MCNC: 16.9 G/DL (ref 13.6–17.2)
IMM GRANULOCYTES # BLD AUTO: 1.1 K/UL (ref 0–0.5)
IMM GRANULOCYTES NFR BLD AUTO: 6 % (ref 0–5)
LYMPHOCYTES # BLD: 4.4 K/UL (ref 0.5–4.6)
LYMPHOCYTES NFR BLD: 23 % (ref 13–44)
MCH RBC QN AUTO: 33.2 PG (ref 26.1–32.9)
MCHC RBC AUTO-ENTMCNC: 33.5 G/DL (ref 31.4–35)
MCV RBC AUTO: 99 FL (ref 79.6–97.8)
MONOCYTES # BLD: 2.5 K/UL (ref 0.1–1.3)
MONOCYTES NFR BLD: 13 % (ref 4–12)
NEUTS SEG # BLD: 10.6 K/UL (ref 1.7–8.2)
NEUTS SEG NFR BLD: 56 % (ref 43–78)
NRBC # BLD: 0 K/UL (ref 0–0.2)
PLATELET # BLD AUTO: 310 K/UL (ref 150–450)
PLATELET COMMENTS,PCOM: ADEQUATE
PMV BLD AUTO: 9.2 FL (ref 9.4–12.3)
POTASSIUM SERPL-SCNC: 4 MMOL/L (ref 3.5–5.1)
RBC # BLD AUTO: 5.09 M/UL (ref 4.23–5.6)
RBC MORPH BLD: ABNORMAL
SERVICE CMNT-IMP: ABNORMAL
SODIUM SERPL-SCNC: 136 MMOL/L (ref 136–145)
WBC # BLD AUTO: 19 K/UL (ref 4.3–11.1)
WBC MORPH BLD: ABNORMAL

## 2022-04-12 PROCEDURE — 94640 AIRWAY INHALATION TREATMENT: CPT

## 2022-04-12 PROCEDURE — 74011250636 HC RX REV CODE- 250/636: Performed by: FAMILY MEDICINE

## 2022-04-12 PROCEDURE — 82962 GLUCOSE BLOOD TEST: CPT

## 2022-04-12 PROCEDURE — 74011250636 HC RX REV CODE- 250/636: Performed by: INTERNAL MEDICINE

## 2022-04-12 PROCEDURE — 99232 SBSQ HOSP IP/OBS MODERATE 35: CPT | Performed by: INTERNAL MEDICINE

## 2022-04-12 PROCEDURE — 85025 COMPLETE CBC W/AUTO DIFF WBC: CPT

## 2022-04-12 PROCEDURE — 94660 CPAP INITIATION&MGMT: CPT

## 2022-04-12 PROCEDURE — 74011000250 HC RX REV CODE- 250: Performed by: INTERNAL MEDICINE

## 2022-04-12 PROCEDURE — 97165 OT EVAL LOW COMPLEX 30 MIN: CPT

## 2022-04-12 PROCEDURE — 77010033678 HC OXYGEN DAILY

## 2022-04-12 PROCEDURE — 74011000250 HC RX REV CODE- 250: Performed by: STUDENT IN AN ORGANIZED HEALTH CARE EDUCATION/TRAINING PROGRAM

## 2022-04-12 PROCEDURE — 74011250636 HC RX REV CODE- 250/636: Performed by: HOSPITALIST

## 2022-04-12 PROCEDURE — 80048 BASIC METABOLIC PNL TOTAL CA: CPT

## 2022-04-12 PROCEDURE — 74011636637 HC RX REV CODE- 636/637: Performed by: INTERNAL MEDICINE

## 2022-04-12 PROCEDURE — 74011000250 HC RX REV CODE- 250: Performed by: NURSE PRACTITIONER

## 2022-04-12 PROCEDURE — 74011250636 HC RX REV CODE- 250/636: Performed by: NURSE PRACTITIONER

## 2022-04-12 PROCEDURE — 74011636637 HC RX REV CODE- 636/637: Performed by: NURSE PRACTITIONER

## 2022-04-12 PROCEDURE — 97535 SELF CARE MNGMENT TRAINING: CPT

## 2022-04-12 PROCEDURE — 92526 ORAL FUNCTION THERAPY: CPT

## 2022-04-12 PROCEDURE — 74011250637 HC RX REV CODE- 250/637: Performed by: FAMILY MEDICINE

## 2022-04-12 PROCEDURE — 74011636637 HC RX REV CODE- 636/637: Performed by: STUDENT IN AN ORGANIZED HEALTH CARE EDUCATION/TRAINING PROGRAM

## 2022-04-12 PROCEDURE — 94760 N-INVAS EAR/PLS OXIMETRY 1: CPT

## 2022-04-12 PROCEDURE — 65270000029 HC RM PRIVATE

## 2022-04-12 PROCEDURE — 74011000250 HC RX REV CODE- 250: Performed by: HOSPITALIST

## 2022-04-12 PROCEDURE — 74011000250 HC RX REV CODE- 250: Performed by: FAMILY MEDICINE

## 2022-04-12 PROCEDURE — 74011250637 HC RX REV CODE- 250/637: Performed by: INTERNAL MEDICINE

## 2022-04-12 PROCEDURE — 97530 THERAPEUTIC ACTIVITIES: CPT

## 2022-04-12 PROCEDURE — 74011250636 HC RX REV CODE- 250/636: Performed by: STUDENT IN AN ORGANIZED HEALTH CARE EDUCATION/TRAINING PROGRAM

## 2022-04-12 RX ORDER — CEFAZOLIN SODIUM/WATER 2 G/20 ML
2 SYRINGE (ML) INTRAVENOUS EVERY 8 HOURS
Status: DISCONTINUED | OUTPATIENT
Start: 2022-04-12 | End: 2022-04-13 | Stop reason: HOSPADM

## 2022-04-12 RX ORDER — TRAZODONE HYDROCHLORIDE 50 MG/1
100 TABLET ORAL
Status: DISCONTINUED | OUTPATIENT
Start: 2022-04-12 | End: 2022-04-13 | Stop reason: HOSPADM

## 2022-04-12 RX ORDER — HYDROXYZINE PAMOATE 50 MG/1
50 CAPSULE ORAL
Status: DISCONTINUED | OUTPATIENT
Start: 2022-04-12 | End: 2022-04-13 | Stop reason: HOSPADM

## 2022-04-12 RX ORDER — INSULIN GLARGINE 100 [IU]/ML
15 INJECTION, SOLUTION SUBCUTANEOUS DAILY
Status: DISCONTINUED | OUTPATIENT
Start: 2022-04-12 | End: 2022-04-13 | Stop reason: HOSPADM

## 2022-04-12 RX ORDER — LORAZEPAM 0.5 MG/1
0.5 TABLET ORAL
Status: DISCONTINUED | OUTPATIENT
Start: 2022-04-12 | End: 2022-04-13 | Stop reason: HOSPADM

## 2022-04-12 RX ORDER — LORAZEPAM 1 MG/1
1 TABLET ORAL
Status: DISPENSED | OUTPATIENT
Start: 2022-04-12 | End: 2022-04-13

## 2022-04-12 RX ORDER — MORPHINE SULFATE 2 MG/ML
1 INJECTION, SOLUTION INTRAMUSCULAR; INTRAVENOUS ONCE
Status: COMPLETED | OUTPATIENT
Start: 2022-04-12 | End: 2022-04-12

## 2022-04-12 RX ADMIN — SODIUM CHLORIDE, PRESERVATIVE FREE 5 ML: 5 INJECTION INTRAVENOUS at 21:02

## 2022-04-12 RX ADMIN — ALBUTEROL SULFATE 2.5 MG: 2.5 SOLUTION RESPIRATORY (INHALATION) at 19:59

## 2022-04-12 RX ADMIN — CEFAZOLIN SODIUM 2 G: 100 INJECTION, POWDER, LYOPHILIZED, FOR SOLUTION INTRAVENOUS at 08:39

## 2022-04-12 RX ADMIN — CEFAZOLIN SODIUM 2 G: 100 INJECTION, POWDER, LYOPHILIZED, FOR SOLUTION INTRAVENOUS at 01:04

## 2022-04-12 RX ADMIN — PREDNISONE 40 MG: 20 TABLET ORAL at 07:42

## 2022-04-12 RX ADMIN — ENOXAPARIN SODIUM 40 MG: 100 INJECTION SUBCUTANEOUS at 21:01

## 2022-04-12 RX ADMIN — LORAZEPAM 0.5 MG: 1 TABLET ORAL at 18:50

## 2022-04-12 RX ADMIN — SODIUM CHLORIDE, PRESERVATIVE FREE 10 ML: 5 INJECTION INTRAVENOUS at 05:34

## 2022-04-12 RX ADMIN — CEFAZOLIN SODIUM 2 G: 100 INJECTION, POWDER, LYOPHILIZED, FOR SOLUTION INTRAVENOUS at 16:50

## 2022-04-12 RX ADMIN — AMLODIPINE BESYLATE 10 MG: 10 TABLET ORAL at 08:39

## 2022-04-12 RX ADMIN — IPRATROPIUM BROMIDE 0.5 MG: 0.5 SOLUTION RESPIRATORY (INHALATION) at 07:19

## 2022-04-12 RX ADMIN — MORPHINE SULFATE 1 MG: 2 INJECTION, SOLUTION INTRAMUSCULAR; INTRAVENOUS at 05:29

## 2022-04-12 RX ADMIN — SODIUM CHLORIDE, PRESERVATIVE FREE 10 ML: 5 INJECTION INTRAVENOUS at 14:16

## 2022-04-12 RX ADMIN — INSULIN GLARGINE 15 UNITS: 100 INJECTION, SOLUTION SUBCUTANEOUS at 08:39

## 2022-04-12 RX ADMIN — ALBUTEROL SULFATE 2.5 MG: 2.5 SOLUTION RESPIRATORY (INHALATION) at 01:08

## 2022-04-12 RX ADMIN — ENOXAPARIN SODIUM 40 MG: 100 INJECTION SUBCUTANEOUS at 08:39

## 2022-04-12 RX ADMIN — ALBUTEROL SULFATE 2.5 MG: 2.5 SOLUTION RESPIRATORY (INHALATION) at 13:54

## 2022-04-12 RX ADMIN — BUDESONIDE 500 MCG: 0.5 INHALANT RESPIRATORY (INHALATION) at 07:19

## 2022-04-12 RX ADMIN — ALBUTEROL SULFATE 2.5 MG: 2.5 SOLUTION RESPIRATORY (INHALATION) at 07:19

## 2022-04-12 RX ADMIN — INSULIN LISPRO 2 UNITS: 100 INJECTION, SOLUTION INTRAVENOUS; SUBCUTANEOUS at 22:06

## 2022-04-12 RX ADMIN — SODIUM CHLORIDE, PRESERVATIVE FREE 30 ML: 5 INJECTION INTRAVENOUS at 08:47

## 2022-04-12 RX ADMIN — TRAZODONE HYDROCHLORIDE 100 MG: 50 TABLET ORAL at 21:01

## 2022-04-12 RX ADMIN — BUDESONIDE 500 MCG: 0.5 INHALANT RESPIRATORY (INHALATION) at 20:00

## 2022-04-12 RX ADMIN — INSULIN LISPRO 2 UNITS: 100 INJECTION, SOLUTION INTRAVENOUS; SUBCUTANEOUS at 12:34

## 2022-04-12 NOTE — PROGRESS NOTES
Patient BIPAP was off when respiratory went in to round and assess the BIPAP. Patient state that he took it off so he can sleep tonight, stating that he can not sleep good with it on. RT reassure the patient the importance of wearing it which patient understood. Patient was place on 3L NC tolerating well with no complication noted.

## 2022-04-12 NOTE — PROGRESS NOTES
Verbal and bedside change of shift report given to Funmi Tang RN by Liliana Khan RN. Report included the following information; SBAR, MAR, Recent Resuts, Kardex, Intake/output. Opportunity for questions and clarification provided.

## 2022-04-12 NOTE — PROGRESS NOTES
Marinell Canavan  Admission Date: 4/3/2022         Daily Progress Note: 4/12/2022    The patient's chart is reviewed and the patient is discussed with the staff. Background:  31 y.o. y/o male  seen and evaluated at the request of Dr. Nasir Melissa has a history of obesity and asthma. He was initially seen in the ER the day before admission with wheezing from asthma exacerbation. He has had similar exacerbations before, requiring hospitalization in the past. He is only on prn albuterol at home. No fever, chills, or obvious triggers, though some productive cough. He was initially treated and discharged home. However he returned around midnight with worsening symptoms. L calf pain but d dimer was normal as was LE doppler. CXR was normal. He was given continuous nebs and IV steroids but continued to have increased WOB so plarted on bipap 18/8 with 60% FiO2. WOB worsened so he required iutubation on 4/3 but was able to be extubated on 4/9 after being treated for Influenza A and MSSA pneumonia. He was also diuresed. His baseline CO2 is around 60. He is using the hospital bipap for now. He states that he has an appt with Talia for evaluation of his asthma and suspected ZACK.      Subjective: Wife at bedside. He is still trying to get used to using the CPAP. His wife is asking about medicaid, follow up appts and how to afford medications.      Current Facility-Administered Medications   Medication Dose Route Frequency    insulin glargine (LANTUS) injection 15 Units  15 Units SubCUTAneous DAILY    ceFAZolin (ANCEF) 2 g/20 mL in sterile water IV syringe  2 g IntraVENous Q8H    albuterol (PROVENTIL VENTOLIN) nebulizer solution 2.5 mg  2.5 mg Nebulization Q6H RT    predniSONE (DELTASONE) tablet 40 mg  40 mg Oral DAILY WITH BREAKFAST    ipratropium (ATROVENT) 0.02 % nebulizer solution 0.5 mg  0.5 mg Nebulization BID RT    amLODIPine (NORVASC) tablet 10 mg  10 mg Oral DAILY    polyethylene glycol (MIRALAX) packet 17 g  17 g Oral DAILY    senna-docusate (PERICOLACE) 8.6-50 mg per tablet 2 Tablet  2 Tablet Oral QHS    insulin lispro (HUMALOG) injection   SubCUTAneous AC&HS    labetaloL (NORMODYNE;TRANDATE) injection 20 mg  20 mg IntraVENous Q4H PRN    bisacodyL (DULCOLAX) suppository 10 mg  10 mg Rectal DAILY PRN    hydrALAZINE (APRESOLINE) 20 mg/mL injection 20 mg  20 mg IntraVENous Q8H PRN    NUTRITIONAL SUPPORT ELECTROLYTE PRN ORDERS   Does Not Apply PRN    acetaminophen (TYLENOL) tablet 650 mg  650 mg Per NG tube Q6H PRN    Or    acetaminophen (TYLENOL) suppository 650 mg  650 mg Rectal Q6H PRN    sodium chloride (NS) flush 5-40 mL  5-40 mL IntraVENous Q8H    sodium chloride (NS) flush 5-40 mL  5-40 mL IntraVENous PRN    ondansetron (ZOFRAN ODT) tablet 4 mg  4 mg Oral Q8H PRN    Or    ondansetron (ZOFRAN) injection 4 mg  4 mg IntraVENous Q6H PRN    albuterol (PROVENTIL VENTOLIN) nebulizer solution 2.5 mg  2.5 mg Nebulization Q4H PRN    budesonide (PULMICORT) 500 mcg/2 ml nebulizer suspension  500 mcg Nebulization BID RT    enoxaparin (LOVENOX) injection 40 mg  40 mg SubCUTAneous Q12H    levalbuterol (XOPENEX) nebulizer soln 1.25 mg/3 mL  1.25 mg Nebulization Q6H PRN    sodium chloride (NS) flush 30 mL  30 mL InterCATHeter DAILY    sodium chloride (NS) flush 30 mL  30 mL InterCATHeter PRN     Review of Systems  Constitutional: negative for fever, chills, sweats  Cardiovascular: negative for chest pain, palpitations, syncope, edema  Gastrointestinal:  negative for dysphagia, reflux, vomiting, diarrhea, abdominal pain, or melena  Neurologic:  negative for focal weakness, numbness, headache  Objective:     Vitals:    04/12/22 0521 04/12/22 0719 04/12/22 0739 04/12/22 1230   BP: 135/86  (!) 143/91 (!) 145/94   Pulse: (!) 114  (!) 102 (!) 111   Resp: 22  17 17   Temp: 98.2 °F (36.8 °C)  97.2 °F (36.2 °C) 97.8 °F (36.6 °C)   SpO2: 93% 97% 100% 100%   Weight:       Height:           Intake/Output Summary (Last 24 hours) at 4/12/2022 1307  Last data filed at 4/12/2022 0121  Gross per 24 hour   Intake    Output 575 ml   Net -575 ml     Physical Exam:   Constitution:  the patient is well developed and in no acute distress  HEENT:  Sclera clear, pupils equal, oral mucosa moist  Respiratory: clear bilaterally and without wheezing. Oxygen removed during rounds  Cardiovascular:  RRR without M,G,R  Gastrointestinal: soft and non-tender; with positive bowel sounds. Musculoskeletal: warm without cyanosis. There is no lower extremity edema. Skin:  no jaundice or rashes, no wounds   Neurologic: no gross neuro deficits     Psychiatric:  calm    CXR: none today  4/10      LAB:  Recent Labs     04/12/22  0344 04/11/22  0427 04/10/22  0228   WBC 19.0* 23.3* 20.2*   HGB 16.9 17.4* 14.3   HCT 50.4* 51.5* 43.0    353 291   PCT  --  0.11  --      Recent Labs     04/12/22  0344 04/11/22  0427 04/10/22  0228    136* 136   K 4.0 4.5 4.8   CL 97* 96* 95*   CO2 31 31 36*   * 241* 187*   BUN 28* 29* 31*   CREA 0.80 1.00 0.90   CA 9.5 9.8 9.6     No results for input(s): LAC, TROPHS, BNPNT, CRP in the last 72 hours. No lab exists for component: ESR  No results for input(s): PH, PCO2, PO2, HCO3, PHI, PCO2I, PO2I, HCO3I in the last 72 hours. Recent Labs     04/11/22  1024   CULT NO GROWTH AFTER 21 HOURS  NO GROWTH AFTER 21 HOURS     Assessment and Plan:  (Medical Decision Making)   Principal Problem:    Acute respiratory failure with hypoxia and hypercapnia ([address] (4/3/2022)  Successful extubation on 4/9. Oxygen down to 2 liters and sat 100%. Will check room air sat. Baseline CO2 around 60. Needs sleep evaluation. He states that he has appt with Talia but will have  confirm. Waiting for input from CM.      Active Problems:    Severe persistent asthma with exacerbation (4/3/2022)  Changed steroids to po yesterday and still no wheezing on exam.  He was just on prn Albuterol at home but has had frequent exacerbations. Denies reflux. Would plan to discharge on inhaled steroid with albuterol - will have CM assist with choice that he can afford. Obesity (4/3/2022)  Suspected ZACK. Using hospital BIPAP at present. Trying to confirm appt with Talia - he and his wife said he already had one scheduled for asthma and sleep eval before admission      Influenza A (4/6/2022)  Has completed tx      Staphylococcus aureus pneumonia ([facility] [address] (4/6/2022)  Has staph in sputum culture from 4/9 after he ran a fever and the WBC is still up. Repeat culture grew MSSA. Still on Ancef  - day 5/7. WBC is still up (but better) but may be secondary to steroids/volume status. CXR with lower lobe infiltrates. Primary hypertension (4/6/2022)  Better today. No outpatient tx but now on Norvasc. Have RT assess oxygen needs  Using hospital bipap for now - recommending outpatient sleep evaluation. CM checking on appt with Talia    PT assisting. Ambulated to the BR today      More than 50% of the time documented was spent in face-to-face contact with the patient and in the care of the patient on the floor/unit where the patient is located. Emiliano Mccarthy NP       I have spoken with and examined the patient. I agree with the above assessment and plan as documented. Gen: NAD, resting in bed. Lungs:  Mild R sided exp wheeze  Heart:  RRR with no Murmur/Rubs/Gallops  Abd: soft, nt, nd, nabs  Ext:  No le edema    Patient receovering from respiratory failure due to asthma exacerbation. Now on room air. Minimal wheeze on exam. Was on pulmicort nebs and albuterol at home. When ready for discharge would sent out on medium ICS/LABA combination with prn albuterol and close outpt follow up. Complete course of abx. Down to 40mg prednisone daily. Slow taper as outpatient.      Lakshmi Bangura MD

## 2022-04-12 NOTE — PROGRESS NOTES
ACUTE PHYSICAL THERAPY GOALS:  (Developed with and agreed upon by patient and/or caregiver.)  1. Pt will perform supine to/from sit with mod I in 7 days. 2. Pt will perform sit to/from stand with mod I and LRAD in 7 days. 3. Pt will ambulate 250 ft with mod I and LRAD in 7 days. 4. Pt will negotiate 36 stairs with mod I and LRAD in 7 days. 5. Pt will demonstrate independence with HEP in 7 days.     PHYSICAL THERAPY: Daily Note and PM Treatment Day # 2    Ariane Ewing is a 31 y.o. male   PRIMARY DIAGNOSIS: Acute respiratory failure with hypoxia and hypercapnia (HCC)  Asthma exacerbation [J45.901]      ASSESSMENT:     REHAB RECOMMENDATIONS: CURRENT LEVEL OF FUNCTION:  (Most Recently Demonstrated)   Recommendation to date pending progress:  [address] [place] [address]  Equipment:    To Be Determined Bed Mobility:   Standby Assistance  Sit to Stand:  [employer] Assistance  Transfers:   Contact Guard Assistance  Gait/Mobility:   Contact Guard Assistance/Minimal Assistance at times     ASSESSMENT:  Mr. Ralf Lancaster presents lying in the bed with his wife at the bedside. He reports he is feeling better and is now off O2 with resting sats at 93%. He participated in seated BLE AROM warm-up exercises with low endurance. He was CGA for sit to stand with the RW. He ambualted 10' x 3 times with the RW with CGA to occasional min assist secondary to unsteadiness at times in standing. His O2 sats were 93% after gait with his HR at 103 BPM.   He elected to sit in the bedside chair and was positioned for comfort with his call light and personal items in reach. His wife remained at the bedside. Mr. Ralf Lancaster appears to be making progress towards his goals but his overall endurance with activity remains low.       SUBJECTIVE:   Mr. Ralf Lancaster states, \"I am feeling better today and am ready to work with therapy\"    SOCIAL HISTORY/ LIVING ENVIRONMENT:   Home Environment: Private residence  # Steps to Enter: 36  One/Two Story Residence: One story  Living Alone: No  Support Systems: Spouse/Significant Other  OBJECTIVE:     PAIN: VITAL SIGNS: LINES/DRAINS:   Pre Treatment: Pain Screen  Pain Scale 1: Numeric (0 - 10)  Pain Intensity 1: 0  Post Treatment: 0/10     Visit Vitals  BP (!) 145/94 (BP 1 Location: Left upper arm, BP Patient Position: Supine)   Pulse (!) 111   Temp 97.8 °F (36.6 °C)   Resp 17   Ht 5' 9\" (1.753 m)   Wt 134.6 kg (296 lb 11.8 oz)   SpO2 95%   BMI 43.82 kg/m²      O2 Device: None (Room air)     MOBILITY: I Mod I S SBA CGA Min Mod Max Total  NT x2 Comments:   Bed Mobility    Rolling [] [x] [] [] [] [] [] [] [] [] []    Supine to Sit [] [] [] [x] [] [] [] [] [] [] []    Scooting [] [] [] [x] [] [] [] [] [] [] []    Sit to Supine [] [] [] [] [x] [] [] [] [] [] []    Transfers    Sit to Stand [] [] [] [] [x] [] [] [] [] [] []    Bed to Chair [] [] [] [] [x] [] [] [] [] [] []    Stand to Sit [] [] [] [] [x] [] [] [] [] [] []    I=Independent, Mod I=Modified Independent, S=Supervision, SBA=Standby Assistance, CGA=Contact Guard Assistance,   Min=Minimal Assistance, Mod=Moderate Assistance, Max=Maximal Assistance, Total=Total Assistance, NT=Not Tested    BALANCE: Good Fair+ Fair Fair- Poor NT Comments   Sitting Static [x] [] [] [] [] []    Sitting Dynamic [x] [] [] [] [] []              Standing Static [] [x] [] [] [] []    Standing Dynamic [] [] [x] [] [] []      GAIT: I Mod I S SBA CGA Min Mod Max Total  NT x2 Comments:   Level of Assistance [] [] [] [] [x] [x] [] [] [] [] []    Distance 10' x 3 times    DME Rolling Walker    Gait Quality Slow, cautious but unsteady at times.   Short steps with low clearance and fatigues easily    Weightbearing  Status N/A     I=Independent, Mod I=Modified Independent, S=Supervision, SBA=Standby Assistance, CGA=Contact Guard Assistance,   Min=Minimal Assistance, Mod=Moderate Assistance, Max=Maximal Assistance, Total=Total Assistance, NT=Not Tested    PLAN:   FREQUENCY/DURATION: PT Plan of Care: 3 times/week for duration of hospital stay or until stated goals are met, whichever comes first.  TREATMENT:     TREATMENT:   ($$ Therapeutic Activity: 23-37 mins    )  Therapeutic Activity (25 Minutes): Therapeutic activity included Rolling, Supine to Sit, Sit to Supine, Transfer Training, Ambulation on level ground, Sitting balance , Standing balance and sit to stand to improve functional Mobility, Strength and Activity tolerance.     TREATMENT GRID:   Date:  4/12/22 Date:   Date:     Activity/Exercise Parameters Parameters Parameters   Ankle pumps  10 x B     Seated knee extension 4 x 5 B     Seated hip flexion 4 x 5 B                               AFTER TREATMENT POSITION/PRECAUTIONS:  Chair, Needs within reach, RN notified and wife at the bedside    INTERDISCIPLINARY COLLABORATION:  RN/PCT    TOTAL TREATMENT DURATION:  PT Patient Time In/Time Out  Time In: 1423  Time Out: 1 Jcarlos Harrison PT

## 2022-04-12 NOTE — PROGRESS NOTES
Date of Outreach Update:  March Ramakrishna was seen and assessed. MEWS Score: 3 (04/11/22 2036)  Vitals:    04/11/22 1456 04/11/22 1547 04/11/22 1949 04/11/22 2036   BP:  (!) 145/96  (!) 145/88   Pulse:  (!) 115  (!) 123   Resp:  18  20   Temp:  98.6 °F (37 °C)  98.2 °F (36.8 °C)   SpO2: 93% 90% 94% 96%   Weight:       Height:             Pain Assessment  Pain Intensity 1: 3 (04/11/22 2035)  Pain Location 1: Abdomen  Pain Intervention(s) 1: Medication (see MAR)  Patient Stated Pain Goal: 0      Previous Outreach assessment has been reviewed. There have been no significant clinical changes since the completion of the last dated Outreach assessment. Pt in bed, family at bedside. Currently on HFNC 3L, respirations are even and unlabored. No complaints from pt at this time. VS, labs, and progress notes reviewed. Primary RN to call with any concerns. Will continue to follow up per outreach protocol.     Signed By:   Laureen Cary RN    April 11, 2022 8:59 PM

## 2022-04-12 NOTE — PROGRESS NOTES
1248: patient having complaints of 7/10 intermittent sharp, non radiating chest pain. MD, Dr. Jaime Larios paged. Orders received via phone with read back \"1 mg of morphine, once, IVP. \" NO additional orders will continue to monitor. 1: Papi QUINTANILLA paged for Sepsis Alert Notification, no new orders at his time will continue to monitor.

## 2022-04-12 NOTE — PROGRESS NOTES
Problem: Dysphagia (Adult)  Goal: *Acute Goals and Plan of Care (Insert Text)  Note: ST. Pt. Will tolerate soft/bite size diet with thin liquids with no overt s/sx of aspiration/penetration. MET   2. Patient will tolerate regular diet textures without increased mastication time/fatigue to allow for upgrade. Met   3. Pt. Will participate in speech/language/cognitive evaluation with 100% participation if further indicated in plan of care. Discontinued   LTG: Pt. Will tolerate least restrictive diet without respiratory decline. MET      SPEECH LANGUAGE PATHOLOGY: DYSPHAGIA  Daily Note and Discharge     NAME/AGE/GENDER: Helio Ahn is a 31 y.o. male  DATE: 2022  PRIMARY DIAGNOSIS: Asthma exacerbation [J45.901]      ICD-10: Treatment Diagnosis: R13.11 Dysphagia, Oral Phase    RECOMMENDATIONS   DIET:    Regular Consistency   Thin Liquids    MEDICATIONS: With liquid     ASPIRATION PRECAUTIONS  · Slow rate of intake  · Small bites/sips  · Upright at 90 degrees during meal     COMPENSATORY STRATEGIES/MODIFICATIONS  · Small sips and bites  · Slow rate      EDUCATION: recommendations discussed with patient and his wife      CONTINUATION OF SKILLED SERVICES/MEDICAL NECESSITY:   Dysphagia goals met      RECOMMENDATIONS for CONTINUED SPEECH THERAPY: No further speech therapy indicated at this time. ASSESSMENT   Patient presents with functional oropharyngeal swallow. Oral prep mildly increased prep and mild oral residue that he effectively clears with liquid rinse independenty. No overt s/sx aspiration. Recommend regular diet and thin liquids. Set up assist.   No further ST needs at this time. Please re consult if new concerns arise. REHABILITATION POTENTIAL FOR STATED GOALS: Good    PLAN    FREQUENCY/DURATION: No further speech therapy indicated at this time as oropharyngeal swallow function is within normal limits. SUBJECTIVE   Wife present. Alert.  Reports was waiting on PT to move around and work up appetite before eating lunch. History of Present Injury/Illness: Mr. Jodie Cook  has a past medical history of Asthma and Status asthmaticus (4/3/2022). Tanmay Reyna He also  has a past surgical history that includes hx tonsillectomy. Problem List:  (Impairments causing functional limitations):  1. Oral dysphagia     Orientation:   Person  Place  Time  Situation    Pain: Pain Scale 1: Numeric (0 - 10)  Pain Intensity 1: 0  Pain Location 1: Chest  Pain Intervention(s) 1: Medication (see MAR)    OBJECTIVE   Swallow treatment: dysphagia management  Patient consumed thin liquids via cup/straw. Patient continues to eat minimal solids compared to liquids during SLP session. Tolerated a few bites of bite sized meat from meal tray with mildly increased prep and mild oral residue but clears independently with liquid rinse. Impulsive with liquids. No s/sx aspiration across serial gulps. total of 8oz consumed without overt s/sx and Voice remained clear. Significantly dry delayed cough at conclusion of session.        INTERDISCIPLINARY COLLABORATION: Registered Nurse  PRECAUTIONS/ALLERGIES: Singulair [montelukast]      Tool Used: Dysphagia Outcome and Severity Scale (DAYSI)    Score Comments   Normal Diet  [] 7 With no strategies or extra time needed   Functional Swallow  [] 6 May have mild oral or pharyngeal delay   Mild Dysphagia  [x] 5 Which may require one diet consistency restricted    Mild-Moderate Dysphagia  [] 4 With 1-2 diet consistencies restricted   Moderate Dysphagia  [] 3 With 2 or more diet consistencies restricted   Moderate-Severe Dysphagia  [] 2 With partial PO strategies (trials with ST only)   Severe Dysphagia  [] 1 With inability to tolerate any PO safely      Score:  Initial: 5 Most Recent: 6 (Date 04/12/22 )   Interpretation of Tool: The Dysphagia Outcome and Severity Scale (DAYSI) is a simple, easy-to-use, 7-point scale developed to systematically rate the functional severity of dysphagia based on objective assessment and make recommendations for diet level, independence level, and type of nutrition.      SAFETY:  After treatment position/precautions:  · Upright in bed  · wife at bedside  · Call light within reach    Total Treatment Duration:   Time In: 1250  Time Out: [address] [employer], [facility] STEPHANIE JOHNSON, CCC-SLP

## 2022-04-12 NOTE — PROGRESS NOTES
ACUTE OT GOALS:  (Developed with and agreed upon by patient and/or caregiver.)  1. Patient will complete lower body bathing and dressing with Mod I and adaptive equipment as needed. 2. Patient will complete toileting with Mod I.   3. Patient will tolerate 30 minutes of OT treatment with 1 rest break to increase activity tolerance for ADLs. 4. Patient will complete functional transfers with Mod I and adaptive equipment as needed. Timeframe: 7 visits       OCCUPATIONAL THERAPY ASSESSMENT: Initial Assessment and Daily Note OT Treatment Day # 1    Amos Brand is a 31 y.o. male   PRIMARY DIAGNOSIS: Acute respiratory failure with hypoxia and hypercapnia ([facility] [address]  Asthma exacerbation [J45.901]       Reason for Referral:   ICD-10: Treatment Diagnosis: Generalized Muscle Weakness (M62.81)  Other abnormalities of gait and mobility (R26.89)  INPATIENT: Payor: /   ASSESSMENT:     REHAB RECOMMENDATIONS:   Recommendation to date pending progress:  [address] [place] [address]  Equipment:    To Be Determined     PRIOR LEVEL OF FUNCTION:  (Prior to Hospitalization)  INITIAL/CURRENT LEVEL OF FUNCTION:  (Based on today's evaluation)   Bathing:   Modified Independent  Dressing:   Independent  Feeding/Grooming:   Independent  Toileting:   Modified Independent  Functional Mobility:   Independent Bathing:   Minimal Assistance  Dressing:   Moderate Assistance  Feeding/Grooming:  Perlita Henderson Standby Assistance  Toileting:   Contact Guard Assistance  Functional Mobility:   Contact Guard Assistance     ASSESSMENT:  Mr. Mazie Cabot is a 30 y/o M presenting s/p acute respiratory failure. Pt A/O x 3 seated in recliner on entry. Pt denied and SOB, light headedness or dizziness throughout. SpO2 remained >90% on RA throughout. Pt primary deficits presents as decreased BUE str and significant deficits in LB ADLs and activity tolerance compared to PLOF. Pt reports currently having 3 flights of stairs to primary residence.  Pt presents highly motivated and pleasant with strong rehab potential. Pt would benefit from continued skilled OT services for duration of hospital stay and after t/f to next level of care or until all stated goals met. SUBJECTIVE:   Mr. Zayda Harkins states, \"I need to sit\"    SOCIAL HISTORY/LIVING ENVIRONMENT: Pt live in single level home, 3 flights of stairs to enter with rails on boht sides. Tub/shower with grab bars present in bathroom. Pt reports no other DME use prior.    Home Environment: Private residence  # Steps to Enter: 39  One/Two Story Residence: One story  Living Alone: No  Support Systems: Spouse/Significant Other    OBJECTIVE:     PAIN: VITAL SIGNS: LINES/DRAINS:   Pre Treatment: Pain Screen  Pain Scale 1: Numeric (0 - 10)  Pain Intensity 1: 0  Post Treatment: 0 Vital Signs  Pulse (Heart Rate): 95  Heart Rate Source: Monitor  More BP/Pulse rows needed?: Yes  O2 Sat (%): 92 %  O2 Device: None (Room air)  Additional Blood Pressure/Pulse Data  Pulse 2: 116  BP 2: (!) 151/92  MAP 2 (Calculated): 112  BP 2 Location: Left arm  BP Method 2: Automatic  Patient Position 2: Sitting None  O2 Device: None (Room air)     GROSS EVALUATION:  BUE Within Functional Limits Abnormal/ Functional Abnormal/ Non-Functional (see comments) Not Tested Comments:   AROM [] [x] [] [] B/L shoulder restricted at 90 d/t decreased shoulder strength, pt denies any pain or hx of shoulder surgeries or injuries   PROM [] [] [] [x]    Strength [] [x] [] []    Balance [] [x] [] []    Posture [] [x] [] []    Sensation [x] [] [] []    Coordination [x] [] [] []    Tone [] [] [] [x]    Edema [] [] [] [x]    Activity Tolerance [] [x] [] []     [] [] [] []      COGNITION/  PERCEPTION: Intact Impaired   (see comments) Comments:   Orientation [x] []    Vision [x] []    Hearing [x] []    Judgment/ Insight [x] []    Attention [x] []    Memory [x] []    Command Following [x] []    Emotional Regulation [x] []     [] []      ACTIVITIES OF DAILY LIVING: I Mod I S SBA CGA Min Mod Max Total NT Comments   BASIC ADLs:              Bathing/ Showering [] [] [] [] [] [x] [] [] [] [] BUE washing and B/L knee->ankle washing   Toileting [] [] [] [] [x] [] [] [] [] [] With RW   Dressing [] [] [] [] [] [] [x] [] [] [] Doff/don socks   Feeding [] [] [] [] [] [] [] [] [] [x]    Grooming [] [] [] [x] [] [] [] [] [] [] Oral hygiene and face washing   Personal Device Care [] [] [] [] [] [] [] [] [] [x]    Functional Mobility [] [] [] [] [x] [] [] [] [] [] Functional t/fs   I=Independent, Mod I=Modified Independent, S=Supervision, SBA=Standby Assistance, CGA=Contact Guard Assistance,   Min=Minimal Assistance, Mod=Moderate Assistance, Max=Maximal Assistance, Total=Total Assistance, NT=Not Tested    MOBILITY: I Mod I S SBA CGA Min Mod Max Total  NT x2 Comments:   Supine to sit [] [] [] [] [x] [] [] [] [] [] []    Sit to supine [] [] [] [] [x] [] [] [] [] [] []    Sit to stand [] [] [] [] [x] [] [] [] [] [] []    Bed to chair [] [] [] [] [x] [] [] [] [] [] []    I=Independent, Mod I=Modified Independent, S=Supervision, SBA=Standby Assistance, CGA=Contact Guard Assistance,   Min=Minimal Assistance, Mod=Moderate Assistance, Max=Maximal Assistance, Total=Total Assistance, NT=Not Tested    [facility] [address] [id number] AM-PAC 6 Clicks   Daily Activity Inpatient Short Form        How much help from another person does the patient currently need. .. Total A Lot A Little None   1. Putting on and taking off regular lower body clothing? [] 1   [x] 2   [] 3   [] 4   2. Bathing (including washing, rinsing, drying)? [] 1   [] 2   [x] 3   [] 4   3. Toileting, which includes using toilet, bedpan or urinal?   [] 1   [] 2   [x] 3   [] 4   4. Putting on and taking off regular upper body clothing? [] 1   [] 2   [x] 3   [] 4   5. Taking care of personal grooming such as brushing teeth? [] 1   [] 2   [] 3   [x] 4   6. Eating meals?    [] 1   [] 2   [] 3   [x] 4   © 2007, Trustees of [address] [place] [address] [address] [id number], under license to [employer]. All rights reserved     Score:  Initial: 19 Most Recent: X (Date: -- )   Interpretation of Tool:  Represents activities that are increasingly more difficult (i.e. Bed mobility, Transfers, Gait). PLAN:   FREQUENCY/DURATION: OT Plan of Care: 3 times/week for duration of hospital stay or until stated goals are met, whichever comes first.    PROBLEM LIST:   (Skilled intervention is medically necessary to address:)  1. Decreased ADL/Functional Activities  2. Decreased Activity Tolerance  3. Decreased AROM/PROM  4. Decreased Strength  5. Decreased Transfer Abilities   INTERVENTIONS PLANNED:   (Benefits and precautions of occupational therapy have been discussed with the patient.)  1. Self Care Training  2. Therapeutic Activity  3. Therapeutic Exercise/HEP  4. Neuromuscular Re-education  5. Education     TREATMENT:     EVALUATION: Low Complexity : (Untimed Charge)    TREATMENT:   ($$ Self Care/Home Management: 8-22 mins    )  Self Care (20 Minutes): Self care including Upper Body Bathing, Lower Body Bathing, Toileting, Lower Body Dressing, Grooming and Energy Conservation Training to increase independence and decrease level of assistance required.     TREATMENT GRID:  N/A    AFTER TREATMENT POSITION/PRECAUTIONS:  Bed, Needs within reach, RN notified and Pt set up with dinner david DIAMOND entering at time of therapy exit    INTERDISCIPLINARY COLLABORATION:  RN/PCT and OT/GONGORA    TOTAL TREATMENT DURATION:  OT Patient Time In/Time Out  Time In: 1620  Time Out: 1401 [address], OT

## 2022-04-12 NOTE — PROGRESS NOTES
Date of Outreach Update:  Fatoumata Naranjo was seen and assessed. MEWS Score: 3 (04/11/22 2036)  Vitals:    04/11/22 2036 04/11/22 2318 04/12/22 0108 04/12/22 0121   BP: (!) 145/88 (!) 137/93     Pulse: (!) 123 (!) 113     Resp: 20 20     Temp: 98.2 °F (36.8 °C) 98.5 °F (36.9 °C)     SpO2: 96% 96% 94% 94%   Weight:       Height:             Pain Assessment  Pain Intensity 1: 0 (04/11/22 2135)  Pain Location 1: Abdomen  Pain Intervention(s) 1: Medication (see MAR)  Patient Stated Pain Goal: 0      Previous Outreach assessment has been reviewed. There have been no significant clinical changes since the completion of the last dated Outreach assessment. Will continue to follow up per outreach protocol.     Signed By:   Sandra Shah RN    April 12, 2022 4:15 AM

## 2022-04-12 NOTE — PROGRESS NOTES
Hospitalist Progress Note   Admit Date:  4/3/2022 12:32 AM   Name:  Hailey Salinas   Age:  31 y.o. Sex:  male  :  1985   MRN:  [id number]   Room:  721/01    Presenting Complaint: No chief complaint on file. Reason(s) for Admission: Asthma exacerbation [address] [place] [address] Course & Interval History:   Hailey Salinas is a 31 y.o. male with history of obesity and asthma who was admitted with acute respiratory failure due to asthma exacerbation, influenza A, and MSSA pneumonia. Patient presented to ED 4/3 with cc chest tightness and SOB a/w productive cough of yellow sputum of few-day duration. Patient hypoxic, tachycardic, tachypneic on presentation. Also with complaint of left calf pain, LLE ultrasound negative for DVT. D-dimer 0.55. Patient was admitted under pulmonary service to ICU and required intubation and mechanical ventilation 4/3 with subsequent extubation to Airvo . He did require Precedex for agitation. He was transferred to medical floor 4/10. Hospitalist assumed primary care on medical floor  with Pulmonology still following as consultant. Currently, patient on RA with O2 95%, wears BiPAP overnight, need o/p sleep study per pulmonology. PT consults with recommendations for [facility], [facility] evaluating for possible short administrative-approved stay. SLP following, tolerating diet progression. Subjective/24hr Events (22): Patient evaluated resting supine in bed, spouse present at bedside. Denies chest pain and SOB. Anticipating PT this afternoon, tolerating diet progression. He did endorse daily liquor use prior to admission to primary RN today.      Assessment & Plan:     Principal Problem:  Acute respiratory failure with hypoxia and hypercapnia (HCC), improving  -s/p intubation and mechanical ventilation 4/3, extubated to [facility] , since weaned to room air  -Cont BiPAP QHS, needs o/p sleep study  -Pulmonary following  -Cont aggressive pulmonary toilet  -PT/OT evaluations, OOB to chair, mobilize as able  -On rounding today, saturations stable at % on RA     Active Problems:  Severe persistent asthma with exacerbation   -Cont Pulmicort BID, Atrovent Q8H, Xopenex Q4H  -Transitioned from IV to po steroids 4/11, taper over 7-10 days  -Will need ICS/LABA combo at discharge such as Advair, Breo Ellipta, Symbicort     Obesity   -Increases all cause mortality, encourage diet and lifestyle modifications     Influenza A  -s/p Tamiflu 5-day course, cont supportive care and droplet precautions     Staphylococcus aureus pneumonia (HCC)  -Sputum culture 4/4 with heavy SA, received Azithromycin 4/3-4/8, now on Cefazolin per susceptibilities eot 4/12  -Repeat sputum 4/9 with persistent moderate SA, will extend Cefazolin course per susceptibilities     Primary hypertension  -Cont Amlodipine     Leukocytosis, improving  -BCx 4/5 finalized negative, UA 4/5 clear, on Cefazolin for MSSA pneumonia, also on high-dose steroids  -Repeat BCx 4/11 pending, Repeat UA negative, Procal 0.11  -Remains afebrile, extend Cefazolin for persistent MSSA pneumonia, leukocytosis trending down, cont to monitor     Hyperglycemia  Pre-diabetes  -A1C 6.0, on steroid therapy likely driving hyperglycemia  -Decrease Lantus to 15 units daily with steroid weaning  -Cont Humalog SSI ACHS, carb-controlled diet     Discharge Planning:  Pending clinical course, IRC evaluation     Diet:  ADULT ORAL NUTRITION SUPPLEMENT Breakfast, Lunch, Dinner; Diabetic Supplement  ADULT DIET Regular; 4 carb choices (60 gm/meal); SET UP ASSISTANCE, cut into pieces for pt  DVT PPx: Lovenox  Code status: Full Code    Hospital Problems as of 4/12/2022 Never Reviewed          Codes Class Noted - Resolved POA    Pre-diabetes ICD-10-CM: R73.03  ICD-9-CM: 790.29  4/12/2022 - Present Unknown        Influenza A ICD-10-CM: J10.1  ICD-9-CM: 487.1  4/6/2022 - Present Yes        St[facility] pneumonia ([facility] ICD-10-CM: C31.481  ICD-9-CM: 482.41 4/6/2022 - Present Yes        Primary hypertension (Chronic) ICD-10-CM: I10  ICD-9-CM: 401.9  4/6/2022 - Present Yes        Severe persistent asthma with exacerbation ICD-10-CM: J45.51  ICD-9-CM: 493.92  4/3/2022 - Present Yes        Obesity (Chronic) ICD-10-CM: E66.9  ICD-9-CM: 278.00  4/3/2022 - Present Yes        * (Principal) Acute respiratory failure with hypoxia and hypercapnia (HCC) ICD-10-CM: J96.01, J96.02  ICD-9-CM: 518.81  4/3/2022 - Present Yes        RESOLVED: Status asthmaticus ICD-10-CM: J45.902  ICD-9-CM: 493.91  4/3/2022 - 4/11/2022 Unknown              Objective:     Patient Vitals for the past 24 hrs:   Temp Pulse Resp BP SpO2   04/12/22 1356     95 %   04/12/22 1230 97.8 °F (36.6 °C) (!) 111 17 (!) 145/94 100 %   04/12/22 0739 97.2 °F (36.2 °C) (!) 102 17 (!) 143/91 100 %   04/12/22 0719     97 %   04/12/22 0521 98.2 °F (36.8 °C) (!) 114 22 135/86 93 %   04/12/22 0459 97.5 °F (36.4 °C) (!) 110 20 (!) 147/91 96 %   04/12/22 0121     94 %   04/12/22 0108     94 %   04/11/22 2318 98.5 °F (36.9 °C) (!) 113 20 (!) 137/93 96 %   04/11/22 2036 98.2 °F (36.8 °C) (!) 123 20 (!) 145/88 96 %   04/11/22 1949     94 %   04/11/22 1547 98.6 °F (37 °C) (!) 115 18 (!) 145/96 90 %     Oxygen Therapy  O2 Sat (%): 95 % (04/12/22 1356)  Pulse via Oximetry: 105 beats per minute (04/12/22 1356)  O2 Device: None (Room air) (04/12/22 1356)  Skin Assessment: Clean, dry, & intact (04/11/22 2035)  Skin Protection for O2 Device: N/A (04/11/22 2035)  Orientation: Bilateral (04/09/22 0327)  Location: Cheek (04/09/22 0327)  Interventions: Mouth Care (04/09/22 1915)  O2 Flow Rate (L/min): 3 l/min (reduced to 2L) (04/12/22 0719)  O2 Temperature: 87.8 °F (31 °C) (04/10/22 1551)  FIO2 (%): 50 % (04/12/22 0108)    Estimated body mass index is 43.82 kg/m² as calculated from the following:    Height as of this encounter: 5' 9\" (1.753 m). Weight as of this encounter: 134.6 kg (296 lb 11.8 oz).     Intake/Output Summary (Last 24 hours) at 4/12/2022 1531  Last data filed at 4/12/2022 0121  Gross per 24 hour   Intake    Output 575 ml   Net -575 ml         Physical Exam:   Blood pressure (!) 145/94, pulse (!) 111, temperature 97.8 °F (36.6 °C), resp. rate 17, height 5' 9\" (1.753 m), weight 134.6 kg (296 lb 11.8 oz), SpO2 95 %. General:          Well-nourished, no acute distress, alert, calm, conversational.  Head:               Normocephalic, atraumatic  Eyes:               Sclerae appear normal.  Pupils equally round. ENT:                Nares appear normal, no drainage. Moist oral mucosa  Neck:               No restricted ROM. Trachea midline   CV:                  Tachycardic, regular. No m/r/g. No jugular venous distension. Lungs:             Rhonchi throughout all lobes, Respirations even, unlabored on RA  Abdomen: Bowel sounds present. Obese, soft, nontender to palpation, nondistended. Extremities:     No cyanosis or clubbing. No peripheral edema. Skin:                No rashes and normal coloration. Warm and dry. Neuro:             CN II-XII grossly intact. Sensation intact. A&Ox3. Moves all extremities. No focal deficits. Psych:             Normal mood and affect.      I have reviewed ordered lab tests and independently visualized imaging below:    Recent Labs:  Recent Results (from the past 48 hour(s))   GLUCOSE, POC    Collection Time: 04/10/22  5:05 PM   Result Value Ref Range    Glucose (POC) 118 (H) 65 - 100 mg/dL    Performed by [facility]    GLUCOSE, POC    Collection Time: 04/10/22  8:37 PM   Result Value Ref Range    Glucose (POC) 147 (H) 65 - 100 mg/dL    Performed by [facility]    METABOLIC PANEL, BASIC    Collection Time: 04/11/22  4:27 AM   Result Value Ref Range    Sodium 136 (L) 138 - 145 mmol/L    Potassium 4.5 3.5 - 5.1 mmol/L    Chloride 96 (L) 98 - 107 mmol/L    CO2 31 21 - 32 mmol/L    Anion gap 9 7 - 16 mmol/L    Glucose 241 (H) 65 - 100 mg/dL    BUN 29 (H) 6 - 23 MG/DL    Creatinine 1.00 0.8 - 1.5 MG/DL    GFR est AA >60 >60 ml/min/1.73m2    GFR est non-AA >60 >60 ml/min/1.73m2    Calcium 9.8 8.3 - 10.4 MG/DL   CBC W/O DIFF    Collection Time: 04/11/22  4:27 AM   Result Value Ref Range    WBC 23.3 (H) 4.3 - 11.1 K/uL    RBC 5.15 4.23 - 5.6 M/uL    HGB 17.4 (H) 13.6 - 17.2 g/dL    HCT 51.5 (H) 41.1 - 50.3 %    .0 (H) 79.6 - 97.8 FL    MCH 33.8 (H) 26.1 - 32.9 PG    MCHC 33.8 31.4 - 35.0 g/dL    RDW 13.0 11.9 - 14.6 %    PLATELET 888 927 - 273 K/uL    MPV 9.2 (L) 9.4 - 12.3 FL    ABSOLUTE NRBC 0.03 0.0 - 0.2 K/uL   HEMOGLOBIN A1C WITH EAG    Collection Time: 04/11/22  4:27 AM   Result Value Ref Range    Hemoglobin A1c 6.0 4.20 - 6.30 %    Est. average glucose 126 mg/dL   PROCALCITONIN    Collection Time: 04/11/22  4:27 AM   Result Value Ref Range    Procalcitonin 0.11 0.00 - 0.49 ng/mL   GLUCOSE, POC    Collection Time: 04/11/22  6:08 AM   Result Value Ref Range    Glucose (POC) 204 (H) 65 - 100 mg/dL    Performed by [facility]    CULTURE, BLOOD    Collection Time: 04/11/22 10:24 AM    Specimen: Blood   Result Value Ref Range    Special Requests: RIGHT  FOREARM        Culture result: NO GROWTH AFTER 21 HOURS     CULTURE, BLOOD    Collection Time: 04/11/22 10:24 AM    Specimen: Blood   Result Value Ref Range    Special Requests: LEFT  HAND        Culture result: NO GROWTH AFTER 21 HOURS     URINALYSIS W/ RFLX MICROSCOPIC    Collection Time: 04/11/22 10:34 AM   Result Value Ref Range    Color YELLOW      Appearance CLEAR      Specific gravity 1.025 (H) 1.001 - 1.023      pH (UA) 6.0 5.0 - 9.0      Protein TRACE (A) NEG mg/dL    Glucose Negative mg/dL    Ketone TRACE (A) NEG mg/dL    Bilirubin SMALL (A) NEG      Blood TRACE (A) NEG      Urobilinogen 0.2 0.2 - 1.0 EU/dL    Nitrites Negative NEG      Leukocyte Esterase Negative NEG      WBC 0-3 0 /hpf    RBC 3-5 0 /hpf    Epithelial cells 0 0 /hpf    Bacteria 0 0 /hpf    Casts 0 0 /lpf   GLUCOSE, POC    Collection Time: 04/11/22 11:48 AM   Result Value Ref Range    Glucose (POC) 220 (H) 65 - 100 mg/dL    Performed by [facility]na    COVID-19 RAPID TEST    Collection Time: 04/11/22  1:59 PM   Result Value Ref Range    Specimen source Nasopharyngeal      COVID-19 rapid test Not detected NOTD     GLUCOSE, POC    Collection Time: 04/11/22  3:42 PM   Result Value Ref Range    Glucose (POC) 141 (H) 65 - 100 mg/dL    Performed by Alvaro    GLUCOSE, POC    Collection Time: 04/11/22  9:26 PM   Result Value Ref Range    Glucose (POC) 137 (H) 65 - 100 mg/dL    Performed by [employer]    METABOLIC PANEL, BASIC    Collection Time: 04/12/22  3:44 AM   Result Value Ref Range    Sodium 136 136 - 145 mmol/L    Potassium 4.0 3.5 - 5.1 mmol/L    Chloride 97 (L) 98 - 107 mmol/L    CO2 31 21 - 32 mmol/L    Anion gap 8 7 - 16 mmol/L    Glucose 129 (H) 65 - 100 mg/dL    BUN 28 (H) 6 - 23 MG/DL    Creatinine 0.80 0.8 - 1.5 MG/DL    GFR est AA >60 >60 ml/min/1.73m2    GFR est non-AA >60 >60 ml/min/1.73m2    Calcium 9.5 8.3 - 10.4 MG/DL   CBC WITH AUTOMATED DIFF    Collection Time: 04/12/22  3:44 AM   Result Value Ref Range    WBC 19.0 (H) 4.3 - 11.1 K/uL    RBC 5.09 4.23 - 5.6 M/uL    HGB 16.9 13.6 - 17.2 g/dL    HCT 50.4 (H) 41.1 - 50.3 %    MCV 99.0 (H) 79.6 - 97.8 FL    MCH 33.2 (H) 26.1 - 32.9 PG    MCHC 33.5 31.4 - 35.0 g/dL    RDW 12.7 11.9 - 14.6 %    PLATELET 761 004 - 532 K/uL    MPV 9.2 (L) 9.4 - 12.3 FL    ABSOLUTE NRBC 0.00 0.0 - 0.2 K/uL    NEUTROPHILS 56 43 - 78 %    LYMPHOCYTES 23 13 - 44 %    MONOCYTES 13 (H) 4.0 - 12.0 %    EOSINOPHILS 1 0.5 - 7.8 %    BASOPHILS 1 0.0 - 2.0 %    IMMATURE GRANULOCYTES 6 (H) 0.0 - 5.0 %    ABS. NEUTROPHILS 10.6 (H) 1.7 - 8.2 K/UL    ABS. LYMPHOCYTES 4.4 0.5 - 4.6 K/UL    ABS. MONOCYTES 2.5 (H) 0.1 - 1.3 K/UL    ABS. EOSINOPHILS 0.2 0.0 - 0.8 K/UL    ABS. BASOPHILS 0.2 0.0 - 0.2 K/UL    ABS. IMM. GRANS.  1.1 (H) 0.0 - 0.5 K/UL    RBC COMMENTS NORMOCYTIC/NORMOCHROMIC WBC COMMENTS OCCASIONAL      PLATELET COMMENTS ADEQUATE      DF AUTOMATED     GLUCOSE, POC    Collection Time: 04/12/22  6:24 AM   Result Value Ref Range    Glucose (POC) 118 (H) 65 - 100 mg/dL    [facility]    GLUCOSE, POC    Collection Time: 04/12/22 12:22 PM   Result Value Ref Range    Glucose (POC) 190 (H) 65 - 100 mg/dL    Performed by [facility]        All Micro Results     Procedure Component Value Units Date/Time    CULTURE, BLOOD [[id number]] Collected: 04/11/22 1024    Order Status: Completed Specimen: Blood Updated: 04/12/22 0808     Special Requests: --        RIGHT  FOREARM       Culture result: NO GROWTH AFTER 21 HOURS       CULTURE, BLOOD [[id number]] Collected: 04/11/22 1024    Order Status: Completed Specimen: Blood Updated: 04/12/22 0808     Special Requests: --        LEFT  HAND       Culture result: NO GROWTH AFTER 21 HOURS       CULTURE, RESPIRATORY/SPUTUM/BRONCH Alia Mile STAIN [[id number]]  (Abnormal)  (Susceptibility) Collected: 04/09/22 0832    Order Status: Completed Specimen: Sputum,ET Suction Updated: 04/12/22 0635     Special Requests: NO SPECIAL REQUESTS        GRAM STAIN 50  WBC'S SEEN PER OIF      NO EPITHELIAL CELLS SEEN         FEW GRAM POSITIVE COCCI         FEW GRAM POSITIVE RODS         4+ MUCUS PRESENT        Culture result:       MODERATE STAPHYLOCOCCUS AUREUS                  MODERATE NORMAL RESPIRATORY ALYCIA          COVID-19 RAPID TEST [[id number]] Collected: 04/11/22 1359    Order Status: Completed Specimen: Nasopharyngeal Updated: 04/11/22 1426     Specimen source Nasopharyngeal        COVID-19 rapid test Not detected        Comment:      The specimen is NEGATIVE for SARS-CoV-2, the novel coronavirus associated with COVID-19. A negative result does not rule out COVID-19. This test has been authorized by the FDA under an Emergency Use Authorization (EUA) for use by authorized laboratories.         Fact sheet for Healthcare Providers: [employer]Update[employer].  Fact sheet for Patients: ConventionUpdate.co.nz       Methodology: Isothermal Nucleic Acid Amplification         CULTURE, BLOOD [[id number]] Collected: 04/05/22 0947    Order Status: Completed Specimen: Blood Updated: 04/10/22 0657     Special Requests: --        LEFT  FOREARM       Culture result: NO GROWTH 5 DAYS       CULTURE, BLOOD [[id number]] Collected: 04/05/22 1103    Order Status: Completed Specimen: Blood Updated: 04/10/22 0657     Special Requests: --        LEFT  FOREARM       Culture result: NO GROWTH 5 DAYS       CULTURE, RESPIRATORY/SPUTUM/BRONCH Josepha Shearing STAIN [[id number]]  (Abnormal)  (Susceptibility) Collected: 04/04/22 1207    Order Status: Completed Specimen: Sputum,ET Suction Updated: 04/07/22 0649     Special Requests: NO SPECIAL REQUESTS        GRAM STAIN 2 TO 7 WBCS SEEN PER OIF      NO EPITHELIAL CELLS SEEN         FEW GRAM POSITIVE COCCI         NO MUCUS PRESENT        Culture result:       HEAVY STAPHYLOCOCCUS AUREUS                  MODERATE NORMAL RESPIRATORY ALYCIA          RESPIRATORY VIRUS PANEL W/COVID-19, PCR [[id number]]  (Abnormal) Collected: 04/05/22 0837    Order Status: Completed Specimen: Nasopharyngeal Updated: 04/05/22 1021     Adenovirus NOT DETECTED        Coronavirus 229E NOT DETECTED        Coronavirus HKU1 NOT DETECTED        Coronavirus CVNL63 NOT DETECTED        Coronavirus OC43 NOT DETECTED        SARS-CoV-2, PCR NOT DETECTED        Metapneumovirus NOT DETECTED        Rhinovirus and Enterovirus NOT DETECTED        Influenza A, subtype H3 Detected        Influenza B NOT DETECTED        Parainfluenza 1 NOT DETECTED        Parainfluenza 2 NOT DETECTED        Parainfluenza 3 NOT DETECTED        Parainfluenza virus 4 NOT DETECTED        RSV by PCR NOT DETECTED        B. parapertussis, PCR NOT DETECTED        Bordetella pertussis - PCR NOT DETECTED        Chlamydophila pneumoniae DNA, QL, PCR NOT DETECTED Mycoplasma pneumoniae DNA, QL, PCR NOT DETECTED       MSSA/MRSA SC BY PCR, NASAL SWAB [[id number]]     Order Status: Canceled Specimen: Nasal swab           Other Studies:  No results found.     Current Meds:  Current Facility-Administered Medications   Medication Dose Route Frequency    insulin glargine (LANTUS) injection 15 Units  15 Units SubCUTAneous DAILY    ceFAZolin (ANCEF) 2 g/20 mL in sterile water IV syringe  2 g IntraVENous Q8H    albuterol (PROVENTIL VENTOLIN) nebulizer solution 2.5 mg  2.5 mg Nebulization Q6H RT    predniSONE (DELTASONE) tablet 40 mg  40 mg Oral DAILY WITH BREAKFAST    amLODIPine (NORVASC) tablet 10 mg  10 mg Oral DAILY    polyethylene glycol (MIRALAX) packet 17 g  17 g Oral DAILY    senna-docusate (PERICOLACE) 8.6-50 mg per tablet 2 Tablet  2 Tablet Oral QHS    insulin lispro (HUMALOG) injection   SubCUTAneous AC&HS    labetaloL (NORMODYNE;TRANDATE) injection 20 mg  20 mg IntraVENous Q4H PRN    bisacodyL (DULCOLAX) suppository 10 mg  10 mg Rectal DAILY PRN    hydrALAZINE (APRESOLINE) 20 mg/mL injection 20 mg  20 mg IntraVENous Q8H PRN    NUTRITIONAL SUPPORT ELECTROLYTE PRN ORDERS   Does Not Apply PRN    acetaminophen (TYLENOL) tablet 650 mg  650 mg Per NG tube Q6H PRN    Or    acetaminophen (TYLENOL) suppository 650 mg  650 mg Rectal Q6H PRN    sodium chloride (NS) flush 5-40 mL  5-40 mL IntraVENous Q8H    sodium chloride (NS) flush 5-40 mL  5-40 mL IntraVENous PRN    ondansetron (ZOFRAN ODT) tablet 4 mg  4 mg Oral Q8H PRN    Or    ondansetron (ZOFRAN) injection 4 mg  4 mg IntraVENous Q6H PRN    albuterol (PROVENTIL VENTOLIN) nebulizer solution 2.5 mg  2.5 mg Nebulization Q4H PRN    budesonide (PULMICORT) 500 mcg/2 ml nebulizer suspension  500 mcg Nebulization BID RT    enoxaparin (LOVENOX) injection 40 mg  40 mg SubCUTAneous Q12H    levalbuterol (XOPENEX) nebulizer soln 1.25 mg/3 mL  1.25 mg Nebulization Q6H PRN    sodium chloride (NS) flush 30 mL  30 mL InterCATHeter DAILY    sodium chloride (NS) flush 30 mL  30 mL InterCATHeter PRN       Signed:  Makayla Wolfe NP    Part of this note may have been written by using a voice dictation software. The note has been proof read but may still contain some grammatical/other typographical errors.

## 2022-04-13 ENCOUNTER — HOSPITAL ENCOUNTER (INPATIENT)
Age: 37
LOS: 5 days | Discharge: HOME HEALTH | DRG: 947 | End: 2022-04-18
Attending: PHYSICAL MEDICINE & REHABILITATION | Admitting: PHYSICAL MEDICINE & REHABILITATION
Payer: COMMERCIAL

## 2022-04-13 VITALS
WEIGHT: 296.74 LBS | HEIGHT: 69 IN | BODY MASS INDEX: 43.95 KG/M2 | DIASTOLIC BLOOD PRESSURE: 90 MMHG | TEMPERATURE: 98.3 F | HEART RATE: 101 BPM | SYSTOLIC BLOOD PRESSURE: 137 MMHG | OXYGEN SATURATION: 92 % | RESPIRATION RATE: 18 BRPM

## 2022-04-13 DIAGNOSIS — J10.1 INFLUENZA A: ICD-10-CM

## 2022-04-13 DIAGNOSIS — R53.81 PHYSICAL DEBILITY: ICD-10-CM

## 2022-04-13 DIAGNOSIS — J96.02 ACUTE RESPIRATORY FAILURE WITH HYPOXIA AND HYPERCAPNIA (HCC): ICD-10-CM

## 2022-04-13 DIAGNOSIS — I10 PRIMARY HYPERTENSION: Chronic | ICD-10-CM

## 2022-04-13 DIAGNOSIS — J15.211 STAPHYLOCOCCUS AUREUS PNEUMONIA (HCC): ICD-10-CM

## 2022-04-13 DIAGNOSIS — J96.01 ACUTE RESPIRATORY FAILURE WITH HYPOXIA AND HYPERCAPNIA (HCC): ICD-10-CM

## 2022-04-13 DIAGNOSIS — E66.01 CLASS 3 SEVERE OBESITY WITH SERIOUS COMORBIDITY AND BODY MASS INDEX (BMI) OF 45.0 TO 49.9 IN ADULT, UNSPECIFIED OBESITY TYPE (HCC): Chronic | ICD-10-CM

## 2022-04-13 DIAGNOSIS — J45.51 SEVERE PERSISTENT ASTHMA WITH EXACERBATION: ICD-10-CM

## 2022-04-13 DIAGNOSIS — R73.03 PRE-DIABETES: ICD-10-CM

## 2022-04-13 LAB
ANION GAP SERPL CALC-SCNC: 7 MMOL/L (ref 7–16)
BASOPHILS # BLD: 0.1 K/UL (ref 0–0.2)
BASOPHILS NFR BLD: 0 % (ref 0–2)
BUN SERPL-MCNC: 26 MG/DL (ref 6–23)
CALCIUM SERPL-MCNC: 9.3 MG/DL (ref 8.3–10.4)
CHLORIDE SERPL-SCNC: 95 MMOL/L (ref 98–107)
CO2 SERPL-SCNC: 32 MMOL/L (ref 21–32)
CREAT SERPL-MCNC: 1 MG/DL (ref 0.8–1.5)
DIFFERENTIAL METHOD BLD: ABNORMAL
EOSINOPHIL # BLD: 0.2 K/UL (ref 0–0.8)
EOSINOPHIL NFR BLD: 1 % (ref 0.5–7.8)
ERYTHROCYTE [DISTWIDTH] IN BLOOD BY AUTOMATED COUNT: 12.3 % (ref 11.9–14.6)
GLUCOSE BLD STRIP.AUTO-MCNC: 112 MG/DL (ref 65–100)
GLUCOSE BLD STRIP.AUTO-MCNC: 145 MG/DL (ref 65–100)
GLUCOSE BLD STRIP.AUTO-MCNC: 147 MG/DL (ref 65–100)
GLUCOSE BLD STRIP.AUTO-MCNC: 171 MG/DL (ref 65–100)
GLUCOSE SERPL-MCNC: 132 MG/DL (ref 65–100)
HCT VFR BLD AUTO: 48.4 % (ref 41.1–50.3)
HGB BLD-MCNC: 16.3 G/DL (ref 13.6–17.2)
IMM GRANULOCYTES # BLD AUTO: 0.8 K/UL (ref 0–0.5)
IMM GRANULOCYTES NFR BLD AUTO: 4 % (ref 0–5)
LYMPHOCYTES # BLD: 4.7 K/UL (ref 0.5–4.6)
LYMPHOCYTES NFR BLD: 23 % (ref 13–44)
MCH RBC QN AUTO: 33.5 PG (ref 26.1–32.9)
MCHC RBC AUTO-ENTMCNC: 33.7 G/DL (ref 31.4–35)
MCV RBC AUTO: 99.6 FL (ref 79.6–97.8)
MONOCYTES # BLD: 2.5 K/UL (ref 0.1–1.3)
MONOCYTES NFR BLD: 12 % (ref 4–12)
NEUTS SEG # BLD: 12.1 K/UL (ref 1.7–8.2)
NEUTS SEG NFR BLD: 60 % (ref 43–78)
NRBC # BLD: 0 K/UL (ref 0–0.2)
PLATELET # BLD AUTO: 290 K/UL (ref 150–450)
PMV BLD AUTO: 9.8 FL (ref 9.4–12.3)
POTASSIUM SERPL-SCNC: 4 MMOL/L (ref 3.5–5.1)
RBC # BLD AUTO: 4.86 M/UL (ref 4.23–5.6)
SERVICE CMNT-IMP: ABNORMAL
SODIUM SERPL-SCNC: 134 MMOL/L (ref 136–145)
WBC # BLD AUTO: 20.3 K/UL (ref 4.3–11.1)

## 2022-04-13 PROCEDURE — 74011636637 HC RX REV CODE- 636/637: Performed by: NURSE PRACTITIONER

## 2022-04-13 PROCEDURE — 74011000250 HC RX REV CODE- 250: Performed by: PHYSICAL MEDICINE & REHABILITATION

## 2022-04-13 PROCEDURE — 74011250636 HC RX REV CODE- 250/636: Performed by: NURSE PRACTITIONER

## 2022-04-13 PROCEDURE — 36592 COLLECT BLOOD FROM PICC: CPT

## 2022-04-13 PROCEDURE — 74011250637 HC RX REV CODE- 250/637: Performed by: PHYSICAL MEDICINE & REHABILITATION

## 2022-04-13 PROCEDURE — 94640 AIRWAY INHALATION TREATMENT: CPT

## 2022-04-13 PROCEDURE — 85025 COMPLETE CBC W/AUTO DIFF WBC: CPT

## 2022-04-13 PROCEDURE — 74011250637 HC RX REV CODE- 250/637: Performed by: INTERNAL MEDICINE

## 2022-04-13 PROCEDURE — 74011250636 HC RX REV CODE- 250/636: Performed by: HOSPITALIST

## 2022-04-13 PROCEDURE — 74011250637 HC RX REV CODE- 250/637: Performed by: FAMILY MEDICINE

## 2022-04-13 PROCEDURE — 99223 1ST HOSP IP/OBS HIGH 75: CPT | Performed by: PHYSICAL MEDICINE & REHABILITATION

## 2022-04-13 PROCEDURE — 94760 N-INVAS EAR/PLS OXIMETRY 1: CPT

## 2022-04-13 PROCEDURE — 2709999900 HC NON-CHARGEABLE SUPPLY

## 2022-04-13 PROCEDURE — 74011000250 HC RX REV CODE- 250: Performed by: NURSE PRACTITIONER

## 2022-04-13 PROCEDURE — 74011000250 HC RX REV CODE- 250: Performed by: INTERNAL MEDICINE

## 2022-04-13 PROCEDURE — 80048 BASIC METABOLIC PNL TOTAL CA: CPT

## 2022-04-13 PROCEDURE — 74011250636 HC RX REV CODE- 250/636: Performed by: PHYSICAL MEDICINE & REHABILITATION

## 2022-04-13 PROCEDURE — 74011000250 HC RX REV CODE- 250: Performed by: FAMILY MEDICINE

## 2022-04-13 PROCEDURE — 82962 GLUCOSE BLOOD TEST: CPT

## 2022-04-13 PROCEDURE — 65310000000 HC RM PRIVATE REHAB

## 2022-04-13 PROCEDURE — 74011000250 HC RX REV CODE- 250: Performed by: HOSPITALIST

## 2022-04-13 PROCEDURE — 74011636637 HC RX REV CODE- 636/637: Performed by: STUDENT IN AN ORGANIZED HEALTH CARE EDUCATION/TRAINING PROGRAM

## 2022-04-13 PROCEDURE — 74011636637 HC RX REV CODE- 636/637: Performed by: INTERNAL MEDICINE

## 2022-04-13 RX ORDER — ONDANSETRON 2 MG/ML
4 INJECTION INTRAMUSCULAR; INTRAVENOUS
Status: CANCELLED | OUTPATIENT
Start: 2022-04-13

## 2022-04-13 RX ORDER — LEVALBUTEROL INHALATION SOLUTION 1.25 MG/3ML
1.25 SOLUTION RESPIRATORY (INHALATION)
Status: CANCELLED | OUTPATIENT
Start: 2022-04-13

## 2022-04-13 RX ORDER — LORAZEPAM 0.5 MG/1
0.5 TABLET ORAL
Status: DISCONTINUED | OUTPATIENT
Start: 2022-04-13 | End: 2022-04-13 | Stop reason: SDUPTHER

## 2022-04-13 RX ORDER — SODIUM CHLORIDE 0.9 % (FLUSH) 0.9 %
30 SYRINGE (ML) INJECTION DAILY
Status: DISCONTINUED | OUTPATIENT
Start: 2022-04-14 | End: 2022-04-18 | Stop reason: HOSPADM

## 2022-04-13 RX ORDER — ALBUTEROL SULFATE 0.83 MG/ML
2.5 SOLUTION RESPIRATORY (INHALATION) EVERY 6 HOURS
Status: DISCONTINUED | OUTPATIENT
Start: 2022-04-14 | End: 2022-04-18 | Stop reason: HOSPADM

## 2022-04-13 RX ORDER — SODIUM CHLORIDE 0.9 % (FLUSH) 0.9 %
5-40 SYRINGE (ML) INJECTION AS NEEDED
Status: DISCONTINUED | OUTPATIENT
Start: 2022-04-13 | End: 2022-04-18 | Stop reason: HOSPADM

## 2022-04-13 RX ORDER — ONDANSETRON 4 MG/1
4 TABLET, ORALLY DISINTEGRATING ORAL
Status: CANCELLED | OUTPATIENT
Start: 2022-04-13

## 2022-04-13 RX ORDER — ALBUTEROL SULFATE 0.83 MG/ML
2.5 SOLUTION RESPIRATORY (INHALATION)
Status: DISCONTINUED | OUTPATIENT
Start: 2022-04-13 | End: 2022-04-13 | Stop reason: SDUPTHER

## 2022-04-13 RX ORDER — SODIUM CHLORIDE 0.9 % (FLUSH) 0.9 %
5-40 SYRINGE (ML) INJECTION EVERY 8 HOURS
Status: CANCELLED | OUTPATIENT
Start: 2022-04-13

## 2022-04-13 RX ORDER — BUDESONIDE 0.5 MG/2ML
500 INHALANT ORAL EVERY 6 HOURS
Status: DISCONTINUED | OUTPATIENT
Start: 2022-04-14 | End: 2022-04-15

## 2022-04-13 RX ORDER — HYDRALAZINE HYDROCHLORIDE 50 MG/1
25 TABLET, FILM COATED ORAL
Status: DISCONTINUED | OUTPATIENT
Start: 2022-04-13 | End: 2022-04-18 | Stop reason: HOSPADM

## 2022-04-13 RX ORDER — CEFAZOLIN SODIUM/WATER 2 G/20 ML
2 SYRINGE (ML) INTRAVENOUS EVERY 8 HOURS
Status: CANCELLED | OUTPATIENT
Start: 2022-04-13 | End: 2022-04-14

## 2022-04-13 RX ORDER — INSULIN LISPRO 100 [IU]/ML
0-10 INJECTION, SOLUTION INTRAVENOUS; SUBCUTANEOUS
Qty: 1.2 ML | Refills: 0 | Status: SHIPPED
Start: 2022-04-13 | End: 2022-04-18

## 2022-04-13 RX ORDER — PREDNISONE 20 MG/1
40 TABLET ORAL
Status: CANCELLED | OUTPATIENT
Start: 2022-04-14

## 2022-04-13 RX ORDER — BUDESONIDE 0.5 MG/2ML
500 INHALANT ORAL
Status: CANCELLED | OUTPATIENT
Start: 2022-04-13

## 2022-04-13 RX ORDER — ACETAMINOPHEN 325 MG/1
650 TABLET ORAL
Status: CANCELLED | OUTPATIENT
Start: 2022-04-13

## 2022-04-13 RX ORDER — SODIUM CHLORIDE 0.9 % (FLUSH) 0.9 %
30 SYRINGE (ML) INJECTION AS NEEDED
Status: DISCONTINUED | OUTPATIENT
Start: 2022-04-13 | End: 2022-04-18 | Stop reason: HOSPADM

## 2022-04-13 RX ORDER — ENOXAPARIN SODIUM 100 MG/ML
40 INJECTION SUBCUTANEOUS EVERY 12 HOURS
Status: CANCELLED | OUTPATIENT
Start: 2022-04-13

## 2022-04-13 RX ORDER — POLYETHYLENE GLYCOL 3350 17 G/17G
17 POWDER, FOR SOLUTION ORAL DAILY
Status: CANCELLED | OUTPATIENT
Start: 2022-04-14

## 2022-04-13 RX ORDER — SODIUM CHLORIDE 0.9 % (FLUSH) 0.9 %
30 SYRINGE (ML) INJECTION DAILY
Status: CANCELLED | OUTPATIENT
Start: 2022-04-14

## 2022-04-13 RX ORDER — AMLODIPINE BESYLATE 10 MG/1
10 TABLET ORAL DAILY
Status: DISCONTINUED | OUTPATIENT
Start: 2022-04-14 | End: 2022-04-18 | Stop reason: HOSPADM

## 2022-04-13 RX ORDER — POLYETHYLENE GLYCOL 3350 17 G/17G
17 POWDER, FOR SOLUTION ORAL DAILY
Status: DISCONTINUED | OUTPATIENT
Start: 2022-04-14 | End: 2022-04-18 | Stop reason: HOSPADM

## 2022-04-13 RX ORDER — TRAZODONE HYDROCHLORIDE 50 MG/1
100 TABLET ORAL
Status: CANCELLED | OUTPATIENT
Start: 2022-04-13

## 2022-04-13 RX ORDER — TRAZODONE HYDROCHLORIDE 50 MG/1
100 TABLET ORAL
Status: DISCONTINUED | OUTPATIENT
Start: 2022-04-13 | End: 2022-04-18 | Stop reason: HOSPADM

## 2022-04-13 RX ORDER — ACETAMINOPHEN 650 MG/1
650 SUPPOSITORY RECTAL
Status: DISCONTINUED | OUTPATIENT
Start: 2022-04-13 | End: 2022-04-13 | Stop reason: HOSPADM

## 2022-04-13 RX ORDER — SODIUM CHLORIDE 0.9 % (FLUSH) 0.9 %
5-40 SYRINGE (ML) INJECTION EVERY 8 HOURS
Status: DISCONTINUED | OUTPATIENT
Start: 2022-04-13 | End: 2022-04-18 | Stop reason: HOSPADM

## 2022-04-13 RX ORDER — INSULIN LISPRO 100 [IU]/ML
INJECTION, SOLUTION INTRAVENOUS; SUBCUTANEOUS
Status: CANCELLED | OUTPATIENT
Start: 2022-04-13

## 2022-04-13 RX ORDER — ENOXAPARIN SODIUM 100 MG/ML
40 INJECTION SUBCUTANEOUS EVERY 12 HOURS
Status: DISCONTINUED | OUTPATIENT
Start: 2022-04-13 | End: 2022-04-16

## 2022-04-13 RX ORDER — ONDANSETRON 4 MG/1
4 TABLET, ORALLY DISINTEGRATING ORAL
Status: DISCONTINUED | OUTPATIENT
Start: 2022-04-13 | End: 2022-04-18 | Stop reason: HOSPADM

## 2022-04-13 RX ORDER — ALBUTEROL SULFATE 0.83 MG/ML
2.5 SOLUTION RESPIRATORY (INHALATION)
Status: CANCELLED | OUTPATIENT
Start: 2022-04-13

## 2022-04-13 RX ORDER — INSULIN LISPRO 100 [IU]/ML
0-10 INJECTION, SOLUTION INTRAVENOUS; SUBCUTANEOUS
Status: DISCONTINUED | OUTPATIENT
Start: 2022-04-13 | End: 2022-04-16

## 2022-04-13 RX ORDER — INSULIN GLARGINE 100 [IU]/ML
15 INJECTION, SOLUTION SUBCUTANEOUS DAILY
Status: DISCONTINUED | OUTPATIENT
Start: 2022-04-14 | End: 2022-04-15

## 2022-04-13 RX ORDER — PREDNISONE 10 MG/1
40 TABLET ORAL
Status: DISCONTINUED | OUTPATIENT
Start: 2022-04-14 | End: 2022-04-18 | Stop reason: HOSPADM

## 2022-04-13 RX ORDER — LORAZEPAM 0.5 MG/1
0.5 TABLET ORAL
Status: CANCELLED | OUTPATIENT
Start: 2022-04-13 | End: 2022-04-14

## 2022-04-13 RX ORDER — SODIUM CHLORIDE 0.9 % (FLUSH) 0.9 %
5-40 SYRINGE (ML) INJECTION AS NEEDED
Status: CANCELLED | OUTPATIENT
Start: 2022-04-13

## 2022-04-13 RX ORDER — ONDANSETRON 2 MG/ML
4 INJECTION INTRAMUSCULAR; INTRAVENOUS
Status: DISCONTINUED | OUTPATIENT
Start: 2022-04-13 | End: 2022-04-18 | Stop reason: HOSPADM

## 2022-04-13 RX ORDER — AMOXICILLIN 250 MG
2 CAPSULE ORAL
Status: CANCELLED | OUTPATIENT
Start: 2022-04-13

## 2022-04-13 RX ORDER — TRAZODONE HYDROCHLORIDE 100 MG/1
100 TABLET ORAL
Qty: 7 TABLET | Refills: 0 | Status: ON HOLD
Start: 2022-04-13 | End: 2022-04-18 | Stop reason: SDUPTHER

## 2022-04-13 RX ORDER — INSULIN GLARGINE 100 [IU]/ML
15 INJECTION, SOLUTION SUBCUTANEOUS DAILY
Status: CANCELLED | OUTPATIENT
Start: 2022-04-14

## 2022-04-13 RX ORDER — ACETAMINOPHEN 325 MG/1
650 TABLET ORAL
Status: DISCONTINUED | OUTPATIENT
Start: 2022-04-13 | End: 2022-04-18 | Stop reason: HOSPADM

## 2022-04-13 RX ORDER — PREDNISONE 10 MG/1
TABLET ORAL
Qty: 18 TABLET | Refills: 0 | Status: SHIPPED
Start: 2022-04-14 | End: 2022-04-18

## 2022-04-13 RX ORDER — ALBUTEROL SULFATE 0.83 MG/ML
2.5 SOLUTION RESPIRATORY (INHALATION)
Status: DISCONTINUED | OUTPATIENT
Start: 2022-04-13 | End: 2022-04-18 | Stop reason: HOSPADM

## 2022-04-13 RX ORDER — BUDESONIDE 0.5 MG/2ML
500 INHALANT ORAL
Status: DISCONTINUED | OUTPATIENT
Start: 2022-04-13 | End: 2022-04-13

## 2022-04-13 RX ORDER — CEFAZOLIN SODIUM/WATER 2 G/20 ML
2 SYRINGE (ML) INTRAVENOUS EVERY 8 HOURS
Status: COMPLETED | OUTPATIENT
Start: 2022-04-13 | End: 2022-04-18

## 2022-04-13 RX ORDER — AMLODIPINE BESYLATE 10 MG/1
10 TABLET ORAL DAILY
Qty: 7 TABLET | Refills: 0 | Status: SHIPPED
Start: 2022-04-14 | End: 2022-04-18

## 2022-04-13 RX ORDER — HYDROXYZINE PAMOATE 50 MG/1
50 CAPSULE ORAL
Status: CANCELLED | OUTPATIENT
Start: 2022-04-13

## 2022-04-13 RX ORDER — ALBUTEROL SULFATE 0.83 MG/ML
2.5 SOLUTION RESPIRATORY (INHALATION)
Status: DISCONTINUED | OUTPATIENT
Start: 2022-04-13 | End: 2022-04-13

## 2022-04-13 RX ORDER — AMOXICILLIN 250 MG
2 CAPSULE ORAL
Status: DISCONTINUED | OUTPATIENT
Start: 2022-04-13 | End: 2022-04-18 | Stop reason: HOSPADM

## 2022-04-13 RX ORDER — AMLODIPINE BESYLATE 10 MG/1
10 TABLET ORAL DAILY
Status: CANCELLED | OUTPATIENT
Start: 2022-04-14

## 2022-04-13 RX ORDER — LEVALBUTEROL INHALATION SOLUTION 1.25 MG/3ML
1.25 SOLUTION RESPIRATORY (INHALATION)
Status: DISCONTINUED | OUTPATIENT
Start: 2022-04-13 | End: 2022-04-13 | Stop reason: CLARIF

## 2022-04-13 RX ORDER — BUDESONIDE 0.5 MG/2ML
500 INHALANT ORAL 2 TIMES DAILY
Qty: 14 EACH | Refills: 0 | Status: SHIPPED
Start: 2022-04-13 | End: 2022-04-18

## 2022-04-13 RX ORDER — FACIAL-BODY WIPES
10 EACH TOPICAL DAILY PRN
Status: DISCONTINUED | OUTPATIENT
Start: 2022-04-13 | End: 2022-04-18 | Stop reason: HOSPADM

## 2022-04-13 RX ORDER — SODIUM CHLORIDE 0.9 % (FLUSH) 0.9 %
30 SYRINGE (ML) INJECTION AS NEEDED
Status: CANCELLED | OUTPATIENT
Start: 2022-04-13

## 2022-04-13 RX ORDER — INSULIN GLARGINE 100 [IU]/ML
10 INJECTION, SOLUTION SUBCUTANEOUS DAILY
Qty: 0.3 ML | Refills: 0 | Status: SHIPPED
Start: 2022-04-13 | End: 2022-04-18

## 2022-04-13 RX ORDER — ACETAMINOPHEN 650 MG/1
650 SUPPOSITORY RECTAL
Status: CANCELLED | OUTPATIENT
Start: 2022-04-13

## 2022-04-13 RX ORDER — ALBUTEROL SULFATE 0.83 MG/ML
2.5 SOLUTION RESPIRATORY (INHALATION) EVERY 6 HOURS
Qty: 28 EACH | Refills: 0 | Status: SHIPPED
Start: 2022-04-13 | End: 2022-04-18

## 2022-04-13 RX ORDER — ACETAMINOPHEN 325 MG/1
650 TABLET ORAL
Status: DISCONTINUED | OUTPATIENT
Start: 2022-04-13 | End: 2022-04-13 | Stop reason: HOSPADM

## 2022-04-13 RX ORDER — CEFAZOLIN SODIUM/WATER 2 G/20 ML
2 SYRINGE (ML) INTRAVENOUS EVERY 8 HOURS
Qty: 60 ML | Refills: 0 | Status: SHIPPED
Start: 2022-04-13 | End: 2022-04-18

## 2022-04-13 RX ORDER — FACIAL-BODY WIPES
10 EACH TOPICAL DAILY PRN
Status: CANCELLED | OUTPATIENT
Start: 2022-04-13

## 2022-04-13 RX ORDER — HYDROXYZINE PAMOATE 25 MG/1
50 CAPSULE ORAL
Status: DISCONTINUED | OUTPATIENT
Start: 2022-04-13 | End: 2022-04-18 | Stop reason: HOSPADM

## 2022-04-13 RX ORDER — ACETAMINOPHEN 650 MG/1
650 SUPPOSITORY RECTAL
Status: DISCONTINUED | OUTPATIENT
Start: 2022-04-13 | End: 2022-04-18 | Stop reason: HOSPADM

## 2022-04-13 RX ADMIN — CEFAZOLIN SODIUM 2 G: 100 INJECTION, POWDER, LYOPHILIZED, FOR SOLUTION INTRAVENOUS at 17:17

## 2022-04-13 RX ADMIN — TRAZODONE HYDROCHLORIDE 100 MG: 50 TABLET ORAL at 22:04

## 2022-04-13 RX ADMIN — SODIUM CHLORIDE, PRESERVATIVE FREE 10 ML: 5 INJECTION INTRAVENOUS at 14:37

## 2022-04-13 RX ADMIN — PREDNISONE 40 MG: 20 TABLET ORAL at 08:06

## 2022-04-13 RX ADMIN — AMLODIPINE BESYLATE 10 MG: 10 TABLET ORAL at 08:06

## 2022-04-13 RX ADMIN — ACETAMINOPHEN 650 MG: 325 TABLET ORAL at 08:05

## 2022-04-13 RX ADMIN — CEFAZOLIN SODIUM 2 G: 100 INJECTION, POWDER, LYOPHILIZED, FOR SOLUTION INTRAVENOUS at 01:09

## 2022-04-13 RX ADMIN — INSULIN GLARGINE 15 UNITS: 100 INJECTION, SOLUTION SUBCUTANEOUS at 08:49

## 2022-04-13 RX ADMIN — ALBUTEROL SULFATE 2.5 MG: 2.5 SOLUTION RESPIRATORY (INHALATION) at 13:19

## 2022-04-13 RX ADMIN — BUDESONIDE 500 MCG: 0.5 INHALANT RESPIRATORY (INHALATION) at 23:41

## 2022-04-13 RX ADMIN — SODIUM CHLORIDE, PRESERVATIVE FREE 10 ML: 5 INJECTION INTRAVENOUS at 05:13

## 2022-04-13 RX ADMIN — CEFAZOLIN SODIUM 2 G: 100 INJECTION, POWDER, LYOPHILIZED, FOR SOLUTION INTRAVENOUS at 08:07

## 2022-04-13 RX ADMIN — BUDESONIDE 500 MCG: 0.5 INHALANT RESPIRATORY (INHALATION) at 07:27

## 2022-04-13 RX ADMIN — ALBUTEROL SULFATE 2.5 MG: 2.5 SOLUTION RESPIRATORY (INHALATION) at 23:41

## 2022-04-13 RX ADMIN — ALBUTEROL SULFATE 2.5 MG: 2.5 SOLUTION RESPIRATORY (INHALATION) at 07:27

## 2022-04-13 RX ADMIN — ENOXAPARIN SODIUM 40 MG: 100 INJECTION SUBCUTANEOUS at 08:06

## 2022-04-13 RX ADMIN — SODIUM CHLORIDE, PRESERVATIVE FREE 10 ML: 5 INJECTION INTRAVENOUS at 22:07

## 2022-04-13 RX ADMIN — SODIUM CHLORIDE, PRESERVATIVE FREE 30 ML: 5 INJECTION INTRAVENOUS at 08:32

## 2022-04-13 RX ADMIN — INSULIN LISPRO 2 UNITS: 100 INJECTION, SOLUTION INTRAVENOUS; SUBCUTANEOUS at 11:36

## 2022-04-13 RX ADMIN — ALBUTEROL SULFATE 2.5 MG: 2.5 SOLUTION RESPIRATORY (INHALATION) at 03:15

## 2022-04-13 RX ADMIN — ENOXAPARIN SODIUM 40 MG: 40 INJECTION SUBCUTANEOUS at 22:04

## 2022-04-13 NOTE — PROGRESS NOTES
TRANSFER - IN REPORT:    Verbal report received from Krysta Morejon on Arlana Knife  being received from 7th floor for routine progression of care      Report consisted of patients Situation, Background, Assessment and   Recommendations(SBAR). Information from the following report(s) SBAR, Kardex, Intake/Output, MAR and Recent Results was reviewed with the receiving nurse. Opportunity for questions and clarification was provided. Assessment completed upon patients arrival to unit and care assumed.

## 2022-04-13 NOTE — PROGRESS NOTES
Pt admitted to 907 from the 7th floor. Pt is alert and oriented with stable vital signs. Dual skin assessment completed with Danielle De Los Santos RN. Skin noted to be intact. Pt currently on room air. Call light given to pt and instructed to call before getting out of bed. Pt verbalized understanding.

## 2022-04-13 NOTE — PROGRESS NOTES
Date of Outreach Update:  Tamiko Erwin was seen and assessed. MEWS Score: 2 (04/13/22 0800)  Vitals:    04/13/22 0315 04/13/22 0438 04/13/22 0729 04/13/22 0800   BP:  134/88  (!) 121/91   Pulse:  (!) 107  (!) 102   Resp:  20  18   Temp:  98.1 °F (36.7 °C)  97.4 °F (36.3 °C)   SpO2: 96% 95% 91% 100%   Weight:       Height:             Pain Assessment  Pain Intensity 1: 0 (04/13/22 0905)  Pain Location 1: Abdomen  Pain Intervention(s) 1: Medication (see MAR)  Patient Stated Pain Goal: 0      Previous Outreach assessment has been reviewed. There have been no significant clinical changes since the completion of the last dated Outreach assessment. Will continue to follow up per outreach protocol.     Signed By:   Zilphia Krabbe    April 13, 2022 10:47 AM

## 2022-04-13 NOTE — PROGRESS NOTES
TRANSFER - OUT REPORT:    Verbal report given to HEIDI Stephen (name) on Blaine Nunez  being transferred to [id number] (unit) for routine progression of care       Report consisted of patients Situation, Background, Assessment and   Recommendations(SBAR). Information from the following report(s) SBAR was reviewed with the receiving nurse. Opportunity for questions and clarification was provided.       Patient transported with:   Monitor

## 2022-04-13 NOTE — DISCHARGE SUMMARY
Hospitalist Discharge Summary   Admit Date:  4/3/2022 12:32 AM   DC Note date: 2022  Name:  Monica Helm   Age:  31 y.o. Sex:  male  :  1985   MRN:  [id number]   Room:  [facility]  PCP:  [place], Not On File, MARISA    Presenting Complaint: No chief complaint on file. Initial Admission Diagnosis: Asthma exacerbation [J45.901]     Problem List for this Hospitalization:  Hospital Problems as of 2022 Never Reviewed          Codes Class Noted - Resolved POA    Pre-diabetes ICD-10-CM: R73.03  ICD-9-CM: 790.29  2022 - Present Unknown        Influenza A ICD-10-CM: J10.1  ICD-9-CM: 487.1  2022 - Present Yes        Staphylococcus aureus pneumonia ([facility] [address] ICD-10-CM: J15.211  ICD-9-CM: 482.41  2022 - Present Yes        Primary hypertension (Chronic) ICD-10-CM: I10  ICD-9-CM: 401.9  2022 - Present Yes        Severe persistent asthma with exacerbation ICD-10-CM: J45.51  ICD-9-CM: 493.92  4/3/2022 - Present Yes        Obesity (Chronic) ICD-10-CM: E66.9  ICD-9-CM: 278.00  4/3/2022 - Present Yes        * (Principal) Acute respiratory failure with hypoxia and hypercapnia (HCC) ICD-10-CM: J96.01, J96.02  ICD-9-CM: 518.81  4/3/2022 - Present Yes        RESOLVED: Status asthmaticus ICD-10-CM: J45.902  ICD-9-CM: 493.91  4/3/2022 - 2022 Unknown            Did Patient have Sepsis (YES OR NO):     Hospital Course:  Pipe Perez a 30 y/o male with history of obesity and asthma who was admitted with acute respiratory failure due to asthma exacerbation, influenza A, and MSSA pneumonia. Patient presented to ED 4/3 with cc chest tightness and SOB a/w productive cough of yellow sputum of few-day duration. Patient hypoxic, tachycardic, tachypneic on presentation. Also with complaint of left calf pain, LLE ultrasound negative for DVT. D-dimer 0.55.  Patient was admitted under pulmonary service to ICU and required intubation and mechanical ventilation 4/3 with subsequent extubation to [facility] .  He did require Precedex for agitation. He was transferred to medical floor 4/10. Hospitalist assumed primary care 4/11 with Pulmonology still following as consultant. Patient was successfully weaned to RA, with saturations currently stable on RA at 93%. Sputum cultures 4/4 and 4/9 with MSSA Pneumonia, patient to complete Cefazolin course 4/14 per susceptibilities. Pulmonology has ordered BiPAP QHS and recommends outpatient sleep study for further evaluation. Patient has a new-patient appointment with [facility] 6/8/22. Patient was initiated on Amlodipine for hypertension while hospitalized. Continue at d/c. Patient was initiated on insulin therapy due to steroid-induced hyperglycemia while hospitalized. A1C indicates pre-diabetes at baseline. Steroid weaning to continue at discharge, anticipate insulin therapy can be weaned off with no long-term need for insulin therapy at this time. Encourage diet and lifestyle modifications to reduce risk of long-term progression to DM II. Leukocytosis has remained persistent with BCx 4/5 and 4/11 negative, UA 4/5 and 4/11 negative for infection, on appropriate antibiotics for MSSA pneumonia, remains afebrile with procal 0.11, also on steroid therapies now weaning. Will need CBC follow-up at [facility].      PT/OT evaluations have recommended inpatient rehab at discharge. [facility] has accepted patient for admission this afternoon. Patient is excited to transition to [facility] in goals to improve functioning and return home soon. He is motivated to progress with intensive therapies. Patient does endorse history of daily etoh use prior to admission. He has required anti-anxiety agents intermittently. Encourage cessation. Note: Patient is without payor source and would benefit from prescriptions at discharge from [facility] that could be obtained through [employer].  Amlodipine, Trazodone, Advair HFA Inhaler, and Proventil HFA Inhalfer are all currently listed with [employer] as covered medications. CM can assist with first 30 days while a 90-day prescription can be sent to [facility] for coverage thereafter. Patient has a new-patient appointment with [facility] on 4/25/22 at 10:05am to establish PCP. Disposition: Rehab Facility  Diet: ADULT ORAL NUTRITION SUPPLEMENT Breakfast, Lunch, Dinner; Diabetic Supplement  ADULT DIET Regular; 4 carb choices (60 gm/meal); SET UP ASSISTANCE, cut into pieces for pt  Code Status: Full Code    Follow Up Orders: Follow-up Appointments   Procedures    FOLLOW UP VISIT Appointment in: Other [facility]) [facility] as scheduled 4/25/22 [address] [place] [address] as scheduled 6/8/22     [facility] as scheduled 4/25/22  [address] [place] [address] as scheduled 6/8/22     Standing Status:   Standing     Number of Occurrences:   1     Order Specific Question:   Appointment in     Answer: Other (Specify)       Follow-up Information     Follow up With Specialties Details Why Contact Info    Bsh[facility], Not On File, PAZOLTAN    Not On File (62) Patient has a PCP but that physician is not listed in [address] [place] [address]. Follow up labs/diagnostics (ultimately defer to outpatient provider):  CBC/BMP     Time spent in patient discharge and coordination 40 minutes. Plan was discussed with patient, spouse at bedside, care team including Dr. Riki Murray, case management, and Dr. Ashwini Todd. All questions answered. Patient hemodynamically stable to discharge to [facility] this afternoon. Discharge Info:   Current Discharge Medication List      START taking these medications    Details   amLODIPine (NORVASC) 10 mg tablet Take 1 Tablet by mouth daily for 7 days. Qty: 7 Tablet, Refills: 0  Start date: 4/14/2022, End date: 4/21/2022      predniSONE (DELTASONE) 10 mg tablet Take 30 mg by mouth daily (with breakfast) for 3 days, THEN 20 mg daily (with breakfast) for 3 days, THEN 10 mg daily (with breakfast) for 3 days.  Indications: worsening asthma  Qty: 18 Tablet, Refills: 0  Start date: 4/14/2022, End date: 4/23/2022      albuterol (PROVENTIL VENTOLIN) 2.5 mg /3 mL (0.083 %) nebu 3 mL by Nebulization route every six (6) hours for 7 days. Qty: 28 Each, Refills: 0  Start date: 4/13/2022, End date: 4/20/2022      budesonide (PULMICORT) 0.5 mg/2 mL nbsp 2 mL by Nebulization route two (2) times a day for 7 days. Qty: 14 Each, Refills: 0  Start date: 4/13/2022, End date: 4/20/2022      ceFAZolin (ANCEF) in sterile water 2 gram/20 mL 2 g IV syringe 20 mL by IntraVENous route every eight (8) hours for 3 doses. First dose 4/13/22 1700, last dose 4/14/22 0900  Qty: 60 mL, Refills: 0  Start date: 4/13/2022, End date: 4/14/2022      insulin glargine (LANTUS) 100 unit/mL injection 10 Units by SubCUTAneous route daily for 3 days. Qty: 0.3 mL, Refills: 0  Start date: 4/13/2022, End date: 4/16/2022      insulin lispro (HUMALOG) 100 unit/mL injection 0-10 Units by SubCUTAneous route Before breakfast, lunch, dinner and at bedtime for 3 days. For blood sugar (mg/dl) of less than 150 = 0 units, 150-199 = 2 units, 200-249 = 4 units, 250-299 = 6 units, 300-349 = 8 units, 350 and above = 10 units and notify physician  Qty: 1.2 mL, Refills: 0  Start date: 4/13/2022, End date: 4/16/2022      traZODone (DESYREL) 100 mg tablet Take 1 Tablet by mouth nightly for 7 days.   Qty: 7 Tablet, Refills: 0  Start date: 4/13/2022, End date: 4/20/2022         STOP taking these medications       fluticasone furoate-vilanteroL (Breo Ellipta) 100-25 mcg/dose inhaler Comments:   Reason for Stopping:         albuterol (PROVENTIL HFA, VENTOLIN HFA, PROAIR HFA) 90 mcg/actuation inhaler Comments:   Reason for Stopping:               Procedures done this admission:  * No surgery found *    Consults this admission:  IP CONSULT TO PULMONOLOGY  IP CONSULT TO HOSPITALIST  IP CONSULT TO PHYSIATRIST(REHAB MEDICINE)    Echocardiogram/EKG results:  No results found for this or any previous visit.       EKG Results     Procedure 720 Value Units Date/Time    EKG, 12 LEAD, SUBSEQUENT [[id number]] Collected: 04/04/22 0433    Order Status: Completed Updated: 04/04/22 1821     Ventricular Rate 128 BPM      Atrial Rate 128 BPM      P-R Interval 118 ms      QRS Duration 98 ms      Q-T Interval 336 ms      QTC Calculation (Bezet) 490 ms      Calculated P Axis 81 degrees      Calculated R Axis 74 degrees      Calculated T Axis -43 degrees      Diagnosis --     Sinus tachycardia  Nonspecific ST abnormality  Abnormal ECG  When compared with ECG of 03-APR-2022 00:16,  No significant change was found  Confirmed by ST DULCE SYKES MD (), BESS SAMANO ([id number]) on 4/4/2022 6:21:26 PM      EKG, 12 LEAD, SUBSEQUENT [[id number]]     Order Status: Canceled     EKG 12 LEAD INITIAL [[id number]]     Order Status: Canceled           Diagnostic Imaging/Tests:   XR CHEST SNGL V    Result Date: 4/4/2022  EXAM: Chest x-ray. INDICATION: Dyspnea. COMPARISON: Yesterday's chest x-ray. TECHNIQUE: Frontal view chest x-ray. FINDINGS: There is new atelectasis or infiltrate in the right lung base. The left lung remains clear. The heart size is normal. No pneumothorax or pleural effusion is seen. The endotracheal tube, enteric tube and right arm PICC line remain in place, and appear to be in good position. New right lung base atelectasis or infiltrate. XR CHEST SNGL V    Result Date: 4/3/2022  History: Endotracheal tube placement Exam: portable chest Comparison: 4/3/2022 Findings: There is a well-positioned endotracheal tube. An NG tube terminates below the diaphragm. The lungs are clear. No pneumothorax. IMPRESSIONs: No significant interval change     XR CHEST SNGL V    Result Date: 4/3/2022  History: Intubation Exam: portable chest Comparison: 4/3/2022 Findings: There has been interval placement of an endotracheal tube which terminates at the level of the thoracic inlet. An NG tube terminates in the upper abdomen. The lungs are clear.  The mediastinal contour and osseous structures are normal. IMPRESSIONs: Support and monitoring devices as described. XR ABD (KUB)    Result Date: 4/3/2022  History: NG tube placement EXAM: Single view abdomen FINDINGS: There is an NG tube which terminates in the body the stomach. The lung bases are clear. The bowel gas pattern is nonspecific. NG tube as described. XR CHEST PORT    Result Date: 4/3/2022  EXAM: Chest x-ray. INDICATION: Chest pain and dyspnea. COMPARISON: Yesterday's chest x-ray. TECHNIQUE: Single frontal view. FINDINGS: The lungs are clear. The cardiac size, mediastinal contour and pulmonary vasculature are normal. No pneumothorax or pleural effusion is seen. The bony structures are within normal limits. Normal chest x-ray. DUPLEX LOWER EXT VENOUS LEFT    Result Date: 4/3/2022  EXAM: Left leg venous ultrasound. INDICATION: Left leg pain. COMPARISON: None. TECHNIQUE: Evaluation of the left leg veins was performed utilizing grey-scale, color Doppler and duplex ultrasound. FINDINGS: The left common femoral, superficial femoral, deep femoral, popliteal, posterior tibialis, peroneal and greater saphenous veins are patent and compressible, with normal response to augmentation. No evidence of left leg DVT.       All Micro Results     Procedure Component Value Units Date/Time    CULTURE, BLOOD [[id number]] Collected: 04/11/22 1024    Order Status: Completed Specimen: Blood Updated: 04/13/22 0847     Special Requests: --        LEFT  HAND       Culture result: NO GROWTH 2 DAYS       CULTURE, BLOOD [[id number]] Collected: 04/11/22 1024    Order Status: Completed Specimen: Blood Updated: 04/13/22 0847     Special Requests: --        RIGHT  FOREARM       Culture result: NO GROWTH 2 DAYS       CULTURE, RESPIRATORY/SPUTUM/BRONCH Emperatriz Morn STAIN [[id number]]  (Abnormal)  (Susceptibility) Collected: 04/09/22 0832    Order Status: Completed Specimen: Sputum,ET Suction Updated: 04/12/22 5373     Special Requests: NO SPECIAL REQUESTS        GRAM STAIN 50  WBC'S SEEN PER OIF      NO EPITHELIAL CELLS SEEN         FEW GRAM POSITIVE COCCI         FEW GRAM POSITIVE RODS         4+ MUCUS PRESENT        Culture result:       MODERATE STAPHYLOCOCCUS AUREUS                  MODERATE NORMAL RESPIRATORY ALYCIA          COVID-19 RAPID TEST [[id number]] Collected: 04/11/22 1359    Order Status: Completed Specimen: Nasopharyngeal Updated: 04/11/22 1426     Specimen source Nasopharyngeal        COVID-19 rapid test Not detected        Comment:      The specimen is NEGATIVE for SARS-CoV-2, the novel coronavirus associated with COVID-19. A negative result does not rule out COVID-19. This test has been authorized by the FDA under an Emergency Use Authorization (EUA) for use by authorized laboratories.         Fact sheet for Healthcare Providers: ConventionUpdate.co.nz  Fact sheet for Patients: ConventionUpdate.co.nz       Methodology: Isothermal Nucleic Acid Amplification         CULTURE, BLOOD [[id number]] Collected: 04/05/22 0947    Order Status: Completed Specimen: Blood Updated: 04/10/22 0657     Special Requests: --        LEFT  FOREARM       Culture result: NO GROWTH 5 DAYS       CULTURE, BLOOD [[id number]] Collected: 04/05/22 1103    Order Status: Completed Specimen: Blood Updated: 04/10/22 0657     Special Requests: --        LEFT  FOREARM       Culture result: NO GROWTH 5 DAYS       CULTURE, RESPIRATORY/SPUTUM/BRONCH Iraj Living STAIN [[id number]]  (Abnormal)  (Susceptibility) Collected: 04/04/22 1207    Order Status: Completed Specimen: Sputum,ET Suction Updated: 04/07/22 0649     Special Requests: NO SPECIAL REQUESTS        GRAM STAIN 2 TO 7 WBCS SEEN PER OIF      NO EPITHELIAL CELLS SEEN         FEW GRAM POSITIVE COCCI         NO MUCUS PRESENT        Culture result:       HEAVY STAPHYLOCOCCUS AUREUS                  MODERATE NORMAL RESPIRATORY ALYCIA          RESPIRATORY VIRUS PANEL W/COVID-19, PCR [[id number]]  (Abnormal) Collected: 04/05/22 0837    Order Status: Completed Specimen: Nasopharyngeal Updated: 04/05/22 1021     Adenovirus NOT DETECTED        Coronavirus 229E NOT DETECTED        Coronavirus HKU1 NOT DETECTED        Coronavirus CVNL63 NOT DETECTED        Coronavirus OC43 NOT DETECTED        SARS-CoV-2, PCR NOT DETECTED        Metapneumovirus NOT DETECTED        Rhinovirus and Enterovirus NOT DETECTED        Influenza A, subtype H3 Detected        Influenza B NOT DETECTED        Parainfluenza 1 NOT DETECTED        Parainfluenza 2 NOT DETECTED        Parainfluenza 3 NOT DETECTED        Parainfluenza virus 4 NOT DETECTED        RSV by PCR NOT DETECTED        B. parapertussis, PCR NOT DETECTED        Bordetella pertussis - PCR NOT DETECTED        Chlamydophila pneumoniae DNA, QL, PCR NOT DETECTED        Mycoplasma pneumoniae DNA, QL, PCR NOT DETECTED       MSSA/MRSA SC BY PCR, NASAL SWAB [[id number]]     Order Status: Canceled Specimen: Nasal swab           Labs: Results:       BMP, Mg, Phos Recent Labs     04/13/22 0448 04/12/22  0344 04/11/22 0427   * 136 136*   K 4.0 4.0 4.5   CL 95* 97* 96*   CO2 32 31 31   AGAP 7 8 9   BUN 26* 28* 29*   CREA 1.00 0.80 1.00   CA 9.3 9.5 9.8   * 129* 241*      CBC Recent Labs     04/13/22 0448 04/12/22  0344 04/11/22 0427   WBC 20.3* 19.0* 23.3*   RBC 4.86 5.09 5.15   HGB 16.3 16.9 17.4*   HCT 48.4 50.4* 51.5*    310 353   GRANS 60 56  --    LYMPH 23 23  --    EOS 1 1  --    MONOS 12 13*  --    BASOS 0 1  --    IG 4 6*  --    ANEU 12.1* 10.6*  --    ABL 4.7* 4.4  --    MOE 0.2 0.2  --    ABM 2.5* 2.5*  --    ABB 0.1 0.2  --    AIG 0.8* 1.1*  --       LFT No results for input(s): ALT, TBIL, AP, TP, ALB, GLOB, AGRAT in the last 72 hours.     No lab exists for component: SGOT, GPT   Cardiac Testing No results found for: BNPP, BNP, CPK, RCK1, RCK2, RCK3, RCK4, CKMB, CKNDX, CKND1, TROPT, TROIQ   Coagulation Tests No results found for: PTP, INR, APTT, INREXT   A1c Lab Results   Component Value Date/Time    Hemoglobin A1c 6.0 04/11/2022 04:27 AM      Lipid Panel No results found for: CHOL, CHOLPOCT, CHOLX, CHLST, CHOLV, 874341, HDL, HDLP, LDL, LDLC, DLDLP, 887609, VLDLC, VLDL, TGLX, TRIGL, TRIGP, TGLPOCT, CHHD, CHHDX   Thyroid Panel No results found for: TSH, T4, FT4, TT3, T3U, TSHEXT     Most Recent UA Lab Results   Component Value Date/Time    Color YELLOW 04/11/2022 10:34 AM    Appearance CLEAR 04/11/2022 10:34 AM    pH (UA) 6.0 04/11/2022 10:34 AM    Protein TRACE (A) 04/11/2022 10:34 AM    Glucose Negative 04/11/2022 10:34 AM    Ketone TRACE (A) 04/11/2022 10:34 AM    Bilirubin SMALL (A) 04/11/2022 10:34 AM    Blood TRACE (A) 04/11/2022 10:34 AM    Urobilinogen 0.2 04/11/2022 10:34 AM    Nitrites Negative 04/11/2022 10:34 AM    Leukocyte Esterase Negative 04/11/2022 10:34 AM    WBC 0-3 04/11/2022 10:34 AM    RBC 3-5 04/11/2022 10:34 AM    Epithelial cells 0 04/11/2022 10:34 AM    Bacteria 0 04/11/2022 10:34 AM    Casts 0 04/11/2022 10:34 AM          All Labs from Last 24 Hrs:  Recent Results (from the past 24 hour(s))   GLUCOSE, POC    Collection Time: 04/12/22 12:22 PM   Result Value Ref Range    Glucose (POC) 190 (H) 65 - 100 mg/dL    Performed by Kristal Carton    GLUCOSE, POC    Collection Time: 04/12/22  4:37 PM   Result Value Ref Range    Glucose (POC) 143 (H) 65 - 100 mg/dL    Performed by Kristal Carton    GLUCOSE, POC    Collection Time: 04/12/22  9:59 PM   Result Value Ref Range    Glucose (POC) 151 (H) 65 - 100 mg/dL    Performed by [employer]    METABOLIC PANEL, BASIC    Collection Time: 04/13/22  4:48 AM   Result Value Ref Range    Sodium 134 (L) 136 - 145 mmol/L    Potassium 4.0 3.5 - 5.1 mmol/L    Chloride 95 (L) 98 - 107 mmol/L    CO2 32 21 - 32 mmol/L    Anion gap 7 7 - 16 mmol/L    Glucose 132 (H) 65 - 100 mg/dL    BUN 26 (H) 6 - 23 MG/DL    Creatinine 1.00 0.8 - 1.5 MG/DL    GFR est AA >60 >60 ml/min/1.73m2    GFR est non-AA >60 >60 ml/min/1.73m2    Calcium 9.3 8.3 - 10.4 MG/DL   CBC WITH AUTOMATED DIFF    Collection Time: 04/13/22  4:48 AM   Result Value Ref Range    WBC 20.3 (H) 4.3 - 11.1 K/uL    RBC 4.86 4.23 - 5.6 M/uL    HGB 16.3 13.6 - 17.2 g/dL    HCT 48.4 41.1 - 50.3 %    MCV 99.6 (H) 79.6 - 97.8 FL    MCH 33.5 (H) 26.1 - 32.9 PG    MCHC 33.7 31.4 - 35.0 g/dL    RDW 12.3 11.9 - 14.6 %    PLATELET 613 680 - 545 K/uL    MPV 9.8 9.4 - 12.3 FL    ABSOLUTE NRBC 0.00 0.0 - 0.2 K/uL    DF AUTOMATED      NEUTROPHILS 60 43 - 78 %    LYMPHOCYTES 23 13 - 44 %    MONOCYTES 12 4.0 - 12.0 %    EOSINOPHILS 1 0.5 - 7.8 %    BASOPHILS 0 0.0 - 2.0 %    IMMATURE GRANULOCYTES 4 0.0 - 5.0 %    ABS. NEUTROPHILS 12.1 (H) 1.7 - 8.2 K/UL    ABS. LYMPHOCYTES 4.7 (H) 0.5 - 4.6 K/UL    ABS. MONOCYTES 2.5 (H) 0.1 - 1.3 K/UL    ABS. EOSINOPHILS 0.2 0.0 - 0.8 K/UL    ABS. BASOPHILS 0.1 0.0 - 0.2 K/UL    ABS. IMM.  GRANS. 0.8 (H) 0.0 - 0.5 K/UL   GLUCOSE, POC    Collection Time: 04/13/22  6:40 AM   Result Value Ref Range    Glucose (POC) 145 (H) 65 - 100 mg/dL    Performed by Marielle    GLUCOSE, POC    Collection Time: 04/13/22 11:03 AM   Result Value Ref Range    Glucose (POC) 171 (H) 65 - 100 mg/dL    Performed by Nery        Current Med List in Hospital:   Current Facility-Administered Medications   Medication Dose Route Frequency    acetaminophen (TYLENOL) tablet 650 mg  650 mg Oral Q6H PRN    Or    acetaminophen (TYLENOL) suppository 650 mg  650 mg Rectal Q6H PRN    insulin glargine (LANTUS) injection 15 Units  15 Units SubCUTAneous DAILY    ceFAZolin (ANCEF) 2 g/20 mL in sterile water IV syringe  2 g IntraVENous Q8H    hydrOXYzine pamoate (VISTARIL) capsule 50 mg  50 mg Oral QID PRN    traZODone (DESYREL) tablet 100 mg  100 mg Oral QHS    LORazepam (ATIVAN) tablet 0.5 mg  0.5 mg Oral Q6H PRN    albuterol (PROVENTIL VENTOLIN) nebulizer solution 2.5 mg  2.5 mg Nebulization Q6H RT    predniSONE (DELTASONE) tablet 40 mg  40 mg Oral DAILY WITH BREAKFAST    amLODIPine (NORVASC) tablet 10 mg  10 mg Oral DAILY    polyethylene glycol (MIRALAX) packet 17 g  17 g Oral DAILY    senna-docusate (PERICOLACE) 8.6-50 mg per tablet 2 Tablet  2 Tablet Oral QHS    insulin lispro (HUMALOG) injection   SubCUTAneous AC&HS    labetaloL (NORMODYNE;TRANDATE) injection 20 mg  20 mg IntraVENous Q4H PRN    bisacodyL (DULCOLAX) suppository 10 mg  10 mg Rectal DAILY PRN    hydrALAZINE (APRESOLINE) 20 mg/mL injection 20 mg  20 mg IntraVENous Q8H PRN    NUTRITIONAL SUPPORT ELECTROLYTE PRN ORDERS   Does Not Apply PRN    sodium chloride (NS) flush 5-40 mL  5-40 mL IntraVENous Q8H    sodium chloride (NS) flush 5-40 mL  5-40 mL IntraVENous PRN    ondansetron (ZOFRAN ODT) tablet 4 mg  4 mg Oral Q8H PRN    Or    ondansetron (ZOFRAN) injection 4 mg  4 mg IntraVENous Q6H PRN    albuterol (PROVENTIL VENTOLIN) nebulizer solution 2.5 mg  2.5 mg Nebulization Q4H PRN    budesonide (PULMICORT) 500 mcg/2 ml nebulizer suspension  500 mcg Nebulization BID RT    enoxaparin (LOVENOX) injection 40 mg  40 mg SubCUTAneous Q12H    levalbuterol (XOPENEX) nebulizer soln 1.25 mg/3 mL  1.25 mg Nebulization Q6H PRN    sodium chloride (NS) flush 30 mL  30 mL InterCATHeter DAILY    sodium chloride (NS) flush 30 mL  30 mL InterCATHeter PRN       Allergies   Allergen Reactions    Singulair [Montelukast] Hives       There is no immunization history on file for this patient.     Recent Vital Data:  Patient Vitals for the past 24 hrs:   Temp Pulse Resp BP SpO2   04/13/22 1200 98.3 °F (36.8 °C) (!) 101 18 (!) 137/90 93 %   04/13/22 0800 97.4 °F (36.3 °C) (!) 102 18 (!) 121/91 100 %   04/13/22 0729 -- -- -- -- 91 %   04/13/22 0438 98.1 °F (36.7 °C) (!) 107 20 134/88 95 %   04/13/22 0315 -- -- -- -- 96 %   04/13/22 0147 -- -- -- -- 91 %   04/13/22 0114 97.5 °F (36.4 °C) (!) 103 20 139/84 (!) 89 %   04/12/22 1959 -- -- -- -- 100 %   04/12/22 1620 97 °F (36.1 °C) 95 16 134/76 92 %   04/12/22 1356 -- -- -- -- 95 %   04/12/22 1230 97.8 °F (36.6 °C) (!) 111 17 (!) 145/94 100 %     Oxygen Therapy  O2 Sat (%): 93 % (04/13/22 1200)  Pulse via Oximetry: 112 beats per minute (04/13/22 0729)  O2 Device: None (Room air) (04/13/22 0729)  Skin Assessment: Clean, dry, & intact (04/11/22 2035)  Skin Protection for O2 Device: N/A (04/11/22 2035)  Orientation: Bilateral (04/09/22 0327)  Location: Cheek (04/09/22 0327)  Interventions: Mouth Care (04/09/22 1915)  O2 Flow Rate (L/min): 0 l/min (04/13/22 0315)  O2 Temperature: 87.8 °F (31 °C) (04/10/22 1551)  FIO2 (%): 21 % (04/13/22 0315)    Estimated body mass index is 43.82 kg/m² as calculated from the following:    Height as of this encounter: 5' 9\" (1.753 m). Weight as of this encounter: 134.6 kg (296 lb 11.8 oz). Intake/Output Summary (Last 24 hours) at 4/13/2022 1216  Last data filed at 4/12/2022 1620  Gross per 24 hour   Intake 600 ml   Output --   Net 600 ml         Physical Exam:  General:          Well-nourished, no acute distress, alert, calm, conversational  Head:               Normocephalic, atraumatic  Eyes:               Sclerae appear normal.  Pupils equally round. ENT:                Nares appear normal, no drainage.  Moist oral mucosa  Neck:               No restricted ROM.  Trachea midline   CV:                  Tachycardic, regular. No m/r/g.  No jugular venous distension. Lungs:            Scattered rhonchi, improving, Respirations even, unlabored on RA  Abdomen:        Bowel sounds present.  Obese, soft, nontender to palpation, nondistended. Extremities:     No cyanosis or clubbing.  No peripheral edema. Skin:                No rashes and normal coloration.   Warm and dry.    Neuro:             CN II-XII grossly intact.  Sensation intact.  A&Ox3. Moves all extremities. No focal deficits. Psych:             Normal mood and affect.      Signed:  Wilbur King NP

## 2022-04-13 NOTE — PROGRESS NOTES
CM notified by attending NP that patient is agreeable to go to [facility] on 9th floor who is willing to accept patient today. Patient does have PCP follow up and Pulmonology appointment scheduled through [facility]. Primary RN may call report to [phone]. Care Management Interventions  PCP Verified by CM: Yes  Mode of Transport at Discharge: Other (see comment)  Transition of Care Consult (CM Consult): Discharge Planning  Discharge Durable Medical Equipment: No (May need Nebulizer at discharge from [facility].)  Physical Therapy Consult: Yes  Occupational Therapy Consult: Yes  Support Systems: Spouse/Significant Other  Confirm Follow Up Transport: Family  The Plan for Transition of Care is Related to the Following Treatment Goals : Patient will participate in STR therapies at [facility] in order to return to safe baseline independence in ADLs.   Freedom of Choice List was Provided with Basic Dialogue that Supports the Patient's Individualized Plan of Care/Goals, Treatment Preferences and Shares the Quality Data Associated with the Providers?: Yes  [employer] Information Provided?:  (no payor source, DECO to follow and spouse aware)  Discharge Location  Patient Expects to be Discharged to[de-identified] Rehab hospital/unit acute

## 2022-04-13 NOTE — H&P
Jeannie Damon MD  Medical Director  [address] [facility], [address] [facility]  Tel: [phone]       Admission History and Physical Exam  [facility] REH[facility]       Admit Date: 4/13/2022    Primary Care Provider: Melisa Ford, Not On File, MARISA  Specialty Group / Referring Service: Hospitalist Service, Pulmonary Medicine    Chief Complaint : \"I want to go home but everyone thinks I need rehab first. \"  Admitting Diagnosis:   Physical debility [R53.81]    Active Problems:    Physical debility (4/13/2022)    influenza A  MSSA pneumonia  Asthma exacerbation  Obesity  Acute respiratory failure with hypoxia and hypercapnia  Status asthmaticus  Leukocytosis; steroid induced  Steroid induced hyperglycemia/pre diabetes  Suspected severe ZACK    Acute Rehab Dx:  Gait impairment / gait dysfunction  Debility  Deconditioning  Mobility and ambulation deficits  Self care / ADL deficits    Medical Dx:  Past Medical History:   Diagnosis Date    Asthma     Status asthmaticus 4/3/2022       Date of Evaluation: April 13, 2022    Fatoumata Naranjo is a 31 y.o. male patient at [facility] who was admitted to [address] [place] [address] on 4/13/2022 by Jeannie Damon MD of Physical Medicine and Rehab service with below-mentioned medical history. HPI: The patient is a r-hand dominant, previously functionally independent morbidly obese 32yo male with a PMH of suspected ZACK, and known asthma who presented to [facility] ED twice on 4/3 with wheezing and SOB. He was tachycardic, tachypnic , and hypoxic on presentation. CXR clear. Initially treated in the ED and sent home but came back several hours later with worsening symptoms. He reported productive cough with yellow spt over the course of several days. D Dimer 0.55. LE neg for DVT ,as he had complained of left calf pain. He was requiring 4L to maintain sats.   He was given continuous nebs and IV steroids but continued to have increased WOB so had been started on bipap 18/8 with 60% FiO2. He was satting well and reportedly less WOB than before and less tachycardia (was up to 150 sinus tach) then at 120 but still with ongoing wheezes. ABG  with some mild hypercarbia at 47.    Later on on day of admission, he decompensated and required intubation. Unable to continue heliox through ventilator. Was having some air trapping and autopeep 15 from 10 set. He had not had any fevers. He had been on 50 rocky for hypotension and some sinus tachycardia. He developed a right midlung infiltrate and was placed on azithromycin. Rocephin was added. By 4/6 , he was off pressors and his leukocytosis was improving. His Peak pressures were down to 30 . On that day they attempted to cut off paralytics and he did ok at first but by afternoon he had more difficulty, thus paralytics restarted. 4/9 was able to tolerate off paralytics, had to work through sedation wean, but eventually on propofol, precedex and fentanyl. Placed on PSV 5/8 40% and volumes are 500s with RR 20-24. He seemed uncomfortable and was biting tube at times. He tested + for influenza A, subtype H3 and spt grew out MSSA. Covid 19 Negative. He was finally extubated on 4/9. He spiked a fever to 101.7 but afebrile the following day. He was on AirVo 50%. Still required small dose of Precedex. He was transferred out of the ICU 4/10. The Pulmonologists turned the care over to the Hospitalist Service. He is not tolerating the bipap. Nursing documenting pt does very minimal for himself and has been encouraged to do so. Physical Medicine and Rehab service was consulted, and patient was initially evaluated by Dr. Dora Martins on 4/11/2022     During reevaluation earlier today, patient shows improvement from initial consult but still shows significant functional deficits.  We recommend inpatient hospital rehabilitation as reasonable and necessary due to ongoing acute medical issues which have not changed since initial pre-admission evaluation. We reviewed the preadmission screening and have approved the patient for admission to the [facility]. Attending service cleared patient for transfer to rehab today. Rapid COVID was negative. Patient continues to have ongoing medical issues outlined above requiring physician / PA medical supervision and has functional deficits which would benefit from continued intensive therapies. Current Level of Function: (evaluated by acute therapy staff, with bed mobility, transfers, balance personally observed post-admission in the [facility] setting minutes prior to submission of document)   PT; SBA bed mobility, CGA STS and transfers, ambulating with CGA/min assist 10 ft x 3, easily fatigues; His O2 sats were 93% after gait with his HR at 103 BPM on RA  OT: min assist bathing, moderate assist dressing, SBA feeding and grooming, CGA toileting and functional mobility    Prior Home Situation:   Pt live in an apt, 3 flights of stairs to enter with rails on both sides. Tub/shower with grab bars present in bathroom. Pt reports no other DME use prior. Home Environment: Private residence  # Steps to Enter: 39  One/Two Story Residence: apt  Living Alone: No  Support Systems: Spouse/Significant Other      Previous Level of Function / Work / Activity:   Functionally independent, unemployed.  NO AD, was independent with all ADLs        Past Medical History:   Diagnosis Date    Asthma     Status asthmaticus 4/3/2022      Past Surgical History:   Procedure Laterality Date    HX TONSILLECTOMY       Allergies   Allergen Reactions    Singulair [Montelukast] Hives      Family History   Problem Relation Age of Onset    Sleep Apnea Mother     Leukemia Father     Asthma Brother       Social History     Tobacco Use    Smoking status: Never Smoker    Smokeless tobacco: Never Used   Substance Use Topics    Alcohol use: Not Currently      Prior to Admission Medications   Prescriptions Last Dose Informant Patient Reported? Taking? albuterol (PROVENTIL VENTOLIN) 2.5 mg /3 mL (0.083 %) nebu 2022 at Unknown time  No Yes   Sig: 3 mL by Nebulization route every six (6) hours for 7 days. amLODIPine (NORVASC) 10 mg tablet 2022 at Unknown time  No Yes   Sig: Take 1 Tablet by mouth daily for 7 days. budesonide (PULMICORT) 0.5 mg/2 mL nbsp 2022 at Unknown time  No Yes   Si mL by Nebulization route two (2) times a day for 7 days. ceFAZolin (ANCEF) in sterile water 2 gram/20 mL 2 g IV syringe 2022 at Unknown time  No Yes   Si mL by IntraVENous route every eight (8) hours for 3 doses. First dose 22 1700, last dose 22 0900   insulin glargine (LANTUS) 100 unit/mL injection 2022 at Unknown time  No Yes   Sig: 10 Units by SubCUTAneous route daily for 3 days. insulin lispro (HUMALOG) 100 unit/mL injection 2022 at Unknown time  No Yes   Si-10 Units by SubCUTAneous route Before breakfast, lunch, dinner and at bedtime for 3 days. For blood sugar (mg/dl) of less than 150 = 0 units, 150-199 = 2 units, 200-249 = 4 units, 250-299 = 6 units, 300-349 = 8 units, 350 and above = 10 units and notify physician   predniSONE (DELTASONE) 10 mg tablet Unknown at Unknown time  No No   Sig: Take 30 mg by mouth daily (with breakfast) for 3 days, THEN 20 mg daily (with breakfast) for 3 days, THEN 10 mg daily (with breakfast) for 3 days. Indications: worsening asthma   traZODone (DESYREL) 100 mg tablet 2022 at Unknown time  No Yes   Sig: Take 1 Tablet by mouth nightly for 7 days.       Facility-Administered Medications: None     Current Facility-Administered Medications   Medication Dose Route Frequency    acetaminophen (TYLENOL) tablet 650 mg  650 mg Oral Q6H PRN    Or    acetaminophen (TYLENOL) suppository 650 mg  650 mg Rectal Q6H PRN    ondansetron (ZOFRAN ODT) tablet 4 mg  4 mg Oral Q8H PRN    Or    ondansetron (ZOFRAN) injection 4 mg  4 mg IntraVENous Q6H PRN  [START ON 4/14/2022] polyethylene glycol (MIRALAX) packet 17 g  17 g Oral DAILY    sodium chloride (NS) flush 5-40 mL  5-40 mL IntraVENous Q8H    sodium chloride (NS) flush 5-40 mL  5-40 mL IntraVENous PRN    albuterol (PROVENTIL VENTOLIN) nebulizer solution 2.5 mg  2.5 mg Nebulization Q6H RT    [START ON 4/14/2022] amLODIPine (NORVASC) tablet 10 mg  10 mg Oral DAILY    bisacodyL (DULCOLAX) suppository 10 mg  10 mg Rectal DAILY PRN    budesonide (PULMICORT) 500 mcg/2 ml nebulizer suspension  500 mcg Nebulization BID RT    ceFAZolin (ANCEF) 2 g/20 mL in sterile water IV syringe  2 g IntraVENous Q8H    enoxaparin (LOVENOX) injection 40 mg  40 mg SubCUTAneous Q12H    hydrOXYzine pamoate (VISTARIL) capsule 50 mg  50 mg Oral QID PRN    [START ON 4/14/2022] insulin glargine (LANTUS) injection 15 Units  15 Units SubCUTAneous DAILY    insulin lispro (HUMALOG) injection 0-10 Units  0-10 Units SubCUTAneous AC&HS    [START ON 4/14/2022] predniSONE (DELTASONE) tablet 40 mg  40 mg Oral DAILY WITH BREAKFAST    senna-docusate (PERICOLACE) 8.6-50 mg per tablet 2 Tablet  2 Tablet Oral QHS    [START ON 4/14/2022] sodium chloride (NS) flush 30 mL  30 mL InterCATHeter DAILY    sodium chloride (NS) flush 30 mL  30 mL InterCATHeter PRN    traZODone (DESYREL) tablet 100 mg  100 mg Oral QHS    albuterol (PROVENTIL VENTOLIN) nebulizer solution 2.5 mg  2.5 mg Nebulization Q6H PRN       Review of Systems:  General: Denies: fevers, chills, sweats,  malaise, anorexia, weight loss + fatigue  Eyes:  Denies: blurry vision, loss of vision, eye pain, photophobia   HENT:  Denies: hearing loss, tinnitus, earache, epistaxis, sore throat, hoarseness  Lungs: Denies: cough, hemoptysis, + dyspnea, asthma, wheezine  CV:  Denies: chest pain, palpitations, syncope, orthopnea, paroxysmal nocturnal dyspnea, claudication   GI:  Denies: dysphagia, odynophagia, nausea, vomiting, diarrhea, constipation, abdominal pain, jaundice, melena :  Denies: frequency, dysuria, nocturia, urinary incontinence, stones, hematuria   Endocrine: Denies: polydipsia/polyuria, skin changes, temperature intolerance, unexpected weight gain or loss  MSK:  Denies: back pain, joint pain, joint swelling, muscle pain, + muscle weakness   Heme:  Denies: bleeding problems, blood transfusions, bruising, pallor, swollen lymph nodes   Neuro:  Denies: headache, dysarthria, blurred vision, diplopia, seizure, memory problems, speech problems,  coordination problems,  + gait problems    Objective:     Visit Vitals  BP (!) 141/91 (BP 1 Location: Left upper arm, BP Patient Position: Lying)   Pulse 99   Temp 98.9 °F (37.2 °C)   Resp 19   SpO2 93%      Intake and Output:  No intake/output data recorded. Physical Exam:   General:  Alert, appears stated age. No acute distress. HEENT:  Normocephalic, EOM intact, facial symmetry noted. Nares patent, oral mucosa moist without lesions. Lungs:  Clear to auscultation bilaterally. Respirations even and unlabored. Heart:  Regular rate and underlying rhythm, S1, S2. No obvious murmur, click, rub or gallop. Genitourinary: Deferred. Abdomen:  Soft, non-tender, not distended. Bowel sounds normoactive. No obvious masses or organomegaly palpated. Neuromuscular:  PERRLA EOMI  Speech , lang and conversation appropriate  weakness proximally in bilateral UEs and LEs  Shoulder flexion; Full PROM but actively can lift his arms to about 40d. bic are 4/5, shoulder abd 4+  Sensation intact  Hip flexion 3+ , distally 4+. Skin:  Intact, dry, good skin turgor, age related changes present   Edema: trace         Psych: Patient was oriented to person, place, and time. Without hallucinations, abnormal affect or abnormal behaviors during the examination.       Recent Results (from the past 72 hour(s))   GLUCOSE, POC    Collection Time: 04/10/22  8:37 PM   Result Value Ref Range    Glucose (POC) 147 (H) 65 - 100 mg/dL    Performed by JasonylorPCA    METABOLIC PANEL, BASIC    Collection Time: 04/11/22  4:27 AM   Result Value Ref Range    Sodium 136 (L) 138 - 145 mmol/L    Potassium 4.5 3.5 - 5.1 mmol/L    Chloride 96 (L) 98 - 107 mmol/L    CO2 31 21 - 32 mmol/L    Anion gap 9 7 - 16 mmol/L    Glucose 241 (H) 65 - 100 mg/dL    BUN 29 (H) 6 - 23 MG/DL    Creatinine 1.00 0.8 - 1.5 MG/DL    GFR est AA >60 >60 ml/min/1.73m2    GFR est non-AA >60 >60 ml/min/1.73m2    Calcium 9.8 8.3 - 10.4 MG/DL   CBC W/O DIFF    Collection Time: 04/11/22  4:27 AM   Result Value Ref Range    WBC 23.3 (H) 4.3 - 11.1 K/uL    RBC 5.15 4.23 - 5.6 M/uL    HGB 17.4 (H) 13.6 - 17.2 g/dL    HCT 51.5 (H) 41.1 - 50.3 %    .0 (H) 79.6 - 97.8 FL    MCH 33.8 (H) 26.1 - 32.9 PG    MCHC 33.8 31.4 - 35.0 g/dL    RDW 13.0 11.9 - 14.6 %    PLATELET 583 743 - 937 K/uL    MPV 9.2 (L) 9.4 - 12.3 FL    ABSOLUTE NRBC 0.03 0.0 - 0.2 K/uL   HEMOGLOBIN A1C WITH EAG    Collection Time: 04/11/22  4:27 AM   Result Value Ref Range    Hemoglobin A1c 6.0 4.20 - 6.30 %    Est. average glucose 126 mg/dL   PROCALCITONIN    Collection Time: 04/11/22  4:27 AM   Result Value Ref Range    Procalcitonin 0.11 0.00 - 0.49 ng/mL   GLUCOSE, POC    Collection Time: 04/11/22  6:08 AM   Result Value Ref Range    Glucose (POC) 204 (H) 65 - 100 mg/dL    Performed by DionicioTaylorPCA    CULTURE, BLOOD    Collection Time: 04/11/22 10:24 AM    Specimen: Blood   Result Value Ref Range    Special Requests: RIGHT  FOREARM        Culture result: NO GROWTH 2 DAYS     CULTURE, BLOOD    Collection Time: 04/11/22 10:24 AM    Specimen: Blood   Result Value Ref Range    Special Requests: LEFT  HAND        Culture result: NO GROWTH 2 DAYS     URINALYSIS W/ RFLX MICROSCOPIC    Collection Time: 04/11/22 10:34 AM   Result Value Ref Range    Color YELLOW      Appearance CLEAR      Specific gravity 1.025 (H) 1.001 - 1.023      pH (UA) 6.0 5.0 - 9.0      Protein TRACE (A) NEG mg/dL    Glucose Negative mg/dL    Ketone TRACE (A) NEG mg/dL    Bilirubin SMALL (A) NEG      Blood TRACE (A) NEG      Urobilinogen 0.2 0.2 - 1.0 EU/dL    Nitrites Negative NEG      Leukocyte Esterase Negative NEG      WBC 0-3 0 /hpf    RBC 3-5 0 /hpf    Epithelial cells 0 0 /hpf    Bacteria 0 0 /hpf    Casts 0 0 /lpf   GLUCOSE, POC    Collection Time: 04/11/22 11:48 AM   Result Value Ref Range    Glucose (POC) 220 (H) 65 - 100 mg/dL    Performed by Mirna    COVID-19 RAPID TEST    Collection Time: 04/11/22  1:59 PM   Result Value Ref Range    Specimen source Nasopharyngeal      COVID-19 rapid test Not detected NOTD     GLUCOSE, POC    Collection Time: 04/11/22  3:42 PM   Result Value Ref Range    Glucose (POC) 141 (H) 65 - 100 mg/dL    Performed by Alvaro    GLUCOSE, POC    Collection Time: 04/11/22  9:26 PM   Result Value Ref Range    Glucose (POC) 137 (H) 65 - 100 mg/dL    Performed by [employer]    METABOLIC PANEL, BASIC    Collection Time: 04/12/22  3:44 AM   Result Value Ref Range    Sodium 136 136 - 145 mmol/L    Potassium 4.0 3.5 - 5.1 mmol/L    Chloride 97 (L) 98 - 107 mmol/L    CO2 31 21 - 32 mmol/L    Anion gap 8 7 - 16 mmol/L    Glucose 129 (H) 65 - 100 mg/dL    BUN 28 (H) 6 - 23 MG/DL    Creatinine 0.80 0.8 - 1.5 MG/DL    GFR est AA >60 >60 ml/min/1.73m2    GFR est non-AA >60 >60 ml/min/1.73m2    Calcium 9.5 8.3 - 10.4 MG/DL   CBC WITH AUTOMATED DIFF    Collection Time: 04/12/22  3:44 AM   Result Value Ref Range    WBC 19.0 (H) 4.3 - 11.1 K/uL    RBC 5.09 4.23 - 5.6 M/uL    HGB 16.9 13.6 - 17.2 g/dL    HCT 50.4 (H) 41.1 - 50.3 %    MCV 99.0 (H) 79.6 - 97.8 FL    MCH 33.2 (H) 26.1 - 32.9 PG    MCHC 33.5 31.4 - 35.0 g/dL    RDW 12.7 11.9 - 14.6 %    PLATELET 952 556 - 464 K/uL    MPV 9.2 (L) 9.4 - 12.3 FL    ABSOLUTE NRBC 0.00 0.0 - 0.2 K/uL    NEUTROPHILS 56 43 - 78 %    LYMPHOCYTES 23 13 - 44 %    MONOCYTES 13 (H) 4.0 - 12.0 %    EOSINOPHILS 1 0.5 - 7.8 %    BASOPHILS 1 0.0 - 2.0 %    IMMATURE GRANULOCYTES 6 (H) 0.0 - 5.0 %    ABS. NEUTROPHILS 10.6 (H) 1.7 - 8.2 K/UL    ABS. LYMPHOCYTES 4.4 0.5 - 4.6 K/UL    ABS. MONOCYTES 2.5 (H) 0.1 - 1.3 K/UL    ABS. EOSINOPHILS 0.2 0.0 - 0.8 K/UL    ABS. BASOPHILS 0.2 0.0 - 0.2 K/UL    ABS. IMM. GRANS.  1.1 (H) 0.0 - 0.5 K/UL    RBC COMMENTS NORMOCYTIC/NORMOCHROMIC      WBC COMMENTS OCCASIONAL      PLATELET COMMENTS ADEQUATE      DF AUTOMATED     GLUCOSE, POC    Collection Time: 04/12/22  6:24 AM   Result Value Ref Range    Glucose (POC) 118 (H) 65 - 100 mg/dL    Performed by [employer]    GLUCOSE, POC    Collection Time: 04/12/22 12:22 PM   Result Value Ref Range    Glucose (POC) 190 (H) 65 - 100 mg/dL    Performed by Xeebel    GLUCOSE, POC    Collection Time: 04/12/22  4:37 PM   Result Value Ref Range    Glucose (POC) 143 (H) 65 - 100 mg/dL    Performed by [employer]ee[employer]    GLUCOSE, POC    Collection Time: 04/12/22  9:59 PM   Result Value Ref Range    Glucose (POC) 151 (H) 65 - 100 mg/dL    Performed by [employer]    METABOLIC PANEL, BASIC    Collection Time: 04/13/22  4:48 AM   Result Value Ref Range    Sodium 134 (L) 136 - 145 mmol/L    Potassium 4.0 3.5 - 5.1 mmol/L    Chloride 95 (L) 98 - 107 mmol/L    CO2 32 21 - 32 mmol/L    Anion gap 7 7 - 16 mmol/L    Glucose 132 (H) 65 - 100 mg/dL    BUN 26 (H) 6 - 23 MG/DL    Creatinine 1.00 0.8 - 1.5 MG/DL    GFR est AA >60 >60 ml/min/1.73m2    GFR est non-AA >60 >60 ml/min/1.73m2    Calcium 9.3 8.3 - 10.4 MG/DL   CBC WITH AUTOMATED DIFF    Collection Time: 04/13/22  4:48 AM   Result Value Ref Range    WBC 20.3 (H) 4.3 - 11.1 K/uL    RBC 4.86 4.23 - 5.6 M/uL    HGB 16.3 13.6 - 17.2 g/dL    HCT 48.4 41.1 - 50.3 %    MCV 99.6 (H) 79.6 - 97.8 FL    MCH 33.5 (H) 26.1 - 32.9 PG    MCHC 33.7 31.4 - 35.0 g/dL    RDW 12.3 11.9 - 14.6 %    PLATELET 658 004 - 692 K/uL    MPV 9.8 9.4 - 12.3 FL    ABSOLUTE NRBC 0.00 0.0 - 0.2 K/uL    DF AUTOMATED      NEUTROPHILS 60 43 - 78 %    LYMPHOCYTES 23 13 - 44 % MONOCYTES 12 4.0 - 12.0 %    EOSINOPHILS 1 0.5 - 7.8 %    BASOPHILS 0 0.0 - 2.0 %    IMMATURE GRANULOCYTES 4 0.0 - 5.0 %    ABS. NEUTROPHILS 12.1 (H) 1.7 - 8.2 K/UL    ABS. LYMPHOCYTES 4.7 (H) 0.5 - 4.6 K/UL    ABS. MONOCYTES 2.5 (H) 0.1 - 1.3 K/UL    ABS. EOSINOPHILS 0.2 0.0 - 0.8 K/UL    ABS. BASOPHILS 0.1 0.0 - 0.2 K/UL    ABS. IMM. GRANS. 0.8 (H) 0.0 - 0.5 K/UL   GLUCOSE, POC    Collection Time: 04/13/22  6:40 AM   Result Value Ref Range    Glucose (POC) 145 (H) 65 - 100 mg/dL    Performed by Marielle    GLUCOSE, POC    Collection Time: 04/13/22 11:03 AM   Result Value Ref Range    Glucose (POC) 171 (H) 65 - 100 mg/dL    Performed by Nery    GLUCOSE, POC    Collection Time: 04/13/22  4:29 PM   Result Value Ref Range    Glucose (POC) 147 (H) 65 - 100 mg/dL    Performed by Denise          Condition on Admission: good      Physical Debilitation  Assessment:     The Post Assessment Physician Evaluation (MARIUM) found the current functional status to be comparable with the pre-admission screening. The patient is a good candidate for acute inpatient rehabilitation. Nothing since the pre-admission screen has changed that determination. Rehabilitation Plan  The patient has shown the ability to tolerate and benefit from 3 hours of therapy daily and is being admitted to a comprehensive acute inpatient rehabilitation program consisting of at least 3 hours of combined physical and occupational therapies. - Begin intensive Physical Therapy for a minimum of 1.5 hours a day, at least 5 out of 7 days per week to address bed mobility, transfers, ambulation, strengthening, balance, and endurance. - Begin intensive Occupational Therapy for a minimum of 1.5 hours a day, at least 5 out of 7 days per week to address ADLs (bathing, LE dressing, toileting) and adaptive equipment as needed. -  therapy schedule may be adjusted by MD based on patient's needs.   Each of these therapies will be continued as above for the duration of the inpatient rehab stay. The patient will also require 24-hour skilled rehabilitation nursing for bowel and bladder management, skin care for decubitus ulcer prevention, pain management and ongoing medication administration. Physical Debility due to Acute Respiratory Failure due to Influenza A,, MSSA pneumonia and Asthma exacerbation  Plan / Recommendations / Medical Decision Making:     Daily physician / PA medical management:    Physical debility [R53.81] - PT/OT . limited by being easily fatigued due to weakness and respiratory issues. Hypertension - BP fluctuating, managed medically. Cont Norvasc 10 mg daily, add prn hydralazine     Pre Diabetes - fair controlr fair glycemic control. Will require daily, close fasting glucose monitoring and medication adjustment to optimize glycemic control in setting of acute illness and hospitalization. -exacerbated by steroids. Cont Lantus 15u daily and SSI  -anticipate insulin therapy can be weaned off with no long-term need for insulin therapy     Leukocytosis; steroid induced. UA neg. Blood cx x 2 neg. Afebrile. pneurmonia resolved     ZACK; Pulmonology has ordered BiPAP QHS and recommends outpatient sleep study  Has NOT tolerated bipap at [facility]    Pain management - generalize msk pain. Will require regular pain assessment and comprehensive pain management. Electrolyte abnormality - mild hyponatremia, Na 134; monitor     Pneumonia prophylaxis - incentive spirometer every hour while awake. S/p MSSA and Influenza A  -enc IS. Complete abx ; on Ancef, last day 4/18    Asthma; cont nebs and pulmicort; cont Prednisone 40mg dialy ; Pulm f/u with [facility]; has hospital sponsorship there;Patient has a [facility]-patient appointment with [facility] [place]/8/22. DVT risk / DVT prophylaxis - daily physician / PA exam to assess as patient is at increased risk for of thromboembolism. Mobilize as tolerated.  Sequential pneumatic compression devices (SCDs) when in bed; thigh-high or knee-high thromboembolic deterrent hose when out of bed. Lovenox subcutaneously daily. GI prophylaxis - resume PPI. At times may need additional antacids, Maalox prn. General skin care / wound prevention - monitor   general skin status daily per staff and physician / PA. At risk for failure due to impaired mobility. Bowel program - at risk for constipation as a side effect of opioids, other medications, impaired mobility, etc. MiraLAX daily for regularity, Magda-Colace for stool softener. PRN MOM, bisacodyl suppository or tablets for constipation. Patient has a new-patient appointment with [facility] on 4/25/22 at 10:05am to establish PCP. Note: Patient is without payor source and would benefit from prescriptions at discharge from [facility] that could be obtained through [facility]. Amlodipine, Trazodone, Advair HFA Inhaler, and Proventil HFA Inhalfer are all currently listed with [facility] as covered medications. CM can assist with first 30 days while a 90-day prescription can be sent to [facility] for coverage thereafter.          Disposition: The patient's prognosis for significant practical improvement within a reasonable period of time appears good and the estimated length of stay is 7 days; patient is expected to return home with spouse / family supervision and continued rehabilitation with home health therapy. Given the patient's complex neurologic / medical condition, risks of further medical complications include but are not limited to: thromboembolism / pulmonary embolism, skin breakdown, pneumonia due to decreased mobility, CVA, MI, cardiac arrhythmias due to HTN, postural hypotension. For these ongoing medical issues, rehabilitation services could not be safely provided at a lower level of care such as a skilled nursing facility or nursing home.         Signed By: Viktoriya Aparicio MD     April 13, 2022

## 2022-04-13 NOTE — PROGRESS NOTES
Pt refused BIPAP. No signs of distress noted. Educated pt on importance of BIPAP with ZACK. Will continue to monitor.

## 2022-04-14 LAB
ANION GAP SERPL CALC-SCNC: 6 MMOL/L (ref 7–16)
BUN SERPL-MCNC: 22 MG/DL (ref 6–23)
CALCIUM SERPL-MCNC: 9.1 MG/DL (ref 8.3–10.4)
CHLORIDE SERPL-SCNC: 98 MMOL/L (ref 98–107)
CO2 SERPL-SCNC: 31 MMOL/L (ref 21–32)
CREAT SERPL-MCNC: 0.9 MG/DL (ref 0.8–1.5)
GLUCOSE BLD STRIP.AUTO-MCNC: 107 MG/DL (ref 65–100)
GLUCOSE BLD STRIP.AUTO-MCNC: 110 MG/DL (ref 65–100)
GLUCOSE BLD STRIP.AUTO-MCNC: 127 MG/DL (ref 65–100)
GLUCOSE BLD STRIP.AUTO-MCNC: 128 MG/DL (ref 65–100)
GLUCOSE SERPL-MCNC: 109 MG/DL (ref 65–100)
POTASSIUM SERPL-SCNC: 3.9 MMOL/L (ref 3.5–5.1)
SERVICE CMNT-IMP: ABNORMAL
SODIUM SERPL-SCNC: 135 MMOL/L (ref 136–145)

## 2022-04-14 PROCEDURE — 97530 THERAPEUTIC ACTIVITIES: CPT

## 2022-04-14 PROCEDURE — 82962 GLUCOSE BLOOD TEST: CPT

## 2022-04-14 PROCEDURE — 97165 OT EVAL LOW COMPLEX 30 MIN: CPT

## 2022-04-14 PROCEDURE — 65310000000 HC RM PRIVATE REHAB

## 2022-04-14 PROCEDURE — 74011636637 HC RX REV CODE- 636/637: Performed by: PHYSICAL MEDICINE & REHABILITATION

## 2022-04-14 PROCEDURE — 2709999900 HC NON-CHARGEABLE SUPPLY

## 2022-04-14 PROCEDURE — 94640 AIRWAY INHALATION TREATMENT: CPT

## 2022-04-14 PROCEDURE — 97116 GAIT TRAINING THERAPY: CPT

## 2022-04-14 PROCEDURE — 74011000250 HC RX REV CODE- 250: Performed by: PHYSICAL MEDICINE & REHABILITATION

## 2022-04-14 PROCEDURE — 97161 PT EVAL LOW COMPLEX 20 MIN: CPT

## 2022-04-14 PROCEDURE — 74011250637 HC RX REV CODE- 250/637: Performed by: PHYSICAL MEDICINE & REHABILITATION

## 2022-04-14 PROCEDURE — 94760 N-INVAS EAR/PLS OXIMETRY 1: CPT

## 2022-04-14 PROCEDURE — 80048 BASIC METABOLIC PNL TOTAL CA: CPT

## 2022-04-14 PROCEDURE — 97110 THERAPEUTIC EXERCISES: CPT

## 2022-04-14 PROCEDURE — 97535 SELF CARE MNGMENT TRAINING: CPT

## 2022-04-14 PROCEDURE — 74011250636 HC RX REV CODE- 250/636: Performed by: PHYSICAL MEDICINE & REHABILITATION

## 2022-04-14 PROCEDURE — 99232 SBSQ HOSP IP/OBS MODERATE 35: CPT | Performed by: PHYSICAL MEDICINE & REHABILITATION

## 2022-04-14 RX ADMIN — SODIUM CHLORIDE, PRESERVATIVE FREE 30 ML: 5 INJECTION INTRAVENOUS at 05:13

## 2022-04-14 RX ADMIN — SODIUM CHLORIDE, PRESERVATIVE FREE 30 ML: 5 INJECTION INTRAVENOUS at 08:17

## 2022-04-14 RX ADMIN — BUDESONIDE 500 MCG: 0.5 INHALANT RESPIRATORY (INHALATION) at 05:57

## 2022-04-14 RX ADMIN — BUDESONIDE 500 MCG: 0.5 INHALANT RESPIRATORY (INHALATION) at 17:28

## 2022-04-14 RX ADMIN — CEFAZOLIN SODIUM 2 G: 100 INJECTION, POWDER, LYOPHILIZED, FOR SOLUTION INTRAVENOUS at 17:00

## 2022-04-14 RX ADMIN — ENOXAPARIN SODIUM 40 MG: 40 INJECTION SUBCUTANEOUS at 08:04

## 2022-04-14 RX ADMIN — PREDNISONE 40 MG: 10 TABLET ORAL at 08:04

## 2022-04-14 RX ADMIN — ALBUTEROL SULFATE 2.5 MG: 2.5 SOLUTION RESPIRATORY (INHALATION) at 12:07

## 2022-04-14 RX ADMIN — INSULIN GLARGINE 15 UNITS: 100 INJECTION, SOLUTION SUBCUTANEOUS at 08:14

## 2022-04-14 RX ADMIN — SODIUM CHLORIDE, PRESERVATIVE FREE 10 ML: 5 INJECTION INTRAVENOUS at 21:03

## 2022-04-14 RX ADMIN — ALBUTEROL SULFATE 2.5 MG: 2.5 SOLUTION RESPIRATORY (INHALATION) at 17:29

## 2022-04-14 RX ADMIN — SODIUM CHLORIDE, PRESERVATIVE FREE 30 ML: 5 INJECTION INTRAVENOUS at 16:44

## 2022-04-14 RX ADMIN — ALBUTEROL SULFATE 2.5 MG: 2.5 SOLUTION RESPIRATORY (INHALATION) at 05:57

## 2022-04-14 RX ADMIN — SODIUM CHLORIDE, PRESERVATIVE FREE 30 ML: 5 INJECTION INTRAVENOUS at 16:46

## 2022-04-14 RX ADMIN — TRAZODONE HYDROCHLORIDE 100 MG: 50 TABLET ORAL at 21:03

## 2022-04-14 RX ADMIN — BUDESONIDE 500 MCG: 0.5 INHALANT RESPIRATORY (INHALATION) at 12:12

## 2022-04-14 RX ADMIN — CEFAZOLIN SODIUM 2 G: 100 INJECTION, POWDER, LYOPHILIZED, FOR SOLUTION INTRAVENOUS at 00:46

## 2022-04-14 RX ADMIN — AMLODIPINE BESYLATE 10 MG: 10 TABLET ORAL at 08:04

## 2022-04-14 RX ADMIN — CEFAZOLIN SODIUM 2 G: 100 INJECTION, POWDER, LYOPHILIZED, FOR SOLUTION INTRAVENOUS at 08:16

## 2022-04-14 RX ADMIN — ENOXAPARIN SODIUM 40 MG: 40 INJECTION SUBCUTANEOUS at 21:03

## 2022-04-14 NOTE — PROGRESS NOTES
[facility] OCCUPATIONAL THERAPY INITIAL EVALUATION    Time In: 0825  Time Out: 6470    Precautions: Falls    Pain: Pt reporting mild pain in abdomen when bending forward    History of Presenting Illness (per previous reports): Ariane Ewing is a 31 y.o. male patient at [address] [place] [address] who was admitted to [address] [place] [address] on 4/13/2022 by Katelyn Castelan MD of Physical Medicine and Rehab service with below-mentioned medical history.     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 He was tachycardic, tachypnic , and hypoxic on presentation. CXR clear. Initially treated in the ED and sent home but came back several hours later with worsening symptoms. He reported productive cough with yellow spt over the course of several days. D Dimer 0.55. LE neg for DVT ,as he had complained of left calf pain. He was requiring 4L to maintain sats. He was given continuous nebs and IV steroids but continued to have increased WOB so had been started on bipap 18/8 with 60% FiO2. He was satting well and reportedly less WOB than before and less tachycardia (was up to 150 sinus tach) then at 120 but still with ongoing wheezes. ABG  with some mild hypercarbia at 47.    Later on on day of admission, he decompensated and required intubation. Unable to continue heliox through ventilator. Was having some air trapping and autopeep 15 from 10 set. He had not had any fevers. He had been on 50 rocky for hypotension and some sinus tachycardia. He developed a right midlung infiltrate and was placed on azithromycin. Rocephin was added. By 4/6 , he was off pressors and his leukocytosis was improving. His Peak pressures were down to 30 .  On that day they attempted to cut off paralytics and he did ok at first but by afternoon he had more difficulty, thus paralytics restarted. 4/9 was able to tolerate off paralytics, had to work through sedation wean, but eventually on propofol, precedex and fentanyl. Placed on PSV 5/8 40% and volumes are 500s with RR 20-24. He seemed uncomfortable and was biting tube at times. He tested + for influenza A, subtype H3 and spt grew out MSSA. Covid 19 Negative. He was finally extubated on 4/9. He spiked a fever to 101.7 but afebrile the following day. He was on AirVo 50%. Still required small dose of Precedex. He was transferred out of the ICU 4/10. The Pulmonologists turned the care over to the Hospitalist Service. He is not tolerating the bipap. Nursing documenting pt does very minimal for himself and has been encouraged to do so. Past Medical History/ Co-morbidities:   Past Medical History:   Diagnosis Date    Asthma     Status asthmaticus 4/3/2022       Patient's Goal: \"I want to get stronger and go home. 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 [employer]    [address] [place] [address]   Lives Alone No   Support Systems Spouse/Significant Other,Child(manjinder) (wife and 2 daughters (10 and 9 yo))   Shower Situation Tub/Shower combination (shower curtain, no bars nor chair)   Current DME None   Lift Chair     Stairs to [employer] [address] (3 floors)      Rails        W/C [employer] Steps       Upper Extremity Function   LUE RUE   FMC Intact Intact   GMC Intact Intact   Light Touch       Sharp/Dull Discrimination       Hot/Cold       Proprioception       Stereognosis       9 Hole Peg Test          LUE RUE   General Evalutaion       Shoulder Flexion   4-   Shoulder Extension  4- 4-   Shoulder Abduction 4- 4-   Shoulder Adduction 4- 4-   Elbow Flexion 4 4   Elbow Extension  4 4          0/5 No palpable muscle contraction  1/5 Palpable muscle contraction, no joint movement  2-/5 Less than full range of motion in gravity eliminated position  2/5 Able to complete full range of motion in gravity eliminated position  2+/5 Able to initiate movement against gravity  3-/5 More than half but not full range of motion against gravity  3/5 Able to complete full range of motion against gravity  3+/5 Completes full range of motion against gravity with minimal resistance  4-/5 Completes full range of motion against gravity with minimal-moderate resistance  4/5 Completes full range of motion against gravity with moderate resistance  4+/5 Completes full range of motion against gravity with moderate-maximum resistance  5/5 Completes full range of motion against gravity with maximum resistance      Functional Mobility   Score Comments   Rolling 6: Independent Side: Left     Supine to Sit 4: Supervision or touching A S   Sit to Supine 4: Supervision or touching A S   Sit to Stand 4: Supervision or touching A S   Transfer Assist 4: Supervision or touching A Transfer Type: SPT   Equipment: Rolling Walker   Comments: CGA     Activities of Daily Living    Score Comments   Eating 6: Independent    Oral Hyigene 6: Independent    Bathing 4: Supervision or touching A Type of Shower: Shower  Position: Standing PRN and Unsupported Sitting   Adaptive  Equipment: Tub Transfer Bench and Grab Bars  Comments: S   Upper Body  Dressing 5: S/U or clean-up assist Items Applied: Pullover  Position: Unsupported Sitting  Comments:    Lower Body Dressing 4: Supervision or touching A Items Applied: Underwear and Elastic pants  Position: Standing PRN and Unsupported Sitting  Adaptive Equipment: RW  Comments: S in stance   Donning/[place] Footwear 2: Substantial/Maximal A Items Applied: Socks and Slip-on shoes  Adaptive Equipment: N/A  Comments:     Toilet Transfer 4: Supervision or touching A Transfer Type: SPT   Equipment: Rolling Walker   Comments: CGA, pt with 1 LOB in flip [facility] 4: Supervision or touching A Output: urine   Comments: S Cognition: Pt completed the IRF-AMADOR with a score of 13 out of 15. Pt with mild deficits in STM. Pt reports he took sleeping medication making him more foggy this morning. Vision/Perception: No deficits noted. Session: Pt was friendly and agreeable to OT evaluation. Pt completed ADLs, ROM/MMT, IRF-AMADOR, and informal OT interview with details recorded above. Pt with decreased flexibility in hips preventing him from donning socks using several techniques. Pt was on room air and maintained SpO2 levels at and above 93% with HR ranging from 99 to 119 bpm with exertion. Pt will benefit from skilled OT services to improve safety, balance, activity tolerance, shoulder strength, and independence in ADLs/IADLs. Interdisciplinary Communication: Collaborated with PT and nursing for current level of function, plan of care, and measures to assure safety during their stay. Updated board with current level of function to increase carryover between disciplines. Patient/Family Education: Patient was/were educated On the role of OT, On POC and On IRC expectations. Problem List: Activity Tolerance, Safety Awareness, Strength, Standing Balance and Cognition    Functional Limitations: ADL, IADL, Functional Transfers and Functional Mobility    Goals:   LTG 1: Patient will complete UB dressing with independence using AE/DME PRN within 7 days. LTG 2: Patient will complete LB dressing with independence using AE/DME PRN within 7 days. LTG 3: Patient will don footwear with independence using AE/DME PRN within 7 days. LTG 4: Patient will complete bathing with independence using AE/DME PRN within 7 days. LTG 5: Patient will complete toileting with independence using AE/DME PRN within 7 days.      LTG 6: Pt/caregiver will demonstrate and verbalize good understanding of OT recommendations regarding ADL status, functional transfer status, home safety, DME, AE, energy conservation techniques, follow-up therapy, for increasing safety with functional tasks upon discharge. OT order received and chart reviewed. OT orders have been acknowledged. Patient will benefit from skilled OT services to address ADL, functional transfers, UE strength, UE coordination, balance, cognition and activity tolerance to maximize functional performance with daily self-care tasks and functional mobility. Patient will be seen for 1.5-2 hours of skilled OT services 5-6 days a week as appropriate. Initate POC.      Rey Crabtree, OTR/L   4/14/2022

## 2022-04-14 NOTE — PROGRESS NOTES
BSN, CM met with pt at bedside wearing the appropriate PPE with social distance. Pt sitting on side of bed. Pt alert and oriented to person, place, time, and situation. Pt verified demographic information. Address listed in chart is his father's address but reports he is still on the lease but actually staying with his spouse at [address] [place] [address] Drive [address] [place]. PCP verified as Dr. Brady Max and pt was last seen by PCP in February. Pt lives in 1 level apartment with spouse, 26 steps to enter into the 2nd floor apartment. Pt reports spouse is asking about main level apartment but pt will likely return to this location on 2nd floor. Pt reports being independent with ADLs and driving prior to admission. 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 Pt reports having DMEs such as nebulizer. Pt reports he does not feel like he can go back to working. Pt family able to transport home, to doctor apt,  medications, and get groceries. Pt has no  primary pharmacy but uses the one with best prices including [facility] and [facility] in [place]. Pt may need assistance with medication as he has no insurnace. Pt reports availability for family to be at home with pt 24 hours as needed as spouse works from home with pt business and his father can also be at his home with him as needed. Pt reports plans to discharge home. Pt has never used HH or been to SNF. Pt aware of Wednesday Team meetings and would like family updated about meeting. Spoke to [facility] staff reports meeting with spouse and pt not meeting Medicaid requirements as he will be able to return to work. [employer] plans to follow up on finanacial assistance application. CM to continue to follow and monitor for any further needs. Care Management Interventions  PCP Verified by CM: Yes (Dr. Brady Max)  Mode of Transport at Discharge:  Other (see comment) (spouse )  Transition of Care Consult (CM Consult): Discharge Planning  Discharge Durable Medical Equipment: No  Physical Therapy Consult: Yes  Occupational Therapy Consult: Yes  Speech Therapy Consult: No  Support Systems: Spouse/Significant Other,Parent(s)  Confirm Follow Up Transport: Family  The Plan for Transition of Care is Related to the Following Treatment Goals : Return to baseline  The Patient and/or Patient Representative was Provided with a Choice of Provider and Agrees with the Discharge Plan?: Yes  Name of the Patient Representative Who was Provided with a Choice of Provider and Agrees with the Discharge Plan: pt  Freedom of Choice List was Provided with Basic Dialogue that Supports the Patient's Individualized Plan of Care/Goals, Treatment Preferences and Shares the Quality Data Associated with the Providers?: Yes   Resource Information Provided?: No  Discharge Location  Patient Expects to be Discharged to[de-identified] Home with home health (anticipated plan )

## 2022-04-14 NOTE — PROGRESS NOTES
OT Daily Note  Time In 1116   Time Out 1202     Subjective: \"I have about 7 steps with landings and then another 7 steps for each floor. \"  Pain: No pain expressed. Education: breathing and activity pacing techniques  Interdisciplinary Communication: Collaborated with PT to ensure progress towards POC and goals. Precautions: Falls  Patient Vitals for the past 12 hrs:   Temp Pulse Resp BP SpO2   04/14/22 1214     93 %   04/14/22 1207  (!) 107  124/70    04/14/22 0900  100   94 %   04/14/22 0830  99   93 %   04/14/22 0730 97.9 °F (36.6 °C) 83 16 124/70 92 %   04/14/22 0557     94 %         Mobility   Score Comments   Supine to Sit 4: Supervision or touching A S   Sit to Stand 4: Supervision or touching A    Transfer Assist 4: Supervision or touching A Transfer Type: SPT   Equipment: Rolling Walker   Comments:      Activity Tolerance and functional mobility Daily Assessment   Pt ambulated 25 ft and 75 ft using RW with PT providing SBA and OT providing wc follow. Pt ambulated 25 ft without a device with CGA and wc follow. Pt navigating 4 to 5 steps with bilateral hand rail support with CGA in preparation for apartment steps. SpO2 level remained above 92% throughout functional mobility on room air. HR ranged from 100 to 116 bpm.      Education Daily Assessment   Pt educated throughout session on activity pacing and matching breathing pattern with activity before increasing speed. Pt verbalized understanding. Assessment: Pt maintained maintained appropriate SpO2 sats throughout functional mobility, HR increased with exertion on steps. Pt will benefit from continued balance building reactions to improve overall safety and independence. Pt demonstrated good participation and engagement in OT session. Pt continues to benefit from skilled OT services to address remaining deficits and return back to baseline with improved independence and safety during ADLs and IADLs.    Patient ended session seated in recliner and with RN assuming care  Plan: Continue OT POC with focus on ADL/IADL skills, functional transfers, functional mobility, coordination, strength, static and dynamic balance, and activity tolerance to maximize safety and independence with ADLs and functional transfers.      Rey Gutierrez, OTR/L  4/14/2022

## 2022-04-14 NOTE — PROGRESS NOTES
PHYSICAL THERAPY DAILY NOTE  Time In: (P) 1345  Time Out: (P) 1436  Patient Seen For: (P) PM;Therapeutic exercise;Transfer training; Other (see progress notes);Gait training    Subjective: Patient had no complaints. Objective: No pain noted. Other (comment) (fall risk)  GROSS ASSESSMENT Daily Assessment            COGNITION Daily Assessment           BED/MAT MOBILITY Daily Assessment    Rolling Right : 6 (Modified independent)  Rolling Left : 6 (Modified independent)  Supine to Sit : (P) 5 (Stand-by assistance)  Sit to Supine : (P) 5 (Supervision)       TRANSFERS Daily Assessment    Transfer Type: (P) SPT with walker  Transfer Assistance : (P) 4 (Contact guard assistance)  Sit to Stand Assistance: (P) Contact guard assistance  Car Transfers: (P) Not tested       GAIT Daily Assessment    Amount of Assistance: (P) 4 (Contact guard assistance)  Distance (ft): (P) 75 Feet (ft)  Assistive Device: (P) Gait belt;Walker, rolling       STEPS or STAIRS Daily Assessment    Steps/Stairs Ambulated (#): 8 (4 steps x 1,4 min rest, 4 steps x 2)  Level of Assist : (P) 0 (Not tested)  Rail Use: Both       BALANCE Daily Assessment    Sitting - Static: Good (unsupported)  Sitting - Dynamic: Good (unsupported)  Standing - Static: Fair       WHEELCHAIR MOBILITY Daily Assessment    Curbs/Ramps Assist Required (FIM Score): 0 (Not tested)  Wheelchair Setup Assist Required : 0 (Not tested)       LOWER EXTREMITY EXERCISES Daily Assessment    Extremity: (P) Both  Exercise Type #1: (P) Supine lower extremity strengthening; Other (comment) (used green swiss ball to perform core strengthening exs)  Sets Performed: (P) 1  Reps Performed: (P) 20  Level of Assist: (P) Stand-by assistance          Assessment: Patient making good progress. Core and hip weakness. Plan of Care: Continue with plan of care.     Jami Cedeno, PTA  4/14/2022

## 2022-04-14 NOTE — PROGRESS NOTES
Purposeful hourly rounds completed this shift, needs met. Continent voids and Bm on toilet, independent with clothing and hygiene. Calls appropriately for assistance. Pleasant, cooperative,  motivated.

## 2022-04-14 NOTE — PROGRESS NOTES
PHYSICAL THERAPY EXAMINATION  TIME IN [facility]  Patient Name: Dickson Herr  Patient Age: 31 y.o. Past Medical History:   Past Medical History:   Diagnosis Date    Asthma     Status asthmaticus 4/3/2022       Medical Diagnosis:  Physical debility [R53.81] <principal problem not specified>    Precautions at Admission: Other (comment) (fall precautions)    Therapy Diagnosis:   Difficulty with bed mobility  [x]     Difficulty with functional transfers  [x]     Difficulty with ambulation  [x]     Difficulty with stair negotiations  [x]       Problem List:    Decreased strength B LE  [x]     Decreased strength trunk/core  [x]     Decreased AROM   []     Decreased PROM  []    Decreased endurance  [x]     Decreased balance sitting  []     Decreased balance standing  [x]     Pain   []     Slow ambulation velocity  [x]    Decreased coordination  [x]    Decreased safety awareness  []      Functional Limitations:   Decreased independence with bed mobility  []     Decreased independence with functional transfers  [x]     Decreased independence with ambulation  [x]     Decreased independence with stair negotiation  [x]       Previous Functional Level: Pt reports previous independence in ADLs, IADLs, driving, walking without a device, 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   Patient Vitals for the past 12 hrs:   Temp Pulse Resp BP SpO2   04/14/22 1214     93 %   04/14/22 1207  (!) 107  124/70    22 0900  100   94 %   22 0830  99   93 %   22 0730 97.9 °F (36.6 °C) 83 16 124/70 92 %   22 0557     94 %     Pain level:No c/o pain during treatment  Pain location:NA  Pain interventions:NA    Patient education:PT POC and goals, Bed mobility training,transfer training, gait training, balance training,fall precautions, body mechanics,activity pacing, energy conservation,breathing techniques during functional mobility, Patient verbalizing understanding and demonstrating understanding of patient education. Recommend follow up education.     Interdisciplinary Communication:co treat with OT, spoke with RN regarding functional status      Cognition: Orientation:A+O x 4  Communication:able to express needs verbally, able to follow multi step commands  Social Interaction:cooperating, appropriate with PT, participating, motivated to improve  Problem Solving:cues for controlling activity pacing and RR during functional mobility in order to control RR  Memory:cues to recall name, , PLOF, PMH         MMT Initial Asssessment   Right Lower Extremity Left Lower Extremity   Hip Flexion 4 4   Knee Extension 5 5   Knee Flexion 4+ 4+   Ankle Dorsiflexion 5 5   0/5 No palpable muscle contraction  1/5 Palpable muscle contraction, no joint movement  2-/5 Less than full range of motion in gravity eliminated position  2/5 Able to complete full range of motion in gravity eliminated position  2+/5 Able to initiate movement against gravity  3-/5 More than half but not full range of motion against gravity  3/5 Able to complete full range of motion against gravity  3+/5 Completes full range of motion against gravity with minimal resistance  4-/5 Completes full range of motion against gravity with minimal-moderate resistance  4/5 Completes full range of motion against gravity with moderate resistance  4+/5 Completes full range of motion against gravity with moderate-maximum resistance  5/5 Completes full range of motion against gravity with maximum resistance     AROM: Bilateral LE generally decreased, functional    PRIMARY MODE OF LOCOMOTION: ambulation      BED/CHAIR/WHEELCHAIR TRANSFERS Initial Assessment   Rolling Right 6 (Modified independent)   Rolling Left 6 (Modified independent)   Supine to Sit 6 (Modified independent)   Sit to Stand Stand-by assistance   Sit to Supine 6 (Modified independent)   Transfer Type SPT without device   Comments Increased time and effort to complete bed mobility and transfers, Increased GIL with midl ataxia with SPT without a device   Car Transfer Not tested   Car Type         WHEELCHAIR MOBILITY/MANAGEMENT Initial Assessment   Able to Propel     W/C Assistance     Curbs/ramps assistance required 0 (Not tested)   Wheelchair set up assistance required 0 (Not tested)   Wheelchair management         WALKING INDEPENDENCE Initial Assessment   Assistive device Gait belt   Ambulation assistance - level surface 4 (Minimal assistance)   Distance 20 Feet (ft)   Comments slow cont step through gait with increased base of support, decreased step length and step clearance with decreased ankle PF and knee flex at terminal stance and decreased ankle DF at initial contact. Decreased hip stance stability with trendelenberg noted. Gait training x 45 ft x 1 and 75 ft x 1 with RW and SBA  with 5 min sitting rest interval between attempts   Ambulation assistance - unlevel surface 0 (Not tested) (due to impaired balance)       STEPS/STAIRS Initial Assessment   Steps/Stairs ambulated 8 (4 steps x 1,4 min rest, 4 steps x 2)   Stairs Assistance     Rail Use Both   Comments slow reciprocating pattern going up steps and single step at a time going down steps. Cues for safe foot placement going down steps, Decreased quad eccentric control gooing down steps. Curbs/Ramps 0 (Not tested)       QUALITY INDICATOR ASSIST COMMENTS   Roll right (&return to back) 6:  Independent Roll left (& return to back) 6: Independent    Supine to sit 6: Independent    Sit to stand 5: S/U or clean-up assist    Chair/bed-to-chair transfer 5: S/U or clean-up assist    Walk 10 feet 4: Supervision or touching A    Walk 50 feet with 2 turns Not Tested: Not attempted due to fatigue and impaired balance    Walk 150 feet Not Tested: Not attempted due to fatigue and impaired balance    Walk 10 feet on uneven  Not Tested: Not attempted due to impaired balance    1 step/curb 4: Supervision or touching A    4 steps 4: Supervision or touching A    12 steps Not Tested: Not attempted due to fatigue and LE weakness     object 4: Supervision or touching A    Wheel 50' w/2 turns Not Tested: Not applicable secondary to pt not completing activity previously    Wheel 150' Not Tested: Not applicable secondary to pt not completing activity previously    Car Transfer Not Tested: Not attempted due to environmental concerns: rehab car too small             PHYSICAL THERAPY PLAN OF CARE    Therapy Diagnosis:   Please see table above    Order received from MD for physical therapy services and chart reviewed. Pt to be seen at least 5 times per week for at least 1.5 hours of physical therapy per day for 1 week. Thank you for the referral.    LTGs:to be met in 1 week  1. Patient independent with rolling left and right and transfer supine<>sit without use of bed rail or HOB elevation  2. Patient transfer sit<>stand and perform stand pivot transfer with LRAD and modified independence   3. Patient ambulate 200ft with LRAD independently on level surfaces with 02 sat > 88%  4. Patient ambulate up/down 4 steps x 9 with hand rails and SBA with 02 sat >88%    Pt would benefit from skilled physical therapy in order to improve independent functional mobility within the home.  Interventions may include range of motion (AROM, PROM B LE/trunk), motor function (B LE/trunk strengthening/coordination), activity tolerance (vitals, oxygen saturation levels), bed mobility training, balance activities, gait training (progressive ambulation program), and functional transfer training. Please see IRC; Interdisciplinary Eval, Care Plan, and Patient Education for further information regarding physical therapy examination and plan of care. Patient returned to room and remained in bathroom on commode.  RN to assist patient out of bathroom    Ashley Calabrese, PT  4/14/2022

## 2022-04-14 NOTE — PROGRESS NOTES
Renu Busch MD  Medical Director  [address] [facility], [address] [facility]  Tel: [phone]       [facility] PROGRESS NOTE    Benson Fields  Admit Date: 4/13/2022  Admit Diagnosis:   Physical debility [R53.81]    Subjective     Patient seen and examined. Refuses CPAP claiming he is too claustrophobic. Have discussed the risks of untreated ZACK. Denies cp, sob, wheezing. Slept well.      Objective:     Current Facility-Administered Medications   Medication Dose Route Frequency    acetaminophen (TYLENOL) tablet 650 mg  650 mg Oral Q6H PRN    Or    acetaminophen (TYLENOL) suppository 650 mg  650 mg Rectal Q6H PRN    ondansetron (ZOFRAN ODT) tablet 4 mg  4 mg Oral Q8H PRN    Or    ondansetron (ZOFRAN) injection 4 mg  4 mg IntraVENous Q6H PRN    polyethylene glycol (MIRALAX) packet 17 g  17 g Oral DAILY    sodium chloride (NS) flush 5-40 mL  5-40 mL IntraVENous Q8H    sodium chloride (NS) flush 5-40 mL  5-40 mL IntraVENous PRN    amLODIPine (NORVASC) tablet 10 mg  10 mg Oral DAILY    bisacodyL (DULCOLAX) suppository 10 mg  10 mg Rectal DAILY PRN    ceFAZolin (ANCEF) 2 g/20 mL in sterile water IV syringe  2 g IntraVENous Q8H    enoxaparin (LOVENOX) injection 40 mg  40 mg SubCUTAneous Q12H    hydrOXYzine pamoate (VISTARIL) capsule 50 mg  50 mg Oral QID PRN    insulin glargine (LANTUS) injection 15 Units  15 Units SubCUTAneous DAILY    insulin lispro (HUMALOG) injection 0-10 Units  0-10 Units SubCUTAneous AC&HS    predniSONE (DELTASONE) tablet 40 mg  40 mg Oral DAILY WITH BREAKFAST    senna-docusate (PERICOLACE) 8.6-50 mg per tablet 2 Tablet  2 Tablet Oral QHS    sodium chloride (NS) flush 30 mL  30 mL InterCATHeter DAILY    sodium chloride (NS) flush 30 mL  30 mL InterCATHeter PRN    traZODone (DESYREL) tablet 100 mg  100 mg Oral QHS    albuterol (PROVENTIL VENTOLIN) nebulizer solution 2.5 mg  2.5 mg Nebulization Q6H PRN    hydrALAZINE (APRESOLINE) tablet 25 mg  25 mg Oral Q6H PRN    albuterol (PROVENTIL VENTOLIN) nebulizer solution 2.5 mg  2.5 mg Nebulization Q6H    budesonide (PULMICORT) 500 mcg/2 ml nebulizer suspension  500 mcg Nebulization Q6H       Review of Systems:   Denies chest pain, shortness of breath, cough, headache, visual problems, abdominal pain, dysuria, calf pain. Pertinent positives are as noted in the HPI, ROS unremarkable otherwise. Visit Vitals  /70   Pulse 83   Temp 97.9 °F (36.6 °C)   Resp 16   Ht 5' 9\" (1.753 m)   Wt 283 lb 9.6 oz (128.6 kg)   SpO2 92%   BMI 41.88 kg/m²        Physical Exam:   General: Alert and age appropriately oriented. No acute cardiorespiratory distress. HEENT: Normocephalic, no scleral icterus. Oral mucosa moist without cyanosis. Lungs: Clear to auscultation bilaterally. Respiration even and unlabored. Heart: Regular rate and rhythm, S1, S2. No murmurs, clicks, rub or gallops. Abdomen: Soft, non-tender, not distended. Bowel sounds normoactive. No organomegaly. obese   Genitourinary: Deferred. Neuromuscular:      No gross focal motor deficits noted. weakness proximally in bilateral UEs and LEs  Shoulder flexion; Full PROM but actively can lift his arms to about 40d. bic are 4/5, shoulder abd 4+  Sensation intact  Hip flexion 3+ , distally 4+. Effort dependent   Skin/extremity: No rashes, no erythema. No calf tenderness B LE.   Edema 1                                                                              Functional Assessment:                                Malu Katey Fall Risk Assessment:  Malu Katey Fall Risk  Mobility: Ambulates or transfers with assist devices or assistance (04/14/22 0800)  Mobility Interventions: Patient to call before getting OOB (04/13/22 1930)  Mentation: Alert, oriented x 3 (04/13/22 1930)  Mentation Interventions: Door open when patient unattended (04/13/22 1930)  Medication: Patient receiving anticonvulsants, sedatives(tranquilizers), psychotropics or hypnotics, hypoglycemics, narcotics, sleep aids, antihypertensives, laxatives, or diuretics (04/13/22 1930)  Medication Interventions: Patient to call before getting OOB (04/13/22 1930)  Elimination: Needs assistance with toileting (04/13/22 1930)  Elimination Interventions: Call light in reach (04/13/22 1930)  Prior Fall History: Before admission in past 12 months _home or previous inpatient care) (04/13/22 1930)  History of Falls Interventions: Utilize gait belt for transfer/ambulation (04/13/22 1930)  Total Score: 4 (04/13/22 1930)  High Fall Risk: Yes (04/13/22 1930)     Speech Assessment:         Ambulation:        Labs/Studies:  Recent Results (from the past 67 hour(s))   CULTURE, BLOOD    Collection Time: 04/11/22 10:24 AM    Specimen: Blood   Result Value Ref Range    Special Requests: RIGHT  FOREARM        Culture result: NO GROWTH 2 DAYS     CULTURE, BLOOD    Collection Time: 04/11/22 10:24 AM    Specimen: Blood   Result Value Ref Range    Special Requests: LEFT  HAND        Culture result: NO GROWTH 2 DAYS     URINALYSIS W/ RFLX MICROSCOPIC    Collection Time: 04/11/22 10:34 AM   Result Value Ref Range    Color YELLOW      Appearance CLEAR      Specific gravity 1.025 (H) 1.001 - 1.023      pH (UA) 6.0 5.0 - 9.0      Protein TRACE (A) NEG mg/dL    Glucose Negative mg/dL    Ketone TRACE (A) NEG mg/dL    Bilirubin SMALL (A) NEG      Blood TRACE (A) NEG      Urobilinogen 0.2 0.2 - 1.0 EU/dL    Nitrites Negative NEG      Leukocyte Esterase Negative NEG      WBC 0-3 0 /hpf    RBC 3-5 0 /hpf    Epithelial cells 0 0 /hpf    Bacteria 0 0 /hpf    Casts 0 0 /lpf   GLUCOSE, POC    Collection Time: 04/11/22 11:48 AM   Result Value Ref Range    Glucose (POC) 220 (H) 65 - 100 mg/dL    Performed by Mirna    COVID-19 RAPID TEST    Collection Time: 04/11/22  1:59 PM   Result Value Ref Range    Specimen source Nasopharyngeal      COVID-19 rapid test Not detected NOTD     GLUCOSE, POC Collection Time: 04/11/22  3:42 PM   Result Value Ref Range    Glucose (POC) 141 (H) 65 - 100 mg/dL    Performed by Alvaro    GLUCOSE, POC    Collection Time: 04/11/22  9:26 PM   Result Value Ref Range    Glucose (POC) 137 (H) 65 - 100 mg/dL    Performed by [facility] [employer]    METABOLIC PANEL, BASIC    Collection Time: 04/12/22  3:44 AM   Result Value Ref Range    Sodium 136 136 - 145 mmol/L    Potassium 4.0 3.5 - 5.1 mmol/L    Chloride 97 (L) 98 - 107 mmol/L    CO2 31 21 - 32 mmol/L    Anion gap 8 7 - 16 mmol/L    Glucose 129 (H) 65 - 100 mg/dL    BUN 28 (H) 6 - 23 MG/DL    Creatinine 0.80 0.8 - 1.5 MG/DL    GFR est AA >60 >60 ml/min/1.73m2    GFR est non-AA >60 >60 ml/min/1.73m2    Calcium 9.5 8.3 - 10.4 MG/DL   CBC WITH AUTOMATED DIFF    Collection Time: 04/12/22  3:44 AM   Result Value Ref Range    WBC 19.0 (H) 4.3 - 11.1 K/uL    RBC 5.09 4.23 - 5.6 M/uL    HGB 16.9 13.6 - 17.2 g/dL    HCT 50.4 (H) 41.1 - 50.3 %    MCV 99.0 (H) 79.6 - 97.8 FL    MCH 33.2 (H) 26.1 - 32.9 PG    MCHC 33.5 31.4 - 35.0 g/dL    RDW 12.7 11.9 - 14.6 %    PLATELET 445 778 - 466 K/uL    MPV 9.2 (L) 9.4 - 12.3 FL    ABSOLUTE NRBC 0.00 0.0 - 0.2 K/uL    NEUTROPHILS 56 43 - 78 %    LYMPHOCYTES 23 13 - 44 %    MONOCYTES 13 (H) 4.0 - 12.0 %    EOSINOPHILS 1 0.5 - 7.8 %    BASOPHILS 1 0.0 - 2.0 %    IMMATURE GRANULOCYTES 6 (H) 0.0 - 5.0 %    ABS. NEUTROPHILS 10.6 (H) 1.7 - 8.2 K/UL    ABS. LYMPHOCYTES 4.4 0.5 - 4.6 K/UL    ABS. MONOCYTES 2.5 (H) 0.1 - 1.3 K/UL    ABS. EOSINOPHILS 0.2 0.0 - 0.8 K/UL    ABS. BASOPHILS 0.2 0.0 - 0.2 K/UL    ABS. IMM. GRANS.  1.1 (H) 0.0 - 0.5 K/UL    RBC COMMENTS NORMOCYTIC/NORMOCHROMIC      WBC COMMENTS OCCASIONAL      PLATELET COMMENTS ADEQUATE      DF AUTOMATED     GLUCOSE, POC    Collection Time: 04/12/22  6:24 AM   Result Value Ref Range    Glucose (POC) 118 (H) 65 - 100 mg/dL    Performed by Marielle    GLUCOSE, POC    Collection Time: 04/12/22 12:22 PM   Result Value Ref Range    Glucose (POC) 190 (H) 65 - 100 mg/dL    Performed by Odalys Galloway    GLUCOSE, POC    Collection Time: 04/12/22  4:37 PM   Result Value Ref Range    Glucose (POC) 143 (H) 65 - 100 mg/dL    Performed by Odalys Galloway    GLUCOSE, POC    Collection Time: 04/12/22  9:59 PM   Result Value Ref Range    Glucose (POC) 151 (H) 65 - 100 mg/dL    Performed by [employer]    METABOLIC PANEL, BASIC    Collection Time: 04/13/22  4:48 AM   Result Value Ref Range    Sodium 134 (L) 136 - 145 mmol/L    Potassium 4.0 3.5 - 5.1 mmol/L    Chloride 95 (L) 98 - 107 mmol/L    CO2 32 21 - 32 mmol/L    Anion gap 7 7 - 16 mmol/L    Glucose 132 (H) 65 - 100 mg/dL    BUN 26 (H) 6 - 23 MG/DL    Creatinine 1.00 0.8 - 1.5 MG/DL    GFR est AA >60 >60 ml/min/1.73m2    GFR est non-AA >60 >60 ml/min/1.73m2    Calcium 9.3 8.3 - 10.4 MG/DL   CBC WITH AUTOMATED DIFF    Collection Time: 04/13/22  4:48 AM   Result Value Ref Range    WBC 20.3 (H) 4.3 - 11.1 K/uL    RBC 4.86 4.23 - 5.6 M/uL    HGB 16.3 13.6 - 17.2 g/dL    HCT 48.4 41.1 - 50.3 %    MCV 99.6 (H) 79.6 - 97.8 FL    MCH 33.5 (H) 26.1 - 32.9 PG    MCHC 33.7 31.4 - 35.0 g/dL    RDW 12.3 11.9 - 14.6 %    PLATELET 329 356 - 262 K/uL    MPV 9.8 9.4 - 12.3 FL    ABSOLUTE NRBC 0.00 0.0 - 0.2 K/uL    DF AUTOMATED      NEUTROPHILS 60 43 - 78 %    LYMPHOCYTES 23 13 - 44 %    MONOCYTES 12 4.0 - 12.0 %    EOSINOPHILS 1 0.5 - 7.8 %    BASOPHILS 0 0.0 - 2.0 %    IMMATURE GRANULOCYTES 4 0.0 - 5.0 %    ABS. NEUTROPHILS 12.1 (H) 1.7 - 8.2 K/UL    ABS. LYMPHOCYTES 4.7 (H) 0.5 - 4.6 K/UL    ABS. MONOCYTES 2.5 (H) 0.1 - 1.3 K/UL    ABS. EOSINOPHILS 0.2 0.0 - 0.8 K/UL    ABS. BASOPHILS 0.1 0.0 - 0.2 K/UL    ABS. IMM.  GRANS. 0.8 (H) 0.0 - 0.5 K/UL   GLUCOSE, POC    Collection Time: 04/13/22  6:40 AM   Result Value Ref Range    Glucose (POC) 145 (H) 65 - 100 mg/dL    Performed by Marielle    GLUCOSE, POC    Collection Time: 04/13/22 11:03 AM   Result Value Ref Range    Glucose (POC) 171 (H) 65 - 100 mg/dL    Performed by Nery    GLUCOSE, POC    Collection Time: 04/13/22  4:29 PM   Result Value Ref Range    Glucose (POC) 147 (H) 65 - 100 mg/dL    Performed by Denise    GLUCOSE, POC    Collection Time: 04/13/22 10:03 PM   Result Value Ref Range    Glucose (POC) 112 (H) 65 - 100 mg/dL    Performed by [facility]    METABOLIC PANEL, BASIC    Collection Time: 04/14/22  6:10 AM   Result Value Ref Range    Sodium 135 (L) 136 - 145 mmol/L    Potassium 3.9 3.5 - 5.1 mmol/L    Chloride 98 98 - 107 mmol/L    CO2 31 21 - 32 mmol/L    Anion gap 6 (L) 7 - 16 mmol/L    Glucose 109 (H) 65 - 100 mg/dL    BUN 22 6 - 23 MG/DL    Creatinine 0.90 0.8 - 1.5 MG/DL    GFR est AA >60 >60 ml/min/1.73m2    GFR est non-AA >60 >60 ml/min/1.73m2    Calcium 9.1 8.3 - 10.4 MG/DL   GLUCOSE, POC    Collection Time: 04/14/22  7:30 AM   Result Value Ref Range    Glucose (POC) 110 (H) 65 - 100 mg/dL    Performed by Maria L        Assessment:     Problem List as of 4/14/2022 Never Reviewed          Codes Class Noted - Resolved    Physical debility ICD-10-CM: R53.81  ICD-9-CM: 799.3  4/13/2022 - Present        Pre-diabetes ICD-10-CM: R73.03  ICD-9-CM: 790.29  4/12/2022 - Present        Influenza A ICD-10-CM: J10.1  ICD-9-CM: 487.1  4/6/2022 - Present        [facility] aureus pneumonia [facility] ICD-10-CM: V21.685  ICD-9-CM: 482.41  4/6/2022 - Present        Primary hypertension (Chronic) ICD-10-CM: I10  ICD-9-CM: 401.9  4/6/2022 - Present        Severe persistent asthma with exacerbation ICD-10-CM: J45.51  ICD-9-CM: 493.92  4/3/2022 - Present        Obesity (Chronic) ICD-10-CM: E66.9  ICD-9-CM: 278.00  4/3/2022 - Present        Acute respiratory failure with hypoxia and hypercapnia (HCC) ICD-10-CM: J96.01, J96.02  ICD-9-CM: 518.81  4/3/2022 - Present        RESOLVED: Status asthmaticus ICD-10-CM: J45.902  ICD-9-CM: 493.91  4/3/2022 - 4/11/2022                Physical Debility due to Acute Respiratory Failure due to Influenza A,, MSSA pneumonia and Asthma exacerbation  Plan / Recommendations / Medical Decision Making:      Daily physician / PA medical management:     Physical debility [R53.81] - PT/OT . limited by being easily fatigued due to weakness and respiratory issues.     Hypertension - BP fluctuating, managed medically. Cont Norvasc 10 mg daily, add prn hydralazine  -4/14124/70 controlled      Pre Diabetes - fair controlr fair glycemic control. Will require daily, close fasting glucose monitoring and medication adjustment to optimize glycemic control in setting of acute illness and hospitalization. -exacerbated by steroids. Cont Lantus 15u daily and SSI  -anticipate insulin therapy can be weaned off with no long-term need for insulin therapy      Leukocytosis; steroid induced. UA neg. Blood cx x 2 neg. Afebrile. pneurmonia resolved      ZACK; Pulmonology has ordered BiPAP QHS and recommends outpatient sleep study  Has NOT tolerated bipap at [facility]     Pain management - generalize msk pain. Will require regular pain assessment and comprehensive pain management.      Electrolyte abnormality - mild hyponatremia, Na 134; monitor     Pneumonia prophylaxis - incentive spirometer every hour while awake. S/p MSSA and Influenza A  -enc IS. Complete abx ; on Ancef, last day 4/18     Asthma; cont nebs and pulmicort; cont Prednisone 40mg dialy ; Pulm f/u with [facility]; has hospital sponsorship there;Patient has a new-patient appointment with [facility] 6/8/22.  -4/14 no wheezing. No O2 requirements     DVT risk / DVT prophylaxis - daily physician / PA exam to assess as patient is at increased risk for of thromboembolism. Mobilize as tolerated. Sequential pneumatic compression devices (SCDs) when in bed; thigh-high or knee-high thromboembolic deterrent hose when out of bed. Lovenox subcutaneously daily.      GI prophylaxis - resume PPI.  At times may need additional antacids, Maalox prn.     General skin care / wound prevention - monitor   general skin status daily per staff and physician / PA. At risk for failure due to impaired mobility.      Bowel program - at risk for constipation as a side effect of opioids, other medications, impaired mobility, etc. MiraLAX daily for regularity, Magda-Colace for stool softener. PRN MOM, bisacodyl suppository or tablets for constipation.     Patient has a new-patient appointment with [facility] on 4/25/22 at 10:05am to establish PCP.     Note: Patient is without payor source and would benefit from prescriptions at discharge from [facility] that could be obtained through [facility]. Amlodipine, Trazodone, Advair HFA Inhaler, and Proventil HFA Inhalfer are all currently listed with [facility] as covered medications. CM can assist with first 30 days while a 90-day prescription can be sent to [facility] for coverage thereafter.        Time spent was 25 minutes with over 1/2 in direct patient care/examination, consultation and coordination of care.      Signed By: Meagan Glez MD     April 14, 2022

## 2022-04-15 ENCOUNTER — HOME HEALTH ADMISSION (OUTPATIENT)
Dept: HOME HEALTH SERVICES | Facility: HOME HEALTH | Age: 37
End: 2022-04-15

## 2022-04-15 LAB
ANION GAP SERPL CALC-SCNC: 5 MMOL/L (ref 7–16)
BUN SERPL-MCNC: 20 MG/DL (ref 6–23)
CALCIUM SERPL-MCNC: 9.3 MG/DL (ref 8.3–10.4)
CHLORIDE SERPL-SCNC: 100 MMOL/L (ref 98–107)
CO2 SERPL-SCNC: 29 MMOL/L (ref 21–32)
CREAT SERPL-MCNC: 0.7 MG/DL (ref 0.8–1.5)
GLUCOSE BLD STRIP.AUTO-MCNC: 105 MG/DL (ref 65–100)
GLUCOSE BLD STRIP.AUTO-MCNC: 107 MG/DL (ref 65–100)
GLUCOSE BLD STRIP.AUTO-MCNC: 148 MG/DL (ref 65–100)
GLUCOSE BLD STRIP.AUTO-MCNC: 88 MG/DL (ref 65–100)
GLUCOSE SERPL-MCNC: 106 MG/DL (ref 65–100)
POTASSIUM SERPL-SCNC: 3.6 MMOL/L (ref 3.5–5.1)
SERVICE CMNT-IMP: ABNORMAL
SERVICE CMNT-IMP: NORMAL
SODIUM SERPL-SCNC: 134 MMOL/L (ref 136–145)

## 2022-04-15 PROCEDURE — 94640 AIRWAY INHALATION TREATMENT: CPT

## 2022-04-15 PROCEDURE — 2709999900 HC NON-CHARGEABLE SUPPLY

## 2022-04-15 PROCEDURE — 97110 THERAPEUTIC EXERCISES: CPT

## 2022-04-15 PROCEDURE — 74011250636 HC RX REV CODE- 250/636: Performed by: PHYSICAL MEDICINE & REHABILITATION

## 2022-04-15 PROCEDURE — 97116 GAIT TRAINING THERAPY: CPT

## 2022-04-15 PROCEDURE — 74011250637 HC RX REV CODE- 250/637: Performed by: PHYSICAL MEDICINE & REHABILITATION

## 2022-04-15 PROCEDURE — 99232 SBSQ HOSP IP/OBS MODERATE 35: CPT | Performed by: PHYSICAL MEDICINE & REHABILITATION

## 2022-04-15 PROCEDURE — 97535 SELF CARE MNGMENT TRAINING: CPT

## 2022-04-15 PROCEDURE — 74011636637 HC RX REV CODE- 636/637: Performed by: PHYSICAL MEDICINE & REHABILITATION

## 2022-04-15 PROCEDURE — 65310000000 HC RM PRIVATE REHAB

## 2022-04-15 PROCEDURE — 94760 N-INVAS EAR/PLS OXIMETRY 1: CPT

## 2022-04-15 PROCEDURE — 36592 COLLECT BLOOD FROM PICC: CPT

## 2022-04-15 PROCEDURE — 80048 BASIC METABOLIC PNL TOTAL CA: CPT

## 2022-04-15 PROCEDURE — 74011000250 HC RX REV CODE- 250: Performed by: PHYSICAL MEDICINE & REHABILITATION

## 2022-04-15 PROCEDURE — 97530 THERAPEUTIC ACTIVITIES: CPT

## 2022-04-15 PROCEDURE — 82962 GLUCOSE BLOOD TEST: CPT

## 2022-04-15 RX ORDER — METFORMIN HYDROCHLORIDE 500 MG/1
250 TABLET ORAL 2 TIMES DAILY WITH MEALS
Status: DISCONTINUED | OUTPATIENT
Start: 2022-04-15 | End: 2022-04-18 | Stop reason: HOSPADM

## 2022-04-15 RX ORDER — BUDESONIDE 0.5 MG/2ML
500 INHALANT ORAL
Status: DISCONTINUED | OUTPATIENT
Start: 2022-04-15 | End: 2022-04-18 | Stop reason: HOSPADM

## 2022-04-15 RX ADMIN — CEFAZOLIN SODIUM 2 G: 100 INJECTION, POWDER, LYOPHILIZED, FOR SOLUTION INTRAVENOUS at 00:49

## 2022-04-15 RX ADMIN — BUDESONIDE 500 MCG: 0.5 INHALANT RESPIRATORY (INHALATION) at 17:27

## 2022-04-15 RX ADMIN — SODIUM CHLORIDE, PRESERVATIVE FREE 10 ML: 5 INJECTION INTRAVENOUS at 05:22

## 2022-04-15 RX ADMIN — SODIUM CHLORIDE, PRESERVATIVE FREE 30 ML: 5 INJECTION INTRAVENOUS at 08:11

## 2022-04-15 RX ADMIN — CEFAZOLIN SODIUM 2 G: 100 INJECTION, POWDER, LYOPHILIZED, FOR SOLUTION INTRAVENOUS at 08:11

## 2022-04-15 RX ADMIN — ALBUTEROL SULFATE 2.5 MG: 2.5 SOLUTION RESPIRATORY (INHALATION) at 05:39

## 2022-04-15 RX ADMIN — ENOXAPARIN SODIUM 40 MG: 40 INJECTION SUBCUTANEOUS at 08:10

## 2022-04-15 RX ADMIN — TRAZODONE HYDROCHLORIDE 100 MG: 50 TABLET ORAL at 20:59

## 2022-04-15 RX ADMIN — METFORMIN HYDROCHLORIDE 250 MG: 500 TABLET ORAL at 17:56

## 2022-04-15 RX ADMIN — CEFAZOLIN SODIUM 2 G: 100 INJECTION, POWDER, LYOPHILIZED, FOR SOLUTION INTRAVENOUS at 17:57

## 2022-04-15 RX ADMIN — SODIUM CHLORIDE, PRESERVATIVE FREE 30 ML: 5 INJECTION INTRAVENOUS at 14:00

## 2022-04-15 RX ADMIN — ENOXAPARIN SODIUM 40 MG: 40 INJECTION SUBCUTANEOUS at 20:57

## 2022-04-15 RX ADMIN — ALBUTEROL SULFATE 2.5 MG: 2.5 SOLUTION RESPIRATORY (INHALATION) at 17:27

## 2022-04-15 RX ADMIN — SODIUM CHLORIDE, PRESERVATIVE FREE 30 ML: 5 INJECTION INTRAVENOUS at 21:01

## 2022-04-15 RX ADMIN — BUDESONIDE 500 MCG: 0.5 INHALANT RESPIRATORY (INHALATION) at 05:40

## 2022-04-15 RX ADMIN — PREDNISONE 40 MG: 10 TABLET ORAL at 08:09

## 2022-04-15 RX ADMIN — INSULIN GLARGINE 15 UNITS: 100 INJECTION, SOLUTION SUBCUTANEOUS at 08:22

## 2022-04-15 RX ADMIN — ALBUTEROL SULFATE 2.5 MG: 2.5 SOLUTION RESPIRATORY (INHALATION) at 12:34

## 2022-04-15 RX ADMIN — AMLODIPINE BESYLATE 10 MG: 10 TABLET ORAL at 08:09

## 2022-04-15 NOTE — PROGRESS NOTES
OT Daily Note  Time In 1035   Time Out 1115     Subjective: \"My wife used to be a CNA. \"  Pain: No pain expressed. Education: breathing techniques and UB strengthening and endurance  Interdisciplinary Communication: Collaborated with PT to ensure progress towards POC and goals. Precautions: Falls  Patient Vitals for the past 12 hrs:   Temp Pulse Resp BP SpO2   04/15/22 0730 98.6 °F (37 °C) 87 18 127/79 100 %   04/15/22 0540     94 %         Mobility   Score Comments   Sit to Stand 6: Independent    Transfer Assist 4: Supervision or touching A Transfer Type: SPT   Equipment: N/A   Comments: ambulates with supervision      Activity Tolerance, Strengthening and Breathing techniques Daily Assessment   Pt completed 10 minutes on the ergometer frontwards and backwards with medium heavy resistance to increase UB strength and activity tolerance for integration into functional transfers. Patient educated regarding [place] [employer] for BUE seated in wheelchair. Patient verbalized and demonstrated understanding for 9 BUE therapeutic exercises included in HEP utilizing red and green Therabands. Patient completed 1 set x 10 to 15 reps of scapular retraction, shoulder flexion, shoulder extension, shoulder scaption, shoulder horizontal abduction, chest press, anterior punch, elbow flexion, and elbow extension exercises. Printed copy of HEP and red and green Therabands provided to patient. SpO2 level remained above 95% during arm exercises on room air       Assessment: Progressing well and very motivated to get stronger and go home. Pt demonstrated good participation and engagement in OT session. Pt continues to benefit from skilled OT services to address remaining deficits and return back to baseline with improved independence and safety during ADLs and IADLs. Patient ended session seated in recliner and call remote, phone, all needs within reach.    Plan: Continue OT POC with focus on ADL/IADL skills, functional transfers, functional mobility, coordination, strength, static and dynamic balance, and activity tolerance to maximize safety and independence with ADLs and functional transfers.      Rey Gutierrez, OTR/L  4/15/2022

## 2022-04-15 NOTE — DIABETES MGMT
Patient admitted with physical debility. Admitting blood glucose 96. HbA1c 6.0 (). Patient was admitted to ICU, intubated on 4/3, extubated on 4/9, currently remains on steroid therapy but is now on 9th floor for rehab and hoping to discharge Monday. Blood glucose ranged 107-128 yesterday with patient receiving Lantus 15 units and Prednisone 40mg. Blood glucose this morning was 88. Creatinine 0.70. GFR >60. Reviewed patient current regimen: Lantus 15 units daily, Humalog correctional insulin, and Prednisone 40mg daily. Per provider note anticipate insulin therapy to be weaned as steroids are weaned. Patient in chair, no visitors present. Patient denies previous history of diabetes or prediabetes but voices a positive family history of diabetes. Patient was given ADA educational material, Prediabetes: What Is It and What Can I Do?, which was reviewed with patient. Discussed potential benefits of healthy diet and healthier beverage choices and increased physical activity as cleared by their physician. Educated regarding effects of sweetened beverages on glycemic control and alterative unsweetened beverage options. Also discussed plate method of healthy meal planning. Patient encouraged to work on lifestyle modifications in the hopes they can prevent themselves from developing diabetes. Patient states he likes [place] food and that he and his wife eat out a good bit, discussed healthier tips for eating out. Encouraged portion control. Discussed benefits of eating consistent smaller meals as patient typically eats 1 large meal. Discussed benefits of weight loss on the prevention of diabetes. Patient was instructed to follow up regarding elevated A1C with primary care provider (Patient states PCP is going to be at [facility] medicine and his appointment is 4/25/22).      Explained the importance of blood glucose monitoring to patient and how this will provide their primary care provider with more information to help them safely titrate their regimen. Recommended frequency of a couple times a month alternating fasting and 2 hours post prandial and to record in log book to take to primary care provider appointment. Demonstrated how to use a blood glucose meter, care of strips, and sharps disposal. Educated patient that all glucometers are similar but differ in small ways. Educated patient to seek out affordable glucometer options that exist at some stores or drug stores. Reviewed target blood glucose goals per American Diabetes Association recommendations. Patient was able to return demonstrate correct use of meter. Patient given glucometer, 110 lancets, and 20 test strips. Encouraged compliance with discharge regimen. Encouraged patient to continue to work on lifestyle modifications and to follow up with primary care provider for further titration of regimen. Patient verbalized understanding and voices no further questions regarding prediabetes at this time.

## 2022-04-15 NOTE — PROGRESS NOTES
PHYSICAL THERAPY DAILY NOTE  Time In: 7095  Time Out: 1913  Patient Seen For: AM;Gait training; Therapeutic exercise;Transfer training    Subjective: \"I am working hard so I can go home Monday as it's my birthday\"         Objective: Vital Signs:   Visit Vitals  /79 (BP 1 Location: Left upper arm, BP Patient Position: Supine)   Pulse 87   Temp 98.6 °F (37 °C)   Resp 18   Ht 5' 9\" (1.753 m)   Wt 126.4 kg (278 lb 9.6 oz)   SpO2 100%   BMI 41.14 kg/m²     Pain level:  Patient had no complaints of pain during therapy this morning    Patient education: fall prevention and safety with mobility     Other (comment) (fall risk)    COGNITION Daily Assessment    Alert, oriented and able to follow commands well. Engages in conversation appropriately and stays on task well. Kind, cooperative and motivated. TRANSFERS Daily Assessment   Improved safety and control with SPT with and without assistive device Transfer Type: SPT with walker  Transfer Assistance : 5 (Stand-by assistance)  Sit to Stand Assistance: Stand-by assistance  Car Transfers: Not tested     GAIT Daily Assessment   Worked on ambulation without the assistive device with SBA. Endurance remains lower without assistive device, but he is functional. Amount of Assistance: 5 (Stand-by assistance)  Distance (ft): 75 Feet (ft) (75' X 3 times)  Assistive Device: Walker, rolling (3rd walk to his room without RW with SBA)     STEPS or STAIRS Daily Assessment   Managed 8 steps up/down with rails with SBA with good control. Fatigues some after 8 steps and sat to rest briefly.  Steps/Stairs Ambulated (#): 8 (step over step going up and 1 step at a time coming down)  Level of Assist : 5 (Stand-by assistance)  Rail Use: Both     BALANCE Daily Assessment    Sitting - Static: Good (unsupported)  Sitting - Dynamic: Good (unsupported)  Standing - Static: Good  Standing - Dynamic : Intact     LOWER EXTREMITY EXERCISES Daily Assessment    Extremity: Both  Exercise Type #1: Seated lower extremity strengthening (Nu Step x 10 level 4)  Level of Assist: Supervision  Exercise Type #2: Standing lower extremity strengthening  Sets Performed: 1  Reps Performed: 15  Level of Assist: Supervision (with 1 seated rest break during exercises)          Assessment: Patient participated in therapy well and appears to be making steady progress towards his goals. He remains motivated for discharge home Monday and feels confident with his progress. Patient ambulated back to his room with SBA without the RW with steady gait. He elected to sit in the bedside chair with his call light and personal items in reach. Plan of Care: Continue to treat and progress as tolerated.      [place], [place]  4/15/2022

## 2022-04-15 NOTE — PROGRESS NOTES
Time In 0740   Time Out 0833     Mobility   Score Comments   Supine to Sit 6: Independent    Sit to Stand 4: Supervision or touching A S   Transfer Assist 4: Supervision or touching A Transfer Type: SPT   Equipment: Rolling Walker   Comments: S     Activities of Daily Living    Score Comments   Eating 6: Independent    Oral Hyigene 6: Independent    Bathing 5: S/U or clean-up assist Type of Shower: Shower  Position: Standing PRN and Unsupported Sitting   Adaptive  Equipment: Tub Transfer Bench and Grab Bars  Comments:    Upper Body  Dressing 6: Independent Items Applied: Pullover  Position: Unsupported Sitting  Comments:    Lower Body Dressing 6: Independent Items Applied: Elastic pants  Position: Standing PRN and Unsupported Sitting  Adaptive Equipment: RW  Comments: Pt picked out clothing and placed on bed prior to dressing, mod I when dressing using RW   Donning/Sammy Martinez Footwear 4: Supervision or touching A Items Applied: Socks and Shoes with fasteners   Adaptive Equipment: Sock Aide  Comments: educated pt on use of sock aide   Toilet Transfer 4: Supervision or touching A Transfer Type: SPT   Equipment: Rolling Walker   Comments: S   Toileting Hygiene 6: Independent Output: urine   Comments: S   Education  breathing techniques and AE/DME training     Strengthening Daily Assessment   Patient educated regarding Jacqueline Line for BUE strengthening while in seated position. Patient verbalized and demonstrated understanding for 3 BUE therapeutic exercises included in HEP utilizing green Theraband. Patient completed 1 set x 10  Reps shoulder flexion, shoulder horizontal abduction, anterior punch exercises. Printed copy of HEP and green Theraband provided to patient. S: \"I really want to go home on Monday. I think my antibiotics finish on Monday. \" Agreeable to therapy. Patient was able to ambulate ~10 feet using a RW with SBA. Pt completed morning ADLs with quality indicators reflected in chart above. SpO2 level remained above 94% during ADL and arm exercises,  bpm.  Pain No pain expressed. Patient Vitals for the past 12 hrs:   Temp Pulse Resp BP SpO2   04/15/22 0730 98.6 °F (37 °C) 87 18 127/79 100 %   04/15/22 0540     94 %       Collaborated with RN regarding patient performance and POC. Patient tolerated session well, but activity tolerance, balance, functional mobility, strength, coordination, cognition are still below baseline and require skilled facilitation to successfully and safely complete ADLs and transfers. Patient ended session seated in recliner and call remote, phone, all needs within reach.       Rey Gutierrez, OTR/L  4/15/2022

## 2022-04-15 NOTE — PROGRESS NOTES
PHYSICAL THERAPY DAILY NOTE  Time In: 1991  Time Out: 1270  Patient Seen For: PM;Balance activities;Gait training;Patient education; Therapeutic exercise;Transfer training; Other (see progress notes)    Subjective: Patient reporting he wants to go home Monday. Reports it [birth year] his [birth year]         Leilani Jim Signs:on room air, 02 sat 96 to 97% at rest, HR 94, 02 sat 96% and  after ambulating up/down a total of 36 steps. 02 sat 96% and  after ambulating 150ft. Patient Vitals for the past 12 hrs:   Temp Pulse Resp BP SpO2   04/15/22 0730 98.6 °F (37 °C) 87 18 127/79 100 %   04/15/22 0540     94 %     Pain level:No c/o pain during treatment  Pain location:NA  Pain interventions:NA    Patient education:Bed mobility training,transfer training, gait training, balance training,fall precautions, body mechanics,activity pacing, energy conservation,breathing techniques during functional mobility, LE HEP Patient verbalizing understanding and demonstrating  understanding of patient education. Recommend follow up education. Interdisciplinary Communication:spoke with RN and OT regarding order for patient to be independent in room.      Other (comment) (fall risk)  GROSS ASSESSMENT Daily Assessment     NA       COGNITION Daily Assessment    Alert, able to follow commands,cooperating,participating, motivated,          BED/MAT MOBILITY Daily Assessment    Rolling Right : 6 (Modified independent)  Rolling Left : 6 (Modified independent)  Supine to Sit : 6 (Modified independent)  Sit to Supine : 6 (Modified independent)       TRANSFERS Daily Assessment    Transfer Type: SPT without device  Other: independent with commode transfers using grab bar  Transfer Assistance : 6 (Modified independent)  Sit to Stand Assistance: Modified independent  Car Transfers: Independent  Car Type: rehab car       GAIT Daily Assessment   Assessed patient with straight cane  Amount of Assistance: 6 (Modified independent)  Distance (ft): 150 Feet (ft)  Assistive Device: Cane, straight   Gait training x 150ft x 1 without a device and modified independence. Cues for improved stance phase stability    STEPS or STAIRS Daily Assessment   Single step at a time going up/down steps leading up with R LE and down with LLE. Increased time and effort to complete with multiple 5 to 10 second standing rest breaks Cues to control gait speed. Steps/Stairs Ambulated (#): 36 (4 steps x 4 followed by 10 steps x 2 with no sitting break)  Level of Assist : 5 (Supervision/setup)  Rail Use: Both       BALANCE Daily Assessment   No loss of balance during gait or transfer training Sitting - Static: Good (unsupported)  Sitting - Dynamic: Good (unsupported)  Standing - Static: Good  Standing - Dynamic : Impaired       WHEELCHAIR MOBILITY Daily Assessment    Curbs/Ramps Assist Required (FIM Score): 0 (Not tested)  Wheelchair Setup Assist Required : 0 (Not tested)       LOWER EXTREMITY EXERCISES Daily Assessment   Increased time and effort to complete with multiple and frequent rest breaks. Cues for correct form   Extremity: Both  Exercise Type #1: Standing lower extremity strengthening  Sets Performed: 1  Reps Performed: 10  Level of Assist: Supervision (hand support on table, with verbal/visual cues)     STANDING EXERCISES Sets Reps Comments   Heel Raises 1 10    Toe Raises 1 10    Hip Flexion/Marching 1 10    Hip Abduction 1 10    Hip Extension 1 10    Mini Squats 1 10             Assessment: Patient progressing towards goals. Advanced to modified independent functional level. 02 sat stable on room air during treatment. HR elevated during stair climbing. Recommending patient try to get apartment on first floor. Able to advance to climbing equivalent of 3 flights of steps with stable 02 sat. Patient returned to room at end of treatment and remained up in recliner with LEs elevated and with needs in reach. 02 sat 96%    Plan of Care: Continue with POC and progress as tolerated.      Rian Mcpherson, PT  4/15/2022

## 2022-04-15 NOTE — PROGRESS NOTES
Chart reviewed. Pt will likely discharge on Monday, 4/18. Will follow up with therapy and MD on discharge needs. CM to continue to follow and monitor for any further needs. Update 6752: Pt will need RW and therapy has bariatric RW to donate to pt.  PT only. Referral placed to [facility] as pt requested and completing FA application for SF. Liaison aware. IV ABT will be complete prior to pt discharge. Update 0852: Pt has RW at home. Pt has sleep study set up for Tuesday night and Dr. Mona López reports no need for overnight pulse ox and for pt to follow up with sleep study.

## 2022-04-15 NOTE — PROGRESS NOTES
Alexandria Yang MD  Medical Director  [address] [facility], [address]  Tel: [phone]       [facility] PROGRESS NOTE    Ottoniel Strickland  Admit Date: 4/13/2022  Admit Diagnosis:   Physical debility [R53.81]    Subjective     Patient seen and examined. Really wanting to go home Monday for his birthday. Therapies going very well. Not requiring an AD today. Denies cp, sob, wheezing, [facility].  Has sleep study scheduled for Tues 4/19 with [facility]    Objective:     Current Facility-Administered Medications   Medication Dose Route Frequency    budesonide (PULMICORT) 500 mcg/2 ml nebulizer suspension  500 mcg Nebulization BID RT    acetaminophen (TYLENOL) tablet 650 mg  650 mg Oral Q6H PRN    Or    acetaminophen (TYLENOL) suppository 650 mg  650 mg Rectal Q6H PRN    ondansetron (ZOFRAN ODT) tablet 4 mg  4 mg Oral Q8H PRN    Or    ondansetron (ZOFRAN) injection 4 mg  4 mg IntraVENous Q6H PRN    polyethylene glycol (MIRALAX) packet 17 g  17 g Oral DAILY    sodium chloride (NS) flush 5-40 mL  5-40 mL IntraVENous Q8H    sodium chloride (NS) flush 5-40 mL  5-40 mL IntraVENous PRN    amLODIPine (NORVASC) tablet 10 mg  10 mg Oral DAILY    bisacodyL (DULCOLAX) suppository 10 mg  10 mg Rectal DAILY PRN    ceFAZolin (ANCEF) 2 g/20 mL in sterile water IV syringe  2 g IntraVENous Q8H    enoxaparin (LOVENOX) injection 40 mg  40 mg SubCUTAneous Q12H    hydrOXYzine pamoate (VISTARIL) capsule 50 mg  50 mg Oral QID PRN    insulin glargine (LANTUS) injection 15 Units  15 Units SubCUTAneous DAILY    insulin lispro (HUMALOG) injection 0-10 Units  0-10 Units SubCUTAneous AC&HS    predniSONE (DELTASONE) tablet 40 mg  40 mg Oral DAILY WITH BREAKFAST    senna-docusate (PERICOLACE) 8.6-50 mg per tablet 2 Tablet  2 Tablet Oral QHS    sodium chloride (NS) flush 30 mL  30 mL InterCATHeter DAILY    sodium chloride (NS) flush 30 mL  30 mL InterCATHeter PRN    traZODone (DESYREL) tablet 100 mg  100 mg Oral QHS    albuterol (PROVENTIL VENTOLIN) nebulizer solution 2.5 mg  2.5 mg Nebulization Q6H PRN    hydrALAZINE (APRESOLINE) tablet 25 mg  25 mg Oral Q6H PRN    albuterol (PROVENTIL VENTOLIN) nebulizer solution 2.5 mg  2.5 mg Nebulization Q6H       Review of Systems:   Denies chest pain, shortness of breath, cough, headache, visual problems, abdominal pain, dysuria, calf pain. Pertinent positives are as noted in the HPI, ROS unremarkable otherwise. Visit Vitals  /79 (BP 1 Location: Left upper arm, BP Patient Position: Supine)   Pulse 87   Temp 98.6 °F (37 °C)   Resp 18   Ht 5' 9\" (1.753 m)   Wt 278 lb 9.6 oz (126.4 kg)   SpO2 100%   BMI 41.14 kg/m²        Physical Exam:   General: Alert and age appropriately oriented. No acute cardiorespiratory distress. HEENT: Normocephalic, no scleral icterus. Oral mucosa moist without cyanosis. Lungs: Clear to auscultation bilaterally. Respiration even and unlabored. Heart: Regular rate and rhythm, S1, S2. No murmurs, clicks, rub or gallops. Abdomen: Soft, non-tender, not distended. Bowel sounds normoactive. No organomegaly. obese   Genitourinary: Deferred. Neuromuscular:      No gross focal motor deficits noted. Generalized prox>distal weakness   Skin/extremity: No rashes, no erythema.  No calf tenderness B LE.  1+ edema                                                                              Functional Assessment:          Balance  Sitting - Static: Good (unsupported) (04/15/22 1000)  Sitting - Dynamic: Good (unsupported) (04/15/22 1000)  Standing - Static: Good (04/15/22 1000)  Standing - Dynamic : Intact (04/15/22 1000)                     Star Ortiz Fall Risk Assessment:  Star Ortiz Fall Risk  Mobility: Ambulates or transfers with assist devices or assistance (04/15/22 0800)  Mobility Interventions: Patient to call before getting OOB;Utilize walker, cane, or other assistive device (04/15/22 0800)  Mentation: Alert, oriented x 3 (04/15/22 0800)  Mentation Interventions: Adequate sleep, hydration, pain control (04/15/22 0800)  Medication: Patient receiving anticonvulsants, sedatives(tranquilizers), psychotropics or hypnotics, hypoglycemics, narcotics, sleep aids, antihypertensives, laxatives, or diuretics (04/15/22 0800)  Medication Interventions: Patient to call before getting OOB; Teach patient to arise slowly (04/15/22 0800)  Elimination: Needs assistance with toileting (04/15/22 0800)  Elimination Interventions: Call light in reach; Patient to call for help with toileting needs (04/15/22 0800)  Prior Fall History: Before admission in past 12 months _home or previous inpatient care) (04/15/22 0800)  History of Falls Interventions: Consult care management for discharge planning;Door open when patient unattended;Evaluate medications/consider consulting pharmacy (04/15/22 0800)  Total Score: 4 (04/15/22 0800)  High Fall Risk: Yes (04/15/22 0800)     Speech Assessment:         Ambulation:  Gait  Distance (ft): 75 Feet (ft) (75' X 3 times) (04/15/22 1000)  Assistive Device: Walker, rolling (3rd walk to his room without RW with SBA) (04/15/22 1000)  Rail Use: Both (04/15/22 1000)     Labs/Studies:  Recent Results (from the past 72 hour(s))   GLUCOSE, POC    Collection Time: 04/12/22 12:22 PM   Result Value Ref Range    Glucose (POC) 190 (H) 65 - 100 mg/dL    Performed by Carmelita Angel    GLUCOSE, POC    Collection Time: 04/12/22  4:37 PM   Result Value Ref Range    Glucose (POC) 143 (H) 65 - 100 mg/dL    Performed by Carmelita Angel    GLUCOSE, POC    Collection Time: 04/12/22  9:59 PM   Result Value Ref Range    Glucose (POC) 151 (H) 65 - 100 mg/dL    Performed by [employer]    METABOLIC PANEL, BASIC    Collection Time: 04/13/22  4:48 AM   Result Value Ref Range    Sodium 134 (L) 136 - 145 mmol/L    Potassium 4.0 3.5 - 5.1 mmol/L    Chloride 95 (L) 98 - 107 mmol/L    CO2 32 21 - 32 mmol/L    Anion gap 7 7 - 16 mmol/L    Glucose 132 (H) 65 - 100 mg/dL    BUN 26 (H) 6 - 23 MG/DL    Creatinine 1.00 0.8 - 1.5 MG/DL    GFR est AA >60 >60 ml/min/1.73m2    GFR est non-AA >60 >60 ml/min/1.73m2    Calcium 9.3 8.3 - 10.4 MG/DL   CBC WITH AUTOMATED DIFF    Collection Time: 04/13/22  4:48 AM   Result Value Ref Range    WBC 20.3 (H) 4.3 - 11.1 K/uL    RBC 4.86 4.23 - 5.6 M/uL    HGB 16.3 13.6 - 17.2 g/dL    HCT 48.4 41.1 - 50.3 %    MCV 99.6 (H) 79.6 - 97.8 FL    MCH 33.5 (H) 26.1 - 32.9 PG    MCHC 33.7 31.4 - 35.0 g/dL    RDW 12.3 11.9 - 14.6 %    PLATELET 436 248 - 045 K/uL    MPV 9.8 9.4 - 12.3 FL    ABSOLUTE NRBC 0.00 0.0 - 0.2 K/uL    DF AUTOMATED      NEUTROPHILS 60 43 - 78 %    LYMPHOCYTES 23 13 - 44 %    MONOCYTES 12 4.0 - 12.0 %    EOSINOPHILS 1 0.5 - 7.8 %    BASOPHILS 0 0.0 - 2.0 %    IMMATURE GRANULOCYTES 4 0.0 - 5.0 %    ABS. NEUTROPHILS 12.1 (H) 1.7 - 8.2 K/UL    ABS. LYMPHOCYTES 4.7 (H) 0.5 - 4.6 K/UL    ABS. MONOCYTES 2.5 (H) 0.1 - 1.3 K/UL    ABS. EOSINOPHILS 0.2 0.0 - 0.8 K/UL    ABS. BASOPHILS 0.1 0.0 - 0.2 K/UL    ABS. IMM.  GRANS. 0.8 (H) 0.0 - 0.5 K/UL   GLUCOSE, POC    Collection Time: 04/13/22  6:40 AM   Result Value Ref Range    Glucose (POC) 145 (H) 65 - 100 mg/dL    Performed by Marielle    GLUCOSE, POC    Collection Time: 04/13/22 11:03 AM   Result Value Ref Range    Glucose (POC) 171 (H) 65 - 100 mg/dL    Performed by Nery    GLUCOSE, POC    Collection Time: 04/13/22  4:29 PM   Result Value Ref Range    Glucose (POC) 147 (H) 65 - 100 mg/dL    Performed by Jeremie 92, POC    Collection Time: 04/13/22 10:03 PM   Result Value Ref Range    Glucose (POC) 112 (H) 65 - 100 mg/dL    Performed by Masood    METABOLIC PANEL, BASIC    Collection Time: 04/14/22  6:10 AM   Result Value Ref Range    Sodium 135 (L) 136 - 145 mmol/L    Potassium 3.9 3.5 - 5.1 mmol/L    Chloride 98 98 - 107 mmol/L    CO2 31 21 - 32 mmol/L    Anion gap 6 (L) 7 - 16 mmol/L    Glucose 109 (H) 65 - 100 mg/dL    BUN 22 6 - 23 MG/DL    Creatinine 0.90 0.8 - 1.5 MG/DL    GFR est AA >60 >60 ml/min/1.73m2    GFR est non-AA >60 >60 ml/min/1.73m2    Calcium 9.1 8.3 - 10.4 MG/DL   GLUCOSE, POC    Collection Time: 04/14/22  7:30 AM   Result Value Ref Range    Glucose (POC) 110 (H) 65 - 100 mg/dL    Performed by [facility]    GLUCOSE, POC    Collection Time: 04/14/22 12:05 PM   Result Value Ref Range    Glucose (POC) 128 (H) 65 - 100 mg/dL    Performed by [facility]    GLUCOSE, POC    Collection Time: 04/14/22  5:23 PM   Result Value Ref Range    Glucose (POC) 107 (H) 65 - 100 mg/dL    Performed by [facility] Center[facility]ylv[facility]na    GLUCOSE, POC    Collection Time: 04/14/22 10:43 PM   Result Value Ref Range    Glucose (POC) 127 (H) 65 - 100 mg/dL    Performed by Kaci    METABOLIC PANEL, BASIC    Collection Time: 04/15/22  5:20 AM   Result Value Ref Range    Sodium 134 (L) 136 - 145 mmol/L    Potassium 3.6 3.5 - 5.1 mmol/L    Chloride 100 98 - 107 mmol/L    CO2 29 21 - 32 mmol/L    Anion gap 5 (L) 7 - 16 mmol/L    Glucose 106 (H) 65 - 100 mg/dL    BUN 20 6 - 23 MG/DL    Creatinine 0.70 (L) 0.8 - 1.5 MG/DL    GFR est AA >60 >60 ml/min/1.73m2    GFR est non-AA >60 >60 ml/min/1.73m2    Calcium 9.3 8.3 - 10.4 MG/DL   GLUCOSE, POC    Collection Time: 04/15/22  6:53 AM   Result Value Ref Range    Glucose (POC) 88 65 - 100 mg/dL    Performed by Kelvin)CNA        Assessment:     Problem List as of 4/15/2022 Never Reviewed          Codes Class Noted - Resolved    Physical debility ICD-10-CM: R53.81  ICD-9-CM: 799.3  4/13/2022 - Present        Pre-diabetes ICD-10-CM: R73.03  ICD-9-CM: 790.29  4/12/2022 - Present        Influenza A ICD-10-CM: J10.1  ICD-9-CM: 487.1  4/6/2022 - Present        Staphylococcus aureus pneumonia ([facility] ICD-10-CM: T95.570  ICD-9-CM: 482.41  4/6/2022 - Present        Primary hypertension (Chronic) ICD-10-CM: I10  ICD-9-CM: 401.9  4/6/2022 - Present        Severe persistent asthma with exacerbation ICD-10-CM: J45.51  ICD-9-CM: 493.92  4/3/2022 - Present        Obesity (Chronic) ICD-10-CM: E66.9  ICD-9-CM: 278.00  4/3/2022 - Present        Acute respiratory failure with hypoxia and hypercapnia (HCC) ICD-10-CM: J96.01, J96.02  ICD-9-CM: 518.81  4/3/2022 - Present        RESOLVED: Status asthmaticus ICD-10-CM: J45.902  ICD-9-CM: 493.91  4/3/2022 - 4/11/2022                  Physical Debility due to Acute Respiratory Failure due to Influenza A,, MSSA pneumonia and Asthma exacerbation  Plan / Recommendations / Medical Decision Making:      Daily physician / PA medical management:     Physical debility [R53.81] - PT/OT . limited by being easily fatigued due to weakness and respiratory issues.     Hypertension - BP fluctuating, managed medically. Cont Norvasc 10 mg daily, add prn hydralazine  -4/14 124/70 controlled  -4/15 127/79 controlled.      Pre Diabetes - fair controlr fair glycemic control. Will require daily, close fasting glucose monitoring and medication adjustment to optimize glycemic control in setting of acute illness and hospitalization. -exacerbated by steroids. Cont Lantus 15u daily and SSI  -anticipate insulin therapy can be weaned off with no long-term need for insulin therapy   -4/15 if pt to dc 4/18, needs diabetic education. He does not want insulin; BS  dc lantus. Start glucophage 250mg bid     Leukocytosis; steroid induced. UA neg. Blood cx x 2 neg. Afebrile. pneurmonia resolved   -WBC remains elevated.  Likely stress response, steroid induced     ZACK; Pulmonology has ordered BiPAP QHS and recommends outpatient sleep study  Has NOT tolerated bipap at [facility]  -Has Sleep Study scheduled for 4/19 if dc'd by then with [facility]     Pain management - generalize msk pain.  Will require regular pain assessment and comprehensive pain management.      Electrolyte abnormality - mild hyponatremia, Na 134; monitor     Pneumonia prophylaxis - incentive spirometer every hour while awake. S/p MSSA and Influenza A  -enc IS. Complete abx ; on Ancef, last day 4/18     Asthma; cont nebs and pulmicort; cont Prednisone 40mg dialy ; Pulm f/u with [facility]; has hospital sponsorship there;Patient has a new-patient appointment with [facility] 6/8/22.  -4/14 no wheezing. No O2 requirements     DVT risk / DVT prophylaxis - daily physician / PA exam to assess as patient is at increased risk for of thromboembolism. Mobilize as tolerated. Sequential pneumatic compression devices (SCDs) when in bed; thigh-high or knee-high thromboembolic deterrent hose when out of bed. Lovenox subcutaneously daily.      GI prophylaxis - resume PPI. At times may need additional antacids, Maalox prn.     General skin care / wound prevention - monitor   general skin status daily per staff and physician / PA. At risk for failure due to impaired mobility.      Bowel program - at risk for constipation as a side effect of opioids, other medications, impaired mobility, etc. MiraLAX daily for regularity, Magda-Colace for stool softener. PRN MOM, bisacodyl suppository or tablets for constipation.     Patient has a new-patient appointment with [facility] for [facility] on 4/25/22 at 10:05am to establish PCP.     Note: Patient is without payor source and would benefit from prescriptions at discharge from [facility] that could be obtained through [facility]. Amlodipine, Trazodone, Advair HFA Inhaler, and Proventil HFA Inhalfer are all currently listed with [facility] as covered medications. CM can assist with first 30 days while a 90-day prescription can be sent to [facility] for coverage thereafter.               Time spent was 25 minutes with over 1/2 in direct patient care/examination, consultation and coordination of care.      Signed By: Clive Love MD     April 15, 2022

## 2022-04-16 LAB
ANION GAP SERPL CALC-SCNC: 8 MMOL/L (ref 7–16)
BACTERIA SPEC CULT: NORMAL
BACTERIA SPEC CULT: NORMAL
BUN SERPL-MCNC: 17 MG/DL (ref 6–23)
CALCIUM SERPL-MCNC: 9.5 MG/DL (ref 8.3–10.4)
CHLORIDE SERPL-SCNC: 99 MMOL/L (ref 98–107)
CO2 SERPL-SCNC: 28 MMOL/L (ref 21–32)
CREAT SERPL-MCNC: 0.9 MG/DL (ref 0.8–1.5)
GLUCOSE BLD STRIP.AUTO-MCNC: 101 MG/DL (ref 65–100)
GLUCOSE BLD STRIP.AUTO-MCNC: 107 MG/DL (ref 65–100)
GLUCOSE BLD STRIP.AUTO-MCNC: 125 MG/DL (ref 65–100)
GLUCOSE SERPL-MCNC: 108 MG/DL (ref 65–100)
POTASSIUM SERPL-SCNC: 3.6 MMOL/L (ref 3.5–5.1)
SERVICE CMNT-IMP: ABNORMAL
SERVICE CMNT-IMP: NORMAL
SERVICE CMNT-IMP: NORMAL
SODIUM SERPL-SCNC: 135 MMOL/L (ref 136–145)

## 2022-04-16 PROCEDURE — 36592 COLLECT BLOOD FROM PICC: CPT

## 2022-04-16 PROCEDURE — 74011250636 HC RX REV CODE- 250/636: Performed by: PHYSICAL MEDICINE & REHABILITATION

## 2022-04-16 PROCEDURE — 82962 GLUCOSE BLOOD TEST: CPT

## 2022-04-16 PROCEDURE — 74011636637 HC RX REV CODE- 636/637: Performed by: PHYSICAL MEDICINE & REHABILITATION

## 2022-04-16 PROCEDURE — 94640 AIRWAY INHALATION TREATMENT: CPT

## 2022-04-16 PROCEDURE — 65310000000 HC RM PRIVATE REHAB

## 2022-04-16 PROCEDURE — 74011000250 HC RX REV CODE- 250: Performed by: PHYSICAL MEDICINE & REHABILITATION

## 2022-04-16 PROCEDURE — 97116 GAIT TRAINING THERAPY: CPT

## 2022-04-16 PROCEDURE — 94760 N-INVAS EAR/PLS OXIMETRY 1: CPT

## 2022-04-16 PROCEDURE — 99232 SBSQ HOSP IP/OBS MODERATE 35: CPT | Performed by: PHYSICAL MEDICINE & REHABILITATION

## 2022-04-16 PROCEDURE — 97110 THERAPEUTIC EXERCISES: CPT

## 2022-04-16 PROCEDURE — 80048 BASIC METABOLIC PNL TOTAL CA: CPT

## 2022-04-16 PROCEDURE — 74011250637 HC RX REV CODE- 250/637: Performed by: PHYSICAL MEDICINE & REHABILITATION

## 2022-04-16 RX ORDER — INSULIN LISPRO 100 [IU]/ML
0-10 INJECTION, SOLUTION INTRAVENOUS; SUBCUTANEOUS
Status: DISCONTINUED | OUTPATIENT
Start: 2022-04-16 | End: 2022-04-18 | Stop reason: HOSPADM

## 2022-04-16 RX ADMIN — METFORMIN HYDROCHLORIDE 250 MG: 500 TABLET ORAL at 17:08

## 2022-04-16 RX ADMIN — CEFAZOLIN SODIUM 2 G: 100 INJECTION, POWDER, LYOPHILIZED, FOR SOLUTION INTRAVENOUS at 00:48

## 2022-04-16 RX ADMIN — POLYETHYLENE GLYCOL 3350 17 G: 17 POWDER, FOR SOLUTION ORAL at 08:11

## 2022-04-16 RX ADMIN — CEFAZOLIN SODIUM 2 G: 100 INJECTION, POWDER, LYOPHILIZED, FOR SOLUTION INTRAVENOUS at 17:09

## 2022-04-16 RX ADMIN — BUDESONIDE 500 MCG: 0.5 INHALANT RESPIRATORY (INHALATION) at 17:31

## 2022-04-16 RX ADMIN — TRAZODONE HYDROCHLORIDE 100 MG: 50 TABLET ORAL at 21:37

## 2022-04-16 RX ADMIN — PREDNISONE 40 MG: 10 TABLET ORAL at 08:20

## 2022-04-16 RX ADMIN — AMLODIPINE BESYLATE 10 MG: 10 TABLET ORAL at 08:11

## 2022-04-16 RX ADMIN — ALBUTEROL SULFATE 2.5 MG: 2.5 SOLUTION RESPIRATORY (INHALATION) at 17:31

## 2022-04-16 RX ADMIN — ENOXAPARIN SODIUM 40 MG: 40 INJECTION SUBCUTANEOUS at 08:11

## 2022-04-16 RX ADMIN — CEFAZOLIN SODIUM 2 G: 100 INJECTION, POWDER, LYOPHILIZED, FOR SOLUTION INTRAVENOUS at 08:12

## 2022-04-16 RX ADMIN — ALBUTEROL SULFATE 2.5 MG: 2.5 SOLUTION RESPIRATORY (INHALATION) at 05:56

## 2022-04-16 RX ADMIN — SODIUM CHLORIDE, PRESERVATIVE FREE 10 ML: 5 INJECTION INTRAVENOUS at 14:43

## 2022-04-16 RX ADMIN — ALBUTEROL SULFATE 2.5 MG: 2.5 SOLUTION RESPIRATORY (INHALATION) at 11:57

## 2022-04-16 RX ADMIN — SODIUM CHLORIDE, PRESERVATIVE FREE 30 ML: 5 INJECTION INTRAVENOUS at 21:38

## 2022-04-16 RX ADMIN — METFORMIN HYDROCHLORIDE 250 MG: 500 TABLET ORAL at 08:10

## 2022-04-16 RX ADMIN — BUDESONIDE 500 MCG: 0.5 INHALANT RESPIRATORY (INHALATION) at 05:56

## 2022-04-16 RX ADMIN — SODIUM CHLORIDE, PRESERVATIVE FREE 30 ML: 5 INJECTION INTRAVENOUS at 08:11

## 2022-04-16 RX ADMIN — ALBUTEROL SULFATE 2.5 MG: 2.5 SOLUTION RESPIRATORY (INHALATION) at 00:55

## 2022-04-16 NOTE — PROGRESS NOTES
Problem: Falls - Risk of  Goal: *Absence of Falls  Description: Document Vicki Mane Fall Risk and appropriate interventions in the flowsheet.   Outcome: Progressing Towards Goal  Note: Fall Risk Interventions:  Mobility Interventions: Patient to call before getting OOB,Utilize walker, cane, or other assistive device    Mentation Interventions: Adequate sleep, hydration, pain control,More frequent rounding,Toileting rounds    Medication Interventions: Patient to call before getting OOB    Elimination Interventions: Call light in reach,Patient to call for help with toileting needs,Toileting schedule/hourly rounds,Urinal in reach    History of Falls Interventions: Consult care management for discharge planning

## 2022-04-16 NOTE — PROGRESS NOTES
Problem: Pressure Injury - Risk of  Goal: *Prevention of pressure injury  Description: Document Martir Scale and appropriate interventions in the flowsheet.   Outcome: Progressing Towards Goal  Note: Pressure Injury Interventions:  Sensory Interventions: Assess changes in LOC    Moisture Interventions: Absorbent underpads,Apply protective barrier, creams and emollients    Activity Interventions: Increase time out of bed    Mobility Interventions: HOB 30 degrees or less    Nutrition Interventions: Document food/fluid/supplement intake    Friction and Shear Interventions: Apply protective barrier, creams and emollients                Problem: Patient Education: Go to Patient Education Activity  Goal: Patient/Family Education  Outcome: Progressing Towards Goal

## 2022-04-16 NOTE — PROGRESS NOTES
Kapil Moody MD  Medical Director  [address] [facility], [address] [facility]  Tel: [phone]       [facility] PROGRESS NOTE    Audra Jernigan  Admit Date: 4/13/2022  Admit Diagnosis:   Physical debility [R53.81]    Subjective     Patient seen and examined. Really wanting to go home Monday for his birthday. Therapies going very well. Not requiring an AD today. Denies cp, sob, wheezing, [place]. Has sleep study scheduled for Tues 4/19 with [facility]    Patient seen and examined. No pain complaints. Denies dizziness, SOB or nausea. Participating in therapy.      Objective:     Current Facility-Administered Medications   Medication Dose Route Frequency    budesonide (PULMICORT) 500 mcg/2 ml nebulizer suspension  500 mcg Nebulization BID RT    metFORMIN (GLUCOPHAGE) tablet 250 mg  250 mg Oral BID WITH MEALS    acetaminophen (TYLENOL) tablet 650 mg  650 mg Oral Q6H PRN    Or    acetaminophen (TYLENOL) suppository 650 mg  650 mg Rectal Q6H PRN    ondansetron (ZOFRAN ODT) tablet 4 mg  4 mg Oral Q8H PRN    Or    ondansetron (ZOFRAN) injection 4 mg  4 mg IntraVENous Q6H PRN    polyethylene glycol (MIRALAX) packet 17 g  17 g Oral DAILY    sodium chloride (NS) flush 5-40 mL  5-40 mL IntraVENous Q8H    sodium chloride (NS) flush 5-40 mL  5-40 mL IntraVENous PRN    amLODIPine (NORVASC) tablet 10 mg  10 mg Oral DAILY    bisacodyL (DULCOLAX) suppository 10 mg  10 mg Rectal DAILY PRN    ceFAZolin (ANCEF) 2 g/20 mL in sterile water IV syringe  2 g IntraVENous Q8H    enoxaparin (LOVENOX) injection 40 mg  40 mg SubCUTAneous Q12H    hydrOXYzine pamoate (VISTARIL) capsule 50 mg  50 mg Oral QID PRN    insulin lispro (HUMALOG) injection 0-10 Units  0-10 Units SubCUTAneous AC&HS    predniSONE (DELTASONE) tablet 40 mg  40 mg Oral DAILY WITH BREAKFAST    senna-docusate (PERICOLACE) 8.6-50 mg per tablet 2 Tablet  2 Tablet Oral QHS    sodium chloride (NS) flush 30 mL 30 mL InterCATHeter DAILY    sodium chloride (NS) flush 30 mL  30 mL InterCATHeter PRN    traZODone (DESYREL) tablet 100 mg  100 mg Oral QHS    albuterol (PROVENTIL VENTOLIN) nebulizer solution 2.5 mg  2.5 mg Nebulization Q6H PRN    hydrALAZINE (APRESOLINE) tablet 25 mg  25 mg Oral Q6H PRN    albuterol (PROVENTIL VENTOLIN) nebulizer solution 2.5 mg  2.5 mg Nebulization Q6H     Review of Systems:   Denies chest pain, shortness of breath, cough, headache, visual problems, abdominal pain, dysuria, calf pain. Pertinent positives are as noted in the HPI, ROS unremarkable otherwise. Visit Vitals  /85 (BP 1 Location: Left upper arm, BP Patient Position: At rest)   Pulse 97   Temp 98.2 °F (36.8 °C)   Resp 18   Ht 5' 9\" (1.753 m)   Wt 126.4 kg (278 lb 9.6 oz)   SpO2 97%   BMI 41.14 kg/m²        Physical Exam:   General: Alert and age appropriately oriented. No acute cardiorespiratory distress. HEENT: Normocephalic, no scleral icterus. Oral mucosa moist without cyanosis. Lungs: Clear to auscultation bilaterally. Respiration even and unlabored. Heart: Regular rate and rhythm, S1, S2. No murmurs    Abdomen: Soft, non-tender, not distended. Bowel sounds normoactive, obese   Genitourinary: Deferred. Neuromuscular:      No gross focal motor deficits noted. Generalized prox>distal weakness   Skin/extremity: No rashes, no erythema.  No calf tenderness B LE.  1+ edema                                                                              Functional Assessment:          Balance  Sitting - Static: Good (unsupported) (04/15/22 1500)  Sitting - Dynamic: Good (unsupported) (04/15/22 1500)  Standing - Static: Good (04/15/22 1500)  Standing - Dynamic : Impaired (04/15/22 1500)     Carolynn Boo Fall Risk Assessment:  Carolynn Boo Fall Risk  Mobility: Ambulates or transfers with assist devices or assistance (04/16/22 5149)  Mobility Interventions: Patient to call before getting OOB;Utilize walker, cane, or other assistive device (04/16/22 0719)  Mentation: Alert, oriented x 3 (04/16/22 0719)  Mentation Interventions: Adequate sleep, hydration, pain control;More frequent rounding; Toileting rounds (04/16/22 0719)  Medication: Patient receiving anticonvulsants, sedatives(tranquilizers), psychotropics or hypnotics, hypoglycemics, narcotics, sleep aids, antihypertensives, laxatives, or diuretics (04/16/22 0719)  Medication Interventions: Patient to call before getting OOB (04/16/22 0719)  Elimination: Needs assistance with toileting (04/16/22 0719)  Elimination Interventions: Call light in reach; Patient to call for help with toileting needs; Toileting schedule/hourly rounds;Urinal in reach (04/16/22 0719)  Prior Fall History: Before admission in past 12 months _home or previous inpatient care) (04/16/22 0719)  History of Falls Interventions: Consult care management for discharge planning (04/16/22 0719)  Total Score: 4 (04/16/22 0719)  High Fall Risk: Yes (04/16/22 0719)     Ambulation:  Gait  Distance (ft): 150 Feet (ft) (04/15/22 1500)  Assistive Device: Cane, straight (04/15/22 1500)  Rail Use: Both (04/15/22 1500)     Labs/Studies:  Recent Results (from the past 72 hour(s))   GLUCOSE, POC    Collection Time: 04/13/22 11:03 AM   Result Value Ref Range    Glucose (POC) 171 (H) 65 - 100 mg/dL    Performed by Nery    GLUCOSE, POC    Collection Time: 04/13/22  4:29 PM   Result Value Ref Range    Glucose (POC) 147 (H) 65 - 100 mg/dL    Performed by Jeremie 92, POC    Collection Time: 04/13/22 10:03 PM   Result Value Ref Range    Glucose (POC) 112 (H) 65 - 100 mg/dL    Performed by Taco    METABOLIC PANEL, BASIC    Collection Time: 04/14/22  6:10 AM   Result Value Ref Range    Sodium 135 (L) 136 - 145 mmol/L    Potassium 3.9 3.5 - 5.1 mmol/L    Chloride 98 98 - 107 mmol/L    CO2 31 21 - 32 mmol/L    Anion gap 6 (L) 7 - 16 mmol/L    Glucose 109 (H) 65 - 100 mg/dL    BUN 22 6 - 23 MG/DL    Creatinine 0. 90 0.8 - 1.5 MG/DL    GFR est AA >60 >60 ml/min/1.73m2    GFR est non-AA >60 >60 ml/min/1.73m2    Calcium 9.1 8.3 - 10.4 MG/DL   GLUCOSE, POC    Collection Time: 04/14/22  7:30 AM   Result Value Ref Range    Glucose (POC) 110 (H) 65 - 100 mg/dL    Performed by [employer] [id number], POC    Collection Time: 04/14/22 12:05 PM   Result Value Ref Range    Glucose (POC) 128 (H) 65 - 100 mg/dL    Performed by [id number], POC    Collection Time: 04/14/22  5:23 PM   Result Value Ref Range    Glucose (POC) 107 (H) 65 - 100 mg/dL    Performed by Maria L    GLUCOSE, POC    Collection Time: 04/14/22 10:43 PM   Result Value Ref Range    Glucose (POC) 127 (H) 65 - 100 mg/dL    Performed by Kaci    METABOLIC PANEL, BASIC    Collection Time: 04/15/22  5:20 AM   Result Value Ref Range    Sodium 134 (L) 136 - 145 mmol/L    Potassium 3.6 3.5 - 5.1 mmol/L    Chloride 100 98 - 107 mmol/L    CO2 29 21 - 32 mmol/L    Anion gap 5 (L) 7 - 16 mmol/L    Glucose 106 (H) 65 - 100 mg/dL    BUN 20 6 - 23 MG/DL    Creatinine 0.70 (L) 0.8 - 1.5 MG/DL    GFR est AA >60 >60 ml/min/1.73m2    GFR est non-AA >60 >60 ml/min/1.73m2    Calcium 9.3 8.3 - 10.4 MG/DL   GLUCOSE, POC    Collection Time: 04/15/22  6:53 AM   Result Value Ref Range    Glucose (POC) 88 65 - 100 mg/dL    Performed by Solomon (Hammonds)A    GLUCOSE, POC    Collection Time: 04/15/22 11:24 AM   Result Value Ref Range    Glucose (POC) 107 (H) 65 - 100 mg/dL    Performed by Solomon (Hammonds)A    GLUCOSE, POC    Collection Time: 04/15/22  4:26 PM   Result Value Ref Range    Glucose (POC) 148 (H) 65 - 100 mg/dL    Performed by CousarBrandy(Azeb)CNA    GLUCOSE, POC    Collection Time: 04/15/22  9:14 PM   Result Value Ref Range    Glucose (POC) 105 (H) 65 - 100 mg/dL    Performed by Lindsey    METABOLIC PANEL, BASIC    Collection Time: 04/16/22  5:51 AM   Result Value Ref Range    Sodium 135 (L) 136 - 145 mmol/L    Potassium 3.6 3.5 - 5.1 mmol/L    Chloride 99 98 - 107 mmol/L    CO2 28 21 - 32 mmol/L    Anion gap 8 7 - 16 mmol/L    Glucose 108 (H) 65 - 100 mg/dL    BUN 17 6 - 23 MG/DL    Creatinine 0.90 0.8 - 1.5 MG/DL    GFR est AA >60 >60 ml/min/1.73m2    GFR est non-AA >60 >60 ml/min/1.73m2    Calcium 9.5 8.3 - 10.4 MG/DL   GLUCOSE, POC    Collection Time: 04/16/22  7:25 AM   Result Value Ref Range    Glucose (POC) 101 (H) 65 - 100 mg/dL    Performed by Denise        Assessment:     Problem List as of 4/16/2022 Never Reviewed          Codes Class Noted - Resolved    * (Principal) Physical debility ICD-10-CM: R53.81  ICD-9-CM: 799.3  4/13/2022 - Present        Pre-diabetes ICD-10-CM: R73.03  ICD-9-CM: 790.29  4/12/2022 - Present        Influenza A ICD-10-CM: J10.1  ICD-9-CM: 487.1  4/6/2022 - Present        St[facility] aureus pneumonia [facility] ICD-10-CM: Q58.970  ICD-9-CM: 482.41  4/6/2022 - Present        Primary hypertension (Chronic) ICD-10-CM: I10  ICD-9-CM: 401.9  4/6/2022 - Present        Severe persistent asthma with exacerbation ICD-10-CM: J45.51  ICD-9-CM: 493.92  4/3/2022 - Present        Obesity (Chronic) ICD-10-CM: E66.9  ICD-9-CM: 278.00  4/3/2022 - Present        Acute respiratory failure with hypoxia and hypercapnia (HCC) ICD-10-CM: J96.01, J96.02  ICD-9-CM: 518.81  4/3/2022 - Present        RESOLVED: Status asthmaticus ICD-10-CM: J45.902  ICD-9-CM: 493.91  4/3/2022 - 4/11/2022               Physical Debility due to Acute Respiratory Failure due to Influenza A,, MSSA pneumonia and Asthma exacerbation  Plan / Recommendations / Medical Decision Making:      Daily physician / PA medical management:     Physical debility [R53.81] - PT/OT . limited by being easily fatigued due to weakness and respiratory issues.     Hypertension - BP fluctuating, managed medically. Cont Norvasc 10 mg daily, add prn hydralazine  -4/14 124/70 controlled  -4/16 HR and BP acceptable, 134/85       Pre Diabetes - fair controlr fair glycemic control. Will require daily, close fasting glucose monitoring and medication adjustment to optimize glycemic control in setting of acute illness and hospitalization. -exacerbated by steroids. Cont Lantus 15u daily and SSI  -anticipate insulin therapy can be weaned off with no long-term need for insulin therapy   -4/15 if pt to dc 4/18, needs diabetic education. He does not want insulin; BS  dc lantus. Start glucophage 250mg bid  - 4/16 BG levels consistently < 150, 101 this am, decrease BG monitoring to bid, Cr - .90     Leukocytosis; steroid induced. UA neg. Blood cx x 2 neg. Afebrile. pneurmonia resolved   -WBC remains elevated. Likely stress response, steroid induced     ZACK; Pulmonology has ordered BiPAP QHS and recommends outpatient sleep study  Has NOT tolerated bipap at [facility]  -Has Sleep Study scheduled for 4/19 if dc'd by then with [facility]ia     Pain management - generalize msk pain.  Will require regular pain assessment and comprehensive pain management.      Electrolyte abnormality - mild hyponatremia, Na 134; monitor     Pneumonia prophylaxis - incentive spirometer every hour while awake. S/p MSSA and Influenza A  -enc IS. Complete abx ; on Ancef, last day 4/18     Asthma; cont nebs and pulmicort; cont Prednisone 40mg dialy ; Pulm f/u with [facility]; has hospital sponsorship there;Patient has a [facility]-patient appointment with [facility] 6/8/22.  -4/14 no wheezing. No O2 requirements     DVT risk / DVT prophylaxis - daily physician / PA exam to assess as patient is at increased risk for of thromboembolism. Mobilize as tolerated. Sequential pneumatic compression devices (SCDs) when in bed; thigh-high or knee-high thromboembolic deterrent hose when out of bed.    - 4/16 trial of modified independence in room in progress, ambulating sufficiently, will d/c lovenox     GI prophylaxis - resume PPI.  At times may need additional antacids, Maalox prn.     General skin care / wound prevention - monitor   general skin status daily per staff and physician / PA. At risk for failure due to impaired mobility.      Bowel program - at risk for constipation as a side effect of opioids, other medications, impaired mobility, etc. MiraLAX daily for regularity, Magda-Colace for stool softener. PRN MOM, bisacodyl suppository or tablets for constipation.     Patient has a new-patient appointment with [facility] on 4/25/22 at 10:05am to establish PCP.     Note: Patient is without payor source and would benefit from prescriptions at discharge from [facility] that could be obtained through [facility]. Amlodipine, Trazodone, Advair HFA Inhaler, and Proventil HFA Inhalfer are all currently listed with [facility] as covered medications. CM can assist with first 30 days while a 90-day prescription can be sent to [facility] for coverage thereafter.     Time spent was 25 minutes with over 1/2 in direct patient care/examination, consultation and coordination of care.      Signed By: Rina Robertson MD     April 16, 2022

## 2022-04-16 NOTE — PROGRESS NOTES
Good visit with the patient   he was awake and alert   very friendly   hopes to get home Monday for his birthday

## 2022-04-16 NOTE — PROGRESS NOTES
PHYSICAL THERAPY DAILY NOTE  Time In: 1019  Time Out: 1106  Patient Seen For: AM;Balance activities;Gait training;Patient education; Therapeutic exercise;Transfer training; Other (see progress notes)    Subjective: Patient reporting he is going home Monday. Reports he has about 3 flights of stairs to get up to his apartment         Objective:Vital Signs:on room air, 02 sat 96 to 97% at rest, HR 92, 02 sat 95% and  after ambulating up/down a total of 30 steps. 02 sat 96% and  after ambulating 200ft. Patient Vitals for the past 12 hrs:   Temp Pulse Resp BP SpO2   04/16/22 1157     96 %   04/16/22 0733 98.2 °F (36.8 °C) 97 18 134/85 97 %   04/16/22 0556     98 %   04/16/22 0055     97 %     Pain level:No c/o pain during treatment  Pain location:NA  Pain interventions:NA    Patient education:Bed mobility training,transfer training, gait training, balance training,fall precautions, body mechanics,activity pacing, energy conservation,breathing techniques during functional mobility, LE HEP Patient verbalizing understanding and demonstrating  understanding of patient education. Recommend follow up education.     Interdisciplinary Communication:NA    Other (comment) (fall risk)  GROSS ASSESSMENT Daily Assessment     NA       COGNITION Daily Assessment    Alert, able to follow commands,cooperating,participating, motivated,          BED/MAT MOBILITY Daily Assessment    Rolling Right : 6 (Modified independent)  Rolling Left : 6 (Modified independent)  Supine to Sit : 6 (Modified independent)  Sit to Supine : 6 (Modified independent)       TRANSFERS Daily Assessment    Transfer Type: SPT without device  Transfer Assistance : 6 (Modified independent)  Sit to Stand Assistance: Modified independent  Car Transfers: Not tested       GAIT Daily Assessment    Amount of Assistance: 6 (Modified independent)  Distance (ft): 200 Feet (ft)  Assistive Device: Cane, straight   Gait training x 150ft x 1 without a device and modified independence. Cues for improved stance phase stability    STEPS or STAIRS Daily Assessment   Single step at a time going up/down steps leading up with R LE and down with LLE. Increased time and effort to complete with multiple 5 to 10 second standing rest breaks while going up/down steps. Cues to control gait speed. Steps/Stairs Ambulated (#): 30 (10 steps x 3 with 30 second standing rest break between)  Level of Assist : 5 (Supervision/setup)  Rail Use: Both       BALANCE Daily Assessment   No loss of balance during gait or transfer training Sitting - Static: Good (unsupported)  Sitting - Dynamic: Good (unsupported)  Standing - Static: Good  Standing - Dynamic : Impaired       WHEELCHAIR MOBILITY Daily Assessment    Curbs/Ramps Assist Required (FIM Score): 0 (Not tested)  Wheelchair Setup Assist Required : 0 (Not tested)       LOWER EXTREMITY EXERCISES Daily Assessment   Increased time and effort to complete with multiple and frequent rest breaks. Cues for correct form   Extremity: Both  Exercise Type #1: Standing lower extremity strengthening  Sets Performed: 1  Reps Performed: 10  Level of Assist: Supervision (hand support on table, with verbal/visual cues)     STANDING EXERCISES Sets Reps Comments   Heel Raises 1 10    Toe Raises 1 10    Hip Flexion/Marching 1 10    Hip Abduction 1 10    Hip Extension 1 10    Mini Squats 1 10      SUPINE EXERCISES Sets Reps Comments, set up assist with cues to complete   Bridging 2 10    Bridging with hip ADD isometric 2 10    Lower Abdominal marching 2 10    Straight Leg Raise 2 10             Assessment: Patient progressing towards goals. 02 sat stable on room air during treatment. HR elevated during stair climbing. Recommending patient try to get apartment on first floor. Able to advance to climbing equivalent of 3 flights of steps with stable 02 sat.        Patient returned to room at end of treatment and remained up in recliner with LEs elevated and with needs in reach. 02 sat 96%    Plan of Care: Continue with POC and progress as tolerated.      Catie Steele, PT  4/16/2022

## 2022-04-16 NOTE — PROGRESS NOTES
OT Daily Note  Time In 0936   Time Out 1016     Subjective: Patient stated, \"I'm ready to go home. \"  Pain: none reported  Education: benefits of therapy  Interdisciplinary Communication: handoff to PT, discussed session and POC. Precautions: Other (comment) (fall precautions)  Location on arrival: seated in recliner, in room    Strengthening   Completed HEP theraband using red theraband. Reviewed each exercises prescribed demonstrating good tolerance and proper positioning and execution of exercises. Patient completed the following: bicep, tricep, anterior punch, shoulder scap, chest press, 1 set x10      UE exercise bike completed for 10 minutes (5 minutes fwd, 5 minutes bkwd) with moderate resistance at steady pace to increase activity tolerance and UE strength. Activity Tolerance   Patient ambulated in hallways retrieving small cones in various locations \"mini scavenger hunt\" addressing activity tolerance, endurance, attention, and visual scanning. Retrieved 12 cones with SBA ambulating with no gait aid. Patient was left with PT, [place] with all needs in reach and in no distress. Assessment: Patient demonstrated good activity tolerance, endurance, and participation. Patient was motivated and cooperative. He completed activities above addressing strength, endurance, and activity tolerance for increased functional performance and independence. Plan: Continue OT POC with focus on ADL/IADL skills, functional transfers, functional mobility, coordination, strength, static and dynamic balance, and activity tolerance to maximize safety and independence with ADLs and functional transfers.      Anmol Mills, YUMIKOD, OTR/L  4/16/2022  Note may contain the following abbreviations:   Abbreviation Explanation   AROM Active range of motion   PROM Passive range of motion   SPT Stand pivot transfer   LPT Lateral pivot transfer   WC wheelchair   RW Rolling walker    BUE Bilateral upper extremities   BLE Bilateral lower extremities    WBAT Weight bearing as tolerated    TTWB Toe-touch weight bearing    AD Adaptive device   AE Adaptive equipment    NMES Neuro muscular electrical stimulation   UBE Upper body ergometer

## 2022-04-17 LAB
ANION GAP SERPL CALC-SCNC: 7 MMOL/L (ref 7–16)
BUN SERPL-MCNC: 13 MG/DL (ref 6–23)
CALCIUM SERPL-MCNC: 9.4 MG/DL (ref 8.3–10.4)
CHLORIDE SERPL-SCNC: 102 MMOL/L (ref 98–107)
CO2 SERPL-SCNC: 27 MMOL/L (ref 21–32)
CREAT SERPL-MCNC: 0.8 MG/DL (ref 0.8–1.5)
GLUCOSE SERPL-MCNC: 92 MG/DL (ref 65–100)
POTASSIUM SERPL-SCNC: 3.5 MMOL/L (ref 3.5–5.1)
SODIUM SERPL-SCNC: 136 MMOL/L (ref 136–145)

## 2022-04-17 PROCEDURE — 74011636637 HC RX REV CODE- 636/637: Performed by: PHYSICAL MEDICINE & REHABILITATION

## 2022-04-17 PROCEDURE — 65310000000 HC RM PRIVATE REHAB

## 2022-04-17 PROCEDURE — 74011250636 HC RX REV CODE- 250/636: Performed by: PHYSICAL MEDICINE & REHABILITATION

## 2022-04-17 PROCEDURE — 94760 N-INVAS EAR/PLS OXIMETRY 1: CPT

## 2022-04-17 PROCEDURE — 74011000250 HC RX REV CODE- 250: Performed by: PHYSICAL MEDICINE & REHABILITATION

## 2022-04-17 PROCEDURE — 80048 BASIC METABOLIC PNL TOTAL CA: CPT

## 2022-04-17 PROCEDURE — 94640 AIRWAY INHALATION TREATMENT: CPT

## 2022-04-17 PROCEDURE — 74011250637 HC RX REV CODE- 250/637: Performed by: PHYSICAL MEDICINE & REHABILITATION

## 2022-04-17 PROCEDURE — 99231 SBSQ HOSP IP/OBS SF/LOW 25: CPT | Performed by: PHYSICAL MEDICINE & REHABILITATION

## 2022-04-17 RX ADMIN — CEFAZOLIN SODIUM 2 G: 100 INJECTION, POWDER, LYOPHILIZED, FOR SOLUTION INTRAVENOUS at 08:07

## 2022-04-17 RX ADMIN — CEFAZOLIN SODIUM 2 G: 100 INJECTION, POWDER, LYOPHILIZED, FOR SOLUTION INTRAVENOUS at 17:38

## 2022-04-17 RX ADMIN — SODIUM CHLORIDE, PRESERVATIVE FREE 30 ML: 5 INJECTION INTRAVENOUS at 21:07

## 2022-04-17 RX ADMIN — AMLODIPINE BESYLATE 10 MG: 10 TABLET ORAL at 08:08

## 2022-04-17 RX ADMIN — PREDNISONE 40 MG: 10 TABLET ORAL at 08:08

## 2022-04-17 RX ADMIN — BUDESONIDE 500 MCG: 0.5 INHALANT RESPIRATORY (INHALATION) at 06:07

## 2022-04-17 RX ADMIN — SODIUM CHLORIDE, PRESERVATIVE FREE 10 ML: 5 INJECTION INTRAVENOUS at 14:13

## 2022-04-17 RX ADMIN — ALBUTEROL SULFATE 2.5 MG: 2.5 SOLUTION RESPIRATORY (INHALATION) at 06:07

## 2022-04-17 RX ADMIN — SODIUM CHLORIDE, PRESERVATIVE FREE 30 ML: 5 INJECTION INTRAVENOUS at 08:10

## 2022-04-17 RX ADMIN — TRAZODONE HYDROCHLORIDE 100 MG: 50 TABLET ORAL at 21:06

## 2022-04-17 RX ADMIN — METFORMIN HYDROCHLORIDE 250 MG: 500 TABLET ORAL at 17:39

## 2022-04-17 RX ADMIN — ALBUTEROL SULFATE 2.5 MG: 2.5 SOLUTION RESPIRATORY (INHALATION) at 16:50

## 2022-04-17 RX ADMIN — ALBUTEROL SULFATE 2.5 MG: 2.5 SOLUTION RESPIRATORY (INHALATION) at 11:21

## 2022-04-17 RX ADMIN — BUDESONIDE 500 MCG: 0.5 INHALANT RESPIRATORY (INHALATION) at 16:50

## 2022-04-17 RX ADMIN — POLYETHYLENE GLYCOL 3350 17 G: 17 POWDER, FOR SOLUTION ORAL at 08:08

## 2022-04-17 RX ADMIN — METFORMIN HYDROCHLORIDE 250 MG: 500 TABLET ORAL at 08:07

## 2022-04-17 RX ADMIN — SODIUM CHLORIDE, PRESERVATIVE FREE 30 ML: 5 INJECTION INTRAVENOUS at 05:58

## 2022-04-17 RX ADMIN — CEFAZOLIN SODIUM 2 G: 100 INJECTION, POWDER, LYOPHILIZED, FOR SOLUTION INTRAVENOUS at 00:47

## 2022-04-17 NOTE — PROGRESS NOTES
Lizzy Acuna MD  Medical Director  [address] [facility], [address] [facility]  Tel: [phone]       [facility] PROGRESS NOTE    Elton Farnsworth  Admit Date: 4/13/2022  Admit Diagnosis:   Physical debility [R53.81]    Subjective     Patient seen and examined. Really wanting to go home Monday for his [age]. Therapies going very well. Not requiring an AD today. Denies cp, sob, wheezing, [facility]. Has sleep study scheduled for Tues 4/19 with [facility]    Patient seen and examined. Ambulating in room. Denies pain, lightheadedness, SOB or nausea. Participating in therapy.      Objective:     Current Facility-Administered Medications   Medication Dose Route Frequency    insulin lispro (HUMALOG) injection 0-10 Units  0-10 Units SubCUTAneous ACB&D    budesonide (PULMICORT) 500 mcg/2 ml nebulizer suspension  500 mcg Nebulization BID RT    metFORMIN (GLUCOPHAGE) tablet 250 mg  250 mg Oral BID WITH MEALS    acetaminophen (TYLENOL) tablet 650 mg  650 mg Oral Q6H PRN    Or    acetaminophen (TYLENOL) suppository 650 mg  650 mg Rectal Q6H PRN    ondansetron (ZOFRAN ODT) tablet 4 mg  4 mg Oral Q8H PRN    Or    ondansetron (ZOFRAN) injection 4 mg  4 mg IntraVENous Q6H PRN    polyethylene glycol (MIRALAX) packet 17 g  17 g Oral DAILY    sodium chloride (NS) flush 5-40 mL  5-40 mL IntraVENous Q8H    sodium chloride (NS) flush 5-40 mL  5-40 mL IntraVENous PRN    amLODIPine (NORVASC) tablet 10 mg  10 mg Oral DAILY    bisacodyL (DULCOLAX) suppository 10 mg  10 mg Rectal DAILY PRN    ceFAZolin (ANCEF) 2 g/20 mL in sterile water IV syringe  2 g IntraVENous Q8H    hydrOXYzine pamoate (VISTARIL) capsule 50 mg  50 mg Oral QID PRN    predniSONE (DELTASONE) tablet 40 mg  40 mg Oral DAILY WITH BREAKFAST    senna-docusate (PERICOLACE) 8.6-50 mg per tablet 2 Tablet  2 Tablet Oral QHS    sodium chloride (NS) flush 30 mL  30 mL InterCATHeter DAILY    sodium chloride (NS) flush 30 mL  30 mL InterCATHeter PRN    traZODone (DESYREL) tablet 100 mg  100 mg Oral QHS    albuterol (PROVENTIL VENTOLIN) nebulizer solution 2.5 mg  2.5 mg Nebulization Q6H PRN    hydrALAZINE (APRESOLINE) tablet 25 mg  25 mg Oral Q6H PRN    albuterol (PROVENTIL VENTOLIN) nebulizer solution 2.5 mg  2.5 mg Nebulization Q6H     Review of Systems:   Denies chest pain, shortness of breath, cough, headache, visual problems, abdominal pain, dysuria, calf pain. Pertinent positives are as noted in the HPI, ROS unremarkable otherwise. Visit Vitals  /76   Pulse 98   Temp 98.3 °F (36.8 °C)   Resp 18   Ht 5' 9\" (1.753 m)   Wt 126.4 kg (278 lb 9.6 oz)   SpO2 96%   BMI 41.14 kg/m²        Physical Exam:   General: Alert and age appropriately oriented. No acute cardiorespiratory distress. HEENT: Normocephalic, no scleral icterus. Oral mucosa moist without cyanosis. Lungs: Clear to auscultation bilaterally. Respiration even and unlabored. Heart: Regular rate and rhythm, no murmurs    Abdomen: Soft, non-tender, not distended. Bowel sounds normoactive, obese   Genitourinary: Deferred. Neuromuscular:      No gross focal motor deficits noted. Generalized prox>distal weakness   Skin/extremity: No rashes, no erythema.  No calf tenderness B LE.  1+ edema                                                                              Functional Assessment:          Balance  Sitting - Static: Good (unsupported) (04/16/22 1200)  Sitting - Dynamic: Good (unsupported) (04/16/22 1200)  Standing - Static: Good (04/16/22 1200)  Standing - Dynamic : Impaired (04/16/22 1200)     Dejuan Ralphs Fall Risk Assessment:  Dejuan Ralphs Fall Risk  Mobility: Ambulates or transfers with assist devices or assistance (04/17/22 0708)  Mobility Interventions: Patient to call before getting OOB;Utilize walker, cane, or other assistive device (04/17/22 0708)  Mentation: Alert, oriented x 3 (04/17/22 0708)  Mentation Interventions: Adequate sleep, hydration, pain control;More frequent rounding; Toileting rounds (04/17/22 0708)  Medication: Patient receiving anticonvulsants, sedatives(tranquilizers), psychotropics or hypnotics, hypoglycemics, narcotics, sleep aids, antihypertensives, laxatives, or diuretics (04/17/22 0708)  Medication Interventions: Patient to call before getting OOB (04/17/22 0665)  Elimination: Needs assistance with toileting (04/17/22 0708)  Elimination Interventions: Call light in reach; Patient to call for help with toileting needs; Toileting schedule/hourly rounds;Urinal in reach (04/17/22 0708)  Prior Fall History: Before admission in past 12 months _home or previous inpatient care) (04/17/22 5649)  History of Falls Interventions: Consult care management for discharge planning (04/17/22 0708)  Total Score: 4 (04/17/22 0708)  Standard Fall Precautions: Yes (04/16/22 2300)  High Fall Risk: Yes (04/17/22 0708)     Ambulation:  Gait  Distance (ft): 200 Feet (ft) (04/16/22 1200)  Assistive Device: Cane, straight (04/16/22 1200)  Rail Use: Both (04/16/22 1200)     Labs/Studies:  Recent Results (from the past 72 hour(s))   GLUCOSE, POC    Collection Time: 04/14/22 12:05 PM   Result Value Ref Range    Glucose (POC) 128 (H) 65 - 100 mg/dL    Performed by Madeleine Mcneill [id number], POC    Collection Time: 04/14/22  5:23 PM   Result Value Ref Range    Glucose (POC) 107 (H) 65 - 100 mg/dL    Performed by Maria L    GLUCOSE, POC    Collection Time: 04/14/22 10:43 PM   Result Value Ref Range    Glucose (POC) 127 (H) 65 - 100 mg/dL    Performed by Nirmala Moses [id number], BASIC    Collection Time: 04/15/22  5:20 AM   Result Value Ref Range    Sodium 134 (L) 136 - 145 mmol/L    Potassium 3.6 3.5 - 5.1 mmol/L    Chloride 100 98 - 107 mmol/L    CO2 29 21 - 32 mmol/L    Anion gap 5 (L) 7 - 16 mmol/L    Glucose 106 (H) 65 - 100 mg/dL    BUN 20 6 - 23 MG/DL    Creatinine 0.70 (L) 0.8 - 1.5 MG/DL    GFR est AA >60 >60 ml/min/1.73m2    GFR est non-AA >60 >60 ml/min/1.73m2    Calcium 9.3 8.3 - 10.4 MG/DL   GLUCOSE, POC    Collection Time: 04/15/22  6:53 AM   Result Value Ref Range    Glucose (POC) 88 65 - 100 mg/dL    Performed by Kelvin)CNA    GLUCOSE, POC    Collection Time: 04/15/22 11:24 AM   Result Value Ref Range    Glucose (POC) 107 (H) 65 - 100 mg/dL    Performed by Kelvin)CNA    GLUCOSE, POC    Collection Time: 04/15/22  4:26 PM   Result Value Ref Range    Glucose (POC) 148 (H) 65 - 100 mg/dL    Performed by Kelvin)CNA    GLUCOSE, POC    Collection Time: 04/15/22  9:14 PM   Result Value Ref Range    Glucose (POC) 105 (H) 65 - 100 mg/dL    Performed by Lindsey    METABOLIC PANEL, BASIC    Collection Time: 04/16/22  5:51 AM   Result Value Ref Range    Sodium 135 (L) 136 - 145 mmol/L    Potassium 3.6 3.5 - 5.1 mmol/L    Chloride 99 98 - 107 mmol/L    CO2 28 21 - 32 mmol/L    Anion gap 8 7 - 16 mmol/L    Glucose 108 (H) 65 - 100 mg/dL    BUN 17 6 - 23 MG/DL    Creatinine 0.90 0.8 - 1.5 MG/DL    GFR est AA >60 >60 ml/min/1.73m2    GFR est non-AA >60 >60 ml/min/1.73m2    Calcium 9.5 8.3 - 10.4 MG/DL   GLUCOSE, POC    Collection Time: 04/16/22  7:25 AM   Result Value Ref Range    Glucose (POC) 101 (H) 65 - 100 mg/dL    Performed by Jeremie 92, POC    Collection Time: 04/16/22 10:58 AM   Result Value Ref Range    Glucose (POC) 107 (H) 65 - 100 mg/dL    Performed by Denise    GLUCOSE, POC    Collection Time: 04/16/22  4:21 PM   Result Value Ref Range    Glucose (POC) 125 (H) 65 - 100 mg/dL    Performed by Denise    METABOLIC PANEL, BASIC    Collection Time: 04/17/22  5:50 AM   Result Value Ref Range    Sodium 136 136 - 145 mmol/L    Potassium 3.5 3.5 - 5.1 mmol/L    Chloride 102 98 - 107 mmol/L    CO2 27 21 - 32 mmol/L    Anion gap 7 7 - 16 mmol/L    Glucose 92 65 - 100 mg/dL    BUN 13 6 - 23 MG/DL    Creatinine 0.80 0.8 - 1.5 MG/DL    GFR est AA >60 >60 ml/min/1.73m2 GFR est non-AA >60 >60 ml/min/1.73m2    Calcium 9.4 8.3 - 10.4 MG/DL       Assessment:     Problem List as of 4/17/2022 Never Reviewed          Codes Class Noted - Resolved    * (Principal) Physical debility ICD-10-CM: R53.81  ICD-9-CM: 799.3  4/13/2022 - Present        Pre-diabetes ICD-10-CM: R73.03  ICD-9-CM: 790.29  4/12/2022 - Present        Influenza A ICD-10-CM: J10.1  ICD-9-CM: 487.1  4/6/2022 - Present        Staphylococcus aureus pneumonia ([facility] ICD-10-CM: N27.507  ICD-9-CM: 482.41  4/6/2022 - Present        Primary hypertension (Chronic) ICD-10-CM: I10  ICD-9-CM: 401.9  4/6/2022 - Present        Severe persistent asthma with exacerbation ICD-10-CM: J45.51  ICD-9-CM: 493.92  4/3/2022 - Present        Obesity (Chronic) ICD-10-CM: E66.9  ICD-9-CM: 278.00  4/3/2022 - Present        Acute respiratory failure with hypoxia and hypercapnia (HCC) ICD-10-CM: J96.01, J96.02  ICD-9-CM: 518.81  4/3/2022 - Present        RESOLVED: Status asthmaticus ICD-10-CM: J45.902  ICD-9-CM: 493.91  4/3/2022 - 4/11/2022               Physical Debility due to Acute Respiratory Failure due to Influenza A,, MSSA pneumonia and Asthma exacerbation  Plan / Recommendations / Medical Decision Making:      Daily physician / PA medical management:     Physical debility [R53.81] - PT/OT . limited by being easily fatigued due to weakness and respiratory issues.     Hypertension - BP fluctuating, managed medically. Cont Norvasc 10 mg daily, add prn hydralazine  -4/14 124/70 controlled  -4/17 HR and BP acceptable, 139/76       Pre Diabetes - fair controlr fair glycemic control. Will require daily, close fasting glucose monitoring and medication adjustment to optimize glycemic control in setting of acute illness and hospitalization. -exacerbated by steroids. Cont Lantus 15u daily and SSI  -anticipate insulin therapy can be weaned off with no long-term need for insulin therapy   -4/15 if pt to dc 4/18, needs diabetic education.  He does not want insulin; BS  dc lantus. Start glucophage 250mg bid  - 4/17 BG levels consistently < 150, continue BG bid monitoring,  Cr - .80 on 4/17     Leukocytosis; steroid induced. UA neg. Blood cx x 2 neg. Afebrile. pneurmonia resolved   -WBC remains elevated. Likely stress response, steroid induced     ZACK; Pulmonology has ordered BiPAP QHS and recommends outpatient sleep study  Has NOT tolerated bipap at [facility]  -Has Sleep Study scheduled for 4/19 if dc'd by then with Talia     Pain management - generalize msk pain.  Will require regular pain assessment and comprehensive pain management.      Electrolyte abnormality - mild hyponatremia, Na 134; monitor     Pneumonia prophylaxis - incentive spirometer every hour while awake. S/p MSSA and Influenza A  -enc IS. Complete abx ; on Ancef, last day 4/18     Asthma; cont nebs and pulmicort; cont Prednisone 40mg dialy ; Pulm f/u with [facility]; has hospital sponsorship there;Patient has a new-patient appointment with [facility] 6/8/22.  -4/14 no wheezing. No O2 requirements     DVT risk / DVT prophylaxis - daily physician / PA exam to assess as patient is at increased risk for of thromboembolism. Mobilize as tolerated. Sequential pneumatic compression devices (SCDs) when in bed; thigh-high or knee-high thromboembolic deterrent hose when out of bed.    - 4/17 trial of modified independence in room in progress, ambulating sufficiently, lovenox d/roc     GI prophylaxis - resume PPI. At times may need additional antacids, Maalox prn.     General skin care / wound prevention - monitor   general skin status daily per staff and physician / PA. At risk for failure due to impaired mobility.      Bowel program - at risk for constipation as a side effect of opioids, other medications, impaired mobility, etc. MiraLAX daily for regularity, Magda-Colace for stool softener.  PRN MOM, bisacodyl suppository or tablets for constipation.     Patient has a new-patient appointment with [facility] for [facility] on 4/25/22 at 10:05am to establish PCP.     Note: Patient is without payor source and would benefit from prescriptions at discharge from [facility] that could be obtained through [facility]. Amlodipine, Trazodone, Advair HFA Inhaler, and Proventil HFA Inhalfer are all currently listed with [facility] as covered medications. CM can assist with first 30 days while a 90-day prescription can be sent to [facility] for coverage thereafter.     Time spent was 15 minutes with over 1/2 in direct patient care/examination, consultation and coordination of care.      Signed By: Luis F Harris MD     April 17, 2022

## 2022-04-17 NOTE — PROGRESS NOTES
Problem: Pressure Injury - Risk of  Goal: *Prevention of pressure injury  Description: Document Martir Scale and appropriate interventions in the flowsheet. Outcome: Progressing Towards Goal  Note: Pressure Injury Interventions:  Sensory Interventions: Assess changes in LOC,Pressure redistribution bed/mattress (bed type)    Moisture Interventions: Absorbent underpads    Activity Interventions: Increase time out of bed,Pressure redistribution bed/mattress(bed type)    Mobility Interventions: HOB 30 degrees or less,Pressure redistribution bed/mattress (bed type)    Nutrition Interventions: Document food/fluid/supplement intake    Friction and Shear Interventions: Apply protective barrier, creams and emollients                Problem: Falls - Risk of  Goal: *Absence of Falls  Description: Document Rolando Fall Risk and appropriate interventions in the flowsheet.   Outcome: Progressing Towards Goal  Note: Fall Risk Interventions:  Mobility Interventions: Communicate number of staff needed for ambulation/transfer,OT consult for ADLs,Patient to call before getting OOB,PT Consult for mobility concerns,PT Consult for assist device competence,Strengthening exercises (ROM-active/passive),Utilize walker, cane, or other assistive device    Mentation Interventions: Adequate sleep, hydration, pain control,More frequent rounding,Toileting rounds    Medication Interventions: Assess postural VS orthostatic hypotension,Evaluate medications/consider consulting pharmacy,Patient to call before getting OOB,Teach patient to arise slowly    Elimination Interventions: Call light in reach,Patient to call for help with toileting needs,Stay With Me (per policy),Toilet paper/wipes in reach    History of Falls Interventions: Consult care management for discharge planning,Door open when patient unattended,Evaluate medications/consider consulting pharmacy,Investigate reason for fall

## 2022-04-17 NOTE — PROGRESS NOTES
Problem: Falls - Risk of  Goal: *Absence of Falls  Description: Document Sanaz Jovel Fall Risk and appropriate interventions in the flowsheet.   Outcome: Progressing Towards Goal  Note: Fall Risk Interventions:  Mobility Interventions: Patient to call before getting OOB,Utilize walker, cane, or other assistive device    Mentation Interventions: Adequate sleep, hydration, pain control,More frequent rounding,Toileting rounds    Medication Interventions: Patient to call before getting OOB    Elimination Interventions: Call light in reach,Patient to call for help with toileting needs,Toileting schedule/hourly rounds,Urinal in reach    History of Falls Interventions: Consult care management for discharge planning         Problem: Patient Education: Go to Patient Education Activity  Goal: Patient/Family Education  Outcome: Progressing Towards Goal

## 2022-04-18 VITALS
HEART RATE: 79 BPM | BODY MASS INDEX: 41.26 KG/M2 | DIASTOLIC BLOOD PRESSURE: 81 MMHG | SYSTOLIC BLOOD PRESSURE: 147 MMHG | RESPIRATION RATE: 16 BRPM | OXYGEN SATURATION: 97 % | HEIGHT: 69 IN | TEMPERATURE: 97.5 F | WEIGHT: 278.6 LBS

## 2022-04-18 LAB
GLUCOSE BLD STRIP.AUTO-MCNC: 150 MG/DL (ref 65–100)
SERVICE CMNT-IMP: ABNORMAL

## 2022-04-18 PROCEDURE — 97116 GAIT TRAINING THERAPY: CPT

## 2022-04-18 PROCEDURE — 97110 THERAPEUTIC EXERCISES: CPT

## 2022-04-18 PROCEDURE — 82962 GLUCOSE BLOOD TEST: CPT

## 2022-04-18 PROCEDURE — 2709999900 HC NON-CHARGEABLE SUPPLY

## 2022-04-18 PROCEDURE — 74011250637 HC RX REV CODE- 250/637: Performed by: PHYSICAL MEDICINE & REHABILITATION

## 2022-04-18 PROCEDURE — 74011636637 HC RX REV CODE- 636/637: Performed by: PHYSICAL MEDICINE & REHABILITATION

## 2022-04-18 PROCEDURE — 97535 SELF CARE MNGMENT TRAINING: CPT

## 2022-04-18 PROCEDURE — 94640 AIRWAY INHALATION TREATMENT: CPT

## 2022-04-18 PROCEDURE — 74011250636 HC RX REV CODE- 250/636: Performed by: PHYSICAL MEDICINE & REHABILITATION

## 2022-04-18 PROCEDURE — 74011000250 HC RX REV CODE- 250: Performed by: PHYSICAL MEDICINE & REHABILITATION

## 2022-04-18 PROCEDURE — 99239 HOSP IP/OBS DSCHRG MGMT >30: CPT | Performed by: PHYSICIAN ASSISTANT

## 2022-04-18 RX ORDER — BUDESONIDE 0.5 MG/2ML
500 INHALANT ORAL 2 TIMES DAILY
Qty: 60 EACH | Refills: 0 | Status: SHIPPED | OUTPATIENT
Start: 2022-04-18

## 2022-04-18 RX ORDER — ALBUTEROL SULFATE 0.83 MG/ML
2.5 SOLUTION RESPIRATORY (INHALATION)
Qty: 40 NEBULE | Refills: 0 | Status: SHIPPED | OUTPATIENT
Start: 2022-04-18

## 2022-04-18 RX ORDER — PREDNISONE 20 MG/1
40 TABLET ORAL
Qty: 20 TABLET | Refills: 0 | Status: SHIPPED | OUTPATIENT
Start: 2022-04-19

## 2022-04-18 RX ORDER — METFORMIN HYDROCHLORIDE 500 MG/1
250 TABLET ORAL 2 TIMES DAILY WITH MEALS
Qty: 30 TABLET | Refills: 0 | Status: SHIPPED | OUTPATIENT
Start: 2022-04-18

## 2022-04-18 RX ORDER — TRAZODONE HYDROCHLORIDE 100 MG/1
100 TABLET ORAL
Qty: 30 TABLET | Refills: 0 | Status: SHIPPED | OUTPATIENT
Start: 2022-04-18

## 2022-04-18 RX ORDER — AMLODIPINE BESYLATE 10 MG/1
10 TABLET ORAL DAILY
Qty: 30 TABLET | Refills: 0 | Status: SHIPPED | OUTPATIENT
Start: 2022-04-19

## 2022-04-18 RX ORDER — ALBUTEROL SULFATE 0.83 MG/ML
2.5 SOLUTION RESPIRATORY (INHALATION) EVERY 6 HOURS
Qty: 120 NEBULE | Refills: 0 | Status: SHIPPED | OUTPATIENT
Start: 2022-04-18

## 2022-04-18 RX ADMIN — PREDNISONE 40 MG: 10 TABLET ORAL at 08:47

## 2022-04-18 RX ADMIN — Medication 2 UNITS: at 07:30

## 2022-04-18 RX ADMIN — SODIUM CHLORIDE, PRESERVATIVE FREE 30 ML: 5 INJECTION INTRAVENOUS at 08:55

## 2022-04-18 RX ADMIN — AMLODIPINE BESYLATE 10 MG: 10 TABLET ORAL at 08:46

## 2022-04-18 RX ADMIN — ALBUTEROL SULFATE 2.5 MG: 2.5 SOLUTION RESPIRATORY (INHALATION) at 05:49

## 2022-04-18 RX ADMIN — METFORMIN HYDROCHLORIDE 250 MG: 500 TABLET ORAL at 08:43

## 2022-04-18 RX ADMIN — ALBUTEROL SULFATE 2.5 MG: 2.5 SOLUTION RESPIRATORY (INHALATION) at 00:03

## 2022-04-18 RX ADMIN — CEFAZOLIN SODIUM 2 G: 100 INJECTION, POWDER, LYOPHILIZED, FOR SOLUTION INTRAVENOUS at 08:48

## 2022-04-18 RX ADMIN — CEFAZOLIN SODIUM 2 G: 100 INJECTION, POWDER, LYOPHILIZED, FOR SOLUTION INTRAVENOUS at 00:54

## 2022-04-18 NOTE — DISCHARGE SUMMARY
Waqar Gaston MD  Medical Director  [facility], [address] [place] [address]  Tel: [phone]       PHYSICAL MEDICINE & REHABILITATION DISCHARGE SUMMARY     Date: 4/18/2022  Admission Date: 4/13/2022  Discharge Date: 4/18/2022    Primary Care Provider: has new-patient appointment with [facility] for Family Medicine to establish care    Admission Condition: good  Discharged Condition: good    Hospital Course: The patient was admitted to [address] [place] [address] by Inocencia Grijalva MD on 4/13/2022 s/p asthma exacerbation, influenza A and MSSA pneumonia. HPI: Alpa العلي is a right-hand dominant, previously functionally independent 30 y.o. Black male with PMH including asthma and suspected ZACK who presented to [facility] ED twice on 4/3/2022 with worsening dyspnea, wheezing and productive cough. He was hypoxic, tachycardic and tachypneic on presentation. He was admitted to the ICU on Intensivist service and required intubation / mechanical ventilation shortly after admission. He was extubated to [place] [address] 4/9 and transferred to the medical floor 4/10. Sputum cultures 4/4 and 4/9 with MSSA pneumonia; he started cefazolin course per susceptibilities. Leukocytosis persisted with BCx 4/5 and 4/11 negative, UA 4/5 and 4/11 non-infectious; he was on appropriate ABX for MSSA pneumonia and remained afebrile. Pulmonology ordered BiPAP QHS and recommended outpatient sleep study for further evaluation. He was started on amlodipine for HTN and insulin SSI (hyperglycemia attributed to steroids, HgbA1c 6.0%, 4/11/2022). Physical and occupational therapies were initiated, and patient was found to have significant mobility and self-care impairments. PM&R service was consulted for rehab needs, and he was initially evaluated on 4/11 by Dr. Patricia Kellogg.  We determined he would benefit from comprehensive acute inpatient rehabilitation with continued close medical supervision and management prior to returning home. On 4/13/2022, he was discharged from [facility] care and admitted to [facility]. REHABILITATION COURSE:   Physical debility due to acute respiratory failure due to Influenza A, MSSA pneumonia and asthma exacerbation    Plan / Recommendations / Medical Decision Making:      Daily physician / PA medical management:     Physical debility - PT/OT; limited by being easily fatigued due to weakness and respiratory issues.     Hypertension - no prior history of HTN. During acute inpatient stay, BP fluctuating, managed medically with amlodipine 10mg daily; add PRN hydralazine.  -4/14 /70, controlled  -4/16 HR and BP acceptable, 134/85     Impaired glucose tolerance, pre-diabetes - HgbA1c 6.0% (4/11/2022), fair glycemic control. Will require regular fasting glucose monitoring and medication adjustment to optimize glycemic control in setting of acute illness and hospitalization. Exacerbated by steroids. Continue Lantus 15u daily and Humalog SSI; anticipate insulin therapy can be weaned off with no long-term need for insulin therapy. -4/15 if patient to d/c 4/18, needs diabetic education; he does not want insulin; BS  d/c Lantus, start metformin 250mg BID  -4/16 BS consistently < 150, 101 this AM, decrease BS monitoring to BID; Cr 0.90     Leukocytosis - steroid induced. UA negative, BCx x 2 negative. Remains afebrile. Pneumonia resolved on IV ABX. WBC remains elevated. Likely stress response, steroid induced. Obstructive sleep apnea, ZACK - Pulmonology has ordered BiPAP QHS and recommends outpatient sleep study. Has NOT tolerated BiPAP at night. Has sleep study scheduled with [facility] 4/19 if d/c'ed by then. Asthma - continue nebulizer treatments and Pulmicort; continue prednisone 40mg daily. Pulmonology f/u with [facility]; has hospital sponsorship there.  Patient has a new-patient appointment with [facility] 6/8/22.  -4/14 no wheezing, no O2 requirements  -4/18 [facility] f/u rescheduled for 4/19     Pain management - generalize MSK pain. Will require regular pain assessment and comprehensive pain management.      Electrolyte abnormality - mild hyponatremia, Na 134; monitor.     Pneumonia prophylaxis - incentive spirometer every hour while awake. S/p MSSA and influenza A. Encourage IS. Complete ABX: on cefazolin, EOT 4/18. DVT risk / DVT prophylaxis - daily physician / PA exam to assess as patient is at increased risk for of thromboembolism. Mobilize as tolerated. Sequential pneumatic compression devices (SCDs) when in bed; thigh-high or knee-high thromboembolic deterrent hose when out of bed.    -4/16 trial of modified independence in room in progress, ambulating sufficiently, will d/c Lovenox     GI prophylaxis - resume PPI. At times may need additional antacids, Maalox prn.     General skin care / wound prevention - monitor general skin status daily per staff and physician / PA. At risk for failure due to impaired mobility. Bowel program - at risk for constipation as a side effect of medications, impaired mobility, etc. MiraLAX daily for regularity, Magda-Colace for stool softener. PRN MOM, bisacodyl suppository or tablets for constipation. FUNCTIONAL LEVEL ON ADMISSION:  PT: SBA bed mobility, CGA STS and transfers, ambulating with CGA / min assist 10' x 3, easily fatigues; his O2 sats were 93% after gait with his HR at 103 BPM on RA  OT: min assist bathing, moderate assist dressing, SBA feeding and grooming, CGA toileting and functional mobility      FUNCTIONAL LEVEL AT DISCHARGE:  PHYSICAL THERAPY DISCHARGE SUMMARY  TIME   TIME OUT  946  Precautions at discharge: Other (comment) (fall precautions)     Problem List:    Decreased strength B LE  [x]? Decreased strength trunk/core  [x]? Decreased AROM   []? Decreased PROM  []? Decreased balance sitting  []? Decreased balance standing  [x]?      Decreased endurance  [x]? Pain  []?        Functional Limitations:   Decreased independence with bed mobility  []? Decreased independence with functional transfers  []? Decreased independence with ambulation  []? Decreased independence with stair negotiation  [x]?           Outcome Measures: Vital Signs:on room air, 02 sat 96 to 98% during assessment. HR 94 at rest.  after ambulating up/down 10 steps with reciprocating pattern,  after ambulating up/down steps with single step at a time pattern. Patient Vitals for the past 12 hrs:    Temp Pulse BP SpO2   04/18/22 0800 97.5 °F (36.4 °C) -- -- --   04/18/22 0721 -- 79 (!) 147/81 97 %   04/18/22 0550 -- -- -- 97 %         Pain level:No c/o pain during treatment  Pain location:NA  Pain interventions:NA     Patient education: gait training, LE HEP,fall precautions, body mechanics,activity pacing, energy conservation, Patient verbalizing understanding and demonstrating understanding of patient education.  Recommend follow up education with HHPT     Interdisciplinary Communication:spoke with OT regarding D/C plans     Cognition: Alert, able to follow commands,cooperating,participating, motivated to go home                  MMT Initial Assessment    Right Lower Extremity Left Lower Extremity   Hip Flexion 4 4   Knee Extension 5 5   Knee Flexion 4+ 4+   Ankle Dorsiflexion 5 5                   MMT Discharge Assessment    Right Lower Extremity Left Lower Extremity   Hip Flexion 4 4   Knee Extension 5 5   Knee Flexion 4+ 4+   Ankle Dorsiflexion 5 5   0/5            No palpable muscle contraction  1/5            Palpable muscle contraction, no joint movement  2-/5          Less than full range of motion in gravity eliminated position  2/5            Able to complete full range of motion in gravity eliminated position  2+/5          Able to initiate movement against gravity  3-/5          More than half but not full range of motion against gravity  3/5 Able to complete full range of motion against gravity  3+/5          Completes full range of motion against gravity with minimal resistance  4-/5          Completes full range of motion against gravity with minimal-moderate resistance  4/5            Completes full range of motion against gravity with moderate resistance  4+/5          Completes full range of motion against gravity with moderate-maximum resistance  5/5            Completes full range of motion against gravity with maximum resistance                 AROM: bilateral LE generally decreased, functional     PRIMARY MODE OF LOCOMOTION: ambulation  Please see IRC Interdisciplinary Eval: Coordination/Balance Section for details regarding FIM score description.     BED/CHAIR/WHEELCHAIR TRANSFERS Initial Assessment Discharge Assessment   Rolling Right 6 (Modified independent) 7 (Independent)   Rolling Left 6 (Modified independent) 7 (Independent)   Supine to Sit 6 (Modified independent) 7 (Independent)   Sit to Stand Stand-by assistance Completely independent   Sit to Supine 6 (Modified independent) 7 (Independent)   Transfer Type SPT without device SPT without device   Comments Increased time and effort to complete bed mobility and transfers, Increased GIL with mild ataxia with SPT without a device    Car Transfer Not tested Independent   Car Type rehab car rehab car         WHEELCHAIR MOBILITY/MANAGEMENT Initial Assessment Discharge Assessment   Able to Propel     Functional Level     Curbs/ramps assistance required 0 (Not tested) 0 (Not tested)   Wheelchair set up assistance required 0 (Not tested) 0 (Not tested)   Wheelchair management           WALKING INDEPENDENCE Initial Assessment Discharge Assessment   Assistive device Gait belt  (No device)   Ambulation assistance - level surface 4 (Minimal assistance) 7 (Independent)   Distance 20 Feet (ft) 400 Feet (ft)   Comments slow cont step through gait with increased base of support, decreased step length and step clearance with decreased ankle PF and knee flex at terminal stance and decreased ankle DF at initial contact. Decreased hip stance stability with Trendelenburg noted. Gait training x 45 ft x 1 and 75 ft x 1 with RW and SBA  with 5 min sitting rest interval between attempts slow cont step through gait pattern with decreased stance phase stability with trendelenburg noted. Increased hip ext rot through gait cycle with increased width of base of support   Ambulation assistance - unlevel surface 0 (Not tested) (due to impaired balance) 7 (Independent)         STEPS/STAIRS Initial Assessment Discharge Assessment   Steps/Stairs ambulated 8 (4 steps x 1,4 min rest, 4 steps x 2) 30 (10 steps x 3)   Rail Use Both Both   Functional Level     Comments slow reciprocating pattern going up steps and single step at a time going down steps. Cues for safe foot placement going down steps, Decreased quad eccentric control going down steps. slow single reciprocating pattern going up/down steps first set of 10, next 2 sets of 10 single step at a time  up/down steps. Improved control of HR with single step pattern   Curbs/Ramps 0 (Not tested) 7 (Independent)         QUALITY INDICATOR ASSIST COMMENTS   Roll right (&return to back) 6: Independent     Roll left (& return to back) 6: Independent     Supine to sit 6: Independent     Sit to stand 6: Independent     Chair/bed-to-chair transfer 6: Independent     Walk 10 feet 6: Independent     Walk 50 feet with 2 turns 6: Independent     Walk 150 feet 6: Independent     Walk 10 feet on uneven  6: Independent     1 step/curb 6: Independent     4 steps 6: Independent     12 steps 6: Independent      object 6:  Independent     Wheel 48' w/2 turns Not Tested: Not applicable secondary to pt not completing activity previously     Wheel 150' Not Tested: Not applicable secondary to pt not completing activity previously     Car Transfer 6: Independent                PHYSICAL THERAPY PLAN OF CARE     LTGs: patient met all goals per eval.     Pt would benefit from continued skilled physical therapy in order to improve independent functional mobility within the home with use of least restrictive device. Interventions may include  motor function (B LE/trunk strengthening/coordination), activity tolerance (vitals, oxygen saturation levels), bed mobility training, balance activities, gait training (progressive ambulation program), and stair training.     HEP handout:issued written LE HEP, Able to complete with verbal cues  STANDING EXERCISES Sets Reps Comments, hand support on elevated table   Heel Raises 1 10     Toe Raises 1 10     Hip Flexion/Marching 1 10     Hip Abduction 1 10     Mini Squats 1 10        SUPINE EXERCISES Sets Reps Comments   Bridging 2 10     Bridging with hip ADD isometric 2 10     SLR 2 10     Supine LE marching with abdominal isometric 2 10        Pt to be discharged 04/18/22 with assistance provided by wife. No family training recommended  Therapy Recommendations upon discharge: [facility] PT (advance to OP PT)   Equipment needs at discharge: No DME recommended at this time       Please see IRC; Interdisciplinary Eval, Care Plan, and Patient Education for further information regarding physical therapy discharge summary and plan of care.      Patient returned to room at end of treatment and remained up in recliner with LEs elevated and with needs in reach.     Kristal Gordon, PT  4/18/2022               OT DISCHARGE REPORT    Time In: 5922  Time Out: 0751  Mobility    Usual Performance Score Comments   Rolling 6: Independent Side: Bilateral      Supine to Sit 6: Independent     Sit to Supine 6: Independent     Sit to Stand 6: Independent     Transfer Assist 6: Independent Transfer Type: SPT   Equipment: N/A   Comments: Green socks, independent       Activities of Daily Living     Usual Performance Score Comments   Eating 6:  Independent     Oral Hygiene 6: Independent     Bathing 6: Independent Type of Shower: Shower  Position: Standing PRN and Unsupported Sitting   Adaptive  Equipment: N/A  Comments:    Upper Body  Dressing 6: Independent Items Applied: Pullover  Position: Unsupported Sitting  Comments:    Lower Body Dressing 6: Independent Items Applied: Underwear and Elastic pants  Position: Standing PRN and Unsupported Sitting  Adaptive Equipment: N/A  Comments:    Donning/[place] Footwear 6: Independent Items Applied: Socks and Shoes with fasteners   Adaptive Equipment: N/A  Comments: Toilet Transfer 6: Independent Transfer Type: SPT   Equipment: N/A   Comments: Toileting Hygiene 6: Independent Output:   Comments:    Education   UB strengthening HEP      Plan of Care: Please see below  Goals:   LTG 1: Patient will complete UB dressing with independence using AE/DME PRN within 7 days. GOAL MET 4/18/2022  LTG 2: Patient will complete LB dressing with independence using AE/DME PRN within 7 days. GOAL MET 4/18/2022  LTG 3: Patient will don footwear with independence using AE/DME PRN within 7 days. GOAL MET 4/18/2022   LTG 4: Patient will complete bathing with independence using AE/DME PRN within 7 days. GOAL MET 4/18/2022   LTG 5: Patient will complete toileting with independence using AE/DME PRN within 7 days.   GOAL MET 4/18/2022   LTG 6: Pt/caregiver will demonstrate and verbalize good understanding of OT recommendations regarding ADL status, functional transfer status, home safety, DME, AE, energy conservation techniques, follow-up therapy, for increasing safety with functional tasks upon discharge. GOAL MET 4/18/2022     Precautions at Discharge: breathing/activity pacing  Discharge Location: home with family (spouse and 2 daughters)  Safety/Supervision Recommendations/Functional Level: Pt completes all ADLs with independence without a device. Pt ambulates with independence and will require a cane to ambulate community distances.  Pt will require Dhaval to supervision initially with IADLs such as home management, cooking and cleaning. Pt may require rest breaks during community outings as well as cues for breathing techniques. Family Training: not completed due to patient achieving independence     Recommended Continuing Therapy: No further skilled OT services is recommended at this time. Residual Deficits: Decreased Standing balance. Progress over LOS: Patient demonstrates good progress with ADL skills, UE strength, Standing tolerance, Activity tolerance and Functional mobility for performance of ADL and functional transfers, see above for details.      Summary of Session: S: \"I am so excited to go home. I just have to get one more antibiotic before I leave. \" Agreeable to therapy. Focus of session was on morning ADL routine and discharge assessment. Patient was able to ambulate ~20 feet without a device, independently. Pain not expressed. Collaborated with RN and PT and confirmed patient is ready for discharge. Patient ended session in recliner with call remote and phone within reach.      Rey Adan, OTR/L   4/18/2022      HOME ARCHITECTURE:  Lives in a 4th floor apartment, 3 flights of stairs (36 OBED) with rails on both sides. Tub/shower with grab bars present in bathroom. Past Medical History:   Diagnosis Date    Asthma     Status asthmaticus 4/3/2022      Past Surgical History:   Procedure Laterality Date    HX TONSILLECTOMY        Family History   Problem Relation Age of Onset    Sleep Apnea Mother     Leukemia Father     Asthma Brother       Social History     Tobacco Use    Smoking status: Never Smoker    Smokeless tobacco: Never Used   Substance Use Topics    Alcohol use: Not Currently     Allergies   Allergen Reactions    Singulair [Montelukast] Hives      Prior to Admission medications    Medication Sig Start Date End Date Taking?  Authorizing Provider   traZODone (DESYREL) 100 mg tablet Take 1 Tablet by mouth nightly. PCP will prescribe this medication if it is necessary to continue. 4/18/22  Yes Jonn Bertrand PA   albuterol (PROVENTIL VENTOLIN) 2.5 mg /3 mL (0.083 %) nebu 3 mL by Nebulization route every six (6) hours. 4/18/22  Yes Jonn Bertrand PA   albuterol (PROVENTIL VENTOLIN) 2.5 mg /3 mL (0.083 %) nebu 3 mL by Nebulization route every six (6) hours as needed for Wheezing or Shortness of Breath. 4/18/22  Yes Jackson, Marylee C, PA   amLODIPine (NORVASC) 10 mg tablet Take 1 Tablet by mouth daily. Indications: high blood pressure 4/19/22  Yes Jackson, Marylee C, PA   budesonide (PULMICORT) 0.5 mg/2 mL nbsp 2 mL by Nebulization route two (2) times a day. Indications: controller medication for asthma 4/18/22  Yes BRAEDEN Hopkins   predniSONE (DELTASONE) 20 mg tablet Take 40 mg by mouth daily (with breakfast). Lung doctor or PCP will prescribe this medication if it is necessary to continue. 4/19/22  Yes BRAEDEN Hopkins   metFORMIN (GLUCOPHAGE) 500 mg tablet Take 0.5 Tablets by mouth two (2) times daily (with meals). PCP will continue to prescribe this medication. 4/18/22  Yes Jackson, Marylee C, PA   amLODIPine (NORVASC) 10 mg tablet Take 1 Tablet by mouth daily for 7 days. 4/14/22 4/21/22 Yes Sri Bianchi NP   albuterol (PROVENTIL VENTOLIN) 2.5 mg /3 mL (0.083 %) nebu 3 mL by Nebulization route every six (6) hours for 7 days. 4/13/22 4/20/22 Yes Sri Bianchi NP   budesonide (PULMICORT) 0.5 mg/2 mL nbsp 2 mL by Nebulization route two (2) times a day for 7 days. 4/13/22 4/20/22 Yes Sri Bianchi NP   predniSONE (DELTASONE) 10 mg tablet Take 30 mg by mouth daily (with breakfast) for 3 days, THEN 20 mg daily (with breakfast) for 3 days, THEN 10 mg daily (with breakfast) for 3 days. Indications: worsening asthma 4/14/22 4/23/22  Sri Bianchi NP   traZODone (DESYREL) 100 mg tablet Take 1 Tablet by mouth nightly for 7 days.  4/13/22 4/18/22  Sri Bianchi NP       Lab Review: Recent Results (from the past 72 hour(s))   GLUCOSE, POC    Collection Time: 04/15/22 11:24 AM   Result Value Ref Range    Glucose (POC) 107 (H) 65 - 100 mg/dL    Performed by Galindo ([place])    GLUCOSE, POC    Collection Time: 04/15/22  4:26 PM   Result Value Ref Range    Glucose (POC) 148 (H) 65 - 100 mg/dL    Performed by Galindo ([place])    GLUCOSE, POC    Collection Time: 04/15/22  9:14 PM   Result Value Ref Range    Glucose (POC) 105 (H) 65 - 100 mg/dL    [facility]    METABOLIC PANEL, BASIC    Collection Time: 04/16/22  5:51 AM   Result Value Ref Range    Sodium 135 (L) 136 - 145 mmol/L    Potassium 3.6 3.5 - 5.1 mmol/L    Chloride 99 98 - 107 mmol/L    CO2 28 21 - 32 mmol/L    Anion gap 8 7 - 16 mmol/L    Glucose 108 (H) 65 - 100 mg/dL    BUN 17 6 - 23 MG/DL    Creatinine 0.90 0.8 - 1.5 MG/DL    GFR est AA >60 >60 ml/min/1.73m2    GFR est non-AA >60 >60 ml/min/1.73m2    Calcium 9.5 8.3 - 10.4 MG/DL   GLUCOSE, POC    Collection Time: 04/16/22  7:25 AM   Result Value Ref Range    Glucose (POC) 101 (H) 65 - 100 mg/dL    Performed by Jeremie 92, POC    Collection Time: 04/16/22 10:58 AM   Result Value Ref Range    Glucose (POC) 107 (H) 65 - 100 mg/dL    Performed by Denise    GLUCOSE, POC    Collection Time: 04/16/22  4:21 PM   Result Value Ref Range    Glucose (POC) 125 (H) 65 - 100 mg/dL    Performed by Denise    METABOLIC PANEL, BASIC    Collection Time: 04/17/22  5:50 AM   Result Value Ref Range    Sodium 136 136 - 145 mmol/L    Potassium 3.5 3.5 - 5.1 mmol/L    Chloride 102 98 - 107 mmol/L    CO2 27 21 - 32 mmol/L    Anion gap 7 7 - 16 mmol/L    Glucose 92 65 - 100 mg/dL    BUN 13 6 - 23 MG/DL    Creatinine 0.80 0.8 - 1.5 MG/DL    GFR est AA >60 >60 ml/min/1.73m2    GFR est non-AA >60 >60 ml/min/1.73m2    Calcium 9.4 8.3 - 10.4 MG/DL   GLUCOSE, POC    Collection Time: 04/18/22  7:12 AM   Result Value Ref Range    Glucose (POC) 150 (H) 65 - 100 mg/dL    Performed by Naday Gallegos        Functional Assessment:  Balance  Sitting - Static: Good (unsupported) (04/18/22 1000)  Sitting - Dynamic: Good (unsupported) (04/18/22 1000)  Standing - Static: Good (04/18/22 1000)  Standing - Dynamic : Impaired (04/16/22 1200)       [facility] Fall Risk Assessment:  Marciana Distance Fall Risk  Mobility: Ambulates or transfers with assist devices or assistance (04/18/22 1032)  Mobility Interventions: Communicate number of staff needed for ambulation/transfer (04/18/22 1032)  Mentation: Alert, oriented x 3 (04/18/22 1032)  Mentation Interventions: Adequate sleep, hydration, pain control;More frequent rounding; Toileting rounds (04/17/22 0708)  Medication: Patient receiving anticonvulsants, sedatives(tranquilizers), psychotropics or hypnotics, hypoglycemics, narcotics, sleep aids, antihypertensives, laxatives, or diuretics (04/18/22 1032)  Medication Interventions: Assess postural VS orthostatic hypotension (04/18/22 1032)  Elimination: Independent in elimination (04/18/22 1032)  Elimination Interventions: Call light in reach; Patient to call for help with toileting needs; Toileting schedule/hourly rounds;Urinal in reach (04/17/22 0708)  Prior Fall History: Before admission in past 12 months _home or previous inpatient care) (04/18/22 1032)  History of Falls Interventions: Consult care management for discharge planning (04/18/22 1032)  Total Score: 3 (04/18/22 1032)  Standard Fall Precautions: Yes (04/18/22 1032)  High Fall Risk: Yes (04/18/22 1032)     Ambulation:  Gait  Distance (ft): 400 Feet (ft) (04/18/22 1000)  Assistive Device:  (No device) (04/18/22 1000)  Rail Use: Both (04/18/22 1000)     Visit Vitals  BP (!) 147/81   Pulse 79   Temp 97.5 °F (36.4 °C)   Resp 16   Ht 5' 9\" (1.753 m)   Wt 278 lb 9.6 oz (126.4 kg)   SpO2 97%   BMI 41.14 kg/m²      Intake and Output:    No intake/output data recorded.     Discharge Exam:  General: Alert, appropriately oriented. Dressed and OOB in recliner after AM therapies. HEENT: Normocephalic, EOM intact, mild proptosis. Oral mucosa moist.   Lungs: Clear to auscultation bilaterally. Respiration even and unlabored. Heart: Regular rate and rhythm, no murmurs    Abdomen: Soft, non-tender, obese and protuberant but not distended. Bowel sounds normoactive. Genitourinary: Deferred. Neuromuscular:        No gross focal neuro deficits. Generalized weakness, proximal > distal.   Skin/extremity: No rashes, no erythema. Calves soft, non-tender B LE.  1+ edema. Problem List as of 4/18/2022 Date Reviewed: 4/18/2022          Codes Class Noted - Resolved    * (Principal) Physical debility ICD-10-CM: R53.81  ICD-9-CM: 799.3  4/13/2022 - Present        Pre-diabetes ICD-10-CM: R73.03  ICD-9-CM: 790.29  4/12/2022 - Present        Influenza A ICD-10-CM: J10.1  ICD-9-CM: 487.1  4/6/2022 - Present        [facility] ICD-10-CM: N04.564  ICD-9-CM: 482.41  4/6/2022 - Present        Primary hypertension (Chronic) ICD-10-CM: I10  ICD-9-CM: 401.9  4/6/2022 - Present        Severe persistent asthma with exacerbation ICD-10-CM: J45.51  ICD-9-CM: 493.92  4/3/2022 - Present        Obesity (Chronic) ICD-10-CM: E66.9  ICD-9-CM: 278.00  4/3/2022 - Present        Acute respiratory failure with hypoxia and hypercapnia (HCC) ICD-10-CM: J96.01, J96.02  ICD-9-CM: 518.81  4/3/2022 - Present        RESOLVED: Status asthmaticus ICD-10-CM: J45.902  ICD-9-CM: 493.91  4/3/2022 - 4/11/2022              DISCHARGE INSTRUCTIONS:   1. Diet: carb-controlled diet  2. Activity: per [facility] PT      Current Discharge Medication List      START taking these medications    Details   metFORMIN (GLUCOPHAGE) 500 mg tablet Take 0.5 Tablets by mouth two (2) times daily (with meals). PCP will continue to prescribe this medication.   Qty: 30 Tablet, Refills: 0         CONTINUE these medications which have CHANGED    Details   traZODone (DESYREL) 100 mg tablet Take 1 Tablet by mouth nightly. PCP will prescribe this medication if it is necessary to continue. Qty: 30 Tablet, Refills: 0      !! albuterol (PROVENTIL VENTOLIN) 2.5 mg /3 mL (0.083 %) nebu 3 mL by Nebulization route every six (6) hours. Qty: 120 Nebule, Refills: 0      !! albuterol (PROVENTIL VENTOLIN) 2.5 mg /3 mL (0.083 %) nebu 3 mL by Nebulization route every six (6) hours as needed for Wheezing or Shortness of Breath. Qty: 40 Nebule, Refills: 0      amLODIPine (NORVASC) 10 mg tablet Take 1 Tablet by mouth daily. Indications: high blood pressure  Qty: 30 Tablet, Refills: 0      budesonide (PULMICORT) 0.5 mg/2 mL nbsp 2 mL by Nebulization route two (2) times a day. Indications: controller medication for asthma  Qty: 60 Each, Refills: 0      predniSONE (DELTASONE) 20 mg tablet Take 40 mg by mouth daily (with breakfast). Lung doctor or PCP will prescribe this medication if it is necessary to continue. Qty: 20 Tablet, Refills: 0       !! - Potential duplicate medications found. Please discuss with provider. STOP taking these medications       ceFAZolin (ANCEF) in sterile water 2 gram/20 mL 2 g IV syringe Comments:   Reason for Stopping:         insulin glargine (LANTUS) 100 unit/mL injection Comments:   Reason for Stopping:         insulin lispro (HUMALOG) 100 unit/mL injection Comments:   Reason for Stopping:         fluticasone furoate-vilanteroL (Breo Ellipta) 100-25 mcg/dose inhaler Comments:   Reason for Stopping:         albuterol (PROVENTIL HFA, VENTOLIN HFA, PROAIR HFA) 90 mcg/actuation inhaler Comments:   Reason for Stopping:               I have reviewed the patients controlled substance prescription history as maintained in the [place] prescription monitoring program () so that prescription(s) for controlled substance, if any provided, could be given. None were prescribed. REHABILITATION MANAGEMENT:   1. Devices: none needed (patient has RW at home)  2.  Consult: [facility] PT    DISPOSITION: home with home health physical therapy    Follow-up Information     Follow up With Specialties Details Why [address]  Go on 4/25/2022 at 10:00 AM to establish with new PCP and for hospital follow-up Karolyn Caceres 59 on 4/19/2022 to establish as new patient and for recheck after hospitalization for flu / pneumonia; has known asthma, undiagnosed sleep apnea [address], [address] [facility] [address]  [phone]    Rosita 9  Will call you for follow up  [address], [place]  Physician Assistant with [facility]  Inocencia Bustamante MD, Medical Director        Time spent in patient discharge planning and coordination: >35 minutes.

## 2022-04-18 NOTE — PROGRESS NOTES
Patient to discharge home today. Spouse to transport home. HH needs include PT and referral already placed to [facility] as pt requested. Liaison aware. No DME needs as pt has RW at home. No family training requested. Discussed medication cost with pt and BRAEDEN Melgar and pt reports he can afford. All needs met. CM available if any new needs arise. Care Management Interventions  PCP Verified by CM: Yes (Dr. Erendira Skaggs)  Mode of Transport at Discharge:  Other (see comment) (spouse )  Transition of Care Consult (CM Consult): Discharge [address] Chawla: Yes  Discharge Durable Medical Equipment: No  Physical Therapy Consult: Yes  Occupational Therapy Consult: Yes  Speech Therapy Consult: No  Support Systems: Spouse/Significant Other,Parent(s)  Confirm Follow Up Transport: Family  The Plan for Transition of Care is Related to the Following Treatment Goals : Return to baseline  The Patient and/or Patient Representative was Provided with a Choice of Provider and Agrees with the Discharge Plan?: Yes  Name of the Patient Representative Who was Provided with a Choice of Provider and Agrees with the Discharge Plan: pt  Freedom of Choice List was Provided with Basic Dialogue that Supports the Patient's Individualized Plan of Care/Goals, Treatment Preferences and Shares the Quality Data Associated with the Providers?: Yes   Resource Information Provided?: No  Discharge Location  Patient Expects to be Discharged to[de-identified] Home with home health [facility])

## 2022-04-18 NOTE — PROGRESS NOTES
OT DISCHARGE REPORT    Time In: 1343  Time Out: 8953  Mobility   Usual Performance Score Comments   Rolling 6: Independent Side: Bilateral     Supine to Sit 6: Independent    Sit to Supine 6: Independent    Sit to Stand 6: Independent    Transfer Assist 6: Independent Transfer Type: SPT   Equipment: N/A   Comments: Green socks, independent      Activities of Daily Living    Usual Performance Score Comments   Eating 6: Independent    Oral Hygiene 6: Independent    Bathing 6: Independent Type of Shower: Shower  Position: Standing PRN and Unsupported Sitting   Adaptive  Equipment: N/A  Comments:    Upper Body  Dressing 6: Independent Items Applied: Pullover  Position: Unsupported Sitting  Comments:    Lower Body Dressing 6: Independent Items Applied: Underwear and Elastic pants  Position: Standing PRN and Unsupported Sitting  Adaptive Equipment: N/A  Comments:    Donning/Laurie Footwear 6: Independent Items Applied: Socks and Shoes with fasteners   Adaptive Equipment: N/A  Comments: Toilet Transfer 6: Independent Transfer Type: SPT   Equipment: N/A   Comments: Toileting Hygiene 6: Independent Output:   Comments:    Education  UB strengthening HEP     Plan of Care: Please see below  Goals:   LTG 1: Patient will complete UB dressing with independence using AE/DME PRN within 7 days. GOAL MET 4/18/2022  LTG 2: Patient will complete LB dressing with independence using AE/DME PRN within 7 days. GOAL MET 4/18/2022  LTG 3: Patient will don footwear with independence using AE/DME PRN within 7 days. GOAL MET 4/18/2022   LTG 4: Patient will complete bathing with independence using AE/DME PRN within 7 days.    GOAL MET 4/18/2022   LTG 5: Patient will complete toileting with independence using AE/DME PRN within 7 days.   GOAL MET 4/18/2022   LTG 6: Pt/caregiver will demonstrate and verbalize good understanding of OT recommendations regarding ADL status, functional transfer status, home safety, DME, AE, energy conservation techniques, follow-up therapy, for increasing safety with functional tasks upon discharge. GOAL MET 4/18/2022     Precautions at Discharge: breathing/activity pacing  Discharge Location: home with family (spouse and 2 daughters)  Safety/Supervision Recommendations/Functional Level: Pt completes all ADLs with independence without a device. Pt ambulates with independence and will require a cane to ambulate community distances. Pt will require Dhaval to supervision initially with IADLs such as home management, cooking and cleaning. Pt may require rest breaks during community outings as well as cues for breathing techniques. Family Training: not completed due to patient achieving independence    Recommended Continuing Therapy: No further skilled OT services is recommended at this time. Residual Deficits: Decreased Standing balance. Progress over LOS: Patient demonstrates good progress with ADL skills, UE strength, Standing tolerance, Activity tolerance and Functional mobility for performance of ADL and functional transfers, see above for details. Summary of Session: S: \"I am so excited to go home. I just have to get one more antibiotic before I leave. \" Agreeable to therapy. Focus of session was on morning ADL routine and discharge assessment. Patient was able to ambulate ~20 feet without a device, independently. Pain not expressed. Collaborated with RN and PT and confirmed patient is ready for discharge. Patient ended session in recliner with call remote and phone within reach.      Rey Chen, OTR/L   4/18/2022

## 2022-04-18 NOTE — PROGRESS NOTES
Problem: Pressure Injury - Risk of  Goal: *Prevention of pressure injury  Description: Document Martir Scale and appropriate interventions in the flowsheet. Outcome: Resolved/Met  Note: Pressure Injury Interventions:  Sensory Interventions: Assess changes in LOC    Moisture Interventions: Absorbent underpads    Activity Interventions: Increase time out of bed,Pressure redistribution bed/mattress(bed type)    Mobility Interventions: Pressure redistribution bed/mattress (bed type)    Nutrition Interventions: Document food/fluid/supplement intake    Friction and Shear Interventions: Apply protective barrier, creams and emollients                Problem: Falls - Risk of  Goal: *Absence of Falls  Description: Document Rolando Fall Risk and appropriate interventions in the flowsheet.   Outcome: Progressing Towards Goal  Note: Fall Risk Interventions:  Mobility Interventions: Communicate number of staff needed for ambulation/transfer,OT consult for ADLs,PT Consult for mobility concerns,PT Consult for assist device competence    Mentation Interventions: Adequate sleep, hydration, pain control,More frequent rounding,Toileting rounds    Medication Interventions: Assess postural VS orthostatic hypotension,Evaluate medications/consider consulting pharmacy,Teach patient to arise slowly    Elimination Interventions: Call light in reach,Patient to call for help with toileting needs,Toileting schedule/hourly rounds,Urinal in reach    History of Falls Interventions: Consult care management for discharge planning,Evaluate medications/consider consulting pharmacy

## 2022-04-18 NOTE — PROGRESS NOTES
PHYSICAL THERAPY DISCHARGE SUMMARY  TIME   TIME OUT  946   Precautions at discharge: Other (comment) (fall precautions)    Problem List:    Decreased strength B LE  [x]     Decreased strength trunk/core  [x]     Decreased AROM   []     Decreased PROM  []     Decreased balance sitting  []     Decreased balance standing  [x]     Decreased endurance  [x]     Pain  []       Functional Limitations:   Decreased independence with bed mobility  []     Decreased independence with functional transfers  []     Decreased independence with ambulation  []     Decreased independence with stair negotiation  [x]            Outcome Measures: Vital Signs:on room air, 02 sat 96 to 98% during assessment. HR 94 at rest.  after ambulating up/down 10 steps with reciprocating pattern,  after ambulating up/down steps with single step at a time pattern. Patient Vitals for the past 12 hrs:   Temp Pulse BP SpO2   04/18/22 0800 97.5 °F (36.4 °C)      04/18/22 0721  79 (!) 147/81 97 %   04/18/22 0550    97 %       Pain level:No c/o pain during treatment  Pain location:NA  Pain interventions:NA    Patient education: gait training, LE HEP,fall precautions, body mechanics,activity pacing, energy conservation, Patient verbalizing understanding and demonstrating understanding of patient education.  Recommend follow up education with HHPT    Interdisciplinary Communication:spoke with OT regarding D/C plans      Cognition: Alert, able to follow commands,cooperating,participating, motivated to go home       MMT Initial Assessment   Right Lower Extremity Left Lower Extremity   Hip Flexion 4 4   Knee Extension 5 5   Knee Flexion 4+ 4+   Ankle Dorsiflexion 5 5      MMT Discharge Assessment   Right Lower Extremity Left Lower Extremity   Hip Flexion 4 4   Knee Extension 5 5   Knee Flexion 4+ 4+   Ankle Dorsiflexion 5 5   0/5 No palpable muscle contraction  1/5 Palpable muscle contraction, no joint movement  2-/5 Less than full range of motion in gravity eliminated position  2/5 Able to complete full range of motion in gravity eliminated position  2+/5 Able to initiate movement against gravity  3-/5 More than half but not full range of motion against gravity  3/5 Able to complete full range of motion against gravity  3+/5 Completes full range of motion against gravity with minimal resistance  4-/5 Completes full range of motion against gravity with minimal-moderate resistance  4/5 Completes full range of motion against gravity with moderate resistance  4+/5 Completes full range of motion against gravity with moderate-maximum resistance  5/5 Completes full range of motion against gravity with maximum resistance     AROM: bilateral LE generally decreased, functional    PRIMARY MODE OF LOCOMOTION: ambulation  Please see IRC Interdisciplinary Eval: Coordination/Balance Section for details regarding FIM score description.     BED/CHAIR/WHEELCHAIR TRANSFERS Initial Assessment Discharge Assessment   Rolling Right 6 (Modified independent) 7 (Independent)   Rolling Left 6 (Modified independent) 7 (Independent)   Supine to Sit 6 (Modified independent) 7 (Independent)   Sit to Stand Stand-by assistance Completely independent   Sit to Supine 6 (Modified independent) 7 (Independent)   Transfer Type SPT without device SPT without device   Comments Increased time and effort to complete bed mobility and transfers, Increased GIL with midl ataxia with SPT without a device     Car Transfer Not tested Independent   Car Type rehab car rehab car       Critical access hospital MOBILITY/MANAGEMENT Initial Assessment Discharge Assessment   Able to Propel       Functional Level       Curbs/ramps assistance required 0 (Not tested) 0 (Not tested)   Wheelchair set up assistance required 0 (Not tested) 0 (Not tested)   Wheelchair management           WALKING INDEPENDENCE Initial Assessment Discharge Assessment   Assistive device Gait belt  (No device)   Ambulation assistance - level surface 4 (Minimal assistance) 7 (Independent)   Distance 20 Feet (ft) 400 Feet (ft)   Comments slow cont step through gait with increased base of support, decreased step length and step clearance with decreased ankle PF and knee flex at terminal stance and decreased ankle DF at initial contact. Decreased hip stance stability with trendelenberg noted. Gait training x 45 ft x 1 and 75 ft x 1 with RW and SBA  with 5 min sitting rest interval between attempts slow cont step through gait pattern with decreased stance phase stability with trendelenberg noted. Increased hip ext rot through gait cycle with increased width of base of support   Ambulation assistance - unlevel surface 0 (Not tested) (due to impaired balance) 7 (Independent)       STEPS/STAIRS Initial Assessment Discharge Assessment   Steps/Stairs ambulated 8 (4 steps x 1,4 min rest, 4 steps x 2) 30 (10 steps x 3)   Rail Use Both Both   Functional Level       Comments slow reciprocating pattern going up steps and single step at a time going down steps. Cues for safe foot placement going down steps, Decreased quad eccentric control gooing down steps. slow single reciprocating pattern going up/down steps first set of 10, next 2 sets of 10 single step at a time  up/down steps. Improved control of HR with single step pattern   Curbs/Ramps 0 (Not tested) 7 (Independent)       QUALITY INDICATOR ASSIST COMMENTS   Roll right (&return to back) 6: Independent    Roll left (& return to back) 6: Independent    Supine to sit 6: Independent    Sit to stand 6: Independent    Chair/bed-to-chair transfer 6: Independent    Walk 10 feet 6: Independent    Walk 50 feet with 2 turns 6: Independent    Walk 150 feet 6: Independent    Walk 10 feet on uneven  6: Independent    1 step/curb 6: Independent    4 steps 6: Independent    12 steps 6: Independent     object 6:  Independent    Wheel 48' w/2 turns Not Tested: Not applicable secondary to pt not completing activity previously    Wheel 150' Not Tested: Not applicable secondary to pt not completing activity previously    Car Transfer 6: Independent             PHYSICAL THERAPY PLAN OF CARE    LTGs: patient met all goals per eval.    Pt would benefit from continued skilled physical therapy in order to improve independent functional mobility within the home with use of least restrictive device. Interventions may include  motor function (B LE/trunk strengthening/coordination), activity tolerance (vitals, oxygen saturation levels), bed mobility training, balance activities, gait training (progressive ambulation program), and stair training. HEP handout:issued written LE HEP, Able to complete with verbal cues  STANDING EXERCISES Sets Reps Comments, hand support on elevated table   Heel Raises 1 10    Toe Raises 1 10    Hip Flexion/Marching 1 10    Hip Abduction 1 10    Mini Squats 1 10      SUPINE EXERCISES Sets Reps Comments   Bridging 2 10    Bridging with hip ADD isometric 2 10    SLR 2 10    Supine LE marching with abdominal isometric 2 10      Pt to be discharged 04/18/22 with assistance provided by wife. No family training recommneded  Therapy Recommendations upon discharge: Home Health PT (advance to OP PT)   Equipment needs at discharge: No DME recommended at this time      Please see IRC; Interdisciplinary Eval, Care Plan, and Patient Education for further information regarding physical therapy discharge summary and plan of care. Patient returned to room at end of treatment and remained up in recliner with LEs elevated and with needs in reach.     Mckayla Krishnamurthy, PT  4/18/2022

## 2022-04-18 NOTE — DIABETES MGMT
Patient admitted with physical debility. Lab glucose 92. Patient received Metformin 500mg and Prednisone 40mg. Blood glucose this morning was 150. Reviewed patient current regimen: Metformin 250mg BID, Prednisone 40mg daily, and Humalog correctional insulin BID. Patient states he is going home today. Patient educated regarding Metformin mechanism of action, side effects. Encouraged compliance with discharge regimen. Encouraged patient to continue to work on lifestyle modifications and to follow up with new primary care provider for further titration of regimen. Patient verbalized understanding and voices no further questions at this time.

## 2022-04-19 ENCOUNTER — HOME CARE VISIT (OUTPATIENT)
Dept: HOME HEALTH SERVICES | Facility: HOME HEALTH | Age: 37
End: 2022-04-19

## 2022-04-20 ENCOUNTER — HOME CARE VISIT (OUTPATIENT)
Dept: HOME HEALTH SERVICES | Facility: HOME HEALTH | Age: 37
End: 2022-04-20

## 2022-05-15 PROBLEM — J10.1 INFLUENZA A: Status: RESOLVED | Noted: 2022-04-06 | Resolved: 2022-05-15

## 2023-02-06 NOTE — PROGRESS NOTES
[facility] Critical Care Note[de-identified] 4/10/2022  Naina Cruz  Admission Date: 4/3/2022     Length of Stay: 7 days    Background: 31 y.o. y/o male  seen and evaluated at the request of Dr. Desmond Portillo. He has a history of obesity and asthma. He was initially seen in the ER yesterday with wheezing from asthma exacerbation. He has had similar exacerbations before, requiring hospitalization in the past. He is only on prn albuterol at home. No fever, chills, or obvious triggers, though some productive cough. He was initially treated and discharged home. However he returned around midnight with worsening symptoms. L calf pain but d dimer was normal as was LE doppler. CXR was normal. He was given continuous nebs and IV steroids but continued to have increased WOB so has now been started on bipap 18/8 with 60% FiO2. He is satting well and reportedly less WOB than before and less tachycardia (was up to 150 sinus tach) now at 120 but still with ongoing wheeze. ABG already with some mild hypercarbia at 47. Notable PMH:  has a past medical history of Asthma. 24 Hour events: extubated yesterday, very anxious initially on full precedex and given haldol. Fever to 101.7 yesterday, afebrile today. Repeat sputum sent yesterday. WBC up to 20. Diursed 2.8L yesterday though stopped lasix after AM dose. More alert and less agitated today. Vitals stable on AirVo 50%. Calm still on 0.4mcg Precedex. No pain. Still a sleepy when not engaged    ROS: negative except as listed elsewhere. Lines: (insertion date)   ETT: (4/3/22-4/9)  Hunter: (4/3/22)  OGT: (4/3/22)  PICC line- 4/3/22  Arterial Line (4/3/22-4/7)    Drips: current dose (range)  Precedex (mcg/kg/min): 0.4    Pertinent Exam:         Blood pressure (!) 147/77, pulse 91, temperature 99.3 °F (37.4 °C), resp. rate 27, height 5' 9\" (1.753 m), weight 297 lb 9.9 oz (135 kg), SpO2 97 %.      Intake/Output Summary (Last 24 hours) at 4/10/2022 5532  Last data filed at 4/10/2022 0700  Gross per 24 hour   Intake 1129.85 ml   Output 3775 ml   Net -2645.15 ml     Constitutional:  sleepy, but awakens to voice  EENMT:  Sclera clear, pupils equal, oral mucosa moist  Respiratory: mild rhonchi, no wheezing  Cardiovascular:  RRR  Gastrointestinal:  soft with no tenderness; positive bowel sounds present  Musculoskeletal:  warm with no cyanosis, no lower extremity edema  Skin:  no jaundice or ecchymosis  Neurologic: sleepy, but responsive  Psychiatric: some agitation    CXR: 4/10: interval extubation; stable basilar atx/infiltrates      Recent Labs     04/10/22  0228 04/09/22  0326 04/08/22  0320   WBC 20.2* 17.2* 11.5*   HGB 14.3 12.8* 12.1*   HCT 43.0 40.3* 36.2*    312 296     Recent Labs     04/10/22  0228 04/09/22  0326 04/08/22  0320    140 140   K 4.8 5.0 4.2   CL 95* 94* 98   CO2 36* 42* 38*   * 291* 265*   BUN 31* 36* 28*   CREA 0.90 1.10 0.90   MG  --   --  2.6*   CA 9.6 9.0 8.3   PHOS  --   --  3.4     No results for input(s): LAC, TROPHS, BNPNT, CRP in the last 72 hours. No lab exists for component: ESR  Recent Labs     04/10/22  0625 04/09/22  2319 04/09/22  1815   GLUCPOC 173* 173* 184*     ECHO: No results found for this or any previous visit.      Results     Procedure Component Value Units Date/Time    CULTURE, RESPIRATORY/SPUTUM/BRONCH July Kelle [[id number]] Collected: 04/09/22 7287    Order Status: Completed Specimen: Sputum,ET Suction Updated: 04/09/22 1217    CULTURE, BLOOD [[id number]] Collected: 04/05/22 1103    Order Status: Completed Specimen: Blood Updated: 04/10/22 0657     Special Requests: --        LEFT  FOREARM       Culture result: NO GROWTH 5 DAYS       CULTURE, BLOOD [[id number]] Collected: 04/05/22 0947    Order Status: Completed Specimen: Blood Updated: 04/10/22 0657     Special Requests: --        LEFT  FOREARM       Culture result: NO GROWTH 5 DAYS       MSSA/MRSA SC BY PCR, NASAL SWAB [[id number]]     Order Status: Canceled Specimen: Nasal swab     RESPIRATORY VIRUS PANEL W/COVID-19, PCR [[id number]]  (Abnormal) Collected: 04/05/22 0837    Order Status: Completed Specimen: Nasopharyngeal Updated: 04/05/22 1021     Adenovirus NOT DETECTED        Coronavirus 229E NOT DETECTED        Coronavirus HKU1 NOT DETECTED        Coronavirus CVNL63 NOT DETECTED        Coronavirus OC43 NOT DETECTED        SARS-CoV-2, PCR NOT DETECTED        Metapneumovirus NOT DETECTED        Rhinovirus and Enterovirus NOT DETECTED        Influenza A, subtype H3 Detected        Influenza B NOT DETECTED        Parainfluenza 1 NOT DETECTED        Parainfluenza 2 NOT DETECTED        Parainfluenza 3 NOT DETECTED        Parainfluenza virus 4 NOT DETECTED        RSV by PCR NOT DETECTED        B. parapertussis, PCR NOT DETECTED        Bordetella pertussis - PCR NOT DETECTED        Chlamydophila pneumoniae DNA, QL, PCR NOT DETECTED        Mycoplasma pneumoniae DNA, QL, PCR NOT DETECTED       CULTURE, RESPIRATORY/SPUTUM/BRONCH Rexie Broccoli STAIN [[id number]]  (Abnormal)  (Susceptibility) Collected: 04/04/22 1207    Order Status: Completed Specimen: Sputum,ET Suction Updated: 04/07/22 0649     Special Requests: NO SPECIAL REQUESTS        GRAM STAIN 2 TO 7 WBCS SEEN PER OIF      NO EPITHELIAL CELLS SEEN         FEW GRAM POSITIVE COCCI         NO MUCUS PRESENT        Culture result:       HEAVY STAPHYLOCOCCUS AUREUS                  MODERATE NORMAL RESPIRATORY ALYCIA          Susceptibility      Staphylococcus aureus     TEJ     Cefazolin ($) Susceptible     Clindamycin ($) Resistant     Oxacillin Susceptible     Rifampin ($$$$) Susceptible  [1]      Tetracycline Susceptible     Trimeth-Sulfamethoxa Susceptible     Vancomycin ($) Susceptible                 [1]  Rifampin is not to be used for mono-therapy.           Linear View                   COVID-19 RAPID TEST [[id number]] Collected: 04/02/22 1642    Order Status: Completed Specimen: Nasopharyngeal Updated: 04/02/22 1733     Specimen source Nasopharyngeal        COVID-19 rapid test Not detected        Comment:      The specimen is NEGATIVE for SARS-CoV-2, the novel coronavirus associated with COVID-19. A negative result does not rule out COVID-19. This test has been authorized by the FDA under an Emergency Use Authorization (EUA) for use by authorized laboratories. Fact sheet for Healthcare Providers: [employer]  Fact sheet for Patients: [employer]       Methodology: Isothermal Nucleic Acid Amplification             Inpat Anti-Infectives (From admission, onward)     Start     Ordered Stop    04/03/22 0700  azithromycin (ZITHROMAX) 500 mg in 0.9% sodium chloride 250 mL (Avjv1Ivc)  500 mg,   IntraVENous,   EVERY 24 HOURS         04/03/22 0607 04/08/22 0659              Ventilator Settings:  Ideal body weight: 70.7 kg (155 lb 13.8 oz)   Mode FIO2 Rate Tidal Volume Pressure PEEP   Pressure control  60 %    450 ml     8 cm H20    Peak airway pressure: 29 cm H2O       Minute ventilation: 14.5 l/min  ABG:  Recent Labs     04/08/22  0420 04/08/22  0357   PHI 7.41 7.43   PCO2I 64.6* 60.6*   PO2I 65* 44*   HCO3I 40.9* 39.9*     Assessment and Plan:  (Medical Decision Making)   Impression: 31 y.o. male with acute respiratory failure due to asthma exacerbation, influenza A and staph pneumonia    NEURO:   Anxiety: better, wean off precedex today  Analgesia: PRN  CV:   Septic Shock: due to influenza and Staph pneumonia, resolved  Volume: 2L negative, diuresed well again yesterday, have held lasix for now and monitor  PULM:   Acute hypoxemic/hypercapneic respiratory failure: extubated yesterday, now on AirVo 50%, looks better. Try to wake up more  Severe persistent asthma with exacerbation: improved, extubated, not wheezing On steroids IV, wean to 40 Q12H, Continue nebs.  Sounds like he was not on any controller therapy at home recently though has been in the past. Had 2 prior ICU stays though over a decade ago and no intubations. RENAL:  TREVOR: UOP is good. Stable. Hold lasix today  GI:   Nutrition: Will get speech to see today, hopefully remove NGT and allow diet. HEME:   no issues   ID:    MSSA pneumonia: finished azithro, change to Ancef for 4/5 more days, repeat sputum pending 4/9  Influenza A: Tamiflu finished  X 5 days, droplet precautions. Skin: no decub, turns, preventive care  Prophy: Lovenox, protonix     Full Code     Wean off precedex, PT/Speech, remove NGT and walsh. Possible transfer to floor this afternoon.     Asha Sharma MD

## 2023-11-07 ENCOUNTER — HOSPITAL ENCOUNTER (EMERGENCY)
Age: 38
Discharge: LEFT BEFORE DISCHARGE | End: 2023-11-07

## 2023-12-27 ENCOUNTER — APPOINTMENT (OUTPATIENT)
Dept: GENERAL RADIOLOGY | Age: 38
End: 2023-12-27

## 2023-12-27 ENCOUNTER — HOSPITAL ENCOUNTER (EMERGENCY)
Age: 38
Discharge: HOME | End: 2023-12-27

## 2023-12-27 VITALS
RESPIRATION RATE: 20 BRPM | HEART RATE: 74 BPM | HEIGHT: 64 IN | TEMPERATURE: 98.6 F | OXYGEN SATURATION: 96 % | DIASTOLIC BLOOD PRESSURE: 102 MMHG | SYSTOLIC BLOOD PRESSURE: 170 MMHG | BODY MASS INDEX: 47.8 KG/M2 | WEIGHT: 280 LBS

## 2023-12-27 DIAGNOSIS — B34.9 VIRAL ILLNESS: Primary | ICD-10-CM

## 2023-12-27 LAB
FLUAV RNA SPEC QL NAA+PROBE: NOT DETECTED
FLUBV RNA SPEC QL NAA+PROBE: NOT DETECTED
SARS-COV-2 RDRP RESP QL NAA+PROBE: NOT DETECTED
SOURCE: NORMAL

## 2023-12-27 PROCEDURE — 6370000000 HC RX 637 (ALT 250 FOR IP): Performed by: PHYSICIAN ASSISTANT

## 2023-12-27 PROCEDURE — 71045 X-RAY EXAM CHEST 1 VIEW: CPT

## 2023-12-27 PROCEDURE — 87635 SARS-COV-2 COVID-19 AMP PRB: CPT

## 2023-12-27 PROCEDURE — 99285 EMERGENCY DEPT VISIT HI MDM: CPT

## 2023-12-27 PROCEDURE — 87502 INFLUENZA DNA AMP PROBE: CPT

## 2023-12-27 PROCEDURE — 94640 AIRWAY INHALATION TREATMENT: CPT

## 2023-12-27 RX ORDER — IPRATROPIUM BROMIDE AND ALBUTEROL SULFATE 2.5; .5 MG/3ML; MG/3ML
1 SOLUTION RESPIRATORY (INHALATION)
Status: COMPLETED | OUTPATIENT
Start: 2023-12-27 | End: 2023-12-27

## 2023-12-27 RX ADMIN — IPRATROPIUM BROMIDE AND ALBUTEROL SULFATE 1 DOSE: 2.5; .5 SOLUTION RESPIRATORY (INHALATION) at 09:43

## 2023-12-27 NOTE — ED TRIAGE NOTES
Patient ambulatory to triage with CO SOB worsening x 3 days. Hhx asthma, increased use of inhaler, hx of being admitted to ICU in past for same. Reports short hx HTN, taken off of medications.

## 2023-12-27 NOTE — DISCHARGE INSTRUCTIONS
Use your nebulizer treatments at home for SOB or wheezing. Alternate tylenol and motrin as needed for fever or body aches. You can take tylenol every 4 hours as needed. You can take ibuprofen every 6 hours as needed. Make sure you are drinking plenty of fluids. Follow-up with your PCP in 1 to 2 days if no improvement. Return to the ER for any new or worsening symptoms.

## 2023-12-27 NOTE — ED PROVIDER NOTES
Emergency Department Provider Note       PCP: Greg Bar MD   Age: 41 y.o. Sex: male     DISPOSITION Decision To Discharge 12/27/2023 10:55:01 AM       ICD-10-CM    1. Viral illness  B34.9           Medical Decision Making     Complexity of Problems Addressed:  1 or more acute illnesses that pose a threat to life or bodily function. Data Reviewed and Analyzed:   I independently ordered and reviewed each unique test.  I reviewed external records: ED visit note from an outside group. I independently ordered and interpreted the ED EKG in the absence of a Cardiologist.    Rate: 71  EKG Interpretation: EKG Interpretation: sinus rhythm, no evidence of arrhythmia  ST Segments: Normal ST segments - NO STEMI  Inverted t waves in lead 3 similar to April 2022      I interpreted the X-rays no infiltrate, agree with radiologist interpretation. Discussion of management or test interpretation. 42 yo male presenting for body aches, congestion, wheezing started at work this morning. VSS, no tachycardia, hypoxia, tachypnea. No chest pain. Mild wheezing on exam initially, cleared after duoneb and patient states he feels better after breathing treatment. He has underlying asthma, he does have nebulizer solution at home but has not used it so I recommend he start using this at home. Covid and flu negative, still suspect viral illness, possibly testing too early? Recommend reevaluation if not improvement over next 2 days or sooner if symptoms worsen patient in agreement with discharge plan. Risk of Complications and/or Morbidity of Patient Management:  Shared medical decision making was utilized in creating the patients health plan today. ED Course as of 12/27/23 1059   Wed Dec 27, 2023   4465 Protocols ordered by triage RN, I evaluated patient. Was at work and started having body aches and SOB. Mild wheezing noted but no retractions or tachypnea.  Will check flu/covid CXR and duoneb treatment [KE]   0933 XR

## 2024-05-06 ENCOUNTER — HOSPITAL ENCOUNTER (OUTPATIENT)
Dept: LAB | Age: 39
Setting detail: SPECIMEN
Discharge: HOME | End: 2024-05-09

## 2024-05-06 PROCEDURE — 83655 ASSAY OF LEAD: CPT

## 2024-05-06 PROCEDURE — 36415 COLL VENOUS BLD VENIPUNCTURE: CPT

## 2024-05-06 PROCEDURE — 85025 COMPLETE CBC W/AUTO DIFF WBC: CPT

## 2024-05-06 PROCEDURE — 84202 ASSAY RBC PROTOPORPHYRIN: CPT

## 2024-05-14 ENCOUNTER — APPOINTMENT (OUTPATIENT)
Dept: CT IMAGING | Age: 39
End: 2024-05-14

## 2024-05-14 ENCOUNTER — HOSPITAL ENCOUNTER (EMERGENCY)
Age: 39
Discharge: HOME | End: 2024-05-14
Attending: EMERGENCY MEDICINE

## 2024-05-14 VITALS
HEIGHT: 68 IN | BODY MASS INDEX: 40.62 KG/M2 | OXYGEN SATURATION: 95 % | DIASTOLIC BLOOD PRESSURE: 86 MMHG | RESPIRATION RATE: 18 BRPM | SYSTOLIC BLOOD PRESSURE: 142 MMHG | HEART RATE: 88 BPM | WEIGHT: 268 LBS | TEMPERATURE: 97.3 F

## 2024-05-14 DIAGNOSIS — R10.11 RIGHT UPPER QUADRANT ABDOMINAL PAIN: Primary | ICD-10-CM

## 2024-05-14 LAB
ALBUMIN SERPL-MCNC: 4.2 G/DL (ref 3.5–5)
ALBUMIN/GLOB SERPL: 1.1 (ref 1–1.9)
ALP SERPL-CCNC: 74 U/L (ref 40–129)
ALT SERPL-CCNC: 27 U/L (ref 12–65)
ANION GAP SERPL CALC-SCNC: 14 MMOL/L (ref 9–18)
AST SERPL-CCNC: 50 U/L (ref 15–37)
BASOPHILS # BLD: 0.1 K/UL (ref 0–0.2)
BASOPHILS NFR BLD: 1 % (ref 0–2)
BILIRUB SERPL-MCNC: 1.2 MG/DL (ref 0–1.2)
BUN SERPL-MCNC: 9 MG/DL (ref 6–23)
CALCIUM SERPL-MCNC: 9.7 MG/DL (ref 8.8–10.2)
CHLORIDE SERPL-SCNC: 95 MMOL/L (ref 98–107)
CO2 SERPL-SCNC: 27 MMOL/L (ref 20–28)
CREAT SERPL-MCNC: 0.92 MG/DL (ref 0.8–1.3)
DIFFERENTIAL METHOD BLD: ABNORMAL
EOSINOPHIL # BLD: 0.8 K/UL (ref 0–0.8)
EOSINOPHIL NFR BLD: 8 % (ref 0.5–7.8)
ERYTHROCYTE [DISTWIDTH] IN BLOOD BY AUTOMATED COUNT: 12.6 % (ref 11.9–14.6)
GLOBULIN SER CALC-MCNC: 3.9 G/DL (ref 2.3–3.5)
GLUCOSE SERPL-MCNC: 139 MG/DL (ref 70–99)
HCT VFR BLD AUTO: 43.3 % (ref 41.1–50.3)
HGB BLD-MCNC: 15.3 G/DL (ref 13.6–17.2)
IMM GRANULOCYTES # BLD AUTO: 0 K/UL (ref 0–0.5)
IMM GRANULOCYTES NFR BLD AUTO: 0 % (ref 0–5)
LIPASE SERPL-CCNC: 77 U/L (ref 13–60)
LYMPHOCYTES # BLD: 2.7 K/UL (ref 0.5–4.6)
LYMPHOCYTES NFR BLD: 26 % (ref 13–44)
MCH RBC QN AUTO: 34.9 PG (ref 26.1–32.9)
MCHC RBC AUTO-ENTMCNC: 35.3 G/DL (ref 31.4–35)
MCV RBC AUTO: 98.6 FL (ref 82–102)
MONOCYTES # BLD: 1 K/UL (ref 0.1–1.3)
MONOCYTES NFR BLD: 10 % (ref 4–12)
NEUTS SEG # BLD: 5.7 K/UL (ref 1.7–8.2)
NEUTS SEG NFR BLD: 55 % (ref 43–78)
NRBC # BLD: 0 K/UL (ref 0–0.2)
PLATELET # BLD AUTO: 379 K/UL (ref 150–450)
PMV BLD AUTO: 8.8 FL (ref 9.4–12.3)
POTASSIUM SERPL-SCNC: 3.2 MMOL/L (ref 3.5–5.1)
PROT SERPL-MCNC: 8.1 G/DL (ref 6.3–8.2)
RBC # BLD AUTO: 4.39 M/UL (ref 4.23–5.6)
SODIUM SERPL-SCNC: 135 MMOL/L (ref 136–145)
WBC # BLD AUTO: 10.4 K/UL (ref 4.3–11.1)

## 2024-05-14 PROCEDURE — 85025 COMPLETE CBC W/AUTO DIFF WBC: CPT

## 2024-05-14 PROCEDURE — 96374 THER/PROPH/DIAG INJ IV PUSH: CPT

## 2024-05-14 PROCEDURE — 83690 ASSAY OF LIPASE: CPT

## 2024-05-14 PROCEDURE — 80053 COMPREHEN METABOLIC PANEL: CPT

## 2024-05-14 PROCEDURE — 99285 EMERGENCY DEPT VISIT HI MDM: CPT

## 2024-05-14 PROCEDURE — 74177 CT ABD & PELVIS W/CONTRAST: CPT

## 2024-05-14 PROCEDURE — 6360000004 HC RX CONTRAST MEDICATION: Performed by: EMERGENCY MEDICINE

## 2024-05-14 PROCEDURE — 96375 TX/PRO/DX INJ NEW DRUG ADDON: CPT

## 2024-05-14 PROCEDURE — 6360000002 HC RX W HCPCS: Performed by: EMERGENCY MEDICINE

## 2024-05-14 RX ORDER — HYOSCYAMINE SULFATE 0.12 MG/1
1 TABLET SUBLINGUAL 3 TIMES DAILY PRN
Qty: 20 EACH | Refills: 0 | Status: SHIPPED | OUTPATIENT
Start: 2024-05-14

## 2024-05-14 RX ORDER — ONDANSETRON 2 MG/ML
4 INJECTION INTRAMUSCULAR; INTRAVENOUS
Status: COMPLETED | OUTPATIENT
Start: 2024-05-14 | End: 2024-05-14

## 2024-05-14 RX ORDER — MORPHINE SULFATE 4 MG/ML
4 INJECTION, SOLUTION INTRAMUSCULAR; INTRAVENOUS
Status: COMPLETED | OUTPATIENT
Start: 2024-05-14 | End: 2024-05-14

## 2024-05-14 RX ORDER — ONDANSETRON 4 MG/1
4 TABLET, FILM COATED ORAL 3 TIMES DAILY PRN
Qty: 15 TABLET | Refills: 2 | Status: SHIPPED | OUTPATIENT
Start: 2024-05-14

## 2024-05-14 RX ADMIN — IOPAMIDOL 100 ML: 755 INJECTION, SOLUTION INTRAVENOUS at 04:31

## 2024-05-14 RX ADMIN — ONDANSETRON 4 MG: 2 INJECTION INTRAMUSCULAR; INTRAVENOUS at 04:12

## 2024-05-14 RX ADMIN — MORPHINE SULFATE 4 MG: 4 INJECTION INTRAVENOUS at 04:12

## 2024-05-14 ASSESSMENT — ENCOUNTER SYMPTOMS
DIARRHEA: 1
VOMITING: 1
NAUSEA: 1
FACIAL SWELLING: 0
BLOOD IN STOOL: 1
ABDOMINAL PAIN: 1
BACK PAIN: 1
SHORTNESS OF BREATH: 0

## 2024-05-14 ASSESSMENT — PAIN - FUNCTIONAL ASSESSMENT: PAIN_FUNCTIONAL_ASSESSMENT: 0-10

## 2024-05-14 ASSESSMENT — PAIN DESCRIPTION - ORIENTATION: ORIENTATION: RIGHT;LOWER

## 2024-05-14 ASSESSMENT — PAIN SCALES - GENERAL
PAINLEVEL_OUTOF10: 10
PAINLEVEL_OUTOF10: 9

## 2024-05-14 ASSESSMENT — LIFESTYLE VARIABLES
HOW MANY STANDARD DRINKS CONTAINING ALCOHOL DO YOU HAVE ON A TYPICAL DAY: 1 OR 2
HOW OFTEN DO YOU HAVE A DRINK CONTAINING ALCOHOL: 4 OR MORE TIMES A WEEK

## 2024-05-14 ASSESSMENT — PAIN DESCRIPTION - LOCATION: LOCATION: ABDOMEN

## 2024-05-14 NOTE — ED NOTES
Patient mobility status  with no difficulty. Provider aware     I have reviewed discharge instructions with the patient.  The patient verbalized understanding.    Patient left ED via Discharge Method: ambulatory to Home with  self .    Opportunity for questions and clarification provided.     Patient given 0 scripts.            Postol, Cinthia, RN  05/14/24 0600

## 2024-05-14 NOTE — DISCHARGE INSTRUCTIONS
Your workup was unremarkable in the emergency department today.  You still could have a poorly functioning gallbladder.  If your pain continues, you may need further testing such as a HIDA scan/nuclear medicine test of your gallbladder and or an ultrasound.  Return for any worsening or concerning symptoms.

## 2024-05-14 NOTE — ED PROVIDER NOTES
Emergency Department Provider Note       PCP: No, Pcp   Age: 39 y.o.   Sex: male     DISPOSITION Decision To Discharge 05/14/2024 05:48:27 AM       ICD-10-CM    1. Right upper quadrant abdominal pain  R10.11           Medical Decision Making     Pain improved.  Workup unremarkable.  Discussed possible gallbladder dysfunction.  No sign of cholecystitis or obstruction.  Given return precautions.     1 or more acute illnesses that pose a threat to life or bodily function.   Parental controlled substances given in the ED.  Patient was discharged risks and benefits of hospitalization were considered.  Shared medical decision making was utilized in creating the patients health plan today.    I independently ordered and reviewed each unique test.       I interpreted the CT Scan no acute intra-abdominal process.              History     39-year-old male presents with severe gradually worsening right-sided abdominal pain for the past 5 days.  He states it is cramping and intermittently sharp.  It now radiates to right back and periumbilical region.  He reports nausea with several episodes of vomiting.  He has been unable to eat.  He tried a laxative and has had diarrhea since.  He has a small amount of blood in his stools.  No documented fevers.  No ill contacts or travel.  No prior abdominal surgery.          ROS     Review of Systems   Constitutional:  Negative for fever.   HENT:  Negative for facial swelling.    Eyes:  Negative for visual disturbance.   Respiratory:  Negative for shortness of breath.    Cardiovascular:  Negative for chest pain.   Gastrointestinal:  Positive for abdominal pain, blood in stool, diarrhea, nausea and vomiting.   Genitourinary:  Positive for flank pain. Negative for dysuria.   Musculoskeletal:  Positive for back pain. Negative for joint swelling.   Skin:  Negative for rash.   Neurological:  Negative for speech difficulty.   Psychiatric/Behavioral:  Negative for confusion.    All other  exposure  control, adjustment of the mA and/or kV according to patient size,  and/or use of iterative reconstruction technique.    COMPARISON:  No relevant prior studies available.    FINDINGS:    Lung bases:  Unremarkable.  No mass.  No consolidation.    Mediastinum:  Tiny hiatal hernia.    ABDOMEN:    Liver:  Hepatomegaly with fatty infiltration.    Gallbladder and bile ducts:  Unremarkable.  No calcified stones.  No  ductal dilation.    Pancreas:  Unremarkable.  No mass.  No ductal dilation.    Spleen:  Unremarkable.  No splenomegaly.    Adrenals:  Unremarkable.  No mass.    Kidneys and ureters:  Tiny left renal cysts.  No hydronephrosis.    Stomach and bowel:  Nonspecific air and fluid within small and large  bowel with non differential air-fluid levels and no apparent  transition.  Few colonic diverticula.  No mucosal thickening.    PELVIS:    Appendix:  No findings to suggest acute appendicitis.    Bladder:  Unremarkable.  No mass.    Reproductive:  Unremarkable as visualized.    ABDOMEN and PELVIS:    Intraperitoneal space:  Unremarkable.  No free air.  No significant  fluid collection.    Bones/joints:  Mild multilevel spondylosis.  No acute fracture.  No  dislocation.    Soft tissues:  Unremarkable.    Vasculature:  Unremarkable.  No abdominal aortic aneurysm.    Lymph nodes:  Unremarkable.  No enlarged lymph nodes.      Impression    1.  No acute abdominal or pelvic pathology.  2.  Nonspecific air and fluid within small and large bowel without  definite transition zone.  Possible mild ileus.  Clinical correlation  and imaging follow-up recommended as warranted.  3.  Other nonacute findings above.        Automatic exposure control was used as a dose lowering technique.    Radiation Dose: CTDI is 18.84 mGy. DLP is 1181.78 mGy-cm.    Contrast Type: iopamidol (ISOVUE-370) 76 . Contrast Volume: 100 mL    Report signed on 05/14/2024 (05:41 Eastern Time)  Signed by: Brandon Talavera M.D.  Reading Location: [address] [place] [address]

## 2024-05-14 NOTE — ED TRIAGE NOTES
Pt complains of abdominal pain and decreased appetite since Thursday. Had vomiting on Thursday, but nothing since.

## 2024-06-19 ENCOUNTER — HOSPITAL ENCOUNTER (EMERGENCY)
Age: 39
Discharge: HOME | End: 2024-06-19
Attending: EMERGENCY MEDICINE

## 2024-06-19 VITALS
OXYGEN SATURATION: 98 % | HEIGHT: 68 IN | HEART RATE: 77 BPM | TEMPERATURE: 98 F | DIASTOLIC BLOOD PRESSURE: 89 MMHG | BODY MASS INDEX: 43.8 KG/M2 | SYSTOLIC BLOOD PRESSURE: 166 MMHG | RESPIRATION RATE: 15 BRPM | WEIGHT: 289 LBS

## 2024-06-19 DIAGNOSIS — R42 LIGHTHEADEDNESS: Primary | ICD-10-CM

## 2024-06-19 DIAGNOSIS — R11.2 NAUSEA AND VOMITING IN ADULT: ICD-10-CM

## 2024-06-19 DIAGNOSIS — E83.42 HYPOMAGNESEMIA: ICD-10-CM

## 2024-06-19 DIAGNOSIS — R74.8 ELEVATED LIVER ENZYMES: ICD-10-CM

## 2024-06-19 LAB
ALBUMIN SERPL-MCNC: 4.8 G/DL (ref 3.5–5)
ALBUMIN/GLOB SERPL: 1.5 (ref 1–1.9)
ALP SERPL-CCNC: 66 U/L (ref 40–129)
ALT SERPL-CCNC: 72 U/L (ref 12–65)
ANION GAP SERPL CALC-SCNC: 17 MMOL/L (ref 9–18)
AST SERPL-CCNC: 116 U/L (ref 15–37)
BASOPHILS # BLD: 0.1 K/UL (ref 0–0.2)
BASOPHILS NFR BLD: 2 % (ref 0–2)
BILIRUB SERPL-MCNC: 1.6 MG/DL (ref 0–1.2)
BUN SERPL-MCNC: 9 MG/DL (ref 6–23)
CALCIUM SERPL-MCNC: 9.4 MG/DL (ref 8.8–10.2)
CHLORIDE SERPL-SCNC: 100 MMOL/L (ref 98–107)
CO2 SERPL-SCNC: 23 MMOL/L (ref 20–28)
CREAT SERPL-MCNC: 0.84 MG/DL (ref 0.8–1.3)
DIFFERENTIAL METHOD BLD: ABNORMAL
EOSINOPHIL # BLD: 0.2 K/UL (ref 0–0.8)
EOSINOPHIL NFR BLD: 4 % (ref 0.5–7.8)
ERYTHROCYTE [DISTWIDTH] IN BLOOD BY AUTOMATED COUNT: 13.9 % (ref 11.9–14.6)
FLUAV RNA SPEC QL NAA+PROBE: NOT DETECTED
FLUBV RNA SPEC QL NAA+PROBE: NOT DETECTED
GLOBULIN SER CALC-MCNC: 3.3 G/DL (ref 2.3–3.5)
GLUCOSE SERPL-MCNC: 91 MG/DL (ref 70–99)
HCT VFR BLD AUTO: 46.3 % (ref 41.1–50.3)
HGB BLD-MCNC: 15.9 G/DL (ref 13.6–17.2)
IMM GRANULOCYTES # BLD AUTO: 0 K/UL (ref 0–0.5)
IMM GRANULOCYTES NFR BLD AUTO: 0 % (ref 0–5)
LIPASE SERPL-CCNC: 28 U/L (ref 13–60)
LYMPHOCYTES # BLD: 3 K/UL (ref 0.5–4.6)
LYMPHOCYTES NFR BLD: 49 % (ref 13–44)
MAGNESIUM SERPL-MCNC: 1.7 MG/DL (ref 1.8–2.4)
MCH RBC QN AUTO: 34.3 PG (ref 26.1–32.9)
MCHC RBC AUTO-ENTMCNC: 34.3 G/DL (ref 31.4–35)
MCV RBC AUTO: 99.8 FL (ref 82–102)
MONOCYTES # BLD: 0.6 K/UL (ref 0.1–1.3)
MONOCYTES NFR BLD: 10 % (ref 4–12)
NEUTS SEG # BLD: 2.1 K/UL (ref 1.7–8.2)
NEUTS SEG NFR BLD: 35 % (ref 43–78)
NRBC # BLD: 0 K/UL (ref 0–0.2)
PLATELET # BLD AUTO: 362 K/UL (ref 150–450)
PMV BLD AUTO: 8.4 FL (ref 9.4–12.3)
POTASSIUM SERPL-SCNC: 4.1 MMOL/L (ref 3.5–5.1)
PROT SERPL-MCNC: 8.1 G/DL (ref 6.3–8.2)
RBC # BLD AUTO: 4.64 M/UL (ref 4.23–5.6)
SARS-COV-2 RDRP RESP QL NAA+PROBE: NOT DETECTED
SODIUM SERPL-SCNC: 140 MMOL/L (ref 136–145)
SOURCE: NORMAL
WBC # BLD AUTO: 5.9 K/UL (ref 4.3–11.1)

## 2024-06-19 PROCEDURE — C9113 INJ PANTOPRAZOLE SODIUM, VIA: HCPCS | Performed by: EMERGENCY MEDICINE

## 2024-06-19 PROCEDURE — 87635 SARS-COV-2 COVID-19 AMP PRB: CPT

## 2024-06-19 PROCEDURE — 99284 EMERGENCY DEPT VISIT MOD MDM: CPT

## 2024-06-19 PROCEDURE — 87502 INFLUENZA DNA AMP PROBE: CPT

## 2024-06-19 PROCEDURE — 80053 COMPREHEN METABOLIC PANEL: CPT

## 2024-06-19 PROCEDURE — 83690 ASSAY OF LIPASE: CPT

## 2024-06-19 PROCEDURE — 85025 COMPLETE CBC W/AUTO DIFF WBC: CPT

## 2024-06-19 PROCEDURE — 2580000003 HC RX 258: Performed by: EMERGENCY MEDICINE

## 2024-06-19 PROCEDURE — 96365 THER/PROPH/DIAG IV INF INIT: CPT

## 2024-06-19 PROCEDURE — 96375 TX/PRO/DX INJ NEW DRUG ADDON: CPT

## 2024-06-19 PROCEDURE — 83735 ASSAY OF MAGNESIUM: CPT

## 2024-06-19 PROCEDURE — 2500000003 HC RX 250 WO HCPCS: Performed by: EMERGENCY MEDICINE

## 2024-06-19 PROCEDURE — 6360000002 HC RX W HCPCS: Performed by: EMERGENCY MEDICINE

## 2024-06-19 PROCEDURE — 96361 HYDRATE IV INFUSION ADD-ON: CPT

## 2024-06-19 RX ORDER — ONDANSETRON 2 MG/ML
8 INJECTION INTRAMUSCULAR; INTRAVENOUS ONCE
Status: COMPLETED | OUTPATIENT
Start: 2024-06-19 | End: 2024-06-19

## 2024-06-19 RX ORDER — SODIUM CHLORIDE, SODIUM LACTATE, POTASSIUM CHLORIDE, AND CALCIUM CHLORIDE .6; .31; .03; .02 G/100ML; G/100ML; G/100ML; G/100ML
2000 INJECTION, SOLUTION INTRAVENOUS
Status: COMPLETED | OUTPATIENT
Start: 2024-06-19 | End: 2024-06-19

## 2024-06-19 RX ORDER — FAMOTIDINE 10 MG/ML
20 INJECTION, SOLUTION INTRAVENOUS
Status: COMPLETED | OUTPATIENT
Start: 2024-06-19 | End: 2024-06-19

## 2024-06-19 RX ORDER — ONDANSETRON 4 MG/1
4 TABLET, ORALLY DISINTEGRATING ORAL 3 TIMES DAILY PRN
Qty: 9 TABLET | Refills: 0 | Status: SHIPPED | OUTPATIENT
Start: 2024-06-19 | End: 2024-06-22

## 2024-06-19 RX ORDER — MAGNESIUM SULFATE IN WATER 40 MG/ML
2000 INJECTION, SOLUTION INTRAVENOUS ONCE
Status: COMPLETED | OUTPATIENT
Start: 2024-06-19 | End: 2024-06-19

## 2024-06-19 RX ADMIN — PANTOPRAZOLE SODIUM 40 MG: 40 INJECTION, POWDER, FOR SOLUTION INTRAVENOUS at 08:42

## 2024-06-19 RX ADMIN — ONDANSETRON 8 MG: 2 INJECTION INTRAMUSCULAR; INTRAVENOUS at 08:41

## 2024-06-19 RX ADMIN — MAGNESIUM SULFATE HEPTAHYDRATE 2000 MG: 40 INJECTION, SOLUTION INTRAVENOUS at 08:52

## 2024-06-19 RX ADMIN — SODIUM CHLORIDE, POTASSIUM CHLORIDE, SODIUM LACTATE AND CALCIUM CHLORIDE 2000 ML: 600; 310; 30; 20 INJECTION, SOLUTION INTRAVENOUS at 08:44

## 2024-06-19 RX ADMIN — FAMOTIDINE 20 MG: 10 INJECTION, SOLUTION INTRAVENOUS at 08:43

## 2024-06-19 ASSESSMENT — PAIN - FUNCTIONAL ASSESSMENT: PAIN_FUNCTIONAL_ASSESSMENT: NONE - DENIES PAIN

## 2024-06-19 NOTE — DISCHARGE INSTRUCTIONS
We would love to help you get a primary care doctor for follow-up after your emergency department visit.    Please call [phone] between 7AM - 6PM Monday to Friday.  A care navigator will be able to assist you with setting up a doctor close to your home.       Please note, your liver enzymes were elevated today, this likely reflects injury to your liver associated with drinking whether short-term injury or potentially some element of long-term injury.  We do recommend you follow-up with a primary care provider to have repeat \"CMP\" or LFT\" liver function testing and further evaluation.  We recommend cutting back on your drinking as possible though be careful not to completely stop drinking alcohol all at once as this could cause withdrawal symptoms.  Please contact [facility] for additional information regarding further management of alcohol cessation/reduction and support.    Return as needed for any questions, concerns or worsening symptoms, particularly continuous/intractable nausea/vomiting, inability to keep fluids down by mouth, fainting episodes, development of fever with abdominal pain, signs of withdrawal such as increased shakiness, hallucinations, agitation, persistently fast heart rate, fast breathing, seizure episodes.

## 2024-06-19 NOTE — ED PROVIDER NOTES
nRBC 0.00 0.0 - 0.2 K/uL    Differential Type AUTOMATED      Neutrophils % 35 (L) 43 - 78 %    Lymphocytes % 49 (H) 13 - 44 %    Monocytes % 10 4.0 - 12.0 %    Eosinophils % 4 0.5 - 7.8 %    Basophils % 2 0.0 - 2.0 %    Immature Granulocytes % 0 0.0 - 5.0 %    Neutrophils Absolute 2.1 1.7 - 8.2 K/UL    Lymphocytes Absolute 3.0 0.5 - 4.6 K/UL    Monocytes Absolute 0.6 0.1 - 1.3 K/UL    Eosinophils Absolute 0.2 0.0 - 0.8 K/UL    Basophils Absolute 0.1 0.0 - 0.2 K/UL    Immature Granulocytes Absolute 0.0 0.0 - 0.5 K/UL   CMP   Result Value Ref Range    Sodium 140 136 - 145 mmol/L    Potassium 4.1 3.5 - 5.1 mmol/L    Chloride 100 98 - 107 mmol/L    CO2 23 20 - 28 mmol/L    Anion Gap 17 9 - 18 mmol/L    Glucose 91 70 - 99 mg/dL    BUN 9 6 - 23 MG/DL    Creatinine 0.84 0.80 - 1.30 MG/DL    Est, Glom Filt Rate >90 >60 ml/min/1.73m2    Calcium 9.4 8.8 - 10.2 MG/DL    Total Bilirubin 1.6 (H) 0.0 - 1.2 MG/DL    ALT 72 (H) 12 - 65 U/L     (H) 15 - 37 U/L    Alk Phosphatase 66 40 - 129 U/L    Total Protein 8.1 6.3 - 8.2 g/dL    Albumin 4.8 3.5 - 5.0 g/dL    Globulin 3.3 2.3 - 3.5 g/dL    Albumin/Globulin Ratio 1.5 1.0 - 1.9     Lipase   Result Value Ref Range    Lipase 28 13 - 60 U/L   Magnesium   Result Value Ref Range    Magnesium 1.7 (L) 1.8 - 2.4 mg/dL      No orders to display                   Voice dictation software was used during the making of this note.  This software is not perfect and grammatical and other typographical errors may be present.  This note has not been completely proofread for errors.     Suresh Yeboah MD  06/19/24 4120

## 2024-06-19 NOTE — ED NOTES
Patient mobility status  with no difficulty. Provider aware     I have reviewed discharge instructions with the patient and spouse.  The patient and spouse verbalized understanding.    Patient left ED via Discharge Method: ambulatory to Home with Spouse.    Opportunity for questions and clarification provided.     Patient given 1 scripts. No esign.          Christa Bailey, RN  06/19/24 0588

## 2024-12-27 ENCOUNTER — APPOINTMENT (OUTPATIENT)
Dept: GENERAL RADIOLOGY | Age: 39
End: 2024-12-27
Payer: COMMERCIAL

## 2024-12-27 ENCOUNTER — HOSPITAL ENCOUNTER (EMERGENCY)
Age: 39
Discharge: HOME | End: 2024-12-27
Attending: STUDENT IN AN ORGANIZED HEALTH CARE EDUCATION/TRAINING PROGRAM
Payer: COMMERCIAL

## 2024-12-27 VITALS
HEIGHT: 70 IN | TEMPERATURE: 97 F | HEART RATE: 100 BPM | SYSTOLIC BLOOD PRESSURE: 142 MMHG | BODY MASS INDEX: 41.37 KG/M2 | WEIGHT: 289 LBS | OXYGEN SATURATION: 97 % | RESPIRATION RATE: 13 BRPM | DIASTOLIC BLOOD PRESSURE: 122 MMHG

## 2024-12-27 DIAGNOSIS — J06.9 VIRAL URI WITH COUGH: Primary | ICD-10-CM

## 2024-12-27 DIAGNOSIS — R06.2 WHEEZING: ICD-10-CM

## 2024-12-27 LAB
ALBUMIN SERPL-MCNC: 3.6 G/DL (ref 3.5–5)
ALBUMIN/GLOB SERPL: 1.1 (ref 1–1.9)
ALP SERPL-CCNC: 51 U/L (ref 40–129)
ALT SERPL-CCNC: 28 U/L (ref 8–55)
ANION GAP SERPL CALC-SCNC: 13 MMOL/L (ref 7–16)
AST SERPL-CCNC: 35 U/L (ref 15–37)
BASOPHILS # BLD: 0.1 K/UL (ref 0–0.2)
BASOPHILS NFR BLD: 1 % (ref 0–2)
BILIRUB SERPL-MCNC: 0.4 MG/DL (ref 0–1.2)
BUN SERPL-MCNC: 10 MG/DL (ref 6–23)
CALCIUM SERPL-MCNC: 9.1 MG/DL (ref 8.8–10.2)
CHLORIDE SERPL-SCNC: 101 MMOL/L (ref 98–107)
CO2 SERPL-SCNC: 26 MMOL/L (ref 20–29)
CREAT SERPL-MCNC: 0.79 MG/DL (ref 0.8–1.3)
DIFFERENTIAL METHOD BLD: ABNORMAL
EKG ATRIAL RATE: 89 BPM
EKG DIAGNOSIS: NORMAL
EKG P AXIS: 64 DEGREES
EKG P-R INTERVAL: 132 MS
EKG Q-T INTERVAL: 362 MS
EKG QRS DURATION: 88 MS
EKG QTC CALCULATION (BAZETT): 440 MS
EKG R AXIS: 75 DEGREES
EKG T AXIS: -1 DEGREES
EKG VENTRICULAR RATE: 89 BPM
EOSINOPHIL # BLD: 0.3 K/UL (ref 0–0.8)
EOSINOPHIL NFR BLD: 5 % (ref 0.5–7.8)
ERYTHROCYTE [DISTWIDTH] IN BLOOD BY AUTOMATED COUNT: 13.2 % (ref 11.9–14.6)
GLOBULIN SER CALC-MCNC: 3.4 G/DL (ref 2.3–3.5)
GLUCOSE SERPL-MCNC: 106 MG/DL (ref 70–99)
HCT VFR BLD AUTO: 40.2 % (ref 41.1–50.3)
HGB BLD-MCNC: 13.7 G/DL (ref 13.6–17.2)
IMM GRANULOCYTES # BLD AUTO: 0 K/UL (ref 0–0.5)
IMM GRANULOCYTES NFR BLD AUTO: 0 % (ref 0–5)
LYMPHOCYTES # BLD: 1.3 K/UL (ref 0.5–4.6)
LYMPHOCYTES NFR BLD: 21 % (ref 13–44)
MCH RBC QN AUTO: 33.7 PG (ref 26.1–32.9)
MCHC RBC AUTO-ENTMCNC: 34.1 G/DL (ref 31.4–35)
MCV RBC AUTO: 98.8 FL (ref 82–102)
MONOCYTES # BLD: 0.9 K/UL (ref 0.1–1.3)
MONOCYTES NFR BLD: 15 % (ref 4–12)
NEUTS SEG # BLD: 3.5 K/UL (ref 1.7–8.2)
NEUTS SEG NFR BLD: 58 % (ref 43–78)
NRBC # BLD: 0 K/UL (ref 0–0.2)
PLATELET # BLD AUTO: 219 K/UL (ref 150–450)
PMV BLD AUTO: 8.5 FL (ref 9.4–12.3)
POTASSIUM SERPL-SCNC: 3.6 MMOL/L (ref 3.5–5.1)
PROT SERPL-MCNC: 7 G/DL (ref 6.3–8.2)
RBC # BLD AUTO: 4.07 M/UL (ref 4.23–5.6)
SODIUM SERPL-SCNC: 140 MMOL/L (ref 136–145)
TROPONIN T SERPL HS-MCNC: 12 NG/L (ref 0–22)
TROPONIN T SERPL HS-MCNC: 13 NG/L (ref 0–22)
WBC # BLD AUTO: 6 K/UL (ref 4.3–11.1)

## 2024-12-27 PROCEDURE — 84484 ASSAY OF TROPONIN QUANT: CPT

## 2024-12-27 PROCEDURE — 93005 ELECTROCARDIOGRAM TRACING: CPT | Performed by: STUDENT IN AN ORGANIZED HEALTH CARE EDUCATION/TRAINING PROGRAM

## 2024-12-27 PROCEDURE — 94640 AIRWAY INHALATION TREATMENT: CPT

## 2024-12-27 PROCEDURE — 6360000002 HC RX W HCPCS: Performed by: STUDENT IN AN ORGANIZED HEALTH CARE EDUCATION/TRAINING PROGRAM

## 2024-12-27 PROCEDURE — 99285 EMERGENCY DEPT VISIT HI MDM: CPT

## 2024-12-27 PROCEDURE — 6370000000 HC RX 637 (ALT 250 FOR IP): Performed by: STUDENT IN AN ORGANIZED HEALTH CARE EDUCATION/TRAINING PROGRAM

## 2024-12-27 PROCEDURE — 2500000003 HC RX 250 WO HCPCS: Performed by: STUDENT IN AN ORGANIZED HEALTH CARE EDUCATION/TRAINING PROGRAM

## 2024-12-27 PROCEDURE — 96375 TX/PRO/DX INJ NEW DRUG ADDON: CPT

## 2024-12-27 PROCEDURE — 36415 COLL VENOUS BLD VENIPUNCTURE: CPT

## 2024-12-27 PROCEDURE — 71046 X-RAY EXAM CHEST 2 VIEWS: CPT

## 2024-12-27 PROCEDURE — 80053 COMPREHEN METABOLIC PANEL: CPT

## 2024-12-27 PROCEDURE — 85025 COMPLETE CBC W/AUTO DIFF WBC: CPT

## 2024-12-27 PROCEDURE — 96374 THER/PROPH/DIAG INJ IV PUSH: CPT

## 2024-12-27 RX ORDER — KETOROLAC TROMETHAMINE 15 MG/ML
15 INJECTION, SOLUTION INTRAMUSCULAR; INTRAVENOUS ONCE
Status: COMPLETED | OUTPATIENT
Start: 2024-12-27 | End: 2024-12-27

## 2024-12-27 RX ORDER — IPRATROPIUM BROMIDE AND ALBUTEROL SULFATE 2.5; .5 MG/3ML; MG/3ML
1 SOLUTION RESPIRATORY (INHALATION)
Status: COMPLETED | OUTPATIENT
Start: 2024-12-27 | End: 2024-12-27

## 2024-12-27 RX ORDER — PREDNISONE 20 MG/1
40 TABLET ORAL DAILY
Qty: 8 TABLET | Refills: 0 | Status: SHIPPED | OUTPATIENT
Start: 2024-12-27 | End: 2024-12-31

## 2024-12-27 RX ADMIN — IPRATROPIUM BROMIDE AND ALBUTEROL SULFATE 1 DOSE: .5; 3 SOLUTION RESPIRATORY (INHALATION) at 08:51

## 2024-12-27 RX ADMIN — WATER 125 MG: 1 INJECTION INTRAMUSCULAR; INTRAVENOUS; SUBCUTANEOUS at 08:57

## 2024-12-27 RX ADMIN — KETOROLAC TROMETHAMINE 15 MG: 15 INJECTION, SOLUTION INTRAMUSCULAR; INTRAVENOUS at 08:56

## 2024-12-27 ASSESSMENT — PAIN SCALES - GENERAL
PAINLEVEL_OUTOF10: 6
PAINLEVEL_OUTOF10: 7

## 2024-12-27 ASSESSMENT — PAIN - FUNCTIONAL ASSESSMENT: PAIN_FUNCTIONAL_ASSESSMENT: 0-10

## 2024-12-27 ASSESSMENT — LIFESTYLE VARIABLES
HOW OFTEN DO YOU HAVE A DRINK CONTAINING ALCOHOL: MONTHLY OR LESS
HOW MANY STANDARD DRINKS CONTAINING ALCOHOL DO YOU HAVE ON A TYPICAL DAY: 1 OR 2

## 2024-12-27 ASSESSMENT — PAIN DESCRIPTION - LOCATION: LOCATION: CHEST

## 2024-12-27 ASSESSMENT — PAIN DESCRIPTION - ORIENTATION: ORIENTATION: MID

## 2024-12-27 ASSESSMENT — PAIN DESCRIPTION - DESCRIPTORS: DESCRIPTORS: ACHING

## 2024-12-27 NOTE — ED TRIAGE NOTES
Patient reports he has had increased SOB and wheezing since yesterday.    Patient reports taking three breathing treatments over the couple hours without relief.    Patient denies any recent fevers.     Audible wheezing heard and oxygen levels 95%+ at this time.

## 2024-12-27 NOTE — ED NOTES
Patient mobility status  with no difficulty.     I have reviewed discharge instructions with the patient.  The patient verbalized understanding.    Patient left ED via Discharge Method: ambulatory to Home with  self .    Opportunity for questions and clarification provided.     Patient given 1 script.           Marleni Dangelo RN  12/27/24 0927     Cheek Interpolation Flap Text: A decision was made to reconstruct the defect utilizing an interpolation axial flap and a staged reconstruction.  A telfa template was made of the defect.  This telfa template was then used to outline the Cheek Interpolation flap.  The donor area for the pedicle flap was then injected with anesthesia.  The flap was excised through the skin and subcutaneous tissue down to the layer of the underlying musculature.  The interpolation flap was carefully excised within this deep plane to maintain its blood supply.  The edges of the donor site were undermined.   The donor site was closed in a primary fashion.  The pedicle was then rotated into position and sutured.  Once the tube was sutured into place, adequate blood supply was confirmed with blanching and refill.  The pedicle was then wrapped with xeroform gauze and dressed appropriately with a telfa and gauze bandage to ensure continued blood supply and protect the attached pedicle.

## 2024-12-27 NOTE — ED PROVIDER NOTES
findings in the chest         Electronically signed by Yayo Chambers                   No results for input(s): \"COVID19\" in the last 72 hours.     Voice dictation software was used during the making of this note.  This software is not perfect and grammatical and other typographical errors may be present.  This note has not been completely proofread for errors.     Sher Kimbrough, DO  12/27/24 0978

## 2024-12-27 NOTE — DISCHARGE INSTRUCTIONS
Continue taking Mucinex as needed for chest congestion.  Take steroids as prescribed.  Use your inhalers as needed for wheezing or shortness of breath.  Follow-up with primary care physician within a week.  Return to the ER for worsening worrisome symptoms

## 2025-01-23 ENCOUNTER — APPOINTMENT (OUTPATIENT)
Dept: CT IMAGING | Age: 40
End: 2025-01-23

## 2025-01-23 ENCOUNTER — HOSPITAL ENCOUNTER (EMERGENCY)
Age: 40
Discharge: HOME | End: 2025-01-23

## 2025-01-23 VITALS
HEART RATE: 87 BPM | OXYGEN SATURATION: 99 % | BODY MASS INDEX: 42.8 KG/M2 | WEIGHT: 289 LBS | HEIGHT: 69 IN | TEMPERATURE: 98.3 F | RESPIRATION RATE: 16 BRPM | SYSTOLIC BLOOD PRESSURE: 138 MMHG | DIASTOLIC BLOOD PRESSURE: 92 MMHG

## 2025-01-23 DIAGNOSIS — R10.84 GENERALIZED ABDOMINAL PAIN: ICD-10-CM

## 2025-01-23 DIAGNOSIS — K59.00 CONSTIPATION, UNSPECIFIED CONSTIPATION TYPE: Primary | ICD-10-CM

## 2025-01-23 LAB
ALBUMIN SERPL-MCNC: 4.1 G/DL (ref 3.5–5)
ALBUMIN/GLOB SERPL: 1.2 (ref 1–1.9)
ALP SERPL-CCNC: 57 U/L (ref 40–129)
ALT SERPL-CCNC: 26 U/L (ref 8–55)
ANION GAP SERPL CALC-SCNC: 11 MMOL/L (ref 7–16)
AST SERPL-CCNC: 35 U/L (ref 15–37)
BASOPHILS # BLD: 0.07 K/UL (ref 0–0.2)
BASOPHILS NFR BLD: 1 % (ref 0–2)
BILIRUB SERPL-MCNC: 0.9 MG/DL (ref 0–1.2)
BILIRUB UR QL: NEGATIVE
BUN SERPL-MCNC: 8 MG/DL (ref 6–23)
CALCIUM SERPL-MCNC: 9.4 MG/DL (ref 8.8–10.2)
CHLORIDE SERPL-SCNC: 101 MMOL/L (ref 98–107)
CO2 SERPL-SCNC: 27 MMOL/L (ref 20–29)
CREAT SERPL-MCNC: 1.01 MG/DL (ref 0.8–1.3)
DIFFERENTIAL METHOD BLD: ABNORMAL
EOSINOPHIL # BLD: 0.47 K/UL (ref 0–0.8)
EOSINOPHIL NFR BLD: 6.6 % (ref 0.5–7.8)
ERYTHROCYTE [DISTWIDTH] IN BLOOD BY AUTOMATED COUNT: 12.9 % (ref 11.9–14.6)
GLOBULIN SER CALC-MCNC: 3.5 G/DL (ref 2.3–3.5)
GLUCOSE SERPL-MCNC: 110 MG/DL (ref 70–99)
GLUCOSE UR QL STRIP.AUTO: NEGATIVE MG/DL
HCT VFR BLD AUTO: 45.1 % (ref 41.1–50.3)
HGB BLD-MCNC: 15 G/DL (ref 13.6–17.2)
IMM GRANULOCYTES # BLD AUTO: 0.01 K/UL (ref 0–0.5)
IMM GRANULOCYTES NFR BLD AUTO: 0.1 % (ref 0–5)
KETONES UR-MCNC: NEGATIVE MG/DL
LEUKOCYTE ESTERASE UR QL STRIP: NEGATIVE
LIPASE SERPL-CCNC: 32 U/L (ref 13–60)
LYMPHOCYTES # BLD: 2.65 K/UL (ref 0.5–4.6)
LYMPHOCYTES NFR BLD: 37.3 % (ref 13–44)
MCH RBC QN AUTO: 33.3 PG (ref 26.1–32.9)
MCHC RBC AUTO-ENTMCNC: 33.3 G/DL (ref 31.4–35)
MCV RBC AUTO: 100 FL (ref 82–102)
MONOCYTES # BLD: 0.86 K/UL (ref 0.1–1.3)
MONOCYTES NFR BLD: 12.1 % (ref 4–12)
NEUTS SEG # BLD: 3.05 K/UL (ref 1.7–8.2)
NEUTS SEG NFR BLD: 42.9 % (ref 43–78)
NITRITE UR QL: NEGATIVE
NRBC # BLD: 0 K/UL (ref 0–0.2)
PH UR: 6.5 (ref 5–9)
PLATELET # BLD AUTO: 384 K/UL (ref 150–450)
PMV BLD AUTO: 8.9 FL (ref 9.4–12.3)
POTASSIUM SERPL-SCNC: 4.2 MMOL/L (ref 3.5–5.1)
PROT SERPL-MCNC: 7.6 G/DL (ref 6.3–8.2)
PROT UR QL: NEGATIVE MG/DL
RBC # BLD AUTO: 4.51 M/UL (ref 4.23–5.6)
RBC # UR STRIP: NEGATIVE
SERVICE CMNT-IMP: ABNORMAL
SODIUM SERPL-SCNC: 139 MMOL/L (ref 136–145)
SP GR UR: 1.02 (ref 1–1.02)
UROBILINOGEN UR QL: 0.2 EU/DL (ref 0.2–1)
WBC # BLD AUTO: 7.1 K/UL (ref 4.3–11.1)

## 2025-01-23 PROCEDURE — 6360000002 HC RX W HCPCS

## 2025-01-23 PROCEDURE — 96374 THER/PROPH/DIAG INJ IV PUSH: CPT

## 2025-01-23 PROCEDURE — 80053 COMPREHEN METABOLIC PANEL: CPT

## 2025-01-23 PROCEDURE — 6360000004 HC RX CONTRAST MEDICATION

## 2025-01-23 PROCEDURE — 99285 EMERGENCY DEPT VISIT HI MDM: CPT

## 2025-01-23 PROCEDURE — 81003 URINALYSIS AUTO W/O SCOPE: CPT

## 2025-01-23 PROCEDURE — 2580000003 HC RX 258

## 2025-01-23 PROCEDURE — 85025 COMPLETE CBC W/AUTO DIFF WBC: CPT

## 2025-01-23 PROCEDURE — 74177 CT ABD & PELVIS W/CONTRAST: CPT

## 2025-01-23 PROCEDURE — 96375 TX/PRO/DX INJ NEW DRUG ADDON: CPT

## 2025-01-23 PROCEDURE — 83690 ASSAY OF LIPASE: CPT

## 2025-01-23 RX ORDER — MORPHINE SULFATE 4 MG/ML
4 INJECTION, SOLUTION INTRAMUSCULAR; INTRAVENOUS
Status: COMPLETED | OUTPATIENT
Start: 2025-01-23 | End: 2025-01-23

## 2025-01-23 RX ORDER — 0.9 % SODIUM CHLORIDE 0.9 %
1000 INTRAVENOUS SOLUTION INTRAVENOUS ONCE
Status: COMPLETED | OUTPATIENT
Start: 2025-01-23 | End: 2025-01-23

## 2025-01-23 RX ORDER — DOCUSATE SODIUM 100 MG/1
100 CAPSULE, LIQUID FILLED ORAL 2 TIMES DAILY
Qty: 60 CAPSULE | Refills: 0 | Status: SHIPPED | OUTPATIENT
Start: 2025-01-23 | End: 2025-02-22

## 2025-01-23 RX ORDER — POLYETHYLENE GLYCOL 3350 17 G/17G
17 POWDER, FOR SOLUTION ORAL DAILY
Qty: 1530 G | Refills: 0 | Status: SHIPPED | OUTPATIENT
Start: 2025-01-23 | End: 2025-02-22

## 2025-01-23 RX ORDER — IOPAMIDOL 755 MG/ML
100 INJECTION, SOLUTION INTRAVASCULAR
Status: COMPLETED | OUTPATIENT
Start: 2025-01-23 | End: 2025-01-23

## 2025-01-23 RX ORDER — ONDANSETRON 2 MG/ML
4 INJECTION INTRAMUSCULAR; INTRAVENOUS ONCE
Status: COMPLETED | OUTPATIENT
Start: 2025-01-23 | End: 2025-01-23

## 2025-01-23 RX ADMIN — IOPAMIDOL 100 ML: 755 INJECTION, SOLUTION INTRAVENOUS at 06:32

## 2025-01-23 RX ADMIN — MORPHINE SULFATE 4 MG: 4 INJECTION, SOLUTION INTRAMUSCULAR; INTRAVENOUS at 05:31

## 2025-01-23 RX ADMIN — ONDANSETRON 4 MG: 2 INJECTION, SOLUTION INTRAMUSCULAR; INTRAVENOUS at 05:31

## 2025-01-23 RX ADMIN — SODIUM CHLORIDE 1000 ML: 9 INJECTION, SOLUTION INTRAVENOUS at 05:32

## 2025-01-23 ASSESSMENT — PAIN DESCRIPTION - LOCATION: LOCATION: ABDOMEN

## 2025-01-23 ASSESSMENT — ENCOUNTER SYMPTOMS
VOMITING: 0
NAUSEA: 0
CONSTIPATION: 0
ABDOMINAL PAIN: 1
CHEST TIGHTNESS: 0
SHORTNESS OF BREATH: 0
DIARRHEA: 0

## 2025-01-23 ASSESSMENT — LIFESTYLE VARIABLES
HOW OFTEN DO YOU HAVE A DRINK CONTAINING ALCOHOL: 4 OR MORE TIMES A WEEK
HOW MANY STANDARD DRINKS CONTAINING ALCOHOL DO YOU HAVE ON A TYPICAL DAY: 1 OR 2

## 2025-01-23 ASSESSMENT — PAIN DESCRIPTION - ORIENTATION: ORIENTATION: RIGHT;UPPER;LOWER

## 2025-01-23 ASSESSMENT — PAIN - FUNCTIONAL ASSESSMENT: PAIN_FUNCTIONAL_ASSESSMENT: 0-10

## 2025-01-23 ASSESSMENT — PAIN SCALES - GENERAL: PAINLEVEL_OUTOF10: 10

## 2025-01-23 ASSESSMENT — PAIN DESCRIPTION - DESCRIPTORS: DESCRIPTORS: SHARP

## 2025-01-23 NOTE — ED NOTES
Did discuss with charge and Supervisor. Pt is in exam rm number 1 with pulse ox on. Wife is at his side and she is aware of sx's to look for for decrease oxygenation. Pt was given IV Morphine     Lucita, VANI Ross  01/23/25 3918

## 2025-01-23 NOTE — DISCHARGE INSTRUCTIONS
Start Colace and MiraLAX daily until you are having normal bowel movements.  You may also take magnesium citrate which you buy over-the-counter at the pharmacy.  Follow-up with general surgery.  If you continue to have abdominal pain, start vomiting, or have fevers please return to the emergency department.

## 2025-01-23 NOTE — ED NOTES
Patient mobility status  with no difficulty.     I have reviewed discharge instructions with the patient.  The patient verbalized understanding.    Patient left ED via Discharge Method: ambulatory to Home with Spouse.    Opportunity for questions and clarification provided.     Patient given 2 scripts.           Cody Landrum RN  01/23/25 0872

## 2025-01-23 NOTE — ED PROVIDER NOTES
Emergency Department Provider Note       PCP: Lindsey, Pcp   Age: 39 y.o.   Sex: male     DISPOSITION Decision To Discharge 01/23/2025 07:19:03 AM    ICD-10-CM    1. Constipation, unspecified constipation type  K59.00 LTAC, located within [facility]      2. Generalized abdominal pain  R10.84 [facility], located within [facility]          Medical Decision Making     39-year-old male presents with constipation and generalized abdominal pain.  Mild right side abdominal tenderness.  Labs stable.  CT concerning for enteritis versus possible early bowel obstruction.  Patient does not fit clinical picture of having enteritis.  He has had no nausea vomiting or diarrhea.  Discussed findings with patient.  He elected to be discharged with bowel regimen.  Given strict return precautions.     1 or more acute illnesses that pose a threat to life or bodily function.   Prescription drug management performed.  Patient was discharged risks and benefits of hospitalization were considered.  Shared medical decision making was utilized in creating the patients health plan today.  I independently ordered and reviewed each unique test.    I reviewed external records: ED visit note from a different ED.      ED cardiac monitoring rhythm strip was ordered and interpreted:  sinus rhythm, no evidence of an arrhythmia  ST Segments:Normal ST segments - NO STEMI   Rate: 86  I interpreted the CT Scan concerning for early bowel obstruction.              History     39-year-old male presents with right-sided abdominal pain rating to his back.  He states has been intermittent over the last 3 days and worsening.  He does drink around 1 pint of liquor daily.  He has had no associated vomiting or diarrhea. He feels that he may be constipated, took a laxative.  Last bowel movement 3 days ago.  Denies fevers.  He had a similar episode in May 2024 without acute findings.    The history is provided by the patient.       ROS     Review of  Ofirmev x 1 then Tylenol 650 Q8prn

## 2025-01-23 NOTE — ED TRIAGE NOTES
Pt brought in by [facility] from home with complaint of RUQ pain that has been radiating into his bag. Painful to touch. Also reports no urination today.

## 2025-01-24 NOTE — ED NOTES
Attempt made by this RN on 1/25 to contact pt for follow up call post discharge from the ED. Unable to reach pt.      Jaimie Mauro, VANI  01/24/25 [id number]

## 2025-02-03 ENCOUNTER — HOSPITAL ENCOUNTER (EMERGENCY)
Age: 40
Discharge: HOME | End: 2025-02-03
Attending: EMERGENCY MEDICINE

## 2025-02-03 ENCOUNTER — APPOINTMENT (OUTPATIENT)
Dept: CT IMAGING | Age: 40
End: 2025-02-03

## 2025-02-03 ENCOUNTER — APPOINTMENT (OUTPATIENT)
Dept: GENERAL RADIOLOGY | Age: 40
End: 2025-02-03

## 2025-02-03 VITALS
HEIGHT: 69 IN | SYSTOLIC BLOOD PRESSURE: 143 MMHG | HEART RATE: 94 BPM | TEMPERATURE: 97.6 F | DIASTOLIC BLOOD PRESSURE: 83 MMHG | BODY MASS INDEX: 43.4 KG/M2 | OXYGEN SATURATION: 98 % | RESPIRATION RATE: 17 BRPM | WEIGHT: 293 LBS

## 2025-02-03 DIAGNOSIS — M54.31 SCIATICA OF RIGHT SIDE: Primary | ICD-10-CM

## 2025-02-03 DIAGNOSIS — M25.711 OSTEOPHYTE OF RIGHT SHOULDER: ICD-10-CM

## 2025-02-03 DIAGNOSIS — M54.12 CERVICAL RADICULOPATHY: ICD-10-CM

## 2025-02-03 PROCEDURE — 99284 EMERGENCY DEPT VISIT MOD MDM: CPT

## 2025-02-03 PROCEDURE — 72125 CT NECK SPINE W/O DYE: CPT

## 2025-02-03 PROCEDURE — 72100 X-RAY EXAM L-S SPINE 2/3 VWS: CPT

## 2025-02-03 PROCEDURE — 73030 X-RAY EXAM OF SHOULDER: CPT

## 2025-02-03 PROCEDURE — 96372 THER/PROPH/DIAG INJ SC/IM: CPT

## 2025-02-03 PROCEDURE — 6360000002 HC RX W HCPCS: Performed by: EMERGENCY MEDICINE

## 2025-02-03 PROCEDURE — 73502 X-RAY EXAM HIP UNI 2-3 VIEWS: CPT

## 2025-02-03 RX ORDER — DEXAMETHASONE SODIUM PHOSPHATE 10 MG/ML
8 INJECTION INTRAMUSCULAR; INTRAVENOUS
Status: COMPLETED | OUTPATIENT
Start: 2025-02-03 | End: 2025-02-03

## 2025-02-03 RX ORDER — DEXAMETHASONE 6 MG/1
6 TABLET ORAL
Qty: 5 TABLET | Refills: 0 | Status: SHIPPED | OUTPATIENT
Start: 2025-02-03 | End: 2025-02-03

## 2025-02-03 RX ORDER — KETOROLAC TROMETHAMINE 10 MG/1
10 TABLET, FILM COATED ORAL EVERY 6 HOURS PRN
Qty: 15 TABLET | Refills: 0 | Status: SHIPPED | OUTPATIENT
Start: 2025-02-03

## 2025-02-03 RX ORDER — KETOROLAC TROMETHAMINE 10 MG/1
10 TABLET, FILM COATED ORAL EVERY 6 HOURS PRN
Qty: 15 TABLET | Refills: 0 | Status: SHIPPED | OUTPATIENT
Start: 2025-02-03 | End: 2025-02-03

## 2025-02-03 RX ORDER — KETOROLAC TROMETHAMINE 30 MG/ML
30 INJECTION, SOLUTION INTRAMUSCULAR; INTRAVENOUS
Status: COMPLETED | OUTPATIENT
Start: 2025-02-03 | End: 2025-02-03

## 2025-02-03 RX ORDER — DEXAMETHASONE 6 MG/1
6 TABLET ORAL
Qty: 5 TABLET | Refills: 0 | Status: SHIPPED | OUTPATIENT
Start: 2025-02-03 | End: 2025-02-08

## 2025-02-03 RX ADMIN — KETOROLAC TROMETHAMINE 30 MG: 30 INJECTION, SOLUTION INTRAMUSCULAR at 12:19

## 2025-02-03 RX ADMIN — DEXAMETHASONE SODIUM PHOSPHATE 8 MG: 10 INJECTION INTRAMUSCULAR; INTRAVENOUS at 12:20

## 2025-02-03 ASSESSMENT — ENCOUNTER SYMPTOMS
COLOR CHANGE: 0
ABDOMINAL SWELLING: 0
NAUSEA: 0
BOWEL INCONTINENCE: 0
SHORTNESS OF BREATH: 0
DIARRHEA: 0
COUGH: 0
BACK PAIN: 1
VOMITING: 0
CONSTIPATION: 0
ABDOMINAL PAIN: 0

## 2025-02-03 ASSESSMENT — PAIN DESCRIPTION - DESCRIPTORS
DESCRIPTORS: ACHING
DESCRIPTORS: SHARP

## 2025-02-03 ASSESSMENT — PAIN DESCRIPTION - ORIENTATION
ORIENTATION: MID
ORIENTATION: RIGHT

## 2025-02-03 ASSESSMENT — PAIN SCALES - GENERAL
PAINLEVEL_OUTOF10: 9
PAINLEVEL_OUTOF10: 9

## 2025-02-03 ASSESSMENT — PAIN DESCRIPTION - LOCATION
LOCATION: GROIN;HIP
LOCATION: BACK;LEG

## 2025-02-03 ASSESSMENT — PAIN - FUNCTIONAL ASSESSMENT: PAIN_FUNCTIONAL_ASSESSMENT: 0-10

## 2025-02-03 NOTE — ED TRIAGE NOTES
Pt ambulatory to ED w/ cc of R shoulder pain that radiates up to neck and face today and R back pain that radiates down L leg and into groin that started yesterday. Pt denies any known injury. Pt A&O x 4

## 2025-02-03 NOTE — ED PROVIDER NOTES
arthropathy.    Included abdominal soft tissues: Unremarkable      Impression    1. No acute fracture or dislocation in the lumbar spine.    2. Lumbar spine degenerative changes are predominated by facet arthropathy at  L4-L5 and L5-S1, as detailed above.      Electronically signed by PETRONA GUAJARDO   CT CERVICAL SPINE WO CONTRAST    Narrative       TECHNIQUE: CT CERVICAL SPINE WO CONTRAST Multiplanar, multisequence study.    INDICATION: neck pain for 1 day  COMPARISON: None available.  CONTRAST: None     FINDINGS:     Straight alignment. Slight spondylosis at C5-7. No compression fracture. Facets  appear well located. Mastoid air cells appear clear. No evidence of fracture or  spondylolisthesis. No prevertebral soft tissue swelling. Clear appearing lung  apices.    C4-5: Mild right foraminal stenosis.  C5-6: Small disc bulge. Mild right foraminal stenosis.    Surrounding soft tissues appear otherwise unremarkable.      Impression    1.  C5-6: Small disc bulge. Mild right foraminal stenosis.  2.  C4-5: Mild right foraminal stenosis.  3.  Slight spondylosis C5-7.               Electronically signed by BRADLEY MCFADDEN         CT CERVICAL SPINE WO CONTRAST   Final Result      1.  C5-6: Small disc bulge. Mild right foraminal stenosis.   2.  C4-5: Mild right foraminal stenosis.   3.  Slight spondylosis C5-7.                  Electronically signed by BRADLEY MCFADDEN      XR SHOULDER RIGHT (MIN 2 VIEWS)   Final Result   Addendum (preliminary) 1 of 1   Addendum:       There was a dictation system error at the time of initial interpretation.      This will also serve as an interpretation for 3 views of the right    shoulder.   History is right shoulder pain radiating to left face and neck.      The imaged portions of the right lung are clear. There is an osteophyte   projecting superiorly from the right acromioclavicular joint, with normal   acromioclavicular joint alignment. Possible os acromiale. There is minimal

## 2025-02-06 ENCOUNTER — HOSPITAL ENCOUNTER (EMERGENCY)
Age: 40
Discharge: HOME | End: 2025-02-06

## 2025-02-06 VITALS
WEIGHT: 293 LBS | RESPIRATION RATE: 20 BRPM | OXYGEN SATURATION: 98 % | HEIGHT: 69 IN | DIASTOLIC BLOOD PRESSURE: 78 MMHG | SYSTOLIC BLOOD PRESSURE: 139 MMHG | TEMPERATURE: 98.1 F | HEART RATE: 70 BPM | BODY MASS INDEX: 43.4 KG/M2

## 2025-02-06 DIAGNOSIS — M54.16 LUMBAR BACK PAIN WITH RADICULOPATHY AFFECTING RIGHT LOWER EXTREMITY: Primary | ICD-10-CM

## 2025-02-06 PROCEDURE — 99283 EMERGENCY DEPT VISIT LOW MDM: CPT

## 2025-02-06 PROCEDURE — 6370000000 HC RX 637 (ALT 250 FOR IP)

## 2025-02-06 RX ORDER — HYDROCODONE BITARTRATE AND ACETAMINOPHEN 7.5; 325 MG/1; MG/1
1 TABLET ORAL EVERY 6 HOURS PRN
Qty: 12 TABLET | Refills: 0 | Status: SHIPPED | OUTPATIENT
Start: 2025-02-06 | End: 2025-02-09

## 2025-02-06 RX ORDER — OXYCODONE AND ACETAMINOPHEN 10; 325 MG/1; MG/1
1 TABLET ORAL
Status: COMPLETED | OUTPATIENT
Start: 2025-02-06 | End: 2025-02-06

## 2025-02-06 RX ORDER — CYCLOBENZAPRINE HCL 10 MG
10 TABLET ORAL 3 TIMES DAILY PRN
Qty: 21 TABLET | Refills: 0 | Status: SHIPPED | OUTPATIENT
Start: 2025-02-06 | End: 2025-02-14

## 2025-02-06 RX ORDER — CYCLOBENZAPRINE HCL 10 MG
10 TABLET ORAL
Status: COMPLETED | OUTPATIENT
Start: 2025-02-06 | End: 2025-02-06

## 2025-02-06 RX ADMIN — CYCLOBENZAPRINE HYDROCHLORIDE 10 MG: 10 TABLET, FILM COATED ORAL at 21:57

## 2025-02-06 RX ADMIN — OXYCODONE AND ACETAMINOPHEN 1 TABLET: 10; 325 TABLET ORAL at 21:57

## 2025-02-06 ASSESSMENT — PAIN DESCRIPTION - LOCATION
LOCATION: GROIN;HIP
LOCATION: GROIN;HIP

## 2025-02-06 ASSESSMENT — PAIN DESCRIPTION - PAIN TYPE: TYPE: ACUTE PAIN

## 2025-02-06 ASSESSMENT — PAIN SCALES - GENERAL
PAINLEVEL_OUTOF10: 9

## 2025-02-06 ASSESSMENT — PAIN - FUNCTIONAL ASSESSMENT: PAIN_FUNCTIONAL_ASSESSMENT: 0-10

## 2025-02-06 ASSESSMENT — PAIN DESCRIPTION - ORIENTATION
ORIENTATION: RIGHT
ORIENTATION: RIGHT

## 2025-02-06 ASSESSMENT — PAIN DESCRIPTION - DESCRIPTORS
DESCRIPTORS: SHARP;SHOOTING
DESCRIPTORS: THROBBING;SHARP
DESCRIPTORS: SHARP;SHOOTING

## 2025-02-06 ASSESSMENT — PAIN DESCRIPTION - FREQUENCY: FREQUENCY: CONTINUOUS

## 2025-02-07 NOTE — ED TRIAGE NOTES
Presents from home via POV. I was just here Monday and seen for swelling on my spine. I haven't been able to go back to work. My whole right side is tingling and the pain in my hip goes to my grown and down to my foot. He was given steroids and Tramadol, still taking them both but still having pain.

## 2025-02-07 NOTE — ED PROVIDER NOTES
clear.   Eyes:      Conjunctiva/sclera: Conjunctivae normal.   Cardiovascular:      Rate and Rhythm: Normal rate and regular rhythm.      Pulses:           Dorsalis pedis pulses are 2+ on the right side and 2+ on the left side.   Pulmonary:      Effort: Pulmonary effort is normal. No respiratory distress.   Abdominal:      Tenderness: There is no abdominal tenderness. There is no right CVA tenderness, left CVA tenderness, guarding or rebound.   Musculoskeletal:      Cervical back: Normal range of motion. No tenderness or bony tenderness. No pain with movement. Normal range of motion.      Thoracic back: No spasms, tenderness or bony tenderness. Normal range of motion.      Lumbar back: Tenderness present. No edema, deformity, signs of trauma, lacerations or bony tenderness. Positive right straight leg raise test. Negative left straight leg raise test.        Back:       Right lower leg: No edema.      Left lower leg: No edema.      Comments: Tender to palpation in the lumbar back.  Tenderness in the right glued and SI joint region as well.  Positive straight leg raise.  2+ palpable dorsalis pedis pulses bilaterally, brisk capillary refill, no pallor or coolness to the touch.  Ambulatory.  No saddle anesthesia.   Neurological:      General: No focal deficit present.      Mental Status: He is alert and oriented to person, place, and time.      Cranial Nerves: No cranial nerve deficit.      Sensory: No sensory deficit.      Motor: No weakness.      Coordination: Coordination normal.      Gait: Gait normal.   Psychiatric:         Mood and Affect: Mood normal.         Behavior: Behavior normal.          Procedures     Procedures    Orders placed during this emergency department visit:   No orders of the defined types were placed in this encounter.       Medications given during this emergency department visit:     Medications   oxyCODONE-acetaminophen (PERCOCET)  MG per tablet 1 tablet (1 tablet Oral Given 2/6/25  (PROVENTIL) (2.5 MG/3ML) 0.083% NEBULIZER SOLUTION    Inhale 2.5 mg into the lungs every 6 hours as needed    AMLODIPINE (NORVASC) 10 MG TABLET    Take 10 mg by mouth daily    BUDESONIDE (PULMICORT) 0.5 MG/2ML NEBULIZER SUSPENSION    Inhale 500 mcg into the lungs 2 times daily    DEXAMETHASONE (DECADRON) 6 MG TABLET    Take 1 tablet by mouth daily (with breakfast) for 5 days    DOCUSATE SODIUM (COLACE) 100 MG CAPSULE    Take 1 capsule by mouth 2 times daily    HYOSCYAMINE SULFATE SL (LEVSIN/SL) 0.125 MG SUBL    Place 1 tablet under the tongue 3 times daily as needed (abdominal cramping or diarrhea)    KETOROLAC (TORADOL) 10 MG TABLET    Take 1 tablet by mouth every 6 hours as needed for Pain    METFORMIN (GLUCOPHAGE) 500 MG TABLET    Take 250 mg by mouth 2 times daily (with meals)    ONDANSETRON (ZOFRAN) 4 MG TABLET    Take 1 tablet by mouth 3 times daily as needed for Nausea or Vomiting    POLYETHYLENE GLYCOL (GLYCOLAX) 17 GM/SCOOP POWDER    Take 17 g by mouth daily    PREDNISONE (DELTASONE) 20 MG TABLET    Take 40 mg by mouth daily (with breakfast)    TRAZODONE (DESYREL) 100 MG TABLET    Take 100 mg by mouth        Results from this emergency department visit:      No results found for any visits on 02/06/25.      No orders to display                No results for input(s): \"COVID19\" in the last 72 hours.     Voice dictation software was used during the making of this note.  This software is not perfect and grammatical and other typographical errors may be present.  This note has not been completely proofread for errors.     Teresa Carmona PA-C  02/06/25 [id number]

## 2025-02-07 NOTE — DISCHARGE INSTRUCTIONS
Continue taking the anti-inflammatories and steroids at home.  You can take the pain medication as needed as well as the muscle relaxer.  Continue to perform gentle range of motion, refrain from any heavy lifting or strenuous exercise.  Follow-up with [facility] at your scheduled visit next week.    Return to the ED with any worsening pain, inability to urinate, loss of bowel function, weakness in your legs, or any new or worsening symptoms.

## 2025-02-13 ENCOUNTER — HOSPITAL ENCOUNTER (INPATIENT)
Age: 40
LOS: 1 days | Discharge: HOME HEALTH | DRG: 439 | End: 2025-02-15
Attending: EMERGENCY MEDICINE | Admitting: INTERNAL MEDICINE

## 2025-02-13 DIAGNOSIS — K85.20 ALCOHOL-INDUCED ACUTE PANCREATITIS, UNSPECIFIED COMPLICATION STATUS: Primary | ICD-10-CM

## 2025-02-13 LAB
BASOPHILS # BLD: 0.01 K/UL (ref 0–0.2)
BASOPHILS NFR BLD: 0.1 % (ref 0–2)
DIFFERENTIAL METHOD BLD: ABNORMAL
EOSINOPHIL # BLD: 0 K/UL (ref 0–0.8)
EOSINOPHIL NFR BLD: 0 % (ref 0.5–7.8)
ERYTHROCYTE [DISTWIDTH] IN BLOOD BY AUTOMATED COUNT: 13.2 % (ref 11.9–14.6)
HCT VFR BLD AUTO: 45.1 % (ref 41.1–50.3)
HGB BLD-MCNC: 16 G/DL (ref 13.6–17.2)
IMM GRANULOCYTES # BLD AUTO: 0.07 K/UL (ref 0–0.5)
IMM GRANULOCYTES NFR BLD AUTO: 0.6 % (ref 0–5)
LYMPHOCYTES # BLD: 2.04 K/UL (ref 0.5–4.6)
LYMPHOCYTES NFR BLD: 18.1 % (ref 13–44)
MCH RBC QN AUTO: 33.1 PG (ref 26.1–32.9)
MCHC RBC AUTO-ENTMCNC: 35.5 G/DL (ref 31.4–35)
MCV RBC AUTO: 93.4 FL (ref 82–102)
MONOCYTES # BLD: 1.42 K/UL (ref 0.1–1.3)
MONOCYTES NFR BLD: 12.6 % (ref 4–12)
NEUTS SEG # BLD: 7.74 K/UL (ref 1.7–8.2)
NEUTS SEG NFR BLD: 68.6 % (ref 43–78)
NRBC # BLD: 0 K/UL (ref 0–0.2)
PLATELET # BLD AUTO: 390 K/UL (ref 150–450)
PMV BLD AUTO: 8.6 FL (ref 9.4–12.3)
RBC # BLD AUTO: 4.83 M/UL (ref 4.23–5.6)
WBC # BLD AUTO: 11.3 K/UL (ref 4.3–11.1)

## 2025-02-13 PROCEDURE — 99285 EMERGENCY DEPT VISIT HI MDM: CPT

## 2025-02-13 PROCEDURE — 96374 THER/PROPH/DIAG INJ IV PUSH: CPT

## 2025-02-13 PROCEDURE — 6360000002 HC RX W HCPCS: Performed by: EMERGENCY MEDICINE

## 2025-02-13 PROCEDURE — 85025 COMPLETE CBC W/AUTO DIFF WBC: CPT

## 2025-02-13 PROCEDURE — 96375 TX/PRO/DX INJ NEW DRUG ADDON: CPT

## 2025-02-13 PROCEDURE — 2500000003 HC RX 250 WO HCPCS: Performed by: EMERGENCY MEDICINE

## 2025-02-13 PROCEDURE — 83605 ASSAY OF LACTIC ACID: CPT

## 2025-02-13 PROCEDURE — 80053 COMPREHEN METABOLIC PANEL: CPT

## 2025-02-13 PROCEDURE — 84484 ASSAY OF TROPONIN QUANT: CPT

## 2025-02-13 PROCEDURE — 83690 ASSAY OF LIPASE: CPT

## 2025-02-13 PROCEDURE — 2580000003 HC RX 258: Performed by: EMERGENCY MEDICINE

## 2025-02-13 RX ORDER — ONDANSETRON 2 MG/ML
4 INJECTION INTRAMUSCULAR; INTRAVENOUS
Status: COMPLETED | OUTPATIENT
Start: 2025-02-13 | End: 2025-02-13

## 2025-02-13 RX ORDER — HYDROMORPHONE HYDROCHLORIDE 1 MG/ML
1 INJECTION, SOLUTION INTRAMUSCULAR; INTRAVENOUS; SUBCUTANEOUS
Status: COMPLETED | OUTPATIENT
Start: 2025-02-13 | End: 2025-02-13

## 2025-02-13 RX ORDER — SODIUM CHLORIDE, SODIUM LACTATE, POTASSIUM CHLORIDE, AND CALCIUM CHLORIDE .6; .31; .03; .02 G/100ML; G/100ML; G/100ML; G/100ML
1000 INJECTION, SOLUTION INTRAVENOUS
Status: COMPLETED | OUTPATIENT
Start: 2025-02-13 | End: 2025-02-14

## 2025-02-13 RX ADMIN — SODIUM CHLORIDE 40 MG: 9 INJECTION INTRAMUSCULAR; INTRAVENOUS; SUBCUTANEOUS at 23:55

## 2025-02-13 RX ADMIN — HYDROMORPHONE HYDROCHLORIDE 1 MG: 1 INJECTION, SOLUTION INTRAMUSCULAR; INTRAVENOUS; SUBCUTANEOUS at 23:55

## 2025-02-13 RX ADMIN — ONDANSETRON 4 MG: 2 INJECTION, SOLUTION INTRAMUSCULAR; INTRAVENOUS at 23:55

## 2025-02-13 RX ADMIN — SODIUM CHLORIDE, POTASSIUM CHLORIDE, SODIUM LACTATE AND CALCIUM CHLORIDE 1000 ML: 600; 310; 30; 20 INJECTION, SOLUTION INTRAVENOUS at 23:56

## 2025-02-13 ASSESSMENT — PAIN DESCRIPTION - ORIENTATION
ORIENTATION: LEFT
ORIENTATION: LEFT

## 2025-02-13 ASSESSMENT — PAIN DESCRIPTION - LOCATION
LOCATION: ABDOMEN;CHEST
LOCATION: ABDOMEN;CHEST

## 2025-02-13 ASSESSMENT — PAIN SCALES - GENERAL
PAINLEVEL_OUTOF10: 10
PAINLEVEL_OUTOF10: 10

## 2025-02-13 ASSESSMENT — PAIN DESCRIPTION - FREQUENCY: FREQUENCY: CONTINUOUS

## 2025-02-13 ASSESSMENT — PAIN - FUNCTIONAL ASSESSMENT: PAIN_FUNCTIONAL_ASSESSMENT: 0-10

## 2025-02-13 ASSESSMENT — PAIN DESCRIPTION - PAIN TYPE: TYPE: ACUTE PAIN

## 2025-02-13 ASSESSMENT — PAIN DESCRIPTION - DESCRIPTORS
DESCRIPTORS: STABBING
DESCRIPTORS: SHARP

## 2025-02-14 ENCOUNTER — APPOINTMENT (OUTPATIENT)
Dept: CT IMAGING | Age: 40
DRG: 439 | End: 2025-02-14

## 2025-02-14 PROBLEM — K85.20 ALCOHOL-INDUCED ACUTE PANCREATITIS: Status: ACTIVE | Noted: 2025-02-14

## 2025-02-14 PROBLEM — K85.90 ACUTE PANCREATITIS WITHOUT INFECTION OR NECROSIS: Status: ACTIVE | Noted: 2025-02-14

## 2025-02-14 LAB
ALBUMIN SERPL-MCNC: 3.7 G/DL (ref 3.5–5)
ALBUMIN SERPL-MCNC: 3.9 G/DL (ref 3.5–5)
ALBUMIN/GLOB SERPL: 1.2 (ref 1–1.9)
ALBUMIN/GLOB SERPL: 1.2 (ref 1–1.9)
ALP SERPL-CCNC: 49 U/L (ref 40–129)
ALP SERPL-CCNC: 49 U/L (ref 40–129)
ALT SERPL-CCNC: 17 U/L (ref 8–55)
ALT SERPL-CCNC: 20 U/L (ref 8–55)
ANION GAP SERPL CALC-SCNC: 11 MMOL/L (ref 7–16)
ANION GAP SERPL CALC-SCNC: 15 MMOL/L (ref 7–16)
AST SERPL-CCNC: 18 U/L (ref 15–37)
AST SERPL-CCNC: 23 U/L (ref 15–37)
BASOPHILS # BLD: 0.01 K/UL (ref 0–0.2)
BASOPHILS NFR BLD: 0.1 % (ref 0–2)
BILIRUB SERPL-MCNC: 0.9 MG/DL (ref 0–1.2)
BILIRUB SERPL-MCNC: 1.7 MG/DL (ref 0–1.2)
BUN SERPL-MCNC: 14 MG/DL (ref 6–23)
BUN SERPL-MCNC: 14 MG/DL (ref 6–23)
CALCIUM SERPL-MCNC: 9.3 MG/DL (ref 8.8–10.2)
CALCIUM SERPL-MCNC: 9.6 MG/DL (ref 8.8–10.2)
CHLORIDE SERPL-SCNC: 99 MMOL/L (ref 98–107)
CHLORIDE SERPL-SCNC: 99 MMOL/L (ref 98–107)
CHOLEST SERPL-MCNC: 181 MG/DL (ref 0–200)
CO2 SERPL-SCNC: 25 MMOL/L (ref 20–29)
CO2 SERPL-SCNC: 27 MMOL/L (ref 20–29)
CREAT SERPL-MCNC: 0.95 MG/DL (ref 0.8–1.3)
CREAT SERPL-MCNC: 1.11 MG/DL (ref 0.8–1.3)
DIFFERENTIAL METHOD BLD: ABNORMAL
EOSINOPHIL # BLD: 0.05 K/UL (ref 0–0.8)
EOSINOPHIL NFR BLD: 0.5 % (ref 0.5–7.8)
ERYTHROCYTE [DISTWIDTH] IN BLOOD BY AUTOMATED COUNT: 13.4 % (ref 11.9–14.6)
GLOBULIN SER CALC-MCNC: 3.2 G/DL (ref 2.3–3.5)
GLOBULIN SER CALC-MCNC: 3.4 G/DL (ref 2.3–3.5)
GLUCOSE SERPL-MCNC: 148 MG/DL (ref 70–99)
GLUCOSE SERPL-MCNC: 92 MG/DL (ref 70–99)
HCT VFR BLD AUTO: 43.9 % (ref 41.1–50.3)
HDLC SERPL-MCNC: 97 MG/DL (ref 40–60)
HDLC SERPL: 1.9 (ref 0–5)
HGB BLD-MCNC: 15.2 G/DL (ref 13.6–17.2)
IMM GRANULOCYTES # BLD AUTO: 0.06 K/UL (ref 0–0.5)
IMM GRANULOCYTES NFR BLD AUTO: 0.6 % (ref 0–5)
LACTATE SERPL-SCNC: 1.3 MMOL/L (ref 0.5–2)
LDLC SERPL CALC-MCNC: 70 MG/DL (ref 0–100)
LIPASE SERPL-CCNC: 2595 U/L (ref 13–60)
LYMPHOCYTES # BLD: 2.05 K/UL (ref 0.5–4.6)
LYMPHOCYTES NFR BLD: 20.1 % (ref 13–44)
MCH RBC QN AUTO: 33 PG (ref 26.1–32.9)
MCHC RBC AUTO-ENTMCNC: 34.6 G/DL (ref 31.4–35)
MCV RBC AUTO: 95.4 FL (ref 82–102)
MONOCYTES # BLD: 1.27 K/UL (ref 0.1–1.3)
MONOCYTES NFR BLD: 12.5 % (ref 4–12)
NEUTS SEG # BLD: 6.75 K/UL (ref 1.7–8.2)
NEUTS SEG NFR BLD: 66.2 % (ref 43–78)
NRBC # BLD: 0 K/UL (ref 0–0.2)
PLATELET # BLD AUTO: 371 K/UL (ref 150–450)
PMV BLD AUTO: 8.7 FL (ref 9.4–12.3)
POTASSIUM SERPL-SCNC: 3.9 MMOL/L (ref 3.5–5.1)
POTASSIUM SERPL-SCNC: 4.2 MMOL/L (ref 3.5–5.1)
PROT SERPL-MCNC: 6.8 G/DL (ref 6.3–8.2)
PROT SERPL-MCNC: 7.3 G/DL (ref 6.3–8.2)
RBC # BLD AUTO: 4.6 M/UL (ref 4.23–5.6)
SODIUM SERPL-SCNC: 137 MMOL/L (ref 136–145)
SODIUM SERPL-SCNC: 139 MMOL/L (ref 136–145)
TRIGL SERPL-MCNC: 72 MG/DL (ref 0–150)
TROPONIN T SERPL HS-MCNC: <6 NG/L (ref 0–22)
VLDLC SERPL CALC-MCNC: 14 MG/DL (ref 6–23)
WBC # BLD AUTO: 10.2 K/UL (ref 4.3–11.1)

## 2025-02-14 PROCEDURE — 80061 LIPID PANEL: CPT

## 2025-02-14 PROCEDURE — 80053 COMPREHEN METABOLIC PANEL: CPT

## 2025-02-14 PROCEDURE — 2500000003 HC RX 250 WO HCPCS: Performed by: INTERNAL MEDICINE

## 2025-02-14 PROCEDURE — 36415 COLL VENOUS BLD VENIPUNCTURE: CPT

## 2025-02-14 PROCEDURE — 74177 CT ABD & PELVIS W/CONTRAST: CPT

## 2025-02-14 PROCEDURE — 94760 N-INVAS EAR/PLS OXIMETRY 1: CPT

## 2025-02-14 PROCEDURE — 99253 IP/OBS CNSLTJ NEW/EST LOW 45: CPT | Performed by: INTERNAL MEDICINE

## 2025-02-14 PROCEDURE — 6370000000 HC RX 637 (ALT 250 FOR IP): Performed by: INTERNAL MEDICINE

## 2025-02-14 PROCEDURE — 2580000003 HC RX 258: Performed by: INTERNAL MEDICINE

## 2025-02-14 PROCEDURE — 2500000003 HC RX 250 WO HCPCS: Performed by: EMERGENCY MEDICINE

## 2025-02-14 PROCEDURE — 6360000002 HC RX W HCPCS: Performed by: INTERNAL MEDICINE

## 2025-02-14 PROCEDURE — 94640 AIRWAY INHALATION TREATMENT: CPT

## 2025-02-14 PROCEDURE — 85025 COMPLETE CBC W/AUTO DIFF WBC: CPT

## 2025-02-14 PROCEDURE — 1100000003 HC PRIVATE W/ TELEMETRY

## 2025-02-14 PROCEDURE — 6360000004 HC RX CONTRAST MEDICATION: Performed by: EMERGENCY MEDICINE

## 2025-02-14 RX ORDER — DOCUSATE SODIUM 100 MG/1
100 CAPSULE, LIQUID FILLED ORAL 2 TIMES DAILY
Status: DISCONTINUED | OUTPATIENT
Start: 2025-02-14 | End: 2025-02-15 | Stop reason: HOSPADM

## 2025-02-14 RX ORDER — HYDROMORPHONE HYDROCHLORIDE 1 MG/ML
1 INJECTION, SOLUTION INTRAMUSCULAR; INTRAVENOUS; SUBCUTANEOUS
Status: DISCONTINUED | OUTPATIENT
Start: 2025-02-14 | End: 2025-02-15 | Stop reason: HOSPADM

## 2025-02-14 RX ORDER — AMLODIPINE BESYLATE 10 MG/1
10 TABLET ORAL DAILY
Status: DISCONTINUED | OUTPATIENT
Start: 2025-02-14 | End: 2025-02-15 | Stop reason: HOSPADM

## 2025-02-14 RX ORDER — SODIUM CHLORIDE 0.9 % (FLUSH) 0.9 %
5-40 SYRINGE (ML) INJECTION EVERY 12 HOURS SCHEDULED
Status: DISCONTINUED | OUTPATIENT
Start: 2025-02-14 | End: 2025-02-15 | Stop reason: HOSPADM

## 2025-02-14 RX ORDER — POTASSIUM CHLORIDE 29.8 MG/ML
20 INJECTION INTRAVENOUS PRN
Status: DISCONTINUED | OUTPATIENT
Start: 2025-02-14 | End: 2025-02-15 | Stop reason: HOSPADM

## 2025-02-14 RX ORDER — POTASSIUM CHLORIDE 7.45 MG/ML
10 INJECTION INTRAVENOUS PRN
Status: DISCONTINUED | OUTPATIENT
Start: 2025-02-14 | End: 2025-02-15 | Stop reason: HOSPADM

## 2025-02-14 RX ORDER — ACETAMINOPHEN 325 MG/1
650 TABLET ORAL EVERY 4 HOURS PRN
Status: DISCONTINUED | OUTPATIENT
Start: 2025-02-14 | End: 2025-02-15 | Stop reason: HOSPADM

## 2025-02-14 RX ORDER — POLYETHYLENE GLYCOL 3350 17 G/17G
17 POWDER, FOR SOLUTION ORAL DAILY
Status: DISCONTINUED | OUTPATIENT
Start: 2025-02-14 | End: 2025-02-15 | Stop reason: HOSPADM

## 2025-02-14 RX ORDER — ONDANSETRON 2 MG/ML
4 INJECTION INTRAMUSCULAR; INTRAVENOUS EVERY 6 HOURS PRN
Status: DISCONTINUED | OUTPATIENT
Start: 2025-02-14 | End: 2025-02-15 | Stop reason: HOSPADM

## 2025-02-14 RX ORDER — SODIUM CHLORIDE 9 MG/ML
INJECTION, SOLUTION INTRAVENOUS PRN
Status: DISCONTINUED | OUTPATIENT
Start: 2025-02-14 | End: 2025-02-15 | Stop reason: HOSPADM

## 2025-02-14 RX ORDER — ALBUTEROL SULFATE 0.83 MG/ML
2.5 SOLUTION RESPIRATORY (INHALATION) EVERY 6 HOURS PRN
Status: DISCONTINUED | OUTPATIENT
Start: 2025-02-14 | End: 2025-02-15 | Stop reason: HOSPADM

## 2025-02-14 RX ORDER — PREDNISONE 10 MG/1
40 TABLET ORAL
Status: DISCONTINUED | OUTPATIENT
Start: 2025-02-14 | End: 2025-02-15 | Stop reason: HOSPADM

## 2025-02-14 RX ORDER — SODIUM CHLORIDE 9 MG/ML
INJECTION, SOLUTION INTRAVENOUS CONTINUOUS
Status: DISCONTINUED | OUTPATIENT
Start: 2025-02-14 | End: 2025-02-15 | Stop reason: HOSPADM

## 2025-02-14 RX ORDER — MULTIVITAMIN WITH IRON
1 TABLET ORAL DAILY
Status: DISCONTINUED | OUTPATIENT
Start: 2025-02-14 | End: 2025-02-15 | Stop reason: HOSPADM

## 2025-02-14 RX ORDER — MAGNESIUM SULFATE IN WATER 40 MG/ML
2000 INJECTION, SOLUTION INTRAVENOUS PRN
Status: DISCONTINUED | OUTPATIENT
Start: 2025-02-14 | End: 2025-02-15 | Stop reason: HOSPADM

## 2025-02-14 RX ORDER — LORAZEPAM 1 MG/1
2 TABLET ORAL
Status: DISCONTINUED | OUTPATIENT
Start: 2025-02-14 | End: 2025-02-15 | Stop reason: HOSPADM

## 2025-02-14 RX ORDER — BUDESONIDE 0.5 MG/2ML
0.5 INHALANT ORAL
Status: DISCONTINUED | OUTPATIENT
Start: 2025-02-14 | End: 2025-02-15 | Stop reason: HOSPADM

## 2025-02-14 RX ORDER — SODIUM CHLORIDE, SODIUM LACTATE, POTASSIUM CHLORIDE, CALCIUM CHLORIDE 600; 310; 30; 20 MG/100ML; MG/100ML; MG/100ML; MG/100ML
INJECTION, SOLUTION INTRAVENOUS CONTINUOUS
Status: DISCONTINUED | OUTPATIENT
Start: 2025-02-14 | End: 2025-02-15 | Stop reason: HOSPADM

## 2025-02-14 RX ORDER — IOPAMIDOL 755 MG/ML
100 INJECTION, SOLUTION INTRAVASCULAR
Status: COMPLETED | OUTPATIENT
Start: 2025-02-14 | End: 2025-02-14

## 2025-02-14 RX ORDER — LORAZEPAM 1 MG/1
3 TABLET ORAL
Status: DISCONTINUED | OUTPATIENT
Start: 2025-02-14 | End: 2025-02-15 | Stop reason: HOSPADM

## 2025-02-14 RX ORDER — HYDROMORPHONE HYDROCHLORIDE 1 MG/ML
1 INJECTION, SOLUTION INTRAMUSCULAR; INTRAVENOUS; SUBCUTANEOUS EVERY 4 HOURS PRN
Status: DISCONTINUED | OUTPATIENT
Start: 2025-02-14 | End: 2025-02-14 | Stop reason: SDUPTHER

## 2025-02-14 RX ORDER — HYDROMORPHONE HYDROCHLORIDE 1 MG/ML
1 INJECTION, SOLUTION INTRAMUSCULAR; INTRAVENOUS; SUBCUTANEOUS
Status: COMPLETED | OUTPATIENT
Start: 2025-02-14 | End: 2025-02-14

## 2025-02-14 RX ORDER — HYDROMORPHONE HYDROCHLORIDE 1 MG/ML
0.5 INJECTION, SOLUTION INTRAMUSCULAR; INTRAVENOUS; SUBCUTANEOUS
Status: DISCONTINUED | OUTPATIENT
Start: 2025-02-14 | End: 2025-02-15 | Stop reason: HOSPADM

## 2025-02-14 RX ORDER — LORAZEPAM 1 MG/1
4 TABLET ORAL
Status: DISCONTINUED | OUTPATIENT
Start: 2025-02-14 | End: 2025-02-15 | Stop reason: HOSPADM

## 2025-02-14 RX ORDER — TRAZODONE HYDROCHLORIDE 50 MG/1
100 TABLET ORAL NIGHTLY PRN
Status: DISCONTINUED | OUTPATIENT
Start: 2025-02-14 | End: 2025-02-15 | Stop reason: HOSPADM

## 2025-02-14 RX ORDER — LORAZEPAM 1 MG/1
1 TABLET ORAL
Status: DISCONTINUED | OUTPATIENT
Start: 2025-02-14 | End: 2025-02-15 | Stop reason: HOSPADM

## 2025-02-14 RX ORDER — SODIUM CHLORIDE 0.9 % (FLUSH) 0.9 %
5-40 SYRINGE (ML) INJECTION PRN
Status: DISCONTINUED | OUTPATIENT
Start: 2025-02-14 | End: 2025-02-15 | Stop reason: HOSPADM

## 2025-02-14 RX ORDER — POLYETHYLENE GLYCOL 3350 17 G/17G
17 POWDER, FOR SOLUTION ORAL DAILY
Status: DISCONTINUED | OUTPATIENT
Start: 2025-02-14 | End: 2025-02-14 | Stop reason: SDUPTHER

## 2025-02-14 RX ORDER — SODIUM CHLORIDE, SODIUM LACTATE, POTASSIUM CHLORIDE, CALCIUM CHLORIDE 600; 310; 30; 20 MG/100ML; MG/100ML; MG/100ML; MG/100ML
INJECTION, SOLUTION INTRAVENOUS CONTINUOUS
Status: ACTIVE | OUTPATIENT
Start: 2025-02-14 | End: 2025-02-14

## 2025-02-14 RX ORDER — ONDANSETRON 4 MG/1
4 TABLET, ORALLY DISINTEGRATING ORAL EVERY 8 HOURS PRN
Status: DISCONTINUED | OUTPATIENT
Start: 2025-02-14 | End: 2025-02-15 | Stop reason: HOSPADM

## 2025-02-14 RX ORDER — NICOTINE 21 MG/24HR
1 PATCH, TRANSDERMAL 24 HOURS TRANSDERMAL DAILY PRN
Status: DISCONTINUED | OUTPATIENT
Start: 2025-02-14 | End: 2025-02-15 | Stop reason: HOSPADM

## 2025-02-14 RX ORDER — ENOXAPARIN SODIUM 100 MG/ML
30 INJECTION SUBCUTANEOUS 2 TIMES DAILY
Status: DISCONTINUED | OUTPATIENT
Start: 2025-02-14 | End: 2025-02-15 | Stop reason: HOSPADM

## 2025-02-14 RX ORDER — LANOLIN ALCOHOL/MO/W.PET/CERES
100 CREAM (GRAM) TOPICAL DAILY
Status: DISCONTINUED | OUTPATIENT
Start: 2025-02-14 | End: 2025-02-15 | Stop reason: HOSPADM

## 2025-02-14 RX ADMIN — BUDESONIDE 500 MCG: 0.5 INHALANT RESPIRATORY (INHALATION) at 07:53

## 2025-02-14 RX ADMIN — HYDROMORPHONE HYDROCHLORIDE 1 MG: 1 INJECTION, SOLUTION INTRAMUSCULAR; INTRAVENOUS; SUBCUTANEOUS at 02:00

## 2025-02-14 RX ADMIN — SODIUM CHLORIDE: 9 INJECTION, SOLUTION INTRAVENOUS at 03:03

## 2025-02-14 RX ADMIN — Medication 100 MG: at 08:20

## 2025-02-14 RX ADMIN — IOPAMIDOL 100 ML: 755 INJECTION, SOLUTION INTRAVENOUS at 00:36

## 2025-02-14 RX ADMIN — AMLODIPINE BESYLATE 10 MG: 10 TABLET ORAL at 08:19

## 2025-02-14 RX ADMIN — METFORMIN HYDROCHLORIDE 250 MG: 500 TABLET ORAL at 16:38

## 2025-02-14 RX ADMIN — B-COMPLEX W/ C & FOLIC ACID TAB 1 TABLET: TAB at 08:20

## 2025-02-14 RX ADMIN — PREDNISONE 40 MG: 10 TABLET ORAL at 08:19

## 2025-02-14 RX ADMIN — METFORMIN HYDROCHLORIDE 250 MG: 500 TABLET ORAL at 08:20

## 2025-02-14 RX ADMIN — HYDROMORPHONE HYDROCHLORIDE 0.5 MG: 1 INJECTION, SOLUTION INTRAMUSCULAR; INTRAVENOUS; SUBCUTANEOUS at 03:22

## 2025-02-14 RX ADMIN — DOCUSATE SODIUM 100 MG: 100 CAPSULE, LIQUID FILLED ORAL at 08:19

## 2025-02-14 RX ADMIN — SODIUM CHLORIDE, POTASSIUM CHLORIDE, SODIUM LACTATE AND CALCIUM CHLORIDE: 600; 310; 30; 20 INJECTION, SOLUTION INTRAVENOUS at 15:48

## 2025-02-14 RX ADMIN — DOCUSATE SODIUM 100 MG: 100 CAPSULE, LIQUID FILLED ORAL at 20:50

## 2025-02-14 ASSESSMENT — ENCOUNTER SYMPTOMS
NAUSEA: 1
DIARRHEA: 0
ABDOMINAL PAIN: 1
VOMITING: 1

## 2025-02-14 ASSESSMENT — PAIN DESCRIPTION - DESCRIPTORS
DESCRIPTORS: ACHING
DESCRIPTORS: SQUEEZING;SHOOTING;SHARP

## 2025-02-14 ASSESSMENT — PAIN SCALES - GENERAL
PAINLEVEL_OUTOF10: 0
PAINLEVEL_OUTOF10: 8
PAINLEVEL_OUTOF10: 6
PAINLEVEL_OUTOF10: 0

## 2025-02-14 ASSESSMENT — PAIN DESCRIPTION - ORIENTATION: ORIENTATION: LEFT;UPPER

## 2025-02-14 ASSESSMENT — PAIN DESCRIPTION - LOCATION
LOCATION: ABDOMEN
LOCATION: ABDOMEN;CHEST

## 2025-02-14 NOTE — ED PROVIDER NOTES
Emergency Department Provider Note       PCP: No, Pcp   Age: 39 y.o.   Sex: male     DISPOSITION Decision To Admit 02/14/2025 01:21:20 AM    ICD-10-CM    1. Alcohol-induced acute pancreatitis, unspecified complication status  K85.20           Medical Decision Making     Lipase pending due to machine failure.  CT, history, and exam consistent with acute pancreatitis.  Pain uncontrolled.  Discussed with hospitalist for admission.     1 acute complicated illness or injury.  Parental controlled substances given in the ED.  Shared medical decision making was utilized in creating the patients health plan today.  I independently ordered and reviewed each unique test.    I reviewed external records: ED visit note from a different ED.   I reviewed external records: provider visit note from PCP.  I reviewed external records: provider visit note from outside specialist.  I reviewed external records: previous EKG including cardiologist interpretation.    I reviewed external records: previous lab results from outside ED.  I reviewed external records: previous imaging study including radiologist interpretation.     ED cardiac monitoring rhythm strip was ordered and interpreted:  sinus rhythm, no evidence of an arrhythmia  ST Segments:Nonspecific ST segments - NO STEMI   Rate: 81, T wave inversions inferior leads similar to December 2024  I interpreted the CT Scan acute pancreatitis.    The patient was admitted and I have discussed patient management with the admitting provider.          History     39-year-old male presents with sudden onset severe upper abdominal cramping radiating to his chest, back, and left arm that started about 2 hours ago.  He reports nausea with multiple episodes of vomiting.  He has been unable to have a bowel movement.  Denies any similar pain in the past.  No prior abdominal surgeries.  He admits to drinking about a pint of alcohol daily since he was 23.  Denies blood in stools or vomit.  No  organs and vascular structures.   Radiation dose reduction techniques were used for this study.  All CT scans  performed at this facility use one or all of the following: Automated exposure  control, adjustment of the mA and/or kVp according to patient's size, iterative  reconstruction.    COMPARISON: 1/23/2025    FINDINGS:  - LUNG BASES: No infiltrates or masses.    - LIVER: Normal in size and appearance.    - GALLBLADDER/BILE DUCTS: No gallstones or bile duct dilatation.  - PANCREAS: Question subtle pancreatic swelling. Adjacent mild infiltrative  changes as well as small fluid within the retroperitoneum within the abdomen.  - SPLEEN: Normal.    - ADRENALS: Normal.  - KIDNEYS/URETERS: No hydronephrosis or solid mass bilaterally. A couple of tiny  simple cysts of the left kidney lower pole posteriorly which require no  follow-up.  - BLADDER: Normal.  - REPRODUCTIVE ORGANS: No pelvic masses.    - BOWEL: Normal caliber.  No inflammatory changes. Normal appendix.  - LYMPH NODES: No significant retroperitoneal, mesenteric, or pelvic adenopathy.  - BONES: No fracture or significant bone lesion.  - VASCULATURE: Normal  - OTHER: None.      Impression    1. Question findings of uncomplicated acute pancreatitis. Please see above.  Correlate clinically and follow-up as deemed relevant.      If providers have any questions about this report, I can be reached on  [employer].      Electronically signed by Praful Velazquez   CBC with Diff   Result Value Ref Range    WBC 11.3 (H) 4.3 - 11.1 K/uL    RBC 4.83 4.23 - 5.6 M/uL    Hemoglobin 16.0 13.6 - 17.2 g/dL    Hematocrit 45.1 41.1 - 50.3 %    MCV 93.4 82 - 102 FL    MCH 33.1 (H) 26.1 - 32.9 PG    MCHC 35.5 (H) 31.4 - 35.0 g/dL    RDW 13.2 11.9 - 14.6 %    Platelets 390 150 - 450 K/uL    MPV 8.6 (L) 9.4 - 12.3 FL    nRBC 0.00 0.0 - 0.2 K/uL    Differential Type AUTOMATED      Neutrophils % 68.6 43.0 - 78.0 %    Lymphocytes % 18.1 13.0 - 44.0 %    Monocytes % 12.6 (H) 4.0 - 12.0 %

## 2025-02-14 NOTE — CARE COORDINATION
Patient's inpatient plan of care and discharge needs reviewed in IDT rounds. Patient admitted through E.D. with acute pancreatitis.  Patient has been NPO and diet it to be advanced for tolerance. Patient is listed as self-pay and there does not appear to be a PCP encounter listed since approximately a year ago. Patient being followed by in-house [facility] pharmacy which should be able to provide low cost medications for patient is necessary and patient is enrolled in the meds to beds program. CM will continue to follow.        02/14/25 1100   Service Assessment   Patient Orientation Alert and Oriented   Cognition Alert   History Provided By Medical Record   Primary Caregiver Self   Accompanied By/Relationship N/A   Support Systems Spouse/Significant Other;Family Members   Patient's Healthcare Decision Maker is: Legal Next of Kin   PCP Verified by CM No   Prior Functional Level Independent in ADLs/IADLs   Current Functional Level Independent in ADLs/IADLs   Can patient return to prior living arrangement Yes   Ability to make needs known: Good   Family able to assist with home care needs: Yes   Would you like for me to discuss the discharge plan with any other family members/significant others, and if so, who? No   Financial Resources None

## 2025-02-14 NOTE — ED TRIAGE NOTES
Pt presents from home via pov. He is ambulatory to triage c/o pain from the upper left quard and radiates up to left chest pain. Started 1-2 hrs ago while laying down in bed. Did take a Midol with no effectiveness. Nausea and vomiting. SOB,

## 2025-02-14 NOTE — PROGRESS NOTES
Hospitalist Progress Note   Admit Date:  2025 10:59 PM   Name:  Robb Galvez   Age:  39 y.o.  Sex:  male  :  1985   MRN:  [id number]   Room:  [facility]    Presenting/Chief Complaint: Chest Pain     Reason(s) for Admission: Acute pancreatitis without infection or necrosis [K85.90]  Alcohol-induced acute pancreatitis, unspecified complication status [K85.20]     Hospital Course:     Robb Galvez is a 39 y.o. male with medical history of   Asthma   Hypertension   Prediabetes     who presented with abdominal pain.     Patient drinks vodka often.   In ER, CT shows acute pancreatitis.     No elevated bilirubin.   No elevation of alkaline phosphatase.   Livre enzymes are in normal ranges.     Subjective & 24hr Events:     25   Patient would like to have his diet advanced.   He is supposed to be NPO, but he said last night he had Chinese food.   He does not have abdominal pain now.       Assessment & Plan:     Principal Problem:    Acute pancreatitis without infection or necrosis    Alcoholism   Plan:   Will advance diet today.   Monitor closely.    Pain control.   I advised him to stop drinking alcohol.   Monitor for signs of alcohol withdrawal.       Asthma   No exacerbation.   Patient is on Prednisone and nebulizer.       Hypertension   BP is up a bit.   Patient is on Amlodipine 10 mg po q day.   Monitor.     I have discussed the plan of care with patient.        Anticipated Discharge Arrangements:   Home    PT/OT evals ordered?  Not ordered; patient not expected to need rehab  Diet:  ADULT DIET; Full Liquid  VTE prophylaxis: SCD's   Code status: Full Code      Non-peripheral Lines and Tubes (if present):          Telemetry (if present):  Cardiac/Telemetry Monitor On: No (per tele there is a waitlist)        Hospital Problems:  Principal Problem:    Acute pancreatitis without infection or necrosis  Resolved Problems:    * No resolved hospital problems. *      Objective:   Patient Vitals for the

## 2025-02-14 NOTE — ED TRIAGE NOTES
Called patient to let him know that Dr Montalvo does not do FMLA paperwork. The patient would need to take the paperwork to his PCP. Patient was not happy with this stating that he always had Velazquez to fill out his FMLA paperwork. I stated that Dr Montalvo can choose if he fills out this paperwork. Patient said that he was done and hung up.   Pt states he has been wheezing for the past three days and has been out of his albuterol for 4 days. Pt has audible wheezing and has a non productive cough.

## 2025-02-14 NOTE — H&P
Hospitalist History and Physical   Admit Date:  2025 10:59 PM   Name:  Robb Galvez   Age:  39 y.o.  Sex:  male  :  1985   MRN:  [id number]   Room:  01/    Presenting/Chief Complaint: Chest Pain     Reason(s) for Admission: Acute pancreatitis without infection or necrosis [K85.90]     History of Present Illness:   Robb Galvez is a 39 y.o. male with medical history of asthma, hypertension, prediabetes presents with abdominal pain left upper quadrant with radiates up to the chest started about 1 to 2 hours ago while he was lying flat on the bed did take some Midol with no effectiveness subsequently developed some nausea vomiting as well as shortness of breath patient presented to the emergency room where he was evaluated initial CT scan of the abdomen does suggest possible acute pancreatitis although no stones in the gallbladder patient does not drink alcohol currently we will check a triglyceride keep the patient n.p.o. IV fluids and pain control for now until GI sees him in the morning.  Patient has no other complaints besides abdominal pain in lt upper quadarant radiating in to chest and down lt arm.  Pt's troponin is <6.0  Pt does admit to drinking 3-4 times a week.  He drinks vodka. More than a pint on those days.    Assessment & Plan:     Principal Problem:  Acute pancreatitis without infection or necrosis  N.p.o.  IV fluids  Check triglycerides  Patient is an alcoholic, no history of gallstones,  No medication induced pancreatitis that I could see    ETOH abuse  Will place him on ciwa protocol    Asthma  On albuterol nebulizer as needed and   prednisone 40 mg daily    Hypertension  Amlodipine 10 mg daily    Prediabetes  On glucose 250 mg p.o. twice daily    Insomnia  Trazodone was lowered to 100 mg daily    Constipation  GlycoLax 17 g in 8 ounce of water    PT/OT evals ordered?  Defer to primary team  Diet: Diet NPO  VTE prophylaxis: Lovenox  Code status: Full code  Code status  retroperitoneal, mesenteric, or pelvic adenopathy. - BONES: No fracture or significant bone lesion. - VASCULATURE: Normal - OTHER: None.     1. Question findings of uncomplicated acute pancreatitis. Please see above. Correlate clinically and follow-up as deemed relevant. If providers have any questions about this report, I can be reached on [employer]. Electronically signed by Praful Velazquez        Signed:  Jann Khalil MD    Part of this note may have been written by using a voice dictation software.  The note has been proof read but may still contain some grammatical/other typographical errors.

## 2025-02-14 NOTE — PROGRESS NOTES
Pt requesting to go outside to smoke. RN informed pt he is not allowed to go outside with an IV present. RN offered nicotine patch and pt adamantly refused.

## 2025-02-14 NOTE — ED NOTES
TRANSFER - OUT REPORT:    Verbal report given to Izabella LUDWIG on Robb Galvez  being transferred to [facility] for routine progression of patient care       Report consisted of patient's Situation, Background, Assessment and   Recommendations(SBAR).     Information from the following report(s) Nurse Handoff Report was reviewed with the receiving nurse.    [place] Fall Assessment:    Presents to emergency department  because of falls (Syncope, seizure, or loss of consciousness): No  Age > 70: No  Altered Mental Status, Intoxication with alcohol or substance confusion (Disorientation, impaired judgment, poor safety awaremess, or inability to follow instructions): No  Impaired Mobility: Ambulates or transfers with assistive devices or assistance; Unable to ambulate or transer.: No  Nursing Judgement: No          Lines:   Peripheral IV 02/13/25 Distal;Right Cephalic (Active)   Site Assessment Clean, dry & intact 02/14/25 0226   Line Status Blood return noted;Flushed 02/13/25 2329   Phlebitis Assessment No symptoms 02/14/25 0226   Infiltration Assessment 0 02/14/25 0226   Alcohol Cap Used No 02/13/25 2329   Dressing Status New dressing applied 02/13/25 2329   Dressing Type Transparent 02/13/25 2329   Dressing Intervention New 02/13/25 2329        Opportunity for questions and clarification was provided.      Patient transported with:  Monitor and Registered Nurse           Amado Stringer RN  02/14/25 0227

## 2025-02-14 NOTE — CONSULTS
Consult Note            Date:2/14/2025        Patient Name:Robb Galvez     YOB: 1985     Age:39 y.o.    Consult to Gastroenterology  Consult performed by: Indu Cunningham APRN - CNP  Consult ordered by: Jann Khalil MD          Chief Complaint     Chief Complaint   Patient presents with    Chest Pain        History Obtained From   patient    History of Present Illness   Robb Galvez is a 38 yo male who presented to the ED with abdominal pain that was radiating to his chest. PMH includes asthma, HTN, and prediabetes. GI was consulted for acute pancreatitis. Patient states that he typically has epigastric pain at all times but the pain did increase recently in the last week. He had associated nausea and vomiting yesterday but states he feels better today. He denies diarrhea. He denies any history of pancreatitis, new medications, autoimmune disorders or family history of pancreatitis. He states he has not had a drink in 2 weeks but he typically makes a pint of vodka last all week. However, he told me it was difficult to stop drinking cold turkey and he tried in the past and it made him sick. He does vape as well. He takes NSAID's as needed but denies blood thinner use. Lipase was 2595 when he came in to ED and his CT AP showed uncomplicated acute pancreatitis.     Labs: Bili 0.9, ALT 20, AST 23, Alk Phos 49, Lipase 2595    Medications: Lovenox    Imaging: CT ABDOMEN PELVIS W IV CONTRAST Additional Contrast? None    Result Date: 2/14/2025  1. Question findings of uncomplicated acute pancreatitis. Please see above. Correlate clinically and follow-up as deemed relevant.    Past Medical History     Past Medical History:   Diagnosis Date    Asthma     Status asthmaticus 4/3/2022        Past Surgical History     Past Surgical History:   Procedure Laterality Date    TONSILLECTOMY          Medications     Prior to Admission medications    Medication Sig Start Date End Date Taking?  sodium chloride 0.9 % 250 mL IVPB ([id number]), PRN  acetaminophen (TYLENOL) tablet 650 mg, Q4H PRN  HYDROmorphone HCl PF (DILAUDID) injection 0.5 mg, Q3H PRN   Or  HYDROmorphone HCl PF (DILAUDID) injection 1 mg, Q3H PRN  ondansetron (ZOFRAN-ODT) disintegrating tablet 4 mg, Q8H PRN   Or  ondansetron (ZOFRAN) injection 4 mg, Q6H PRN  enoxaparin Sodium (LOVENOX) injection 30 mg, BID  0.9 % sodium chloride infusion, Continuous  polyethylene glycol (GLYCOLAX) packet 17 g, Daily  nicotine (NICODERM CQ) 14 MG/24HR 1 patch, Daily PRN  LORazepam (ATIVAN) tablet 1 mg, Q1H PRN   Or  LORazepam (ATIVAN) 1 mg in sodium chloride (PF) 0.9 % 10 mL injection, Q1H PRN   Or  LORazepam (ATIVAN) tablet 2 mg, Q1H PRN   Or  LORazepam (ATIVAN) 2 mg in sodium chloride (PF) 0.9 % 10 mL injection, Q1H PRN   Or  LORazepam (ATIVAN) tablet 3 mg, Q1H PRN   Or  LORazepam (ATIVAN) 3 mg in sodium chloride (PF) 0.9 % 10 mL injection, Q1H PRN   Or  LORazepam (ATIVAN) tablet 4 mg, Q1H PRN   Or  LORazepam (ATIVAN) 4 mg in sodium chloride (PF) 0.9 % 10 mL injection, Q1H PRN  thiamine tablet 100 mg, Daily  multivitamin 1 tablet, Daily        Allergies   Montelukast    Social History     Social History       Tobacco History       Smoking Status  Never      Smokeless Tobacco Use  Never              Alcohol History       Alcohol Use Status  Not Currently              Drug Use       Drug Use Status  Not Asked              Sexual Activity       Sexually Active  Not Asked                    Family History     Family History   Problem Relation Age of Onset    Asthma Brother     Sleep Apnea Mother        Review of Systems   Review of Systems   Gastrointestinal:  Positive for abdominal pain, nausea and vomiting. Negative for diarrhea.        Physical Exam   BP (!) 142/96   Pulse 69   Temp 97.5 °F (36.4 °C) (Oral)   Resp 17   Ht 1.753 m (5' 9\")   Wt 131.1 kg (289 lb)   SpO2 98%   BMI 42.68 kg/m²      Physical Exam  Vitals and nursing note reviewed.

## 2025-02-15 VITALS
WEIGHT: 289 LBS | OXYGEN SATURATION: 99 % | SYSTOLIC BLOOD PRESSURE: 117 MMHG | RESPIRATION RATE: 17 BRPM | HEIGHT: 69 IN | DIASTOLIC BLOOD PRESSURE: 63 MMHG | HEART RATE: 89 BPM | TEMPERATURE: 97.7 F | BODY MASS INDEX: 42.8 KG/M2

## 2025-02-15 LAB
ALBUMIN SERPL-MCNC: 3.4 G/DL (ref 3.5–5)
ALBUMIN/GLOB SERPL: 1.3 (ref 1–1.9)
ALP SERPL-CCNC: 53 U/L (ref 40–129)
ALT SERPL-CCNC: 16 U/L (ref 8–55)
ANION GAP SERPL CALC-SCNC: 15 MMOL/L (ref 7–16)
AST SERPL-CCNC: 13 U/L (ref 15–37)
BASOPHILS # BLD: 0.01 K/UL (ref 0–0.2)
BASOPHILS NFR BLD: 0.1 % (ref 0–2)
BILIRUB DIRECT SERPL-MCNC: 0.4 MG/DL (ref 0–0.4)
BILIRUB SERPL-MCNC: 1.2 MG/DL (ref 0–1.2)
BUN SERPL-MCNC: 12 MG/DL (ref 6–23)
CALCIUM SERPL-MCNC: 9.5 MG/DL (ref 8.8–10.2)
CHLORIDE SERPL-SCNC: 99 MMOL/L (ref 98–107)
CO2 SERPL-SCNC: 24 MMOL/L (ref 20–29)
CREAT SERPL-MCNC: 0.97 MG/DL (ref 0.8–1.3)
DIFFERENTIAL METHOD BLD: ABNORMAL
EOSINOPHIL # BLD: 0.26 K/UL (ref 0–0.8)
EOSINOPHIL NFR BLD: 2.2 % (ref 0.5–7.8)
ERYTHROCYTE [DISTWIDTH] IN BLOOD BY AUTOMATED COUNT: 13.1 % (ref 11.9–14.6)
GLOBULIN SER CALC-MCNC: 2.7 G/DL (ref 2.3–3.5)
GLUCOSE SERPL-MCNC: 94 MG/DL (ref 70–99)
HCT VFR BLD AUTO: 42.2 % (ref 41.1–50.3)
HGB BLD-MCNC: 14.1 G/DL (ref 13.6–17.2)
IMM GRANULOCYTES # BLD AUTO: 0.03 K/UL (ref 0–0.5)
IMM GRANULOCYTES NFR BLD AUTO: 0.3 % (ref 0–5)
LIPASE SERPL-CCNC: 93 U/L (ref 13–60)
LYMPHOCYTES # BLD: 2.68 K/UL (ref 0.5–4.6)
LYMPHOCYTES NFR BLD: 22.4 % (ref 13–44)
MAGNESIUM SERPL-MCNC: 1.9 MG/DL (ref 1.8–2.4)
MCH RBC QN AUTO: 33 PG (ref 26.1–32.9)
MCHC RBC AUTO-ENTMCNC: 33.4 G/DL (ref 31.4–35)
MCV RBC AUTO: 98.8 FL (ref 82–102)
MONOCYTES # BLD: 1.02 K/UL (ref 0.1–1.3)
MONOCYTES NFR BLD: 8.5 % (ref 4–12)
NEUTS SEG # BLD: 7.98 K/UL (ref 1.7–8.2)
NEUTS SEG NFR BLD: 66.5 % (ref 43–78)
NRBC # BLD: 0 K/UL (ref 0–0.2)
PLATELET # BLD AUTO: 333 K/UL (ref 150–450)
PMV BLD AUTO: 9.1 FL (ref 9.4–12.3)
POTASSIUM SERPL-SCNC: 4.2 MMOL/L (ref 3.5–5.1)
PROT SERPL-MCNC: 6.1 G/DL (ref 6.3–8.2)
RBC # BLD AUTO: 4.27 M/UL (ref 4.23–5.6)
SODIUM SERPL-SCNC: 138 MMOL/L (ref 136–145)
WBC # BLD AUTO: 12 K/UL (ref 4.3–11.1)

## 2025-02-15 PROCEDURE — 6370000000 HC RX 637 (ALT 250 FOR IP): Performed by: INTERNAL MEDICINE

## 2025-02-15 PROCEDURE — 94760 N-INVAS EAR/PLS OXIMETRY 1: CPT

## 2025-02-15 PROCEDURE — 36415 COLL VENOUS BLD VENIPUNCTURE: CPT

## 2025-02-15 PROCEDURE — 83735 ASSAY OF MAGNESIUM: CPT

## 2025-02-15 PROCEDURE — 80048 BASIC METABOLIC PNL TOTAL CA: CPT

## 2025-02-15 PROCEDURE — 80076 HEPATIC FUNCTION PANEL: CPT

## 2025-02-15 PROCEDURE — 83690 ASSAY OF LIPASE: CPT

## 2025-02-15 PROCEDURE — 2500000003 HC RX 250 WO HCPCS: Performed by: INTERNAL MEDICINE

## 2025-02-15 PROCEDURE — 94640 AIRWAY INHALATION TREATMENT: CPT

## 2025-02-15 PROCEDURE — 85025 COMPLETE CBC W/AUTO DIFF WBC: CPT

## 2025-02-15 PROCEDURE — 6360000002 HC RX W HCPCS: Performed by: INTERNAL MEDICINE

## 2025-02-15 RX ORDER — ALBUTEROL SULFATE 0.83 MG/ML
2.5 SOLUTION RESPIRATORY (INHALATION) EVERY 6 HOURS PRN
Qty: 120 EACH | Refills: 0 | Status: SHIPPED | OUTPATIENT
Start: 2025-02-15 | End: 2025-03-17

## 2025-02-15 RX ORDER — BUDESONIDE 0.5 MG/2ML
500 INHALANT ORAL 2 TIMES DAILY
Qty: 60 EACH | Refills: 0 | Status: SHIPPED | OUTPATIENT
Start: 2025-02-15 | End: 2025-03-17

## 2025-02-15 RX ORDER — NICOTINE 21 MG/24HR
1 PATCH, TRANSDERMAL 24 HOURS TRANSDERMAL DAILY PRN
Qty: 30 PATCH | Refills: 0 | Status: SHIPPED | OUTPATIENT
Start: 2025-02-15 | End: 2025-03-17

## 2025-02-15 RX ADMIN — POLYETHYLENE GLYCOL 3350 17 G: 17 POWDER, FOR SOLUTION ORAL at 08:57

## 2025-02-15 RX ADMIN — AMLODIPINE BESYLATE 10 MG: 10 TABLET ORAL at 08:57

## 2025-02-15 RX ADMIN — METFORMIN HYDROCHLORIDE 250 MG: 500 TABLET ORAL at 08:57

## 2025-02-15 RX ADMIN — BUDESONIDE 500 MCG: 0.5 INHALANT RESPIRATORY (INHALATION) at 08:36

## 2025-02-15 RX ADMIN — DOCUSATE SODIUM 100 MG: 100 CAPSULE, LIQUID FILLED ORAL at 08:57

## 2025-02-15 RX ADMIN — B-COMPLEX W/ C & FOLIC ACID TAB 1 TABLET: TAB at 08:56

## 2025-02-15 RX ADMIN — SODIUM CHLORIDE, PRESERVATIVE FREE 10 ML: 5 INJECTION INTRAVENOUS at 08:58

## 2025-02-15 RX ADMIN — PREDNISONE 40 MG: 10 TABLET ORAL at 08:57

## 2025-02-15 RX ADMIN — Medication 100 MG: at 08:57

## 2025-02-15 ASSESSMENT — PAIN SCALES - GENERAL: PAINLEVEL_OUTOF10: 0

## 2025-02-15 NOTE — DISCHARGE SUMMARY
Hospitalist Discharge Summary   Admit Date:  2025 10:59 PM   DC Note date: 2/15/2025  Name:  Robb Galvez   Age:  39 y.o.  Sex:  male  :  1985   MRN:  [id number]   Room:  [facility]  PCP:  Emy Portillo    Presenting Complaint: Chest Pain     Initial Admission Diagnosis: Acute pancreatitis without infection or necrosis [K85.90]  Alcohol-induced acute pancreatitis, unspecified complication status [K85.20]     Problem List for this Hospitalization (present on admission):    Principal Problem:    Acute pancreatitis without infection or necrosis  Active Problems:    Alcohol-induced acute pancreatitis  Resolved Problems:    * No resolved hospital problems. *      Hospital Course:    Robb Galvez is a 39 y.o. male with medical history of   Asthma   Hypertension   Prediabetes      who presented with abdominal pain.      Patient drinks vodka often.   In ER, CT shows acute pancreatitis.      Lipase is elevated.   No elevated bilirubin.   No elevation of alkaline phosphatase.   Livre enzymes are in normal ranges.     Patient was treated conservatively.   Patient was improved and able to have diet without abdominal pain. He would like to go home.     He is advised on stopping alcohol consumption.     Patient is being discharged in stable condition.      Disposition: Home  Diet: ADULT DIET; Regular  Code Status: Full Code    Follow Ups:  Follow-up Information       Follow up With Specialties Details Why Contact Info    No, Pcp  Schedule an appointment as soon as possible for a visit in 3 day(s)                  Plan was discussed with Patient.  All questions answered.  Patient was stable at time of discharge.  Instructions given to call a physician or return if any concerns.    Pending Labs       Order Current Status    Basic Metabolic Panel w/ Reflex to MG In process    Hepatic Function Panel In process    Lipase In process    Magnesium In process            Current Discharge Medication List        START taking these  Differential    Collection Time: 02/14/25  9:53 AM   Result Value Ref Range    WBC 10.2 4.3 - 11.1 K/uL    RBC 4.60 4.23 - 5.6 M/uL    Hemoglobin 15.2 13.6 - 17.2 g/dL    Hematocrit 43.9 41.1 - 50.3 %    MCV 95.4 82 - 102 FL    MCH 33.0 (H) 26.1 - 32.9 PG    MCHC 34.6 31.4 - 35.0 g/dL    RDW 13.4 11.9 - 14.6 %    Platelets 371 150 - 450 K/uL    MPV 8.7 (L) 9.4 - 12.3 FL    nRBC 0.00 0.0 - 0.2 K/uL    Differential Type AUTOMATED      Neutrophils % 66.2 43.0 - 78.0 %    Lymphocytes % 20.1 13.0 - 44.0 %    Monocytes % 12.5 (H) 4.0 - 12.0 %    Eosinophils % 0.5 0.5 - 7.8 %    Basophils % 0.1 0.0 - 2.0 %    Immature Granulocytes % 0.6 0.0 - 5.0 %    Neutrophils Absolute 6.75 1.70 - 8.20 K/UL    Lymphocytes Absolute 2.05 0.50 - 4.60 K/UL    Monocytes Absolute 1.27 0.10 - 1.30 K/UL    Eosinophils Absolute 0.05 0.00 - 0.80 K/UL    Basophils Absolute 0.01 0.00 - 0.20 K/UL    Immature Granulocytes Absolute 0.06 0.0 - 0.5 K/UL   Comprehensive Metabolic Panel w/ Reflex to MG    Collection Time: 02/14/25  9:53 AM   Result Value Ref Range    Sodium 137 136 - 145 mmol/L    Potassium 3.9 3.5 - 5.1 mmol/L    Chloride 99 98 - 107 mmol/L    CO2 27 20 - 29 mmol/L    Anion Gap 11 7 - 16 mmol/L    Glucose 92 70 - 99 mg/dL    BUN 14 6 - 23 MG/DL    Creatinine 0.95 0.80 - 1.30 MG/DL    Est, Glom Filt Rate >90 >60 ml/min/1.73m2    Calcium 9.3 8.8 - 10.2 MG/DL    Total Bilirubin 1.7 (H) 0.0 - 1.2 MG/DL    ALT 17 8 - 55 U/L    AST 18 15 - 37 U/L    Alk Phosphatase 49 40 - 129 U/L    Total Protein 6.8 6.3 - 8.2 g/dL    Albumin 3.7 3.5 - 5.0 g/dL    Globulin 3.2 2.3 - 3.5 g/dL    Albumin/Globulin Ratio 1.2 1.0 - 1.9     Lipid Panel    Collection Time: 02/14/25  9:53 AM   Result Value Ref Range    Cholesterol, Total 181 0 - 200 MG/DL    Triglycerides 72 0 - 150 MG/DL    HDL 97 (H) 40 - 60 MG/DL    LDL Cholesterol 70 0 - 100 MG/DL    VLDL Cholesterol Calculated 14 6 - 23 MG/DL    Chol/HDL Ratio 1.9 0.0 - 5.0     CBC with Auto Differential

## 2025-02-15 NOTE — PLAN OF CARE
Problem: Discharge Planning  Goal: Discharge to home or other facility with appropriate resources  2/14/2025 2241 by Kaleigh Barnhart, RN  Outcome: Progressing  Flowsheets (Taken 2/14/2025 2000)  Discharge to home or other facility with appropriate resources:   Identify barriers to discharge with patient and caregiver   Identify discharge learning needs (meds, wound care, etc)  2/14/2025 0918 by Jacoby Miller, RN  Outcome: Progressing  Flowsheets (Taken 2/14/2025 0819)  Discharge to home or other facility with appropriate resources: Identify barriers to discharge with patient and caregiver     Problem: Pain  Goal: Verbalizes/displays adequate comfort level or baseline comfort level  2/14/2025 2241 by Kaleigh Barnhart RN  Outcome: Progressing  Flowsheets (Taken 2/14/2025 2020)  Verbalizes/displays adequate comfort level or baseline comfort level:   Encourage patient to monitor pain and request assistance   Assess pain using appropriate pain scale  2/14/2025 0918 by Jacoby Miller, RN  Outcome: Progressing

## 2025-02-15 NOTE — CARE COORDINATION
02/15/25 1003   Services At/After Discharge   Services At/After Discharge   (Medication support references)   [employer] Resource Information Provided? No   Mode of Transport at Discharge Other (see comment)  (spouse to transport by car.)   Confirm Follow Up Transport Self   Condition of Participation: Discharge Planning   The Plan for Transition of Care is related to the following treatment goals: Home with family assistance   The Patient and/Or Patient Representative agree with the Discharge Plan? Yes     Discharge order placed. CM reviewed discharge summary. Patient has no insurer. One new medication, Nicoderm patch ordered. CM met with patient. Patient verbalizes agreement to discharge home. Patient reports he was laid off from his work last week. CM provided free clinic information in the [place] area.   CM informed patient the only new medication is Nicoderm patch. Patient declines voucher, reports he doesn't smoke, just upset yesterday. CM provided prescription assistance programs.  Personal scheduling for transportation home.

## 2025-04-17 ENCOUNTER — HOSPITAL ENCOUNTER (EMERGENCY)
Age: 40
Discharge: HOME | End: 2025-04-17

## 2025-04-17 VITALS
SYSTOLIC BLOOD PRESSURE: 146 MMHG | HEIGHT: 68 IN | DIASTOLIC BLOOD PRESSURE: 96 MMHG | RESPIRATION RATE: 18 BRPM | OXYGEN SATURATION: 99 % | TEMPERATURE: 98.2 F | BODY MASS INDEX: 42.44 KG/M2 | WEIGHT: 280 LBS | HEART RATE: 95 BPM

## 2025-04-17 DIAGNOSIS — J06.9 VIRAL UPPER RESPIRATORY ILLNESS: Primary | ICD-10-CM

## 2025-04-17 DIAGNOSIS — R03.0 ELEVATED BLOOD PRESSURE READING: ICD-10-CM

## 2025-04-17 PROCEDURE — 99282 EMERGENCY DEPT VISIT SF MDM: CPT

## 2025-04-17 ASSESSMENT — PAIN SCALES - GENERAL
PAINLEVEL_OUTOF10: 8
PAINLEVEL_OUTOF10: 6

## 2025-04-17 ASSESSMENT — PAIN DESCRIPTION - DESCRIPTORS: DESCRIPTORS: ACHING

## 2025-04-17 ASSESSMENT — LIFESTYLE VARIABLES
HOW OFTEN DO YOU HAVE A DRINK CONTAINING ALCOHOL: 2-3 TIMES A WEEK
HOW MANY STANDARD DRINKS CONTAINING ALCOHOL DO YOU HAVE ON A TYPICAL DAY: 1 OR 2

## 2025-04-17 ASSESSMENT — PAIN - FUNCTIONAL ASSESSMENT
PAIN_FUNCTIONAL_ASSESSMENT: 0-10
PAIN_FUNCTIONAL_ASSESSMENT: 0-10

## 2025-04-17 ASSESSMENT — PAIN DESCRIPTION - LOCATION
LOCATION: GENERALIZED
LOCATION: GENERALIZED

## 2025-04-17 NOTE — ED NOTES
Patient mobility status  with no difficulty.     I have reviewed discharge instructions with the patient.  The patient verbalized understanding.    Patient left ED via Discharge Method: ambulatory to Home with Friend.    Opportunity for questions and clarification provided.     Patient given 0 scripts.            Linda Hunter RN  04/17/25 0936

## 2025-04-17 NOTE — DISCHARGE INSTRUCTIONS
You have been diagnosed with a viral syndrome.  This is an infection caused by a virus, therefore an antibiotic is not indicated.  The best treatment for a viral illness is symptomatic- this includes hydration, rest and pain relief with Tylenol/Motrin.  You may also wish to take an OTC cold medicine of your choice, however, you should only take one and follow the directions carefully. Follow up with your PCP for recheck. Return to the ER if you develop worsening symptoms. Keep a check on BP, check it once or twice weekly with same blood pressure monitor, write the number down with date and time and take that with you to see a PCP for recheck of BP.

## 2025-04-17 NOTE — ED PROVIDER NOTES
Emergency Department Provider Note       PCP: No, Pcp   Age: 39 y.o.   Sex: male     DISPOSITION Decision To Discharge 04/17/2025 09:00:21 AM   DISPOSITION CONDITION Stable            ICD-10-CM    1. Viral upper respiratory illness  J06.9       2. Elevated blood pressure reading  R03.0           Medical Decision Making     Patient appears to have a viral infection today. His vital signs are stable, and exam is unremarkable. He was encouraged on symptomatic care, to drink plenty of fluids, rest, follow-up with his primary care physician and return to the emergency room if worsening. Use medication as directed, if OTC or prescription meds were given. All of his questions were answered. He is stable for discharge and ambulatory out of the ED at this time.      1 or more acute illnesses that pose a threat to life or bodily function.   Over the counter drug management performed.  Shared medical decision making was utilized in creating the patients health plan today.  Considerations: The following items were considered but not ordered: further evaluation.    I independently ordered and reviewed each unique test.  I reviewed external records: ED visit note from a different ED.    The patients assessment required an independent historian: None.  The reason they were needed is .                History     Patient is here with subjective fever, rhinorrhea, congestion, body aches and not feeling well for 5 days. No nausea and vomiting. No chest pain, shortness of breath, abdominal pain, trouble with urination or bowel movements, dizziness, weakness, dyspnea on exertion, swelling/tingling or weakness to arms and legs. He ambulated to the room without difficulty and is well hydrated.      The history is provided by the patient.       ROS     Review of Systems     Physical Exam     Vitals signs and nursing note reviewed:  Vitals:    04/17/25 0846   BP: (!) 146/87   Pulse: 83   Resp: 17   Temp: 98.4 °F (36.9 °C)   SpO2: 98%

## 2025-04-17 NOTE — ED TRIAGE NOTES
Patient arrives to ED pov from home. Patient reports he was sent home from work yesterday because they could tell he did not feel well. Patient reports he took a covid test and it was negative. Patient reports body aches, cough, and sinus drainage. Patient reports he is allergic to pollen and it causes his sinuses to drain. Patient reports his work told him he needed to see a doctor and get an excuse for work.

## 2025-06-18 ENCOUNTER — HOSPITAL ENCOUNTER (INPATIENT)
Age: 40
LOS: 2 days | Discharge: HOME | DRG: 439 | End: 2025-06-20
Attending: EMERGENCY MEDICINE | Admitting: HOSPITALIST

## 2025-06-18 ENCOUNTER — APPOINTMENT (OUTPATIENT)
Dept: CT IMAGING | Age: 40
DRG: 439 | End: 2025-06-18

## 2025-06-18 ENCOUNTER — APPOINTMENT (OUTPATIENT)
Dept: GENERAL RADIOLOGY | Age: 40
DRG: 439 | End: 2025-06-18

## 2025-06-18 DIAGNOSIS — F10.10 ETOH ABUSE: ICD-10-CM

## 2025-06-18 DIAGNOSIS — G47.53 RECURRENT ISOLATED SLEEP PARALYSIS: ICD-10-CM

## 2025-06-18 DIAGNOSIS — K85.20 ALCOHOL-INDUCED ACUTE PANCREATITIS, UNSPECIFIED COMPLICATION STATUS: Primary | ICD-10-CM

## 2025-06-18 PROBLEM — E66.813 CLASS 3 SEVERE OBESITY DUE TO EXCESS CALORIES WITH BODY MASS INDEX (BMI) OF 40.0 TO 44.9 IN ADULT (HCC): Status: ACTIVE | Noted: 2022-04-03

## 2025-06-18 PROBLEM — F10.239 ALCOHOL DEPENDENCE WITH WITHDRAWAL (HCC): Status: ACTIVE | Noted: 2025-06-18

## 2025-06-18 LAB
ALBUMIN SERPL-MCNC: 4.3 G/DL (ref 3.5–5)
ALBUMIN/GLOB SERPL: 1.1 (ref 1–1.9)
ALP SERPL-CCNC: 78 U/L (ref 40–129)
ALT SERPL-CCNC: 34 U/L (ref 8–55)
ANION GAP SERPL CALC-SCNC: 19 MMOL/L (ref 7–16)
AST SERPL-CCNC: 54 U/L (ref 15–37)
BASE EXCESS BLDV CALC-SCNC: 7.1 MMOL/L
BASOPHILS # BLD: 0.05 K/UL (ref 0–0.2)
BASOPHILS NFR BLD: 0.7 % (ref 0–2)
BILIRUB SERPL-MCNC: 1.6 MG/DL (ref 0–1.2)
BUN SERPL-MCNC: 11 MG/DL (ref 6–23)
CALCIUM SERPL-MCNC: 9.5 MG/DL (ref 8.8–10.2)
CHLORIDE SERPL-SCNC: 99 MMOL/L (ref 98–107)
CO2 SERPL-SCNC: 22 MMOL/L (ref 20–29)
CREAT SERPL-MCNC: 0.97 MG/DL (ref 0.8–1.3)
DIFFERENTIAL METHOD BLD: ABNORMAL
EKG ATRIAL RATE: 91 BPM
EKG DIAGNOSIS: NORMAL
EKG P AXIS: 66 DEGREES
EKG P-R INTERVAL: 128 MS
EKG Q-T INTERVAL: 356 MS
EKG QRS DURATION: 90 MS
EKG QTC CALCULATION (BAZETT): 437 MS
EKG R AXIS: 64 DEGREES
EKG T AXIS: 40 DEGREES
EKG VENTRICULAR RATE: 91 BPM
EOSINOPHIL # BLD: 0.22 K/UL (ref 0–0.8)
EOSINOPHIL NFR BLD: 3.1 % (ref 0.5–7.8)
ERYTHROCYTE [DISTWIDTH] IN BLOOD BY AUTOMATED COUNT: 12.5 % (ref 11.9–14.6)
ETHANOL SERPL-MCNC: 46 MG/DL (ref 0–0.08)
GAS FLOW.O2 O2 DELIVERY SYS: ABNORMAL
GLOBULIN SER CALC-MCNC: 3.7 G/DL (ref 2.3–3.5)
GLUCOSE SERPL-MCNC: 132 MG/DL (ref 70–99)
HCO3 BLDV-SCNC: 32.8 MMOL/L (ref 22–29)
HCT VFR BLD AUTO: 46.6 % (ref 41.1–50.3)
HGB BLD-MCNC: 16.7 G/DL (ref 13.6–17.2)
IMM GRANULOCYTES # BLD AUTO: 0.01 K/UL (ref 0–0.5)
IMM GRANULOCYTES NFR BLD AUTO: 0.1 % (ref 0–5)
LACTATE SERPL-SCNC: 1.2 MMOL/L (ref 0.5–2)
LACTATE SERPL-SCNC: 2.1 MMOL/L (ref 0.5–2)
LACTATE SERPL-SCNC: 2.4 MMOL/L (ref 0.5–2)
LIPASE SERPL-CCNC: 749 U/L (ref 13–60)
LYMPHOCYTES # BLD: 2.99 K/UL (ref 0.5–4.6)
LYMPHOCYTES NFR BLD: 42.5 % (ref 13–44)
MAGNESIUM SERPL-MCNC: 1.7 MG/DL (ref 1.8–2.4)
MCH RBC QN AUTO: 32.6 PG (ref 26.1–32.9)
MCHC RBC AUTO-ENTMCNC: 35.8 G/DL (ref 31.4–35)
MCV RBC AUTO: 90.8 FL (ref 82–102)
MONOCYTES # BLD: 0.7 K/UL (ref 0.1–1.3)
MONOCYTES NFR BLD: 10 % (ref 4–12)
NEUTS SEG # BLD: 3.06 K/UL (ref 1.7–8.2)
NEUTS SEG NFR BLD: 43.6 % (ref 43–78)
NRBC # BLD: 0 K/UL (ref 0–0.2)
PCO2 BLDV: 48.5 MMHG (ref 41–51)
PH BLDV: 7.44 (ref 7.32–7.42)
PLATELET # BLD AUTO: 397 K/UL (ref 150–450)
PMV BLD AUTO: 8.8 FL (ref 9.4–12.3)
PO2 BLDV: 51 MMHG
POTASSIUM SERPL-SCNC: 3.8 MMOL/L (ref 3.5–5.1)
PROT SERPL-MCNC: 8 G/DL (ref 6.3–8.2)
RBC # BLD AUTO: 5.13 M/UL (ref 4.23–5.6)
SAO2 % BLDV: 86.6 % (ref 65–88)
SERVICE CMNT-IMP: ABNORMAL
SODIUM SERPL-SCNC: 139 MMOL/L (ref 136–145)
SPECIMEN TYPE: ABNORMAL
TROPONIN T SERPL HS-MCNC: 9.6 NG/L (ref 0–22)
WBC # BLD AUTO: 7 K/UL (ref 4.3–11.1)

## 2025-06-18 PROCEDURE — 82077 ASSAY SPEC XCP UR&BREATH IA: CPT

## 2025-06-18 PROCEDURE — 93010 ELECTROCARDIOGRAM REPORT: CPT | Performed by: INTERNAL MEDICINE

## 2025-06-18 PROCEDURE — 93005 ELECTROCARDIOGRAM TRACING: CPT | Performed by: EMERGENCY MEDICINE

## 2025-06-18 PROCEDURE — 94760 N-INVAS EAR/PLS OXIMETRY 1: CPT

## 2025-06-18 PROCEDURE — 6360000004 HC RX CONTRAST MEDICATION: Performed by: HOSPITALIST

## 2025-06-18 PROCEDURE — 2500000003 HC RX 250 WO HCPCS: Performed by: EMERGENCY MEDICINE

## 2025-06-18 PROCEDURE — 2580000003 HC RX 258: Performed by: HOSPITALIST

## 2025-06-18 PROCEDURE — 2500000003 HC RX 250 WO HCPCS: Performed by: HOSPITALIST

## 2025-06-18 PROCEDURE — 6370000000 HC RX 637 (ALT 250 FOR IP): Performed by: HOSPITALIST

## 2025-06-18 PROCEDURE — 83735 ASSAY OF MAGNESIUM: CPT

## 2025-06-18 PROCEDURE — 84484 ASSAY OF TROPONIN QUANT: CPT

## 2025-06-18 PROCEDURE — 1100000003 HC PRIVATE W/ TELEMETRY

## 2025-06-18 PROCEDURE — 71045 X-RAY EXAM CHEST 1 VIEW: CPT

## 2025-06-18 PROCEDURE — 2580000003 HC RX 258: Performed by: EMERGENCY MEDICINE

## 2025-06-18 PROCEDURE — 96361 HYDRATE IV INFUSION ADD-ON: CPT

## 2025-06-18 PROCEDURE — 36415 COLL VENOUS BLD VENIPUNCTURE: CPT

## 2025-06-18 PROCEDURE — 96375 TX/PRO/DX INJ NEW DRUG ADDON: CPT

## 2025-06-18 PROCEDURE — 96374 THER/PROPH/DIAG INJ IV PUSH: CPT

## 2025-06-18 PROCEDURE — 6360000002 HC RX W HCPCS: Performed by: HOSPITALIST

## 2025-06-18 PROCEDURE — 82803 BLOOD GASES ANY COMBINATION: CPT

## 2025-06-18 PROCEDURE — 6360000002 HC RX W HCPCS: Performed by: EMERGENCY MEDICINE

## 2025-06-18 PROCEDURE — 74177 CT ABD & PELVIS W/CONTRAST: CPT

## 2025-06-18 PROCEDURE — 83605 ASSAY OF LACTIC ACID: CPT

## 2025-06-18 PROCEDURE — 94640 AIRWAY INHALATION TREATMENT: CPT

## 2025-06-18 PROCEDURE — 80053 COMPREHEN METABOLIC PANEL: CPT

## 2025-06-18 PROCEDURE — 96376 TX/PRO/DX INJ SAME DRUG ADON: CPT

## 2025-06-18 PROCEDURE — 83690 ASSAY OF LIPASE: CPT

## 2025-06-18 PROCEDURE — 85025 COMPLETE CBC W/AUTO DIFF WBC: CPT

## 2025-06-18 PROCEDURE — 99285 EMERGENCY DEPT VISIT HI MDM: CPT

## 2025-06-18 RX ORDER — PANTOPRAZOLE SODIUM 40 MG/1
40 TABLET, DELAYED RELEASE ORAL
Status: DISCONTINUED | OUTPATIENT
Start: 2025-06-18 | End: 2025-06-20 | Stop reason: HOSPADM

## 2025-06-18 RX ORDER — POTASSIUM CHLORIDE 1500 MG/1
40 TABLET, EXTENDED RELEASE ORAL PRN
Status: DISCONTINUED | OUTPATIENT
Start: 2025-06-18 | End: 2025-06-20 | Stop reason: HOSPADM

## 2025-06-18 RX ORDER — DIAZEPAM 10 MG/2ML
5 INJECTION, SOLUTION INTRAMUSCULAR; INTRAVENOUS EVERY 4 HOURS PRN
Status: DISCONTINUED | OUTPATIENT
Start: 2025-06-18 | End: 2025-06-18 | Stop reason: SDUPTHER

## 2025-06-18 RX ORDER — SODIUM CHLORIDE 0.9 % (FLUSH) 0.9 %
5-40 SYRINGE (ML) INJECTION EVERY 12 HOURS SCHEDULED
Status: DISCONTINUED | OUTPATIENT
Start: 2025-06-18 | End: 2025-06-20 | Stop reason: HOSPADM

## 2025-06-18 RX ORDER — SODIUM CHLORIDE, SODIUM LACTATE, POTASSIUM CHLORIDE, CALCIUM CHLORIDE 600; 310; 30; 20 MG/100ML; MG/100ML; MG/100ML; MG/100ML
INJECTION, SOLUTION INTRAVENOUS CONTINUOUS
Status: ACTIVE | OUTPATIENT
Start: 2025-06-18 | End: 2025-06-19

## 2025-06-18 RX ORDER — DIAZEPAM 10 MG/2ML
10 INJECTION, SOLUTION INTRAMUSCULAR; INTRAVENOUS
Status: DISCONTINUED | OUTPATIENT
Start: 2025-06-18 | End: 2025-06-20 | Stop reason: HOSPADM

## 2025-06-18 RX ORDER — OXYCODONE HYDROCHLORIDE 5 MG/1
10 TABLET ORAL EVERY 4 HOURS PRN
Refills: 0 | Status: DISCONTINUED | OUTPATIENT
Start: 2025-06-18 | End: 2025-06-20 | Stop reason: HOSPADM

## 2025-06-18 RX ORDER — SENNA AND DOCUSATE SODIUM 50; 8.6 MG/1; MG/1
1 TABLET, FILM COATED ORAL 2 TIMES DAILY
Status: DISCONTINUED | OUTPATIENT
Start: 2025-06-18 | End: 2025-06-20 | Stop reason: HOSPADM

## 2025-06-18 RX ORDER — DIAZEPAM 5 MG/1
20 TABLET ORAL
Status: DISCONTINUED | OUTPATIENT
Start: 2025-06-18 | End: 2025-06-20 | Stop reason: HOSPADM

## 2025-06-18 RX ORDER — ACETAMINOPHEN 325 MG/1
650 TABLET ORAL EVERY 6 HOURS PRN
Status: DISCONTINUED | OUTPATIENT
Start: 2025-06-18 | End: 2025-06-20 | Stop reason: HOSPADM

## 2025-06-18 RX ORDER — POLYETHYLENE GLYCOL 3350 17 G/17G
17 POWDER, FOR SOLUTION ORAL DAILY PRN
Status: DISCONTINUED | OUTPATIENT
Start: 2025-06-18 | End: 2025-06-20 | Stop reason: HOSPADM

## 2025-06-18 RX ORDER — SODIUM CHLORIDE 9 MG/ML
INJECTION, SOLUTION INTRAVENOUS CONTINUOUS
Status: ACTIVE | OUTPATIENT
Start: 2025-06-18 | End: 2025-06-18

## 2025-06-18 RX ORDER — SODIUM CHLORIDE 0.9 % (FLUSH) 0.9 %
5-40 SYRINGE (ML) INJECTION PRN
Status: DISCONTINUED | OUTPATIENT
Start: 2025-06-18 | End: 2025-06-20 | Stop reason: HOSPADM

## 2025-06-18 RX ORDER — LANOLIN ALCOHOL/MO/W.PET/CERES
100 CREAM (GRAM) TOPICAL DAILY
Status: DISCONTINUED | OUTPATIENT
Start: 2025-06-18 | End: 2025-06-20 | Stop reason: HOSPADM

## 2025-06-18 RX ORDER — LORAZEPAM 1 MG/1
3 TABLET ORAL
Status: DISCONTINUED | OUTPATIENT
Start: 2025-06-18 | End: 2025-06-18 | Stop reason: SDUPTHER

## 2025-06-18 RX ORDER — CHLORDIAZEPOXIDE HYDROCHLORIDE 5 MG/1
20 CAPSULE, GELATIN COATED ORAL 3 TIMES DAILY
Status: DISCONTINUED | OUTPATIENT
Start: 2025-06-18 | End: 2025-06-19

## 2025-06-18 RX ORDER — ONDANSETRON 2 MG/ML
4 INJECTION INTRAMUSCULAR; INTRAVENOUS EVERY 6 HOURS PRN
Status: DISCONTINUED | OUTPATIENT
Start: 2025-06-18 | End: 2025-06-18 | Stop reason: SDUPTHER

## 2025-06-18 RX ORDER — LORAZEPAM 1 MG/1
0.5 TABLET ORAL EVERY 4 HOURS PRN
Status: DISCONTINUED | OUTPATIENT
Start: 2025-06-18 | End: 2025-06-18 | Stop reason: SDUPTHER

## 2025-06-18 RX ORDER — MAGNESIUM SULFATE IN WATER 40 MG/ML
2000 INJECTION, SOLUTION INTRAVENOUS PRN
Status: DISCONTINUED | OUTPATIENT
Start: 2025-06-18 | End: 2025-06-20 | Stop reason: HOSPADM

## 2025-06-18 RX ORDER — ACETAMINOPHEN 650 MG/1
650 SUPPOSITORY RECTAL EVERY 6 HOURS PRN
Status: DISCONTINUED | OUTPATIENT
Start: 2025-06-18 | End: 2025-06-20 | Stop reason: HOSPADM

## 2025-06-18 RX ORDER — PROMETHAZINE HYDROCHLORIDE 25 MG/1
25 TABLET ORAL EVERY 6 HOURS PRN
Status: DISCONTINUED | OUTPATIENT
Start: 2025-06-18 | End: 2025-06-20 | Stop reason: HOSPADM

## 2025-06-18 RX ORDER — POTASSIUM CHLORIDE 7.45 MG/ML
10 INJECTION INTRAVENOUS PRN
Status: DISCONTINUED | OUTPATIENT
Start: 2025-06-18 | End: 2025-06-20 | Stop reason: HOSPADM

## 2025-06-18 RX ORDER — AMLODIPINE BESYLATE 5 MG/1
2.5 TABLET ORAL DAILY
Status: DISCONTINUED | OUTPATIENT
Start: 2025-06-18 | End: 2025-06-20 | Stop reason: HOSPADM

## 2025-06-18 RX ORDER — ENOXAPARIN SODIUM 100 MG/ML
30 INJECTION SUBCUTANEOUS 2 TIMES DAILY
Status: DISCONTINUED | OUTPATIENT
Start: 2025-06-18 | End: 2025-06-20 | Stop reason: HOSPADM

## 2025-06-18 RX ORDER — SODIUM CHLORIDE 9 MG/ML
INJECTION, SOLUTION INTRAVENOUS PRN
Status: DISCONTINUED | OUTPATIENT
Start: 2025-06-18 | End: 2025-06-20 | Stop reason: HOSPADM

## 2025-06-18 RX ORDER — DIAZEPAM 5 MG/1
15 TABLET ORAL
Status: DISCONTINUED | OUTPATIENT
Start: 2025-06-18 | End: 2025-06-20 | Stop reason: HOSPADM

## 2025-06-18 RX ORDER — DIAZEPAM 10 MG/2ML
15 INJECTION, SOLUTION INTRAMUSCULAR; INTRAVENOUS
Status: DISCONTINUED | OUTPATIENT
Start: 2025-06-18 | End: 2025-06-20 | Stop reason: HOSPADM

## 2025-06-18 RX ORDER — FOLIC ACID 1 MG/1
1 TABLET ORAL DAILY
Status: DISCONTINUED | OUTPATIENT
Start: 2025-06-18 | End: 2025-06-20 | Stop reason: HOSPADM

## 2025-06-18 RX ORDER — HYDROMORPHONE HYDROCHLORIDE 1 MG/ML
1 INJECTION, SOLUTION INTRAMUSCULAR; INTRAVENOUS; SUBCUTANEOUS EVERY 4 HOURS PRN
Status: DISCONTINUED | OUTPATIENT
Start: 2025-06-18 | End: 2025-06-20 | Stop reason: HOSPADM

## 2025-06-18 RX ORDER — HYDRALAZINE HYDROCHLORIDE 20 MG/ML
10 INJECTION INTRAMUSCULAR; INTRAVENOUS EVERY 6 HOURS PRN
Status: DISCONTINUED | OUTPATIENT
Start: 2025-06-18 | End: 2025-06-20 | Stop reason: HOSPADM

## 2025-06-18 RX ORDER — INDOMETHACIN 25 MG/1
50 CAPSULE ORAL ONCE
Status: COMPLETED | OUTPATIENT
Start: 2025-06-18 | End: 2025-06-18

## 2025-06-18 RX ORDER — DIAZEPAM 10 MG/2ML
5 INJECTION, SOLUTION INTRAMUSCULAR; INTRAVENOUS
Status: DISCONTINUED | OUTPATIENT
Start: 2025-06-18 | End: 2025-06-20 | Stop reason: HOSPADM

## 2025-06-18 RX ORDER — IOPAMIDOL 755 MG/ML
100 INJECTION, SOLUTION INTRAVASCULAR
Status: COMPLETED | OUTPATIENT
Start: 2025-06-18 | End: 2025-06-18

## 2025-06-18 RX ORDER — SODIUM CHLORIDE 9 MG/ML
INJECTION, SOLUTION INTRAVENOUS PRN
Status: DISCONTINUED | OUTPATIENT
Start: 2025-06-18 | End: 2025-06-18 | Stop reason: SDUPTHER

## 2025-06-18 RX ORDER — LORAZEPAM 1 MG/1
1 TABLET ORAL
Status: DISCONTINUED | OUTPATIENT
Start: 2025-06-18 | End: 2025-06-18 | Stop reason: SDUPTHER

## 2025-06-18 RX ORDER — DIAZEPAM 5 MG/1
5 TABLET ORAL
Status: DISCONTINUED | OUTPATIENT
Start: 2025-06-18 | End: 2025-06-20 | Stop reason: HOSPADM

## 2025-06-18 RX ORDER — DIAZEPAM 10 MG/2ML
20 INJECTION, SOLUTION INTRAMUSCULAR; INTRAVENOUS
Status: DISCONTINUED | OUTPATIENT
Start: 2025-06-18 | End: 2025-06-20 | Stop reason: HOSPADM

## 2025-06-18 RX ORDER — SODIUM CHLORIDE, SODIUM LACTATE, POTASSIUM CHLORIDE, AND CALCIUM CHLORIDE .6; .31; .03; .02 G/100ML; G/100ML; G/100ML; G/100ML
1000 INJECTION, SOLUTION INTRAVENOUS
Status: COMPLETED | OUTPATIENT
Start: 2025-06-18 | End: 2025-06-18

## 2025-06-18 RX ORDER — ONDANSETRON 2 MG/ML
4 INJECTION INTRAMUSCULAR; INTRAVENOUS EVERY 4 HOURS PRN
Status: DISCONTINUED | OUTPATIENT
Start: 2025-06-18 | End: 2025-06-20 | Stop reason: HOSPADM

## 2025-06-18 RX ORDER — IPRATROPIUM BROMIDE AND ALBUTEROL SULFATE 2.5; .5 MG/3ML; MG/3ML
1 SOLUTION RESPIRATORY (INHALATION) EVERY 4 HOURS PRN
Status: DISCONTINUED | OUTPATIENT
Start: 2025-06-18 | End: 2025-06-20 | Stop reason: HOSPADM

## 2025-06-18 RX ORDER — LORAZEPAM 1 MG/1
2 TABLET ORAL
Status: DISCONTINUED | OUTPATIENT
Start: 2025-06-18 | End: 2025-06-18 | Stop reason: SDUPTHER

## 2025-06-18 RX ORDER — LORAZEPAM 1 MG/1
4 TABLET ORAL
Status: DISCONTINUED | OUTPATIENT
Start: 2025-06-18 | End: 2025-06-18 | Stop reason: SDUPTHER

## 2025-06-18 RX ORDER — MORPHINE SULFATE 4 MG/ML
4 INJECTION INTRAVENOUS ONCE
Refills: 0 | Status: COMPLETED | OUTPATIENT
Start: 2025-06-18 | End: 2025-06-18

## 2025-06-18 RX ORDER — DIAZEPAM 5 MG/1
10 TABLET ORAL
Status: DISCONTINUED | OUTPATIENT
Start: 2025-06-18 | End: 2025-06-20 | Stop reason: HOSPADM

## 2025-06-18 RX ADMIN — SODIUM BICARBONATE 50 MEQ: 84 INJECTION, SOLUTION INTRAVENOUS at 12:14

## 2025-06-18 RX ADMIN — OXYCODONE 10 MG: 5 TABLET ORAL at 20:04

## 2025-06-18 RX ADMIN — HYDRALAZINE HYDROCHLORIDE 10 MG: 20 INJECTION INTRAMUSCULAR; INTRAVENOUS at 16:40

## 2025-06-18 RX ADMIN — ENOXAPARIN SODIUM 30 MG: 100 INJECTION SUBCUTANEOUS at 10:50

## 2025-06-18 RX ADMIN — CHLORDIAZEPOXIDE HYDROCHLORIDE 20 MG: 10 CAPSULE ORAL at 10:45

## 2025-06-18 RX ADMIN — CHLORDIAZEPOXIDE HYDROCHLORIDE 20 MG: 10 CAPSULE ORAL at 16:39

## 2025-06-18 RX ADMIN — FOLIC ACID 1 MG: 1 TABLET ORAL at 10:46

## 2025-06-18 RX ADMIN — ONDANSETRON 4 MG: 2 INJECTION, SOLUTION INTRAMUSCULAR; INTRAVENOUS at 07:45

## 2025-06-18 RX ADMIN — SODIUM CHLORIDE, POTASSIUM CHLORIDE, SODIUM LACTATE AND CALCIUM CHLORIDE 1000 ML: 600; 310; 30; 20 INJECTION, SOLUTION INTRAVENOUS at 07:23

## 2025-06-18 RX ADMIN — SODIUM CHLORIDE: 0.9 INJECTION, SOLUTION INTRAVENOUS at 14:41

## 2025-06-18 RX ADMIN — AMLODIPINE BESYLATE 2.5 MG: 5 TABLET ORAL at 10:47

## 2025-06-18 RX ADMIN — HYDROMORPHONE HYDROCHLORIDE 1 MG: 1 INJECTION, SOLUTION INTRAMUSCULAR; INTRAVENOUS; SUBCUTANEOUS at 13:50

## 2025-06-18 RX ADMIN — OXYCODONE 10 MG: 5 TABLET ORAL at 14:41

## 2025-06-18 RX ADMIN — SODIUM CHLORIDE, PRESERVATIVE FREE 10 ML: 5 INJECTION INTRAVENOUS at 20:09

## 2025-06-18 RX ADMIN — HYDROMORPHONE HYDROCHLORIDE 1 MG: 1 INJECTION, SOLUTION INTRAMUSCULAR; INTRAVENOUS; SUBCUTANEOUS at 10:12

## 2025-06-18 RX ADMIN — HYDROMORPHONE HYDROCHLORIDE 1 MG: 1 INJECTION, SOLUTION INTRAMUSCULAR; INTRAVENOUS; SUBCUTANEOUS at 17:50

## 2025-06-18 RX ADMIN — DOCUSATE SODIUM 50 MG AND SENNOSIDES 8.6 MG 1 TABLET: 8.6; 5 TABLET, FILM COATED ORAL at 12:12

## 2025-06-18 RX ADMIN — IOPAMIDOL 100 ML: 755 INJECTION, SOLUTION INTRAVENOUS at 14:28

## 2025-06-18 RX ADMIN — PANTOPRAZOLE SODIUM 40 MG: 40 TABLET, DELAYED RELEASE ORAL at 16:39

## 2025-06-18 RX ADMIN — CHLORDIAZEPOXIDE HYDROCHLORIDE 20 MG: 10 CAPSULE ORAL at 20:03

## 2025-06-18 RX ADMIN — ENOXAPARIN SODIUM 30 MG: 100 INJECTION SUBCUTANEOUS at 20:03

## 2025-06-18 RX ADMIN — DOCUSATE SODIUM 50 MG AND SENNOSIDES 8.6 MG 1 TABLET: 8.6; 5 TABLET, FILM COATED ORAL at 20:03

## 2025-06-18 RX ADMIN — IPRATROPIUM BROMIDE AND ALBUTEROL SULFATE 1 DOSE: 2.5; .5 SOLUTION RESPIRATORY (INHALATION) at 17:33

## 2025-06-18 RX ADMIN — MORPHINE SULFATE 4 MG: 4 INJECTION INTRAVENOUS at 08:52

## 2025-06-18 RX ADMIN — MORPHINE SULFATE 4 MG: 4 INJECTION INTRAVENOUS at 07:46

## 2025-06-18 RX ADMIN — Medication 100 MG: at 10:44

## 2025-06-18 RX ADMIN — FAMOTIDINE 20 MG: 10 INJECTION, SOLUTION INTRAVENOUS at 10:47

## 2025-06-18 RX ADMIN — SODIUM CHLORIDE, POTASSIUM CHLORIDE, SODIUM LACTATE AND CALCIUM CHLORIDE: 600; 310; 30; 20 INJECTION, SOLUTION INTRAVENOUS at 22:18

## 2025-06-18 RX ADMIN — FAMOTIDINE 20 MG: 10 INJECTION, SOLUTION INTRAVENOUS at 20:04

## 2025-06-18 RX ADMIN — Medication 3 MG: at 20:03

## 2025-06-18 ASSESSMENT — PAIN SCALES - GENERAL
PAINLEVEL_OUTOF10: 10
PAINLEVEL_OUTOF10: 9
PAINLEVEL_OUTOF10: 10
PAINLEVEL_OUTOF10: 9
PAINLEVEL_OUTOF10: 9
PAINLEVEL_OUTOF10: 6
PAINLEVEL_OUTOF10: 9
PAINLEVEL_OUTOF10: 9
PAINLEVEL_OUTOF10: 3
PAINLEVEL_OUTOF10: 8
PAINLEVEL_OUTOF10: 9
PAINLEVEL_OUTOF10: 8
PAINLEVEL_OUTOF10: 6
PAINLEVEL_OUTOF10: 7

## 2025-06-18 ASSESSMENT — PAIN DESCRIPTION - ORIENTATION
ORIENTATION: LEFT
ORIENTATION: MID;UPPER
ORIENTATION: RIGHT;LEFT
ORIENTATION: LEFT
ORIENTATION: RIGHT;LEFT

## 2025-06-18 ASSESSMENT — PAIN DESCRIPTION - LOCATION
LOCATION: ABDOMEN
LOCATION: ABDOMEN
LOCATION: CHEST
LOCATION: ABDOMEN
LOCATION: BACK
LOCATION: ABDOMEN;BACK

## 2025-06-18 ASSESSMENT — PAIN - FUNCTIONAL ASSESSMENT
PAIN_FUNCTIONAL_ASSESSMENT: 0-10
PAIN_FUNCTIONAL_ASSESSMENT: 0-10
PAIN_FUNCTIONAL_ASSESSMENT: ACTIVITIES ARE NOT PREVENTED

## 2025-06-18 ASSESSMENT — PAIN DESCRIPTION - DESCRIPTORS
DESCRIPTORS: CRAMPING;STABBING
DESCRIPTORS: ACHING
DESCRIPTORS: ACHING;CRAMPING
DESCRIPTORS: ACHING
DESCRIPTORS: SHARP

## 2025-06-18 ASSESSMENT — LIFESTYLE VARIABLES
HOW MANY STANDARD DRINKS CONTAINING ALCOHOL DO YOU HAVE ON A TYPICAL DAY: 10 OR MORE
HOW OFTEN DO YOU HAVE A DRINK CONTAINING ALCOHOL: 4 OR MORE TIMES A WEEK

## 2025-06-18 NOTE — CARE COORDINATION
Chart review complete, CM consult noted for potential rehab- Jose with [facility] in to speak with pt, resources have been provided. CM met with pt at bedside, pt states he lives with spouse and children in 3rd floor apartment and is currently working, no insurance and no current PCP. Pt is normally independent with ADLS, no DME and drives. NO anticipated dc needs noted at this time.  Demographics, insurance and PCP confirmed with pt.    CM staff will remain available to assist as needed with dc needs.       06/18/25 1126   Service Assessment   Patient Orientation Alert and Oriented   Cognition Alert   History Provided By Patient   Primary Caregiver Self   Accompanied By/Relationship none   Support Systems Spouse/Significant Other;Children   Patient's Healthcare Decision Maker is: Legal Next of Kin   PCP Verified by CM Yes  (none)   Prior Functional Level Independent in ADLs/IADLs   Current Functional Level Independent in ADLs/IADLs   Can patient return to prior living arrangement Yes   Ability to make needs known: Good   Family able to assist with home care needs: Yes   Would you like for me to discuss the discharge plan with any other family members/significant others, and if so, who? Yes   Financial Resources None   Community Resources None   CM/SW Referral Other (see comment)  (dispo)   Social/Functional History   Lives With Spouse;Family   Type of Home Apartment   Home Layout One level   Home Access Stairs to enter with rails   Entrance Stairs - Number of Steps 3rd floor apartment   Bathroom Shower/Tub Tub/Shower unit   Bathroom Toilet Standard   Bathroom Equipment None   Bathroom Accessibility Accessible   Home Equipment None   Receives Help From Family   Prior Level of Assist for ADLs Independent   Prior Level of Assist for Homemaking Independent   Homemaking Responsibilities Yes   Ambulation Assistance Independent   Prior Level of Assist for Transfers Independent   Active  Yes   Occupation Full time

## 2025-06-18 NOTE — H&P
Hospitalist History and Physical   Admit Date:  2025  7:15 AM   Name:  Robb Galvez   Age:  40 y.o.  Sex:  male  :  1985   MRN:  [id number]   Room:  [place] [address]    Presenting/Chief Complaint: Abdominal Pain, Chest Pain, and Nausea     Reason(s) for Admission: Alcohol-induced acute pancreatitis without infection or necrosis [K85.20]     History of Present Illness:   Robb Galvez is a 40 y.o. male with medical history of alcohol dependence, obesity with BMI of 43.3 who presented to the ER with chief complaints of abdominal pain, nausea vomiting going on for past 3 days.  Patient reports that abdominal pain is epigastric region, sharp that radiates to his back and upper chest.  Patient was drinking alcohol daily, consumes 1 to 2 pints of liquor per day.  Patient reports feeling sick since Friday and had his last drink Saturday night.  Patient reports feeling anxious and tremulous and reports that he has not been able to keep anything down for past 2 to 3 days.  Patient reports he has had pancreatitis in the past.  VS on arrival: Tmax 97.7, RR 18, IA , /104 with room air sats of 96%.  Blood work remarkable for magnesium 1.7, lactic acid 2.1 which improved to 1.2 with IV fluids, T. bili 1.6, AST 54.  Blood alcohol level of 46, lipase of 749.        Assessment & Plan:     Principal Problem:    Alcohol-induced acute pancreatitis without infection or necrosis    Alcohol dependence with withdrawal (HCC)  Plan: -  Admit to inpatient in view of alcohol induced pancreatitis.  Follow-up with CT abdominal pelvis with contrast.  Continue aggressive IV hydration.  Bowel rest with n.p.o. with sips of clear water and oral meds.  Pain control with as needed IV Dilaudid and Roxicodone.  As needed antiemetics ordered.  Patient counseled for alcohol cessation.  Order for [facility] protocol for alcohol withdrawal.  Starting on Librium 20 mg p.o. 3 times daily.  GI prophylaxis with IV Pepcid.  Order for folic

## 2025-06-18 NOTE — PROGRESS NOTES
4 Eyes Skin Assessment     NAME:  Robb Galvez  YOB: 1985  MEDICAL RECORD NUMBER:  [id number]    The patient is being assessed for  Admission    I agree that at least one RN has performed a thorough Head to Toe Skin Assessment on the patient. ALL assessment sites listed below have been assessed.      Areas assessed by both nurses:    Head, Face, Ears, Shoulders, Back, Chest, Arms, Elbows, Hands, Sacrum. Buttock, Coccyx, Ischium, Legs. Feet and Heels, and Under Medical Devices         Does the Patient have a Wound? No noted wound(s)       Twin Prevention initiated by RN: Yes  Wound Care Orders initiated by RN: No    Pressure Injury (Stage 3,4, Unstageable, DTI, NWPT, and Complex wounds) if present, place Wound referral order by RN under : No    New Ostomies, if present place, Ostomy referral order under : No     Nurse 1 eSignature: Electronically signed by ANTHONY YUEN RN on 6/18/25 at 4:55 PM EDT    **SHARE this note so that the co-signing nurse can place an eSignature**    Nurse 2 eSignature: Electronically signed by Rekha Butt RN on 6/18/25 at 5:11 PM EDT

## 2025-06-18 NOTE — PROGRESS NOTES
PRN pain med given per order. Pt also given a breathing treatment due to feeling SOB. IVF running per order. PRN hydralazine also given.    Rounds performed throughout shift. Pt denies needs at this time. Bed in low position, locked and call light/personal items within reach.

## 2025-06-18 NOTE — ACP (ADVANCE CARE PLANNING)
Advance Care Planning   Healthcare Decision Maker:    Primary Decision Maker: Mar[facility]itt[facility] - [facility] - [phone]    Click here to complete Healthcare Decision Makers including selection of the Healthcare Decision Maker Relationship (ie \"Primary\").  Today we documented Decision Maker(s) consistent with Legal Next of Kin hierarchy.

## 2025-06-18 NOTE — CARE COORDINATION
Patient admits to struggling with ETOH. Says he has quit cold turkey in the past but started back and has gotten worse. Patient has received outpatient resources as well as community 12 step and [facility] resources.

## 2025-06-18 NOTE — PROGRESS NOTES
TRANSFER - IN REPORT:    Verbal report received from VANI Umaña on Robb Galvez  being received from ED for routine progression of patient care      Report consisted of patient's Situation, Background, Assessment and   Recommendations(SBAR).     Information from the following report(s) Nurse Handoff Report, ED Encounter Summary, ED SBAR, Adult Overview, Recent Results, and Neuro Assessment was reviewed with the receiving nurse.    Opportunity for questions and clarification was provided.      Assessment will be completed upon patient's arrival to unit and care will be assumed.

## 2025-06-18 NOTE — ED TRIAGE NOTES
Pt ambulatory to ED w/ cc of LUQ abd pain and chest pain when coughing x 3 days secondary to stopping drinking. Pt states he usually drinks 1 pint of vodka a day. Pt hx asthma. Pt A&O x 4

## 2025-06-19 ENCOUNTER — APPOINTMENT (OUTPATIENT)
Dept: ULTRASOUND IMAGING | Age: 40
DRG: 439 | End: 2025-06-19

## 2025-06-19 LAB
ALBUMIN SERPL-MCNC: 3.8 G/DL (ref 3.5–5)
ALBUMIN/GLOB SERPL: 1.2 (ref 1–1.9)
ALP SERPL-CCNC: 67 U/L (ref 40–129)
ALT SERPL-CCNC: 29 U/L (ref 8–55)
ANION GAP SERPL CALC-SCNC: 13 MMOL/L (ref 7–16)
AST SERPL-CCNC: 41 U/L (ref 15–37)
BASOPHILS # BLD: 0.05 K/UL (ref 0–0.2)
BASOPHILS NFR BLD: 0.5 % (ref 0–2)
BILIRUB DIRECT SERPL-MCNC: 0.9 MG/DL (ref 0–0.3)
BILIRUB SERPL-MCNC: 2.9 MG/DL (ref 0–1.2)
BUN SERPL-MCNC: 7 MG/DL (ref 6–23)
CALCIUM SERPL-MCNC: 9.1 MG/DL (ref 8.8–10.2)
CHLORIDE SERPL-SCNC: 96 MMOL/L (ref 98–107)
CO2 SERPL-SCNC: 25 MMOL/L (ref 20–29)
CREAT SERPL-MCNC: 0.84 MG/DL (ref 0.8–1.3)
DIFFERENTIAL METHOD BLD: ABNORMAL
EOSINOPHIL # BLD: 0.34 K/UL (ref 0–0.8)
EOSINOPHIL NFR BLD: 3.6 % (ref 0.5–7.8)
ERYTHROCYTE [DISTWIDTH] IN BLOOD BY AUTOMATED COUNT: 12.4 % (ref 11.9–14.6)
GLOBULIN SER CALC-MCNC: 3.2 G/DL (ref 2.3–3.5)
GLUCOSE SERPL-MCNC: 103 MG/DL (ref 70–99)
HCT VFR BLD AUTO: 45.8 % (ref 41.1–50.3)
HGB BLD-MCNC: 15.4 G/DL (ref 13.6–17.2)
IMM GRANULOCYTES # BLD AUTO: 0.02 K/UL (ref 0–0.5)
IMM GRANULOCYTES NFR BLD AUTO: 0.2 % (ref 0–5)
LIPASE SERPL-CCNC: 216 U/L (ref 13–60)
LYMPHOCYTES # BLD: 1.35 K/UL (ref 0.5–4.6)
LYMPHOCYTES NFR BLD: 14.4 % (ref 13–44)
MCH RBC QN AUTO: 32.6 PG (ref 26.1–32.9)
MCHC RBC AUTO-ENTMCNC: 33.6 G/DL (ref 31.4–35)
MCV RBC AUTO: 97 FL (ref 82–102)
MONOCYTES # BLD: 0.75 K/UL (ref 0.1–1.3)
MONOCYTES NFR BLD: 8 % (ref 4–12)
NEUTS SEG # BLD: 6.84 K/UL (ref 1.7–8.2)
NEUTS SEG NFR BLD: 73.3 % (ref 43–78)
NRBC # BLD: 0 K/UL (ref 0–0.2)
PLATELET # BLD AUTO: 274 K/UL (ref 150–450)
PMV BLD AUTO: 8.7 FL (ref 9.4–12.3)
POTASSIUM SERPL-SCNC: 3.6 MMOL/L (ref 3.5–5.1)
PROT SERPL-MCNC: 6.9 G/DL (ref 6.3–8.2)
RBC # BLD AUTO: 4.72 M/UL (ref 4.23–5.6)
SODIUM SERPL-SCNC: 134 MMOL/L (ref 136–145)
TRIGL SERPL-MCNC: 87 MG/DL (ref 0–150)
WBC # BLD AUTO: 9.4 K/UL (ref 4.3–11.1)

## 2025-06-19 PROCEDURE — 80048 BASIC METABOLIC PNL TOTAL CA: CPT

## 2025-06-19 PROCEDURE — 85025 COMPLETE CBC W/AUTO DIFF WBC: CPT

## 2025-06-19 PROCEDURE — 6370000000 HC RX 637 (ALT 250 FOR IP): Performed by: PHYSICIAN ASSISTANT

## 2025-06-19 PROCEDURE — 36415 COLL VENOUS BLD VENIPUNCTURE: CPT

## 2025-06-19 PROCEDURE — 80076 HEPATIC FUNCTION PANEL: CPT

## 2025-06-19 PROCEDURE — 2500000003 HC RX 250 WO HCPCS

## 2025-06-19 PROCEDURE — 6370000000 HC RX 637 (ALT 250 FOR IP): Performed by: HOSPITALIST

## 2025-06-19 PROCEDURE — 6360000002 HC RX W HCPCS

## 2025-06-19 PROCEDURE — 94760 N-INVAS EAR/PLS OXIMETRY 1: CPT

## 2025-06-19 PROCEDURE — 76705 ECHO EXAM OF ABDOMEN: CPT

## 2025-06-19 PROCEDURE — 94640 AIRWAY INHALATION TREATMENT: CPT

## 2025-06-19 PROCEDURE — 1100000003 HC PRIVATE W/ TELEMETRY

## 2025-06-19 PROCEDURE — 83690 ASSAY OF LIPASE: CPT

## 2025-06-19 PROCEDURE — 2500000003 HC RX 250 WO HCPCS: Performed by: HOSPITALIST

## 2025-06-19 PROCEDURE — 6360000002 HC RX W HCPCS: Performed by: HOSPITALIST

## 2025-06-19 PROCEDURE — 84478 ASSAY OF TRIGLYCERIDES: CPT

## 2025-06-19 PROCEDURE — 2580000003 HC RX 258: Performed by: HOSPITALIST

## 2025-06-19 PROCEDURE — 2500000003 HC RX 250 WO HCPCS: Performed by: EMERGENCY MEDICINE

## 2025-06-19 RX ORDER — NICOTINE 21 MG/24HR
1 PATCH, TRANSDERMAL 24 HOURS TRANSDERMAL DAILY
Status: DISCONTINUED | OUTPATIENT
Start: 2025-06-19 | End: 2025-06-20 | Stop reason: HOSPADM

## 2025-06-19 RX ORDER — CHLORDIAZEPOXIDE HYDROCHLORIDE 5 MG/1
10 CAPSULE, GELATIN COATED ORAL 3 TIMES DAILY
Status: DISCONTINUED | OUTPATIENT
Start: 2025-06-19 | End: 2025-06-20

## 2025-06-19 RX ADMIN — AMLODIPINE BESYLATE 2.5 MG: 5 TABLET ORAL at 08:34

## 2025-06-19 RX ADMIN — CHLORDIAZEPOXIDE HYDROCHLORIDE 10 MG: 5 CAPSULE ORAL at 14:27

## 2025-06-19 RX ADMIN — DOCUSATE SODIUM 50 MG AND SENNOSIDES 8.6 MG 1 TABLET: 8.6; 5 TABLET, FILM COATED ORAL at 08:34

## 2025-06-19 RX ADMIN — Medication 100 MG: at 08:35

## 2025-06-19 RX ADMIN — PANTOPRAZOLE SODIUM 40 MG: 40 TABLET, DELAYED RELEASE ORAL at 16:26

## 2025-06-19 RX ADMIN — CHLORDIAZEPOXIDE HYDROCHLORIDE 10 MG: 5 CAPSULE ORAL at 08:35

## 2025-06-19 RX ADMIN — IPRATROPIUM BROMIDE AND ALBUTEROL SULFATE 1 DOSE: 2.5; .5 SOLUTION RESPIRATORY (INHALATION) at 02:51

## 2025-06-19 RX ADMIN — SODIUM CHLORIDE, POTASSIUM CHLORIDE, SODIUM LACTATE AND CALCIUM CHLORIDE: 600; 310; 30; 20 INJECTION, SOLUTION INTRAVENOUS at 14:27

## 2025-06-19 RX ADMIN — OXYCODONE 10 MG: 5 TABLET ORAL at 07:45

## 2025-06-19 RX ADMIN — Medication 3 MG: at 21:29

## 2025-06-19 RX ADMIN — WATER 125 MG: 1 INJECTION INTRAMUSCULAR; INTRAVENOUS; SUBCUTANEOUS at 23:13

## 2025-06-19 RX ADMIN — HYDROMORPHONE HYDROCHLORIDE 1 MG: 1 INJECTION, SOLUTION INTRAMUSCULAR; INTRAVENOUS; SUBCUTANEOUS at 05:49

## 2025-06-19 RX ADMIN — ENOXAPARIN SODIUM 30 MG: 100 INJECTION SUBCUTANEOUS at 08:35

## 2025-06-19 RX ADMIN — OXYCODONE 10 MG: 5 TABLET ORAL at 11:59

## 2025-06-19 RX ADMIN — PANTOPRAZOLE SODIUM 40 MG: 40 TABLET, DELAYED RELEASE ORAL at 05:53

## 2025-06-19 RX ADMIN — POLYETHYLENE GLYCOL 3350 17 G: 17 POWDER, FOR SOLUTION ORAL at 17:20

## 2025-06-19 RX ADMIN — OXYCODONE 10 MG: 5 TABLET ORAL at 17:15

## 2025-06-19 RX ADMIN — HYDROMORPHONE HYDROCHLORIDE 1 MG: 1 INJECTION, SOLUTION INTRAMUSCULAR; INTRAVENOUS; SUBCUTANEOUS at 00:43

## 2025-06-19 RX ADMIN — FAMOTIDINE 20 MG: 10 INJECTION, SOLUTION INTRAVENOUS at 08:34

## 2025-06-19 RX ADMIN — CHLORDIAZEPOXIDE HYDROCHLORIDE 10 MG: 5 CAPSULE ORAL at 21:29

## 2025-06-19 RX ADMIN — IPRATROPIUM BROMIDE AND ALBUTEROL SULFATE 1 DOSE: 2.5; .5 SOLUTION RESPIRATORY (INHALATION) at 21:41

## 2025-06-19 RX ADMIN — FOLIC ACID 1 MG: 1 TABLET ORAL at 08:34

## 2025-06-19 ASSESSMENT — PAIN SCALES - GENERAL
PAINLEVEL_OUTOF10: 9
PAINLEVEL_OUTOF10: 2
PAINLEVEL_OUTOF10: 7
PAINLEVEL_OUTOF10: 8
PAINLEVEL_OUTOF10: 6
PAINLEVEL_OUTOF10: 2
PAINLEVEL_OUTOF10: 8
PAINLEVEL_OUTOF10: 5
PAINLEVEL_OUTOF10: 7
PAINLEVEL_OUTOF10: 8
PAINLEVEL_OUTOF10: 7

## 2025-06-19 ASSESSMENT — PAIN DESCRIPTION - DESCRIPTORS
DESCRIPTORS: SHARP;CRAMPING
DESCRIPTORS: SHARP
DESCRIPTORS: SHARP;STABBING
DESCRIPTORS: ACHING;CRAMPING
DESCRIPTORS: SHARP;STABBING;CRAMPING

## 2025-06-19 ASSESSMENT — PAIN DESCRIPTION - LOCATION
LOCATION: ABDOMEN
LOCATION: ABDOMEN;FLANK
LOCATION: ABDOMEN

## 2025-06-19 ASSESSMENT — PAIN DESCRIPTION - ORIENTATION
ORIENTATION: LEFT;UPPER
ORIENTATION: LOWER;LEFT
ORIENTATION: MID
ORIENTATION: LEFT;UPPER
ORIENTATION: LEFT;UPPER

## 2025-06-19 ASSESSMENT — PAIN - FUNCTIONAL ASSESSMENT
PAIN_FUNCTIONAL_ASSESSMENT: ACTIVITIES ARE NOT PREVENTED
PAIN_FUNCTIONAL_ASSESSMENT: ACTIVITIES ARE NOT PREVENTED

## 2025-06-19 NOTE — CONSULTS
Courtesy note (not billed)    This patient asked if I could review his chart because he has been having episodes of sleep paralysis which have been disturbing and occurring more frequently, including during this hospitalization.  This started in his teens and is worse in the setting of stress.  He has had some sleep studies in the past, but the episodes did not occur during the sleep studies.  No evidence of narcolepsy/cataplexy during daytime hours.    Recommendations  After resolution of pancreatitis, consider an SSRI which can help with sleep paralysis  Melatonin 3 mg 1 hour prior to bedtime can also be mildly helpful, often indirectly  A sleep medicine consultation as an outpatient may be helpful, especially to rule out narcolepsy

## 2025-06-19 NOTE — PROGRESS NOTES
Nutrition Assessment  Assessment Type: Initial  Reason for visit:  Best Practice Alert: Malnutrition Screening Tool   Malnutrition Screening Tool Score: 2  Unintentional weight loss PTA: 2 to 13 pounds (1 point)  Eating poorly due to decreased appetite: Yes (1 point)    Nutrition Intervention:   Food and/or Nutrient Delivery:   Meals and Snacks:  Diet: Continue NPO and advance as medically appropriate  Medical Food Supplements:   Medical food supplement therapy:  None Deferred NPO      Malnutrition Assessment:  Academy/A.S.P.E[facility]N Clinical Malnutrition Criteria  Malnutrition Status: At risk for malnutrition (poor appetite & decr po x2 weeks)  Nutrition Focused Physical Exam: Unremarkable     Nutrition Assessment:  Food/Nutrition Related History:   Patient states that at baseline he has a good appetite. He only eats 1 large meal daily. No dietary restrictions at baseline. He notes a decline in appetite x2 weeks prior to admit due to abdominal pain, nausea, early satiety. States it felt like his stomach had a \"cap\" on it. Says he could eat only small amounts of foods. Says his intakes have been minimal x6 days. Denies having issues chewing/swallowing. Does c/o constipation - last BM 2-3 days ago.      Do You Have Any Cultural, Muslim, or Ethnic Food Preferences?: No     Weight History:   EMR weight history review: 276# 8/22/24 (occupational health) and 287# 2/19/25 (FOV). He thinks he lost weight but isn't sure. Notes UBW Is 290 lbs.     Nutrition Background:       PMH significant for EtOH dependence and HTN.   He presents with abdominal pain, chest pain, nausea. He is admitted with alcohol induced pancreatitis.     Nutrition Monitoring/Evaluation:  Patient is reclined in bed, no family present during my visit this afternoon. Provides history as above. States he's feeling somewhat better today. Denies n/v currently. He is hopeful to stay off pain meds so he can have his diet advanced. Is agreeable to ensure clear

## 2025-06-19 NOTE — PROGRESS NOTES
Hospitalist Progress Note   Admit Date:  2025  7:15 AM   Name:  Robb Galvez   Age:  40 y.o.  Sex:  male  :  1985   MRN:  [id number]   Room:  [facility]/    Presenting/Chief Complaint: Abdominal Pain, Chest Pain, and Nausea     Reason(s) for Admission: ETOH abuse [F10.10]  Alcohol-induced acute pancreatitis without infection or necrosis [K85.20]  Alcohol-induced acute pancreatitis, unspecified complication status [K85.20]     Hospital Course:   Robb Galvze is a 40 y.o. male with medical history of ETOH abuse, cigarette smoker, hypertension who presented with c/o abd pains after drinking 1.5 pint of vodka.  He has been having abdominal pain, nausea, vomiting x 3 days.  He reports that abdominal pain is epigastric region, sharp that radiates to his back and upper chest.      VS on arrival: Tmax 97.7, RR 18, NV , /104 with room air sats of 96%.  Blood work remarkable for magnesium 1.7, lactic acid 2.1 which improved to 1.2 with IV fluids, T. bili 1.6, AST 54.  Blood alcohol level of 46, lipase of 749.  Hospitalist requested for inpatient admission.    Subjective & 24hr Events:   \"Can I eat something?\"  40y.o. AA male laying in bed    Admits to mild abd pains (s/p IV dilaudid and PO oxycodone earlier).  Denies HA, visual deficits, fever, N/V, dyspnea.    Assessment & Plan:     Principal Problem:    Alcohol-induced acute pancreatitis without infection or necrosis  - clinically stable  - triglyceride level pending  - ETOH cessation counseled   - if patient does not require IV pain meds, can start CLD  - lipase improved  - DT precautions; tapering librium     Active Problems:    Hypertension  - exacerbated by acute pain syndrome  - BP controlled this AM; cont norvasc as dosed      Transaminitis  - secondary to ETOH abuse with N/V  - cont NPO  - f/u repeat LFTs      Worsening sleep paralysis  - appreciate Dr. Russell's evaluation today, recommending to start SSRI once acute pancreatitis resolves

## 2025-06-19 NOTE — PLAN OF CARE
Problem: Discharge Planning  Goal: Discharge to home or other facility with appropriate resources  6/18/2025 2355 by Maria Del Rosario Carmona RN  Outcome: Progressing  6/18/2025 2355 by Maria DelR osario Carmona RN  Outcome: Progressing  6/18/2025 1736 by Delaney Garcia RN  Outcome: Progressing     Problem: Seizure Precautions  Goal: Remains free of injury related to seizures activity  6/18/2025 2355 by Maria Del Rosario Carmona RN  Outcome: Progressing  6/18/2025 2355 by Maria Del Rosario Carmona RN  Outcome: Progressing  6/18/2025 1736 by Delaney Garcia RN  Outcome: Progressing     Problem: Pain  Goal: Verbalizes/displays adequate comfort level or baseline comfort level  6/18/2025 2355 by Maria Del Rosario Carmona RN  Outcome: Not Progressing  6/18/2025 2355 by Maria Del Rosario Carmona RN  Outcome: Progressing  6/18/2025 1736 by Delaney Garcia RN  Outcome: Progressing

## 2025-06-19 NOTE — PROGRESS NOTES
Pt given PO pain meds per MAR. Pt advanced to CLD. US done today. IVF infusing. PRN glycolax also given per pt request.    Rounds performed throughout shift. Pt denies needs at this time. Bed in low position, locked and call light/personal items within reach.

## 2025-06-20 VITALS
BODY MASS INDEX: 43.19 KG/M2 | RESPIRATION RATE: 20 BRPM | TEMPERATURE: 98.1 F | DIASTOLIC BLOOD PRESSURE: 86 MMHG | HEIGHT: 68 IN | WEIGHT: 285 LBS | OXYGEN SATURATION: 94 % | HEART RATE: 89 BPM | SYSTOLIC BLOOD PRESSURE: 143 MMHG

## 2025-06-20 LAB
ANION GAP SERPL CALC-SCNC: 12 MMOL/L (ref 7–16)
BUN SERPL-MCNC: 3 MG/DL (ref 6–23)
CALCIUM SERPL-MCNC: 9.8 MG/DL (ref 8.8–10.2)
CHLORIDE SERPL-SCNC: 97 MMOL/L (ref 98–107)
CO2 SERPL-SCNC: 29 MMOL/L (ref 20–29)
CREAT SERPL-MCNC: 0.8 MG/DL (ref 0.8–1.3)
EKG ATRIAL RATE: 97 BPM
EKG DIAGNOSIS: NORMAL
EKG P AXIS: 67 DEGREES
EKG P-R INTERVAL: 134 MS
EKG Q-T INTERVAL: 364 MS
EKG QRS DURATION: 90 MS
EKG QTC CALCULATION (BAZETT): 462 MS
EKG R AXIS: 61 DEGREES
EKG T AXIS: 13 DEGREES
EKG VENTRICULAR RATE: 97 BPM
GLUCOSE SERPL-MCNC: 160 MG/DL (ref 70–99)
LIPASE SERPL-CCNC: 125 U/L (ref 13–60)
MAGNESIUM SERPL-MCNC: 1.8 MG/DL (ref 1.8–2.4)
POTASSIUM SERPL-SCNC: 4 MMOL/L (ref 3.5–5.1)
SODIUM SERPL-SCNC: 137 MMOL/L (ref 136–145)

## 2025-06-20 PROCEDURE — 80048 BASIC METABOLIC PNL TOTAL CA: CPT

## 2025-06-20 PROCEDURE — 83690 ASSAY OF LIPASE: CPT

## 2025-06-20 PROCEDURE — 6370000000 HC RX 637 (ALT 250 FOR IP): Performed by: HOSPITALIST

## 2025-06-20 PROCEDURE — 83735 ASSAY OF MAGNESIUM: CPT

## 2025-06-20 PROCEDURE — 6360000002 HC RX W HCPCS: Performed by: HOSPITALIST

## 2025-06-20 PROCEDURE — 6370000000 HC RX 637 (ALT 250 FOR IP): Performed by: PHYSICIAN ASSISTANT

## 2025-06-20 PROCEDURE — 2500000003 HC RX 250 WO HCPCS: Performed by: HOSPITALIST

## 2025-06-20 PROCEDURE — 36415 COLL VENOUS BLD VENIPUNCTURE: CPT

## 2025-06-20 RX ORDER — FOLIC ACID 1 MG/1
1 TABLET ORAL DAILY
Qty: 30 TABLET | Refills: 0 | Status: SHIPPED | OUTPATIENT
Start: 2025-06-21

## 2025-06-20 RX ORDER — PANTOPRAZOLE SODIUM 40 MG/1
40 TABLET, DELAYED RELEASE ORAL DAILY
Qty: 30 TABLET | Refills: 0 | Status: SHIPPED | OUTPATIENT
Start: 2025-06-20

## 2025-06-20 RX ORDER — LANOLIN ALCOHOL/MO/W.PET/CERES
100 CREAM (GRAM) TOPICAL DAILY
Qty: 30 TABLET | Refills: 0 | Status: SHIPPED | OUTPATIENT
Start: 2025-06-21

## 2025-06-20 RX ORDER — CHLORDIAZEPOXIDE HYDROCHLORIDE 5 MG/1
10 CAPSULE, GELATIN COATED ORAL 2 TIMES DAILY
Status: DISCONTINUED | OUTPATIENT
Start: 2025-06-20 | End: 2025-06-20

## 2025-06-20 RX ORDER — AMLODIPINE BESYLATE 2.5 MG/1
2.5 TABLET ORAL DAILY
Qty: 30 TABLET | Refills: 0 | Status: SHIPPED | OUTPATIENT
Start: 2025-06-21

## 2025-06-20 RX ORDER — METOPROLOL TARTRATE 1 MG/ML
5 INJECTION, SOLUTION INTRAVENOUS EVERY 6 HOURS PRN
Status: DISCONTINUED | OUTPATIENT
Start: 2025-06-20 | End: 2025-06-20 | Stop reason: HOSPADM

## 2025-06-20 RX ADMIN — SODIUM CHLORIDE, PRESERVATIVE FREE 10 ML: 5 INJECTION INTRAVENOUS at 08:53

## 2025-06-20 RX ADMIN — CHLORDIAZEPOXIDE HYDROCHLORIDE 10 MG: 5 CAPSULE ORAL at 08:54

## 2025-06-20 RX ADMIN — PANTOPRAZOLE SODIUM 40 MG: 40 TABLET, DELAYED RELEASE ORAL at 06:43

## 2025-06-20 RX ADMIN — Medication 100 MG: at 08:55

## 2025-06-20 RX ADMIN — FAMOTIDINE 20 MG: 10 INJECTION, SOLUTION INTRAVENOUS at 08:53

## 2025-06-20 RX ADMIN — AMLODIPINE BESYLATE 2.5 MG: 5 TABLET ORAL at 08:54

## 2025-06-20 RX ADMIN — FOLIC ACID 1 MG: 1 TABLET ORAL at 08:54

## 2025-06-20 NOTE — DISCHARGE SUMMARY
Hospitalist Discharge Summary   Admit Date:  2025  7:15 AM   DC Note date: 2025  Name:  Robb Galvez   Age:  40 y.o.  Sex:  male  :  1985   MRN:  [id number]   Room:  [facility]/  PCP:  Sher Rendon APRN - NP    Presenting Complaint: Abdominal Pain, Chest Pain, and Nausea     Initial Admission Diagnosis: ETOH abuse [F10.10]  Alcohol-induced acute pancreatitis without infection or necrosis [K85.20]  Alcohol-induced acute pancreatitis, unspecified complication status [K85.20]     Problem List for this Hospitalization (present on admission):    Principal Problem:    Alcohol-induced acute pancreatitis without infection or necrosis  Active Problems:    Class 3 severe obesity due to excess calories with body mass index (BMI) of 40.0 to 44.9 in adult (HCC)    Primary hypertension    Alcohol dependence with withdrawal (HCC)  Resolved Problems:    * No resolved hospital problems. *      Hospital Course:  Robb Galvez is a 40 y.o. male with medical history of ETOH abuse, cigarette smoker, hypertension who presented with c/o abd pains after drinking 1.5 pint of vodka.  He has been having abdominal pain, nausea, vomiting x 3 days.  He reports that abdominal pain is epigastric region, sharp that radiates to his back and upper chest.       VS on arrival: Tmax 97.7, RR 18, [place] , /104 with room air sats of 96%.  Blood work remarkable for magnesium 1.7, lactic acid 2.1 which improved to 1.2 with IV fluids, T. bili 1.6, AST 54.  Blood alcohol level of 46, lipase of 749.  Hospitalist requested for inpatient admission.    Alcohol-induced acute pancreatitis without infection or necrosis treated with analgesics and IVF.  Triglyceride level wnl; ETOH cessation counseled.  Pt was tapered off librium dosing for avoid DTs and subsequently tolerated CLD advancing to GI bland diet without further exacerbation of abd pain.  Acute pancreatitis resolved with normalizing lipase levels.  Given elevated LFTs and Tbili,

## 2025-06-20 NOTE — PLAN OF CARE
Problem: Discharge Planning  Goal: Discharge to home or other facility with appropriate resources  Outcome: Progressing     Problem: Seizure Precautions  Goal: Remains free of injury related to seizures activity  Outcome: Progressing     Problem: Pain  Goal: Verbalizes/displays adequate comfort level or baseline comfort level  Outcome: Not Progressing

## 2025-06-20 NOTE — PROGRESS NOTES
Discharge instructions discussed with patient, opportunity to ask questions provided, patient verbalizes understanding. PIV removed with no complications, dressing applied, catheter intact.

## 2025-06-20 NOTE — PROGRESS NOTES
[facility]  [address] [place] [address]  Phone: [phone]             June 20, 2025    Patient: Robb Galvez   YOB: 1985   Date of Visit: 6/18/2025       To Whom It May Concern:    Robb Galvez was seen and treated in our facility  beginning 6/18/2025 until . He may return to work on 06/21/2025.      Sincerely,       TYLER FOSTER RN         Signature:__________________________________

## 2025-06-24 ENCOUNTER — TELEPHONE (OUTPATIENT)
Dept: INTERNAL MEDICINE CLINIC | Facility: CLINIC | Age: 40
End: 2025-06-24

## 2025-06-24 NOTE — TELEPHONE ENCOUNTER
Spoke with patient regarding TCM care and PCP follow-up.  He is doing much better since hospital discharge and is motivated to get help regarding his alcohol abuse.  At the moment ,however, he cannot afford the self-pay fees for counseling etc. but hopes he can do so when he gets back on medical insurance.